# Patient Record
Sex: FEMALE | Race: BLACK OR AFRICAN AMERICAN | NOT HISPANIC OR LATINO | Employment: OTHER | URBAN - METROPOLITAN AREA
[De-identification: names, ages, dates, MRNs, and addresses within clinical notes are randomized per-mention and may not be internally consistent; named-entity substitution may affect disease eponyms.]

---

## 2017-01-17 ENCOUNTER — HOSPITAL ENCOUNTER (EMERGENCY)
Facility: OTHER | Age: 57
Discharge: HOME | End: 2017-01-17
Attending: EMERGENCY MEDICINE
Payer: MEDICAID

## 2017-01-17 VITALS
SYSTOLIC BLOOD PRESSURE: 134 MMHG | DIASTOLIC BLOOD PRESSURE: 68 MMHG | TEMPERATURE: 98 F | HEIGHT: 65 IN | WEIGHT: 213 LBS | OXYGEN SATURATION: 98 % | HEART RATE: 70 BPM | BODY MASS INDEX: 35.49 KG/M2 | RESPIRATION RATE: 16 BRPM

## 2017-01-17 DIAGNOSIS — R52 PAIN: ICD-10-CM

## 2017-01-17 DIAGNOSIS — M25.562 ACUTE PAIN OF LEFT KNEE: Primary | ICD-10-CM

## 2017-01-17 PROCEDURE — 96372 THER/PROPH/DIAG INJ SC/IM: CPT

## 2017-01-17 PROCEDURE — 63600175 PHARM REV CODE 636 W HCPCS: Performed by: EMERGENCY MEDICINE

## 2017-01-17 PROCEDURE — 99283 EMERGENCY DEPT VISIT LOW MDM: CPT | Mod: 25

## 2017-01-17 RX ORDER — KETOROLAC TROMETHAMINE 30 MG/ML
30 INJECTION, SOLUTION INTRAMUSCULAR; INTRAVENOUS
Status: COMPLETED | OUTPATIENT
Start: 2017-01-17 | End: 2017-01-17

## 2017-01-17 RX ORDER — IBUPROFEN 800 MG/1
800 TABLET ORAL 3 TIMES DAILY PRN
Qty: 20 TABLET | Refills: 0 | Status: SHIPPED | OUTPATIENT
Start: 2017-01-17 | End: 2017-02-02 | Stop reason: ALTCHOICE

## 2017-01-17 RX ADMIN — KETOROLAC TROMETHAMINE 30 MG: 30 INJECTION, SOLUTION INTRAMUSCULAR at 09:01

## 2017-01-17 NOTE — ED AVS SNAPSHOT
[facility]  [address] [address]               Susan Courtney   2017  8:37 PM   ED    Description:  Female : 1960   Department:  [facility]           Your Care was Coordinated By:     Provider Role From To    Yaw Veliz MD Attending Provider 17 --    Theresa Phillip PA-C Physician Assistant 17      Reason for Visit     Knee Pain           Diagnoses this Visit        Comments    Acute pain of left knee    -  Primary     Pain           ED Disposition     None           To Do List           Follow-up Information     Follow up with Archana Burgess MD. Schedule an appointment as soon as possible for a visit in 3 days.    Specialty:  General Practice    Contact information:    [address] [place] [id number] [phone]          Follow up with [facility].    Specialty:  Emergency Medicine    Why:  If symptoms worsen    Contact information:    [address] [address] [phone]       These Medications        Disp Refills Start End    ibuprofen (ADVIL,MOTRIN) 800 MG tablet 20 tablet 0 2017     Take 1 tablet (800 mg total) by mouth 3 (three) times daily as needed for Pain. - Oral    Pharmacy: [facility]/pharmacy #[id number] - [place], [place] - [address] Ph #: [phone]         [facility]     [facility] Care [facility] -  Assistance  Registered nurses in the [facility] provide clinical advisement, health education, appointment booking, and other advisory services.  Call for this free service at [phone].             Medications           Message regarding Medications     Verify the changes and/or additions to your medication regime listed below are the same as discussed with your clinician today.  If any of these changes or additions are incorrect, please notify your healthcare provider.    "     START taking these NEW medications        Refills    ibuprofen (ADVIL,MOTRIN) 800 MG tablet 0    Sig: Take 1 tablet (800 mg total) by mouth 3 (three) times daily as needed for Pain.    Class: Print    Route: Oral      These medications were administered today        Dose Freq    ketorolac injection 30 mg 30 mg ED 1 Time    Sig: Inject 30 mg into the muscle ED 1 Time.    Class: Normal    Route: Intramuscular           Verify that the below list of medications is an accurate representation of the medications you are currently taking.  If none reported, the list may be blank. If incorrect, please contact your healthcare provider. Carry this list with you in case of emergency.           Current Medications     ALBUTEROL INHL Inhale into the lungs as needed.    amitriptyline (ELAVIL) 150 MG Tab Take 150 mg by mouth every evening.    clotrimazole (LOTRIMIN) 1 % Soln Apply topically 2 (two) times daily.    fluticasone-salmeterol 100-50 mcg/dose (ADVAIR) 100-50 mcg/dose diskus inhaler Inhale 1 puff into the lungs 2 (two) times daily as needed.     hydrochlorothiazide (HYDRODIURIL) 25 MG tablet Take 25 mg by mouth once daily.    metformin (FORTAMET) 500 mg 24 hr tablet Take 500 mg by mouth 2 (two) times daily with meals.     omeprazole (PRILOSEC) 20 MG capsule Take 20 mg by mouth once daily.    ondansetron (ZOFRAN) 4 MG tablet Take 4 mg by mouth every 6 (six) hours as needed.    topiramate (TOPAMAX) 50 MG tablet Take 1 tablet (50 mg total) by mouth every evening.    ibuprofen (ADVIL,MOTRIN) 800 MG tablet Take 1 tablet (800 mg total) by mouth 3 (three) times daily as needed for Pain.    ketorolac injection 30 mg Inject 30 mg into the muscle ED 1 Time.           Clinical Reference Information           Your Vitals Were     BP Pulse Temp Resp Height Weight    137/62 (BP Location: Left arm, Patient Position: Sitting) 79 97.8 °F (36.6 °C) (Oral) 18 5' 5" (1.651 m) 96.6 kg (213 lb)    SpO2 BMI             98% 35.45 kg/m2    "      Allergies as of 1/17/2017        Reactions    Morphine       Immunizations Administered on Date of Encounter - 1/17/2017     None      ED Micro, Lab, POCT     None      ED Imaging Orders     Start Ordered       Status Ordering Provider    01/17/17 2041 01/17/17 2041  X-Ray Knee 3 View Left  1 time imaging      Final result       Discharge References/Attachments     ACE WRAP (ENGLISH)    ARTHRALGIA (ENGLISH)    KNEE PAIN AND SWELLING, REDUCING (ENGLISH)    IBUPROFEN ORAL TABLET (ENGLISH)      [facility] Sign-Up     Activating your [facility] account is as easy as 1-2-3!     1) Visit [email or web address], select Sign Up Now, enter this activation code and your date of birth, then select Next.  [email or web address]  Expires: 3/3/2017  9:22 PM      2) Create a username and password to use when you visit [facility] in the future and select a security question in case you lose your password and select Next.    3) Enter your e-mail address and click Sign Up!    Additional Information  If you have questions, please e-mail [email or web address] or call [phone] to talk to our [facility] staff. Remember, [facility] is NOT to be used for urgent needs. For medical emergencies, dial 911.          [facility] complies with applicable Federal civil rights laws and does not discriminate on the basis of race, color, national origin, age, disability, or sex.        Language Assistance Services     ATTENTION: Language assistance services are available, free of charge. Please call [phone].      ATENCIÓN: Si habla español, tiene a wilkerson disposición servicios gratuitos de asistencia lingüística. Llame al [phone].     CHÚ Ý: N?u b?n nói Ti?ng Vi?t, có các d?ch v? h? tr? ngôn ng? mi?n phí dành cho b?n. G?i s? [phone].

## 2017-01-18 NOTE — PROVIDER PROGRESS NOTES - EMERGENCY DEPT.
Encounter Date: 1/17/2017    ED Physician Progress Notes        Physician Note:   I evaluated the patient in triage and performed medical screening exam. Patient stable at this time and awaiting bed. Chief complaint and vital signs reviewed.

## 2017-01-18 NOTE — ED PROVIDER NOTES
Encounter Date: 1/17/2017    SCRIBE #1 NOTE: I, Roz Weller , am scribing for, and in the presence of, Dr. Veliz.       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 female who presents with complaint of knee pain. Symptoms began one week ago. Left knee pain is described as constant and moderate. Pt reports mild swelling to the knee, but denies fever, chills, nausea, vomiting, bruising, or back pain. She took Ibuprofen (800 mg) and applied heat and Icy-Hot with no relief. Pt reports an increased amount of standing, and has previously experienced symptoms.      The history is provided by the patient.     Past Medical History   Diagnosis Date    Abdominal hernia     Asthma     Diabetes mellitus     Hypertension     Migraine     Obesity     Sleep apnea      No past medical history pertinent negatives.  Past Surgical History   Procedure Laterality Date    Cholecystectomy      Hysterectomy       complete    Foot surgery       Family History   Problem Relation Age of Onset    Psoriasis Neg Hx     Melanoma Neg Hx     Lupus Neg Hx      Social History   Substance Use Topics    Smoking status: Never Smoker    Smokeless tobacco: Not on file    Alcohol use No     Review of Systems   Constitutional: Negative for chills and fever.   HENT: Negative for congestion and sore throat.    Eyes: Negative for redness and visual disturbance.   Respiratory: Negative for cough and shortness of breath.    Cardiovascular: Negative for chest pain and palpitations.   Gastrointestinal: Negative for abdominal pain, diarrhea, nausea and vomiting.   Genitourinary: Negative for dysuria.   Musculoskeletal: Positive for joint swelling. Negative for back pain.        Positive for left knee pain with mild  swelling.   Skin: Negative for rash.        Negative for bruising.   Neurological: Negative for weakness and headaches.   Psychiatric/Behavioral: Negative for confusion.       Physical Exam   Initial Vitals   BP Pulse Resp Temp SpO2   01/17/17 1951 01/17/17 1951 01/17/17 1951 01/17/17 1951 01/17/17 1951   137/62 79 18 97.8 °F (36.6 °C) 98 %     Physical Exam    Nursing note and vitals reviewed.  Constitutional: She appears well-developed and well-nourished. She is not diaphoretic. No distress.   HENT:   Head: Normocephalic and atraumatic.   Right Ear: External ear normal.   Left Ear: External ear normal.   Eyes: Conjunctivae and EOM are normal. Pupils are equal, round, and reactive to light. Right eye exhibits no discharge. Left eye exhibits no discharge. No scleral icterus.   Neck: Normal range of motion. Neck supple.   Cardiovascular: Normal rate, regular rhythm, normal heart sounds and intact distal pulses. Exam reveals no gallop and no friction rub.    No murmur heard.  Pulmonary/Chest: Breath sounds normal. No stridor. No respiratory distress. She has no wheezes. She has no rhonchi. She has no rales.   Abdominal: Soft. She exhibits no distension. There is no tenderness. There is no rebound and no guarding.   Musculoskeletal: Normal range of motion. She exhibits edema and tenderness.   Minimal swelling and tenderness to the left knee. Not ballotable, hot, or erythematous. Full ROM. Rest of the extremity is non-tender.   Neurological: She is alert and oriented to person, place, and time. She has normal strength. No cranial nerve deficit.   Skin: Skin is warm and dry. No rash noted. No erythema. No pallor.   Psychiatric: She has a normal mood and affect. Her behavior is normal. Judgment and thought content normal.         ED Course   Procedures  Labs Reviewed - No data to display      Imaging Results         X-Ray Knee 3 View Left (Final result) Result time:  01/17/17 21:02:33    Final result by Gonzalo MOSQUERA  MD Casper (01/17/17 21:02:33)    Impression:      No acute fracture. Mild osteoarthritis.      Electronically signed by: MARANDA CARMEN MD  Date:     01/17/17  Time:    21:02     Narrative:    AP, lateral, sunrise views of the left knee    Comparison: None available.    Findings:    There is no fracture or dislocation. There is no joint effusion. Mild tricompartment osteoarthritis.                X-Rays:   Independently Interpreted Readings:   Other Readings:  Left knee: No fracture or dislocation.    Medical Decision Making:   Clinical Tests:   Radiological Study: Ordered and Reviewed  ED Management:  Well-appearing patient presents with a week of knee pain.  Patient reports that she is standing on her feet all day and she thinks is leading to pain.  She has no warmth, no erythema, and minimal swelling on exam.  Forward motion of the knee, though she does report painful.  Diffusely tender including the popliteal fossa.  X-ray without acute findings.  X-ray does note arthritis.  I think this likely the cause of her pain. I have considered septic arthritis, but there are no signs of this, and she is afebrile. I recommend Ace wrap ice and ibuprofen 800.  I also recommend she follow-up with primary care to request sports medicine referral.      HERNANDEZ Veliz M.D.  01/18/2017  5:00 AM              Scribe Attestation:   Scribe #1: I performed the above scribed service and the documentation accurately describes the services I performed. I attest to the accuracy of the note.    Attending Attestation:           Physician Attestation for Scribe:  Physician Attestation Statement for Scribe #1: I, Dr. Veliz, reviewed documentation, as scribed by Roz Weller  in my presence, and it is both accurate and complete.                 ED Course     Clinical Impression:   Knee pain left          Yaw Veliz MD  01/18/17 0504

## 2017-01-18 NOTE — ED NOTES
Pt presents to the ED with c/o L knee pain 10/10 pain, limping gait, that has been going on for about a week. Pt reports she has been taking motrin without relief and using heat without relief. 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 Pt reports she has been trying multiple home therapies but nothing is working. Pt has minimal swelling around the L knee and tenderness to palpation.

## 2017-01-20 ENCOUNTER — HOSPITAL ENCOUNTER (EMERGENCY)
Facility: OTHER | Age: 57
Discharge: HOME | End: 2017-01-21
Attending: EMERGENCY MEDICINE
Payer: MEDICAID

## 2017-01-20 DIAGNOSIS — M25.562 ACUTE PAIN OF LEFT KNEE: ICD-10-CM

## 2017-01-20 DIAGNOSIS — R60.0 LEG EDEMA: Primary | ICD-10-CM

## 2017-01-20 DIAGNOSIS — M71.22 BAKER'S CYST OF KNEE, LEFT: ICD-10-CM

## 2017-01-20 PROCEDURE — 99284 EMERGENCY DEPT VISIT MOD MDM: CPT

## 2017-01-20 NOTE — ED AVS SNAPSHOT
[facility]  [address] [id number]               Susan Courtney   2017 10:26 PM   ED    Description:  Female : 1960   Department:  [facility]           Your Care was Coordinated By:     Provider Role From To    Isaiah Pedraza MD Attending Provider 17 --    Yanet Parker PA-C Physician Assistant 17 --      Reason for Visit     Knee Pain           Diagnoses this Visit        Comments    Leg edema    -  Primary     Acute pain of left knee         Baker's cyst of knee, left           ED Disposition     ED Disposition Condition Comment    Discharge             To Do List           Follow-up Information     Follow up with Ahmet Shannon MD In 2 days.    Specialty:  Orthopedic Surgery    Why:  As previously planned.     Contact information:    [address] [place] [id number]  [phone]         These Medications        Disp Refills Start End    meloxicam (MOBIC) 7.5 MG tablet 20 tablet 0 2017     Take 1 tablet (7.5 mg total) by mouth once daily. - Oral    Pharmacy: [facility]/pharmacy #[id number] - [place], [place] - [address] Ph #: [phone]         [facility]     [facility] -  Assistance  Registered nurses in the [facility] provide clinical advisement, health education, appointment booking, and other advisory services.  Call for this free service at [phone].             Medications           Message regarding Medications     Verify the changes and/or additions to your medication regime listed below are the same as discussed with your clinician today.  If any of these changes or additions are incorrect, please notify your healthcare provider.        START taking these NEW medications        Refills    meloxicam (MOBIC) 7.5 MG tablet 0    Sig: Take 1 tablet (7.5 mg total) by mouth once daily.    Class: Print    Route: Oral      STOP  "taking these medications     fluticasone-salmeterol 100-50 mcg/dose (ADVAIR) 100-50 mcg/dose diskus inhaler Inhale 1 puff into the lungs 2 (two) times daily as needed.            Verify that the below list of medications is an accurate representation of the medications you are currently taking.  If none reported, the list may be blank. If incorrect, please contact your healthcare provider. Carry this list with you in case of emergency.           Current Medications     ALBUTEROL INHL Inhale into the lungs as needed.    amitriptyline (ELAVIL) 150 MG Tab Take 150 mg by mouth every evening.    hydrochlorothiazide (HYDRODIURIL) 25 MG tablet Take 25 mg by mouth once daily.    ibuprofen (ADVIL,MOTRIN) 800 MG tablet Take 1 tablet (800 mg total) by mouth 3 (three) times daily as needed for Pain.    metformin (FORTAMET) 500 mg 24 hr tablet Take 500 mg by mouth 2 (two) times daily with meals.     omeprazole (PRILOSEC) 20 MG capsule Take 20 mg by mouth once daily.    topiramate (TOPAMAX) 50 MG tablet Take 1 tablet (50 mg total) by mouth every evening.    clotrimazole (LOTRIMIN) 1 % Soln Apply topically 2 (two) times daily.    meloxicam (MOBIC) 7.5 MG tablet Take 1 tablet (7.5 mg total) by mouth once daily.    ondansetron (ZOFRAN) 4 MG tablet Take 4 mg by mouth every 6 (six) hours as needed.           Clinical Reference Information           Your Vitals Were     BP Pulse Temp Resp Height Weight    128/73 (BP Location: Left arm, Patient Position: Sitting, BP Method: Automatic) 75 97.8 °F (36.6 °C) (Oral) 18 5' 5.5" (1.664 m) 99.8 kg (220 lb)    SpO2 BMI             98% 36.05 kg/m2         Allergies as of 1/21/2017        Reactions    Morphine       Immunizations Administered on Date of Encounter - 1/21/2017     None      ED Micro, Lab, POCT     None      ED Imaging Orders     Start Ordered       Status Ordering Provider    01/20/17 2249 01/20/17 2249   Lower Extremity Veins Left  1 time imaging      Final result     "     Discharge Instructions           Treatment for Bakers Cyst (Popliteal Cyst)  A Bakers cyst (popliteal cyst) is a fluid-filled sac that forms behind the knee.  Types of treatment  You likely wont need any treatment if you dont have any symptoms from your Bakers cyst. Some Bakers cysts go away without any treatment. If your cyst starts causing symptoms, you might need treatment at that time.  If you do have symptoms, you may be treated depending on the cause of your cyst. For example, you may need medicine for rheumatoid arthritis. Or you may need physical therapy for osteoarthritis.  Other treatments for a Bakers cyst can include:  · Over-the-counter pain medicines  · Arthrocentesis to remove extra fluid from the joint space  · Steroid injection into the joint to reduce cyst size  · Surgery to remove the cyst  Possible complications of a Bakers cyst  In rare cases, a Bakers cyst may cause complications. The cyst may get larger, which may cause redness and swelling. The cyst may also rupture, causing warmth, redness, and pain in your calf.  The symptoms may be the same as a blood clot in the veins of the legs. Your health care provider may need imaging tests of your leg to make sure you dont have a clot. Rupture can also lead to its own complications, such as:  · Trapping of a tibial nerve. This cases calf pain and numbness behind the leg. It can be treated with arthrocentesis and steroid injections.  · Blockage of the popliteal artery. This causes pain and lack of blood flow to the leg. It can also be treated with arthrocentesis and steroid injections.  · Compartment syndrome. This causes intense pain and problems moving the foot or toes. Compartment syndrome is a medical emergency. It needs immediate surgery. It can lead to permanent muscle damage if not treated right away.  When to call the health care provider  If your cyst starts causing mild symptoms, plan to see your health care provider soon. See  him or her right away if you have symptoms such as redness and swelling of your leg. These symptoms may mean your Bakers cyst has ruptured.      © 1086-5042 [employer]. [address] [place] [address], [place] [address]. All rights reserved. This information is not intended as a substitute for professional medical care. Always follow your healthcare professional's instructions.          Understanding Bakers Cyst (Popliteal Cyst)  A Bakers cyst (popliteal cyst) is a fluid-filled sac that forms behind the knee.  Parts of the knee  The knee is a complex joint that has many parts. The lower end of the thighbone (femur) rotates on the upper end of the shinbone (tibia). There are several small bursae around the knee joint. These are small sacs filled with a special fluid (synovial fluid) that cushions the rest of the joint. Between the bones is a space that also contains this fluid.  What causes a Bakers cyst?  A small bursa sits just below the crease at the back of your knee. When this sac fills with too much fluid, its called a Bakers cyst. This might happen when an injury or disease causes extra synovial fluid to leak into the bursa from the joint space.  In adults, other problems with the knee joint often cause the Bakers cyst. Injury or a knee disorder can change the normal structure of the knee joint. This can cause a cyst to form.  The synovial fluid inside the joint space may build up as a result of injury or disease. As the pressure builds up, the fluid may bulge into the back of the knee. This can cause the cyst.  Symptoms of a Bakers cyst  A Bakers cyst often doesnt cause symptoms. A cyst will more often be seen on an imaging test, like MRI, done for other reasons. If you do have symptoms, they may include:  · Pain in the back of the knee  · Knee stiffness  · Sense of swelling or fullness behind the knee, especially when you straighten your leg  · A swelling behind the knee that goes away when  you bend your knee  These symptoms tend to get worse when standing for a long time, or being active.  Diagnosing a Bakers cyst  Your health care provider will ask you about your medical history and your symptoms. He or she will give you a physical exam, which will include a careful exam of your knee. Its important to make sure your symptoms are caused by a Bakers cyst and not a tumor or a blood clot.  If the cause of your symptoms is not clear, you may have imaging tests, such as:  · Ultrasound, to look at the cyst in more detail  · X-ray, to get more information about the bones of the joint  · MRI, if the diagnosis is still unclear after ultrasound, or if your health care provider is considering surgery  © 5974-2768 [employer]. [address] [place] [address], [place] [address]. All rights reserved. This information is not intended as a substitute for professional medical care. Always follow your healthcare professional's instructions.        Knee Pain of Uncertain Cause    There are several common causes for knee pain. These can include:  · A sprain of the ligaments that support the joint  · An injury to the cartilage lining of the joint  · Arthritis from wear-and-tear or inflammation  There are other causes as well. There may also be swelling, reduced movement of the knee joint, and pain with walking. A definite diagnosis will still need to be made. If your symptoms do not improve, further follow-up and testing may be needed.  Home care  · Stay off the injured leg as much as possible until pain improves.  · Apply an ice pack over the injured area for 15 to 20 minutes every 3 to 6 hours. You should do this for the first 24 to 48 hours. You can make an ice pack by filling a plastic bag that seals at the top with ice cubes and then wrapping it with a thin towel. Continue to use ice packs for relief of pain and swelling as needed. As the ice melts, be careful to avoid getting your wrap, splint, or cast wet.  After 48 hours, apply heat (warm shower or warm bath) for 15 to 20 minutes several times a day, or alternate ice and heat. If you have to wear a hook-and-loop knee brace, you can open it to apply the ice pack, or heat, directly to the knee. Never put ice directly on the skin. Always wrap the ice in a towel or other type of cloth.  · You may use over-the-counter pain medicine to control pain, unless another pain medicine was prescribed. If you have chronic liver or kidney disease or ever had a stomach ulcer or GI bleeding, talk with your healthcare provider using these medicines.  · If crutches or a walker have been recommended, do not put weight on the injured leg until you can do so without pain. Check with your healthcare provider before returning to sports or full work duties.  · If you have a hook-and-loop knee brace, you can remove it to bathe and sleep, unless told otherwise.  Follow-up care  Follow up with your healthcare provider as advised. This is usually within 1-2 weeks.  If X-rays were taken, you will be told of any new findings that may affect your care.  Call 911  Call 911 if you have:  · Shortness of breath  · Chest pain  When to seek medical advice  Call your healthcare provider right away if any of these occur:  · Toes or foot becomes swollen, cold, blue, numb, or tingly  · Pain or swelling spreads over the knee or calf  · Warmth or redness appears over the knee or calf  · Other joints become painful  · Rash appears  · Fever of 100.4°F (38°C) or above lasting for 24 to 48 hours  © 0621-7102 [employer]. [address] [place] [address], [place] [address]. All rights reserved. This information is not intended as a substitute for professional medical care. Always follow your healthcare professional's instructions.          Reducing Knee Pain and Swelling    Many treatments can help reduce pain and swelling in your knee. Your healthcare provider or physical therapist may suggest one or more of the  following treatments:  · Icing your knee helps reduce swelling. You may be asked to ice your knee once a day or more. Apply ice for about 15 to 20 minutes at a time, with at least 40 minutes between sessions. Always keep a towel between the ice and your skin.   · Keeping your leg raised above your heart helps excess fluid flow out of your knee joint. This reduces swelling.  · Compression means wrapping an elastic bandage or neoprene sleeve snugly around your knees. This keeps fluid from collecting in your knee joint.  · Electrical stimulation, done by a physical therapist or , can help reduce excess fluid in your knee joint.  · Anti-inflammatory medicines may be prescribed by your healthcare provider. You may take pills or receive injections in your knee.  · Isometric (cynthia) exercises strengthen the muscles that support your knee joint. They also help reduce excess fluid in your knee.  · Massage helps fluid drain away from your knee.  © 9399-6007 [employer]. [address] [place] [address]. All rights reserved. This information is not intended as a substitute for professional medical care. Always follow your healthcare professional's instructions.          Leg Swelling in a Single Leg  Swelling of the arms, feet, ankles, and legs is called edema. It is caused by extra fluid collecting in the tissues. Because of gravity, extra fluid in the body settles to the lowest part. That is why the legs and feet are most affected. You have swelling in a single leg.  Some of the causes for swelling in only a single leg include:  · Infection in the foot or leg  · Long-term problem with a vein not working well (venous insufficiency)  · Swollen, twisted vein in the leg (varicose veins)  · Insect bite or sting on the foot or leg  · Injury or recent surgery on the foot or leg  · Blood clot in a deep vein of the leg (deep vein thrombosis or DVT)  · Inflammation of the joints of the lower  leg  Medical treatment will depend on what is causing your swelling.  Home care  Follow these guidelines when caring for yourself at home:  · Dont wear tight clothing.  · Keep your legs up while lying or sitting.  · Take any medicines as directed.  · If infection, injury, or recent surgery is the cause of your swelling, stay off your legs as much as possible until your symptoms get better.  · If you have venous insufficiency or varicose veins, dont sit or  one place for long periods of time. Take breaks and walk around every few hours. Talk with your healthcare provider about wearing support stockings to help lessen swelling during the day.  · Wear compression stockings with your doctor's approval  Follow-up care  Follow up with your healthcare provider as advised.  Call 911  Call 911 if any of these occur:  · Shortness of breath or trouble breathing  · Chest pain  · Coughing up blood  · Fainting or loss of consciousness   When to seek medical advice  Call your healthcare provider right away if any of these occur:  · Increased pain, swelling, warmth, or redness of the leg, ankle, or foot  · Fever of 100.4°F (38ºC) or higher, or as directed by your healthcare provider  · Weakness or dizziness  · Shaking chills  · Drenching sweats  © 8743-1307 [employer]. [address] [place] [address], [place] [address]. All rights reserved. This information is not intended as a substitute for professional medical care. Always follow your healthcare professional's instructions.          [facility] Sign-Up     Activating your [facility] account is as easy as 1-2-3!     1) Visit [email or web address], select Sign Up Now, enter this activation code and your date of birth, then select Next.  [email or web address]  Expires: 3/3/2017  9:22 PM      2) Create a username and password to use when you visit [facility] in the future and select a security question in case you lose your password and select Next.    3) Enter your e-mail address  and click Sign Up!    Additional Information  If you have questions, please e-mail [email or web address] or call [phone] to talk to our [facility] staff. Remember, [facility] is NOT to be used for urgent needs. For medical emergencies, dial 911.          [facility] complies with applicable Federal civil rights laws and does not discriminate on the basis of race, color, national origin, age, disability, or sex.        Language Assistance Services     ATTENTION: Language assistance services are available, free of charge. Please call [phone].      ATENCIÓN: Si habla español, tiene a wilkerson disposición servicios gratuitos de asistencia lingüística. Llame al [phone].     CHÚ Ý: N?u b?n nói Ti?ng Vi?t, có các d?ch v? h? tr? ngôn ng? mi?n phí dành cho b?n. G?i s? [phone].

## 2017-01-21 VITALS
SYSTOLIC BLOOD PRESSURE: 132 MMHG | OXYGEN SATURATION: 97 % | HEIGHT: 66 IN | WEIGHT: 220 LBS | DIASTOLIC BLOOD PRESSURE: 74 MMHG | BODY MASS INDEX: 35.36 KG/M2 | RESPIRATION RATE: 18 BRPM | TEMPERATURE: 98 F | HEART RATE: 79 BPM

## 2017-01-21 RX ORDER — MELOXICAM 7.5 MG/1
7.5 TABLET ORAL DAILY
Qty: 20 TABLET | Refills: 0 | Status: SHIPPED | OUTPATIENT
Start: 2017-01-21 | End: 2017-02-02 | Stop reason: SDDI

## 2017-01-21 NOTE — ED NOTES
Pain to left knee X 2 weeks, was using creams and goody's without relief. Seen in ER on Tuesday. Pain getting worse and now swelling. Pt stands to cook all day.

## 2017-01-21 NOTE — DISCHARGE INSTRUCTIONS
Treatment for Bakers Cyst (Popliteal Cyst)  A Bakers cyst (popliteal cyst) is a fluid-filled sac that forms behind the knee.  Types of treatment  You likely wont need any treatment if you dont have any symptoms from your Bakers cyst. Some Bakers cysts go away without any treatment. If your cyst starts causing symptoms, you might need treatment at that time.  If you do have symptoms, you may be treated depending on the cause of your cyst. For example, you may need medicine for rheumatoid arthritis. Or you may need physical therapy for osteoarthritis.  Other treatments for a Bakers cyst can include:  · Over-the-counter pain medicines  · Arthrocentesis to remove extra fluid from the joint space  · Steroid injection into the joint to reduce cyst size  · Surgery to remove the cyst  Possible complications of a Bakers cyst  In rare cases, a Bakers cyst may cause complications. The cyst may get larger, which may cause redness and swelling. The cyst may also rupture, causing warmth, redness, and pain in your calf.  The symptoms may be the same as a blood clot in the veins of the legs. Your health care provider may need imaging tests of your leg to make sure you dont have a clot. Rupture can also lead to its own complications, such as:  · Trapping of a tibial nerve. This cases calf pain and numbness behind the leg. It can be treated with arthrocentesis and steroid injections.  · Blockage of the popliteal artery. This causes pain and lack of blood flow to the leg. It can also be treated with arthrocentesis and steroid injections.  · Compartment syndrome. This causes intense pain and problems moving the foot or toes. Compartment syndrome is a medical emergency. It needs immediate surgery. It can lead to permanent muscle damage if not treated right away.  When to call the health care provider  If your cyst starts causing mild symptoms, plan to see your health care provider soon. See him or her right away if you  have symptoms such as redness and swelling of your leg. These symptoms may mean your Bakers cyst has ruptured.      © 6210-9430 [employer]. [address] [place] [address], [place] [address]. All rights reserved. This information is not intended as a substitute for professional medical care. Always follow your healthcare professional's instructions.          Understanding Bakers Cyst (Popliteal Cyst)  A Bakers cyst (popliteal cyst) is a fluid-filled sac that forms behind the knee.  Parts of the knee  The knee is a complex joint that has many parts. The lower end of the thighbone (femur) rotates on the upper end of the shinbone (tibia). There are several small bursae around the knee joint. These are small sacs filled with a special fluid (synovial fluid) that cushions the rest of the joint. Between the bones is a space that also contains this fluid.  What causes a Bakers cyst?  A small bursa sits just below the crease at the back of your knee. When this sac fills with too much fluid, its called a Bakers cyst. This might happen when an injury or disease causes extra synovial fluid to leak into the bursa from the joint space.  In adults, other problems with the knee joint often cause the Bakers cyst. Injury or a knee disorder can change the normal structure of the knee joint. This can cause a cyst to form.  The synovial fluid inside the joint space may build up as a result of injury or disease. As the pressure builds up, the fluid may bulge into the back of the knee. This can cause the cyst.  Symptoms of a Bakers cyst  A Bakers cyst often doesnt cause symptoms. A cyst will more often be seen on an imaging test, like MRI, done for other reasons. If you do have symptoms, they may include:  · Pain in the back of the knee  · Knee stiffness  · Sense of swelling or fullness behind the knee, especially when you straighten your leg  · A swelling behind the knee that goes away when you bend your knee  These  symptoms tend to get worse when standing for a long time, or being active.  Diagnosing a Bakers cyst  Your health care provider will ask you about your medical history and your symptoms. He or she will give you a physical exam, which will include a careful exam of your knee. Its important to make sure your symptoms are caused by a Bakers cyst and not a tumor or a blood clot.  If the cause of your symptoms is not clear, you may have imaging tests, such as:  · Ultrasound, to look at the cyst in more detail  · X-ray, to get more information about the bones of the joint  · MRI, if the diagnosis is still unclear after ultrasound, or if your health care provider is considering surgery  © 2879-6688 [employer]. [address] [place] [address], [place] [id number]. All rights reserved. This information is not intended as a substitute for professional medical care. Always follow your healthcare professional's instructions.        Knee Pain of Uncertain Cause    There are several common causes for knee pain. These can include:  · A sprain of the ligaments that support the joint  · An injury to the cartilage lining of the joint  · Arthritis from wear-and-tear or inflammation  There are other causes as well. There may also be swelling, reduced movement of the knee joint, and pain with walking. A definite diagnosis will still need to be made. If your symptoms do not improve, further follow-up and testing may be needed.  Home care  · Stay off the injured leg as much as possible until pain improves.  · Apply an ice pack over the injured area for 15 to 20 minutes every 3 to 6 hours. You should do this for the first 24 to 48 hours. You can make an ice pack by filling a plastic bag that seals at the top with ice cubes and then wrapping it with a thin towel. Continue to use ice packs for relief of pain and swelling as needed. As the ice melts, be careful to avoid getting your wrap, splint, or cast wet. After 48 hours, apply heat  (warm shower or warm bath) for 15 to 20 minutes several times a day, or alternate ice and heat. If you have to wear a hook-and-loop knee brace, you can open it to apply the ice pack, or heat, directly to the knee. Never put ice directly on the skin. Always wrap the ice in a towel or other type of cloth.  · You may use over-the-counter pain medicine to control pain, unless another pain medicine was prescribed. If you have chronic liver or kidney disease or ever had a stomach ulcer or GI bleeding, talk with your healthcare provider using these medicines.  · If crutches or a walker have been recommended, do not put weight on the injured leg until you can do so without pain. Check with your healthcare provider before returning to sports or full work duties.  · If you have a hook-and-loop knee brace, you can remove it to bathe and sleep, unless told otherwise.  Follow-up care  Follow up with your healthcare provider as advised. This is usually within 1-2 weeks.  If X-rays were taken, you will be told of any new findings that may affect your care.  Call 911  Call 911 if you have:  · Shortness of breath  · Chest pain  When to seek medical advice  Call your healthcare provider right away if any of these occur:  · Toes or foot becomes swollen, cold, blue, numb, or tingly  · Pain or swelling spreads over the knee or calf  · Warmth or redness appears over the knee or calf  · Other joints become painful  · Rash appears  · Fever of 100.4°F (38°C) or above lasting for 24 to 48 hours  © 7236-1761 [employer]. [address] [place] [address], [place] [address]. All rights reserved. This information is not intended as a substitute for professional medical care. Always follow your healthcare professional's instructions.          Reducing Knee Pain and Swelling    Many treatments can help reduce pain and swelling in your knee. Your healthcare provider or physical therapist may suggest one or more of the following  treatments:  · Icing your knee helps reduce swelling. You may be asked to ice your knee once a day or more. Apply ice for about 15 to 20 minutes at a time, with at least 40 minutes between sessions. Always keep a towel between the ice and your skin.   · Keeping your leg raised above your heart helps excess fluid flow out of your knee joint. This reduces swelling.  · Compression means wrapping an elastic bandage or neoprene sleeve snugly around your knees. This keeps fluid from collecting in your knee joint.  · Electrical stimulation, done by a physical therapist or , can help reduce excess fluid in your knee joint.  · Anti-inflammatory medicines may be prescribed by your healthcare provider. You may take pills or receive injections in your knee.  · Isometric (cynthia) exercises strengthen the muscles that support your knee joint. They also help reduce excess fluid in your knee.  · Massage helps fluid drain away from your knee.  © 3301-3722 [employer]. [address] [place] [address]. All rights reserved. This information is not intended as a substitute for professional medical care. Always follow your healthcare professional's instructions.          Leg Swelling in a Single Leg  Swelling of the arms, feet, ankles, and legs is called edema. It is caused by extra fluid collecting in the tissues. Because of gravity, extra fluid in the body settles to the lowest part. That is why the legs and feet are most affected. You have swelling in a single leg.  Some of the causes for swelling in only a single leg include:  · Infection in the foot or leg  · Long-term problem with a vein not working well (venous insufficiency)  · Swollen, twisted vein in the leg (varicose veins)  · Insect bite or sting on the foot or leg  · Injury or recent surgery on the foot or leg  · Blood clot in a deep vein of the leg (deep vein thrombosis or DVT)  · Inflammation of the joints of the lower  leg  Medical treatment will depend on what is causing your swelling.  Home care  Follow these guidelines when caring for yourself at home:  · Dont wear tight clothing.  · Keep your legs up while lying or sitting.  · Take any medicines as directed.  · If infection, injury, or recent surgery is the cause of your swelling, stay off your legs as much as possible until your symptoms get better.  · If you have venous insufficiency or varicose veins, dont sit or  one place for long periods of time. Take breaks and walk around every few hours. Talk with your healthcare provider about wearing support stockings to help lessen swelling during the day.  · Wear compression stockings with your doctor's approval  Follow-up care  Follow up with your healthcare provider as advised.  Call 911  Call 911 if any of these occur:  · Shortness of breath or trouble breathing  · Chest pain  · Coughing up blood  · Fainting or loss of consciousness   When to seek medical advice  Call your healthcare provider right away if any of these occur:  · Increased pain, swelling, warmth, or redness of the leg, ankle, or foot  · Fever of 100.4°F (38ºC) or higher, or as directed by your healthcare provider  · Weakness or dizziness  · Shaking chills  · Drenching sweats  © 7395-8039 [employer]. [address] [place] [address], [place] [address]. All rights reserved. This information is not intended as a substitute for professional medical care. Always follow your healthcare professional's instructions.

## 2017-01-21 NOTE — ED PROVIDER NOTES
Encounter Date: 1/20/2017       History     Chief Complaint   Patient presents with    Knee Pain     c/o atraumatic left knee pain unresolved from 1/17 visit. Reports taking Ibu without relief     Review of patient's allergies indicates:   Allergen Reactions    Morphine      HPI Comments: Patient is 56-year-old female who presents with complaints of left knee pain and lower extremity swelling that is been present for 5 days prior to arrival.  She reports being seen in this emergency department 3 days ago and was given anti-inflammatories and told to follow-up with  her primary care physician for consideration of orthopedics referral.  She reports having an appointment scheduled for Monday which is 2 days from now but reports that pain is worsening and now she has swelling all the way down to her foot.  She reports for the last 4 days she has not been working and she has been resting with her leg elevated.  She has been taking anti-inflammatories as well as her chronic pain medication (Percocet ) which she is reporting does not work for her.  She has no report of recurrent trauma or injury, denies fever, chills, nausea, vomiting, chest pain, shortness of breath, bleeding, dizziness, altered mental status.  She is currently unaccompanied in the exam room.  Of note, she is planning to drive herself home from the emergency department tonight.      The history is provided by the patient.     Past Medical History   Diagnosis Date    Abdominal hernia     Asthma     Diabetes mellitus     Hypertension     Migraine     Obesity     Sleep apnea      No past medical history pertinent negatives.  Past Surgical History   Procedure Laterality Date    Cholecystectomy      Hysterectomy       complete    Foot surgery       Family History   Problem Relation Age of Onset    Psoriasis Neg Hx     Melanoma Neg Hx     Lupus Neg Hx      Social History   Substance Use Topics    Smoking status: Never Smoker    Smokeless  tobacco: None    Alcohol use No     Review of Systems   Constitutional: Negative for chills and fever.   HENT: Negative for sore throat and trouble swallowing.    Eyes: Negative for visual disturbance.   Respiratory: Negative for cough and shortness of breath.    Cardiovascular: Negative for chest pain.   Gastrointestinal: Negative for abdominal pain, constipation, diarrhea, nausea and vomiting.   Genitourinary: Negative for dysuria and flank pain.   Musculoskeletal: Negative for back pain, neck pain and neck stiffness.        Left knee pain   Left leg swelling      Skin: Negative for rash.   Neurological: Negative for dizziness, syncope, weakness and headaches.   Psychiatric/Behavioral: Negative for confusion.       Physical Exam   Initial Vitals   BP Pulse Resp Temp SpO2   01/20/17 2203 01/20/17 2203 01/20/17 2203 01/20/17 2203 01/20/17 2203   138/74 87 18 98 °F (36.7 °C) 98 %     Physical Exam    Nursing note and vitals reviewed.  Constitutional: She appears well-developed and well-nourished. She is not diaphoretic. No distress.   Healthy appearing -American female who appears to be uncomfortable during interview and exam.  She is sitting in upright position guarding her left knee.  She makes good eye contact, speaks in clear full sentences and does not ambulate during my exam however she does ambulate into the exam room from the lobby with antalgic gait.   HENT:   Head: Normocephalic and atraumatic.   Eyes: Conjunctivae and EOM are normal. Pupils are equal, round, and reactive to light. Right eye exhibits no discharge. Left eye exhibits no discharge. No scleral icterus.   Neck: Normal range of motion.   Cardiovascular: Normal rate, regular rhythm and normal heart sounds. Exam reveals no gallop and no friction rub.    No murmur heard.  Pulmonary/Chest: Breath sounds normal. She has no wheezes. She has no rhonchi. She has no rales.   Abdominal: Soft. Bowel sounds are normal. There is no tenderness. There  is no rebound and no guarding.   Musculoskeletal: Normal range of motion. She exhibits no edema or tenderness.   Left knee is tender to palpation over medial and lateral joint lines, suprapatellar region, popliteal fossa with mild overlying edema.  No erythema or warmth to touch.  No overlying skin changes.  She does have positive Homans sign and 2+ pitting edema in at ankle and dorsum of foot.  There is 2+ DP and PT pulse.  Normal capillary refill and normal sensation to light touch.    Right lower extremity has normal exam.  There is very mild dorsal pedal edema noted to right lower extremity.   Lymphadenopathy:     She has no cervical adenopathy.   Neurological: She is alert and oriented to person, place, and time. She has normal strength.   Skin: Skin is warm and dry. No rash and no abscess noted. No erythema.   Psychiatric: She has a normal mood and affect. Her behavior is normal. Thought content normal.         ED Course   Procedures  Labs Reviewed - No data to display     Imaging Results         US Lower Extremity Veins Left (Final result) Result time:  01/21/17 00:11:07    Final result by Malik Dennis MD (01/21/17 00:11:07)    Impression:      No evidence of acute venous thrombosis of the left lower extremity.    Small amount of fluid in the prepatellar soft tissues.    3.1 x 2.3 x 1.3 cm hypoechoic collection in the posterior aspect of the left knee.  The findings may represent a Baker's cyst.  Outpatient MRI of the left knee may be obtained for further evaluation (preferably with IV contrast).      Electronically signed by: MALIK DENNIS MD  Date:     01/21/17  Time:    00:11     Narrative:    [id number]  01/20/17  23:04:02 [id number] ([facility]) : US LOWER EXTREMITY VEINS LEFT      ADDITIONAL CLINICAL HISTORY: N/A    TECHNIQUE: Duplex and color flow Doppler evaluation of the left lower extremity.    COMPARISON: X-rays of the left knee dated 01/17/17.    FINDINGS:  Left lower extremity has  no evidence of acute venous  thrombosis in the common femoral, superficial femoral, greater saphenous, popliteal, peroneal, anterior tibial, and posterior tibial veins.  There is no reflux to suggest valvular incompetence.    There is a 2.3 x 0.5 x 2.5 cm fluid in the prepatellar soft tissues.  In the region of the popliteal fossa, there is a 3.1 x 2.3 x 1.3 cm hypoechoic collection.                 Medical Decision Making:   ED Management:  Urgent evaluation of 56-year-old female who presents with complaints of atraumatic left knee pain now with lower extremity swelling concerning for DVT.  Patient is afebrile, nontoxic appearing, hemodynamically stable.  Physical exam reveals generalized knee tenderness to palpation with no range of motion deficits.  There is overlying edema with no clinical concerns for septic joint.  Distally there is tenderness to palpation of her calf with lower extremity edema.  Normal pulses, strength against resistance and sensation to light touch.  Venous ultrasound pending.  I did review x-ray from previous visit 4 days ago which reveals arthritic changes no concern for fracture.  Narcotic analgesia not felt appropriate at this time given patient is planning to drive herself home tonight.  Offered anti-inflammatories initially patient refuses.  We'll continue to monitor.    UPDATE: No evidence of DVT in LLE. There is evidence of Baker's cyst and a small amount of fluid in the pre-patellar soft tissue. Will place patient in ace from ankle to knee for compression and improved edema. She is encouraged to follow-up with ortho as previously planned. She is made aware of signs and symptoms of worsening and is told if these present she should return to the ER.  She is amenable.  Case is discussed with attending who agrees with plan.                   ED Course     Clinical Impression:   The primary encounter diagnosis was Leg edema. Diagnoses of Acute pain of left knee and Baker's cyst of knee, left were also pertinent to  this visit.          Yanet Parker PA-C  01/21/17 0025

## 2017-02-02 ENCOUNTER — HOSPITAL ENCOUNTER (EMERGENCY)
Facility: OTHER | Age: 57
Discharge: HOME | End: 2017-02-02
Attending: EMERGENCY MEDICINE
Payer: MEDICAID

## 2017-02-02 VITALS
OXYGEN SATURATION: 95 % | TEMPERATURE: 99 F | HEART RATE: 77 BPM | WEIGHT: 220 LBS | HEIGHT: 66 IN | BODY MASS INDEX: 35.36 KG/M2 | SYSTOLIC BLOOD PRESSURE: 127 MMHG | DIASTOLIC BLOOD PRESSURE: 76 MMHG | RESPIRATION RATE: 18 BRPM

## 2017-02-02 DIAGNOSIS — R06.02 SHORTNESS OF BREATH: Primary | ICD-10-CM

## 2017-02-02 LAB
ALBUMIN SERPL BCP-MCNC: 3.7 G/DL
ALP SERPL-CCNC: 111 U/L
ALT SERPL W/O P-5'-P-CCNC: 15 U/L
ANION GAP SERPL CALC-SCNC: 13 MMOL/L
AST SERPL-CCNC: 9 U/L
BASOPHILS # BLD AUTO: 0.01 K/UL
BASOPHILS NFR BLD: 0.1 %
BILIRUB SERPL-MCNC: 0.2 MG/DL
BNP SERPL-MCNC: 19 PG/ML
BUN SERPL-MCNC: 21 MG/DL
CALCIUM SERPL-MCNC: 10 MG/DL
CHLORIDE SERPL-SCNC: 97 MMOL/L
CO2 SERPL-SCNC: 30 MMOL/L
CREAT SERPL-MCNC: 0.9 MG/DL
D DIMER PPP IA.FEU-MCNC: 0.2 MG/L FEU
DIFFERENTIAL METHOD: ABNORMAL
EOSINOPHIL # BLD AUTO: 0 K/UL
EOSINOPHIL NFR BLD: 0 %
ERYTHROCYTE [DISTWIDTH] IN BLOOD BY AUTOMATED COUNT: 14.7 %
EST. GFR  (AFRICAN AMERICAN): >60 ML/MIN/1.73 M^2
EST. GFR  (NON AFRICAN AMERICAN): >60 ML/MIN/1.73 M^2
GLUCOSE SERPL-MCNC: 170 MG/DL
HCT VFR BLD AUTO: 40.4 %
HGB BLD-MCNC: 12.8 G/DL
LYMPHOCYTES # BLD AUTO: 1.4 K/UL
LYMPHOCYTES NFR BLD: 17.4 %
MCH RBC QN AUTO: 25.7 PG
MCHC RBC AUTO-ENTMCNC: 31.7 %
MCV RBC AUTO: 81 FL
MONOCYTES # BLD AUTO: 1.2 K/UL
MONOCYTES NFR BLD: 16 %
NEUTROPHILS # BLD AUTO: 5.1 K/UL
NEUTROPHILS NFR BLD: 66.2 %
PLATELET # BLD AUTO: 383 K/UL
PMV BLD AUTO: 9 FL
POTASSIUM SERPL-SCNC: 3.7 MMOL/L
PROT SERPL-MCNC: 9 G/DL
RBC # BLD AUTO: 4.98 M/UL
SODIUM SERPL-SCNC: 140 MMOL/L
TROPONIN I SERPL DL<=0.01 NG/ML-MCNC: 0.01 NG/ML
WBC # BLD AUTO: 7.76 K/UL

## 2017-02-02 PROCEDURE — 80053 COMPREHEN METABOLIC PANEL: CPT

## 2017-02-02 PROCEDURE — 93010 ELECTROCARDIOGRAM REPORT: CPT | Mod: ,,, | Performed by: INTERNAL MEDICINE

## 2017-02-02 PROCEDURE — 93005 ELECTROCARDIOGRAM TRACING: CPT

## 2017-02-02 PROCEDURE — 25000242 PHARM REV CODE 250 ALT 637 W/ HCPCS: Performed by: PHYSICIAN ASSISTANT

## 2017-02-02 PROCEDURE — 85025 COMPLETE CBC W/AUTO DIFF WBC: CPT

## 2017-02-02 PROCEDURE — 84484 ASSAY OF TROPONIN QUANT: CPT

## 2017-02-02 PROCEDURE — 83880 ASSAY OF NATRIURETIC PEPTIDE: CPT

## 2017-02-02 PROCEDURE — 85379 FIBRIN DEGRADATION QUANT: CPT

## 2017-02-02 PROCEDURE — 94640 AIRWAY INHALATION TREATMENT: CPT

## 2017-02-02 PROCEDURE — 99284 EMERGENCY DEPT VISIT MOD MDM: CPT | Mod: 25

## 2017-02-02 RX ORDER — CLONAZEPAM 2 MG/1
2 TABLET ORAL 2 TIMES DAILY PRN
COMMUNITY
End: 2023-01-18 | Stop reason: ALTCHOICE

## 2017-02-02 RX ORDER — FLUTICASONE PROPIONATE AND SALMETEROL 250; 50 UG/1; UG/1
1 POWDER RESPIRATORY (INHALATION) 2 TIMES DAILY
Qty: 60 EACH | Refills: 0 | Status: SHIPPED | OUTPATIENT
Start: 2017-02-02 | End: 2017-03-16

## 2017-02-02 RX ORDER — BENZONATATE 100 MG/1
100 CAPSULE ORAL 3 TIMES DAILY PRN
Qty: 20 CAPSULE | Refills: 0 | Status: SHIPPED | OUTPATIENT
Start: 2017-02-02 | End: 2017-02-12

## 2017-02-02 RX ORDER — IPRATROPIUM BROMIDE AND ALBUTEROL SULFATE 2.5; .5 MG/3ML; MG/3ML
3 SOLUTION RESPIRATORY (INHALATION)
Status: COMPLETED | OUTPATIENT
Start: 2017-02-02 | End: 2017-02-02

## 2017-02-02 RX ADMIN — IPRATROPIUM BROMIDE AND ALBUTEROL SULFATE 3 ML: .5; 3 SOLUTION RESPIRATORY (INHALATION) at 10:02

## 2017-02-02 NOTE — ED TRIAGE NOTES
Pt c/o asthma exacerbation X 2 days.  Pt has been using her albuterol inhaler & taking prednisone with no improvement.  Pt also c/o chest tightness.

## 2017-02-02 NOTE — ED PROVIDER NOTES
Encounter Date: 2/2/2017       History     Chief Complaint   Patient presents with    Shortness of Breath     x 2 days. Hx of asthma. No relief with home inhalers and prednisone.     Review of patient's allergies indicates:   Allergen Reactions    Morphine      HPI Comments: Patient is a 56-year-old female with history of diabetes, hypertension, and asthma who presents to the emergency department with shortness of breath.  Patient states over the last week she has been very short of breath.  Patient states this feels like her typical asthma exacerbation.  Patient states she has been using at home albuterol and prednisone.  Patient states the shortness of breath is not worsened on exertion.  Patient denies any associated chest pain.  Patient denies lower extremity edema.  Patient denies orthopnea.  Patient does report cough.  Pt denies fevers or chills.    The history is provided by the patient.     Past Medical History   Diagnosis Date    Abdominal hernia     Asthma     Diabetes mellitus     Hypertension     Migraine     Obesity     Sleep apnea      No past medical history pertinent negatives.  Past Surgical History   Procedure Laterality Date    Cholecystectomy      Hysterectomy       complete    Foot surgery       Family History   Problem Relation Age of Onset    Psoriasis Neg Hx     Melanoma Neg Hx     Lupus Neg Hx      Social History   Substance Use Topics    Smoking status: Never Smoker    Smokeless tobacco: None    Alcohol use No     Review of Systems   Constitutional: Negative for activity change, appetite change, chills, fatigue and fever.   HENT: Negative for congestion, ear discharge, ear pain, hearing loss, mouth sores, postnasal drip, rhinorrhea, sore throat and trouble swallowing.    Eyes: Negative for photophobia and visual disturbance.   Respiratory: Positive for cough and shortness of breath.    Cardiovascular: Negative for chest pain, palpitations and leg swelling.    Gastrointestinal: Negative for abdominal pain, blood in stool, constipation, diarrhea, nausea and vomiting.   Genitourinary: Negative for dysuria, flank pain and hematuria.   Musculoskeletal: Negative for back pain, neck pain and neck stiffness.   Skin: Negative for rash and wound.   Neurological: Negative for dizziness, syncope, speech difficulty, weakness, light-headedness, numbness and headaches.       Physical Exam   Initial Vitals   BP Pulse Resp Temp SpO2   02/02/17 0923 02/02/17 0923 02/02/17 0923 02/02/17 0923 02/02/17 0923   137/73 76 21 98.3 °F (36.8 °C) 99 %     Physical Exam    Nursing note and vitals reviewed.  Constitutional: She appears well-developed and well-nourished. She is not diaphoretic.  Non-toxic appearance. No distress.   HENT:   Head: Normocephalic.   Right Ear: Hearing and external ear normal.   Left Ear: Hearing and external ear normal.   Nose: Nose normal.   Mouth/Throat: Uvula is midline and oropharynx is clear and moist. No trismus in the jaw. No uvula swelling. No oropharyngeal exudate.   Eyes: Conjunctivae are normal. Pupils are equal, round, and reactive to light.   Neck: Normal range of motion.   Cardiovascular: Normal rate and regular rhythm.   No lower extremity edema.   Pulmonary/Chest: Breath sounds normal. No respiratory distress. She has no wheezes. She has no rhonchi. She has no rales. She exhibits no tenderness.   tachypneic    Abdominal: Soft. Bowel sounds are normal. She exhibits no distension and no mass. There is no tenderness. There is no rebound and no guarding.   Lymphadenopathy:     She has no cervical adenopathy.   Neurological: She is alert and oriented to person, place, and time.   Skin: Skin is warm and dry.   Psychiatric: She has a normal mood and affect.         ED Course   Procedures  Labs Reviewed   CBC W/ AUTO DIFFERENTIAL - Abnormal; Notable for the following:        Result Value    MCV 81 (*)     MCH 25.7 (*)     MCHC 31.7 (*)     RDW 14.7 (*)      Platelets 383 (*)     MPV 9.0 (*)     Mono # 1.2 (*)     Lymph% 17.4 (*)     Mono% 16.0 (*)     All other components within normal limits   COMPREHENSIVE METABOLIC PANEL - Abnormal; Notable for the following:     CO2 30 (*)     Glucose 170 (*)     BUN, Bld 21 (*)     Total Protein 9.0 (*)     AST 9 (*)     All other components within normal limits   B-TYPE NATRIURETIC PEPTIDE   TROPONIN I   D DIMER, QUANTITATIVE     EKG Readings: (Independently Interpreted)   Initial Reading: No STEMI. Rhythm: Normal Sinus Rhythm. Heart Rate: 73.       X-Rays:   Independently Interpreted Readings:   Other Readings:  No acute findings    Imaging Results         X-Ray Chest PA And Lateral (Final result) Result time:  02/02/17 10:10:07    Final result by Allie Rojas MD (02/02/17 10:10:07)    Impression:     Normal exam      Electronically signed by: ALLIE ROJAS MD  Date:     02/02/17  Time:    10:10     Narrative:    Procedure: PA and lateral chest.   This is compared to prior exam dated 1/26/2015.    Findings: PA and lateral radiographs of the chest demonstrate symmetrically inflated lungs with no mass, nodule, pneumothorax, infiltrate or effusion.  The cardiomediastinal silhouette, osseous and soft tissue structures are normal.              Medical Decision Making:   Initial Assessment:   Urgent evaluation of a 56-year-old female with history of asthma, ABDs, and hypertension who presents to the emergency department with shortness of breath.  Patient is afebrile, nontoxic appearing, and hemodynamically stable.  Patient is not hypoxic.  Patient is tachypneic when speaking in full sentences.  Lungs are clear to auscultation.  No lower extremity edema.  Patient states this feels occur typical asthma exacerbation.  With no wheezing and good breath sounds on lung auscultation, I'm not convinced that this is asthma exacerbation.  Patient insists that this is her asthma.  Patient recently visited the emergency department with  pain in lower extremity and swelling in lower extremity.  I do feel that I should obtain d-dimer.  Cardiac workup will be performed to ensure not cardiac etiology.  Symptoms have been present for several days, so if this is cardiac related, I would suspect troponin to be elevated.  I will also check BNP.  Chest x-ray will be obtained.  Patient be given breathing treatments.  Clinical Tests:   Lab Tests: Ordered and Reviewed  Radiological Study: Ordered and Reviewed  ED Management:  12:49 PM  EKG reveals no acute ischemia.  Chest x-ray shows no acute cardiopulmonary disease.  Labs reveal no significant anemia, kidney insufficiency, or electrolyte disturbance.  BNP is 19.  Troponin is negative.  D-dimer is negative.  Upon reevaluation, patient states her symptoms have significantly improved.  She is no longer tachypneic.  Patient is not hypoxic.  I do not feel that further workup is warranted at this time.  Patient has prednisone at home.  She also has albuterol.  She requests refill for Advair.  Patient is advised to follow-up with PCP in 24-48 hours for reevaluation or return to the emergency department with any worsening symptoms or concerns.  Other:   I have discussed this case with another health care provider.       <> Summary of the Discussion: Dr. Hernandez                   ED Course     Clinical Impression:   The encounter diagnosis was Shortness of breath.          Nicole Alford PA-C  02/02/17 [id number]

## 2017-02-02 NOTE — ED AVS SNAPSHOT
[facility]  [address]  [facility] [address]               Susan Courtney   2017  9:37 AM   ED    Description:  Female : 1960   Department:  [facility]           Your Care was Coordinated By:     Provider Role From To    Kayy Hernandez MD Attending Provider 17 0941 --    Nicole Alford PA-C Physician Assistant 1714 --      Reason for Visit     Shortness of Breath           Diagnoses this Visit        Comments    Shortness of breath    -  Primary       ED Disposition     None           To Do List           Follow-up Information     Follow up with Archana Burgess MD.    Specialty:  General Practice    Contact information:    [facility] [place] [id number] [phone]         These Medications        Disp Refills Start End    fluticasone-salmeterol 250-50 mcg/dose (ADVAIR) 250-50 mcg/dose diskus inhaler 60 each 0 2017 3/4/2017    Inhale 1 puff into the lungs 2 (two) times daily. Controller - Inhalation    Pharmacy: [facility]/pharmacy #[id number] - [place], [place] - [address] Ph #: [phone]         [facility]     [facility] Nurse Care [facility] -  Assistance  Registered nurses in the [facility] provide clinical advisement, health education, appointment booking, and other advisory services.  Call for this free service at [phone].             Medications           Message regarding Medications     Verify the changes and/or additions to your medication regime listed below are the same as discussed with your clinician today.  If any of these changes or additions are incorrect, please notify your healthcare provider.        START taking these NEW medications        Refills    fluticasone-salmeterol 250-50 mcg/dose (ADVAIR) 250-50 mcg/dose diskus inhaler 0    Sig: Inhale 1 puff into the lungs 2 (two) times daily. Controller    Class: Print    Route: Inhalation     "  These medications were administered today        Dose Freq    albuterol-ipratropium 2.5mg-0.5mg/3mL nebulizer solution 3 mL 3 mL Every 5 min    Sig: Take 3 mLs by nebulization every 5 (five) minutes.    Class: Normal    Route: Nebulization      STOP taking these medications     ibuprofen (ADVIL,MOTRIN) 800 MG tablet Take 1 tablet (800 mg total) by mouth 3 (three) times daily as needed for Pain.    meloxicam (MOBIC) 7.5 MG tablet Take 1 tablet (7.5 mg total) by mouth once daily.           Verify that the below list of medications is an accurate representation of the medications you are currently taking.  If none reported, the list may be blank. If incorrect, please contact your healthcare provider. Carry this list with you in case of emergency.           Current Medications     clonazePAM (KLONOPIN) 2 MG Tab Take 2 mg by mouth 2 (two) times daily.    PREDNISONE ORAL Take by mouth.    ALBUTEROL INHL Inhale into the lungs as needed.    amitriptyline (ELAVIL) 150 MG Tab Take 150 mg by mouth every evening.    clotrimazole (LOTRIMIN) 1 % Soln Apply topically 2 (two) times daily.    fluticasone-salmeterol 250-50 mcg/dose (ADVAIR) 250-50 mcg/dose diskus inhaler Inhale 1 puff into the lungs 2 (two) times daily. Controller    hydrochlorothiazide (HYDRODIURIL) 25 MG tablet Take 25 mg by mouth once daily.    metformin (FORTAMET) 500 mg 24 hr tablet Take 500 mg by mouth 2 (two) times daily with meals.     omeprazole (PRILOSEC) 20 MG capsule Take 20 mg by mouth once daily.    ondansetron (ZOFRAN) 4 MG tablet Take 4 mg by mouth every 6 (six) hours as needed.    topiramate (TOPAMAX) 50 MG tablet Take 1 tablet (50 mg total) by mouth every evening.           Clinical Reference Information           Your Vitals Were     BP Pulse Temp Resp Height Weight    127/76 (BP Location: Right arm, Patient Position: Sitting, BP Method: Automatic) 77 98.6 °F (37 °C) (Oral) 18 5' 5.5" (1.664 m) 99.8 kg (220 lb)    SpO2 BMI             95% 36.05 " kg/m2         Allergies as of 2/2/2017        Reactions    Morphine       Immunizations Administered on Date of Encounter - 2/2/2017     None      ED Micro, Lab, POCT     Start Ordered       Status Ordering Provider    02/02/17 0954 02/02/17 0954  CBC auto differential  STAT      Final result     02/02/17 0954 02/02/17 0954  Comprehensive metabolic panel  STAT      Final result     02/02/17 0954 02/02/17 0954  Brain natriuretic peptide  STAT      Final result     02/02/17 0954 02/02/17 0954  Troponin I  STAT      Final result     02/02/17 0954 02/02/17 0954  D dimer, quantitative  STAT      Final result       ED Imaging Orders     Start Ordered       Status Ordering Provider    02/02/17 0954 02/02/17 0954  X-Ray Chest PA And Lateral  1 time imaging      Final result       Discharge References/Attachments     ASTHMA, ACUTE (ADULT) (ENGLISH)      [facility] Sign-Up     Activating your [facility] account is as easy as 1-2-3!     1) Visit [email or web address], select Sign Up Now, enter this activation code and your date of birth, then select Next.  [email or web address]  Expires: 3/3/2017  9:22 PM      2) Create a username and password to use when you visit [facility] in the future and select a security question in case you lose your password and select Next.    3) Enter your e-mail address and click Sign Up!    Additional Information  If you have questions, please e-mail [email or web address] or call [phone] to talk to our [facility] staff. Remember, [facility] is NOT to be used for urgent needs. For medical emergencies, dial 911.          [facility] complies with applicable Federal civil rights laws and does not discriminate on the basis of race, color, national origin, age, disability, or sex.        Language Assistance Services     ATTENTION: Language assistance services are available, free of charge. Please call [phone].      ATENCIÓN: Si habla español, tiene a wilkerson disposición servicios gratuitos de  asistencia lingüística. [facility] [phone].     TAVO Ý: N?u b?n nói Ti?ng Vi?t, có các d?ch v? h? tr? ngôn ng? mi?n phí carltonh cho b?n. G?i s? [phone].

## 2017-02-02 NOTE — ED NOTES
Patient Identifiers for Susan Courtney checked and correct  LOC: The patient is awake, alert and aware of environment with an appropriate affect, the patient is oriented x 3 and speaking appropriate.  APPEARANCE: Patient resting comfortably and in no acute distress. The patient is clean and well groomed. The patient's clothing is properly fastened.  SKIN: The skin is warm and dry. The patient has normal skin turgor and moist mucus membranes.  Musculoskeletal :  Normal range of motion noted. Moves all extremities well, No swelling or tenderness noted  RESPIRATORY: Airway is open and patent, respirations are spontaneous, patient has a normal effort and rate. Breath sounds are decreased in the right base. Expiratory rales auscultated in the right upper field.  CARDIAC: Patient has a normal rate and rhythm, no peripheral edema noted, capillary refill < 3 seconds.   PULSES: 2+ radial pulses, symmetrical .  Will continue to monitor

## 2017-03-16 ENCOUNTER — OFFICE VISIT (OUTPATIENT)
Dept: NEUROLOGY | Facility: CLINIC | Age: 57
End: 2017-03-16
Payer: MEDICAID

## 2017-03-16 VITALS
HEART RATE: 84 BPM | SYSTOLIC BLOOD PRESSURE: 130 MMHG | WEIGHT: 215.63 LBS | BODY MASS INDEX: 34.65 KG/M2 | DIASTOLIC BLOOD PRESSURE: 80 MMHG | HEIGHT: 66 IN

## 2017-03-16 DIAGNOSIS — G44.86 CERVICOGENIC HEADACHE: ICD-10-CM

## 2017-03-16 DIAGNOSIS — M54.81 OCCIPITAL NEURALGIA OF LEFT SIDE: ICD-10-CM

## 2017-03-16 DIAGNOSIS — G43.119 INTRACTABLE MIGRAINE WITH AURA WITHOUT STATUS MIGRAINOSUS: Primary | ICD-10-CM

## 2017-03-16 PROCEDURE — 99213 OFFICE O/P EST LOW 20 MIN: CPT | Mod: PBBFAC | Performed by: PSYCHIATRY & NEUROLOGY

## 2017-03-16 PROCEDURE — 99214 OFFICE O/P EST MOD 30 MIN: CPT | Mod: S$PBB,,, | Performed by: PSYCHIATRY & NEUROLOGY

## 2017-03-16 PROCEDURE — 99999 PR PBB SHADOW E&M-EST. PATIENT-LVL III: CPT | Mod: PBBFAC,,, | Performed by: PSYCHIATRY & NEUROLOGY

## 2017-03-16 RX ORDER — METFORMIN HYDROCHLORIDE 500 MG/1
TABLET, EXTENDED RELEASE ORAL
Refills: 3 | COMMUNITY
Start: 2017-01-20 | End: 2017-08-27 | Stop reason: SDUPTHER

## 2017-03-16 RX ORDER — RIZATRIPTAN BENZOATE 10 MG/1
10 TABLET ORAL
Qty: 9 TABLET | Refills: 3 | Status: SHIPPED | OUTPATIENT
Start: 2017-03-16 | End: 2017-06-19 | Stop reason: SDUPTHER

## 2017-03-16 RX ORDER — ALBUTEROL SULFATE 90 UG/1
AEROSOL, METERED RESPIRATORY (INHALATION)
Refills: 2 | COMMUNITY
Start: 2017-02-06 | End: 2023-11-28 | Stop reason: SDUPTHER

## 2017-03-16 RX ORDER — MELOXICAM 7.5 MG/1
7.5 TABLET ORAL DAILY
Refills: 0 | COMMUNITY
Start: 2017-03-06 | End: 2021-09-08

## 2017-03-16 NOTE — PROGRESS NOTES
Subjective:       Patient ID: Susan Courtney is a 57 y.o. female.    Reason for Consult: Headache      Interval History:  Susan Courtney is here for follow up. Their condition is clinically stable however she notes that she is having her attacks of left-sided occipital neuralgia again.  She notes that in terms of her migraines she is only having one significant migraine months.  She has not been on a daily preventative migraine agent for over a year now and has been satisfied with the Maxalt as a rescue medication.  She is no longer working full-time and has applied for disability.  She has multiple medical conditions that were aggravated by the stress of her job.     Objective:     Vitals:    03/16/17 1416   BP: 130/80   Pulse: 84     Patient is awake alert oriented to person place and time.  Cranial nerves II through XII are without focal deficit.  Gait and station within normal limits.  Strength is 5 out of 5 in all extremities grossly.  Tinel sign strongly positive at the left lesser occipital nerve, not at the origin but somewhat more distally up on the scalp.  Focused examination was undertaken today. Over 50% of face to face time of 25 minute visit time was in giving guidance, counseling and discussing treatment options.    Assessment:     1. Intractable migraine with aura without status migrainosus  rizatriptan (MAXALT) 10 MG tablet   2. Occipital neuralgia of left side  Ambulatory Referral to Physical/Occupational Therapy   3. Cervicogenic headache  Ambulatory Referral to Physical/Occupational Therapy       Plan:   57-year-old female presents for evaluation and follow-up of multifactorial headaches including migraine and left-sided occipital neuralgia.  At this point in time we have had a discussion about the fact that physical therapy helped her last time she was suffering with her occipital neuralgia so I will refer her to physical therapy at this time to help address this issue.  If this does  not help that I would consider left-sided occipital nerve block.  I will also refill her Maxalt as she knows that works for her migraine headaches.  Consider imaging of the brain on follow-up if she is still having intractable headaches.  We have discussed risk benefits and alternatives to the treatment plan as outlined above.  I will follow up with them in 3 month(s).  The patient verbalizes understanding and agreement with the treatment plan. Questions were sought and answered to her stated verbal satisfaction.        Melanie Germain MD    This note is dictated on Dragon Natural Speaking word recognition program. There are word recognition mistakes that are occasionally missed on review.

## 2017-06-17 ENCOUNTER — HOSPITAL ENCOUNTER (EMERGENCY)
Facility: OTHER | Age: 57
Discharge: HOME | End: 2017-06-17
Attending: EMERGENCY MEDICINE
Payer: MEDICAID

## 2017-06-17 VITALS
WEIGHT: 207 LBS | HEIGHT: 65 IN | TEMPERATURE: 98 F | RESPIRATION RATE: 19 BRPM | HEART RATE: 68 BPM | OXYGEN SATURATION: 98 % | SYSTOLIC BLOOD PRESSURE: 130 MMHG | BODY MASS INDEX: 34.49 KG/M2 | DIASTOLIC BLOOD PRESSURE: 93 MMHG

## 2017-06-17 DIAGNOSIS — R10.10 UPPER ABDOMINAL PAIN: Primary | ICD-10-CM

## 2017-06-17 DIAGNOSIS — R10.9 ABDOMINAL PAIN: ICD-10-CM

## 2017-06-17 LAB
ALBUMIN SERPL BCP-MCNC: 3.1 G/DL
ALP SERPL-CCNC: 93 U/L
ALT SERPL W/O P-5'-P-CCNC: 12 U/L
ANION GAP SERPL CALC-SCNC: 10 MMOL/L
AST SERPL-CCNC: 14 U/L
BACTERIA #/AREA URNS HPF: ABNORMAL /HPF
BASOPHILS # BLD AUTO: 0.01 K/UL
BASOPHILS NFR BLD: 0.1 %
BILIRUB SERPL-MCNC: 0.2 MG/DL
BILIRUB UR QL STRIP: NEGATIVE
BUN SERPL-MCNC: 14 MG/DL
CALCIUM SERPL-MCNC: 9 MG/DL
CHLORIDE SERPL-SCNC: 103 MMOL/L
CLARITY UR: CLEAR
CO2 SERPL-SCNC: 29 MMOL/L
COLOR UR: YELLOW
CREAT SERPL-MCNC: 0.8 MG/DL
DIFFERENTIAL METHOD: ABNORMAL
EOSINOPHIL # BLD AUTO: 0.3 K/UL
EOSINOPHIL NFR BLD: 3 %
ERYTHROCYTE [DISTWIDTH] IN BLOOD BY AUTOMATED COUNT: 13.7 %
EST. GFR  (AFRICAN AMERICAN): >60 ML/MIN/1.73 M^2
EST. GFR  (NON AFRICAN AMERICAN): >60 ML/MIN/1.73 M^2
GLUCOSE SERPL-MCNC: 150 MG/DL
GLUCOSE UR QL STRIP: NEGATIVE
HCT VFR BLD AUTO: 35.3 %
HGB BLD-MCNC: 11.3 G/DL
HGB UR QL STRIP: ABNORMAL
KETONES UR QL STRIP: NEGATIVE
LEUKOCYTE ESTERASE UR QL STRIP: ABNORMAL
LIPASE SERPL-CCNC: 19 U/L
LYMPHOCYTES # BLD AUTO: 2.5 K/UL
LYMPHOCYTES NFR BLD: 26.3 %
MCH RBC QN AUTO: 26.3 PG
MCHC RBC AUTO-ENTMCNC: 32 %
MCV RBC AUTO: 82 FL
MICROSCOPIC COMMENT: ABNORMAL
MONOCYTES # BLD AUTO: 0.7 K/UL
MONOCYTES NFR BLD: 7.8 %
NEUTROPHILS # BLD AUTO: 5.9 K/UL
NEUTROPHILS NFR BLD: 62.5 %
NITRITE UR QL STRIP: NEGATIVE
PH UR STRIP: 6 [PH] (ref 5–8)
PLATELET # BLD AUTO: 295 K/UL
PMV BLD AUTO: 8.8 FL
POTASSIUM SERPL-SCNC: 3.8 MMOL/L
PROT SERPL-MCNC: 7.5 G/DL
PROT UR QL STRIP: NEGATIVE
RBC # BLD AUTO: 4.3 M/UL
RBC #/AREA URNS HPF: 5 /HPF (ref 0–4)
SODIUM SERPL-SCNC: 142 MMOL/L
SP GR UR STRIP: 1.01 (ref 1–1.03)
TROPONIN I SERPL DL<=0.01 NG/ML-MCNC: <0.006 NG/ML
URN SPEC COLLECT METH UR: ABNORMAL
UROBILINOGEN UR STRIP-ACNC: NEGATIVE EU/DL
WBC # BLD AUTO: 9.45 K/UL
WBC #/AREA URNS HPF: 20 /HPF (ref 0–5)

## 2017-06-17 PROCEDURE — 93010 ELECTROCARDIOGRAM REPORT: CPT | Mod: ,,, | Performed by: INTERNAL MEDICINE

## 2017-06-17 PROCEDURE — 63600175 PHARM REV CODE 636 W HCPCS: Performed by: EMERGENCY MEDICINE

## 2017-06-17 PROCEDURE — 81000 URINALYSIS NONAUTO W/SCOPE: CPT

## 2017-06-17 PROCEDURE — 25000003 PHARM REV CODE 250: Performed by: EMERGENCY MEDICINE

## 2017-06-17 PROCEDURE — 99284 EMERGENCY DEPT VISIT MOD MDM: CPT | Mod: 25

## 2017-06-17 PROCEDURE — 83690 ASSAY OF LIPASE: CPT

## 2017-06-17 PROCEDURE — 87086 URINE CULTURE/COLONY COUNT: CPT

## 2017-06-17 PROCEDURE — C9113 INJ PANTOPRAZOLE SODIUM, VIA: HCPCS | Performed by: EMERGENCY MEDICINE

## 2017-06-17 PROCEDURE — 93005 ELECTROCARDIOGRAM TRACING: CPT

## 2017-06-17 PROCEDURE — 85025 COMPLETE CBC W/AUTO DIFF WBC: CPT

## 2017-06-17 PROCEDURE — 84484 ASSAY OF TROPONIN QUANT: CPT

## 2017-06-17 PROCEDURE — 96361 HYDRATE IV INFUSION ADD-ON: CPT

## 2017-06-17 PROCEDURE — 96374 THER/PROPH/DIAG INJ IV PUSH: CPT

## 2017-06-17 PROCEDURE — 80053 COMPREHEN METABOLIC PANEL: CPT

## 2017-06-17 PROCEDURE — 96375 TX/PRO/DX INJ NEW DRUG ADDON: CPT

## 2017-06-17 RX ORDER — KETOROLAC TROMETHAMINE 30 MG/ML
30 INJECTION, SOLUTION INTRAMUSCULAR; INTRAVENOUS
Status: COMPLETED | OUTPATIENT
Start: 2017-06-17 | End: 2017-06-17

## 2017-06-17 RX ORDER — METOCLOPRAMIDE HYDROCHLORIDE 5 MG/ML
10 INJECTION INTRAMUSCULAR; INTRAVENOUS
Status: COMPLETED | OUTPATIENT
Start: 2017-06-17 | End: 2017-06-17

## 2017-06-17 RX ORDER — SODIUM CHLORIDE 9 MG/ML
500 INJECTION, SOLUTION INTRAVENOUS
Status: COMPLETED | OUTPATIENT
Start: 2017-06-17 | End: 2017-06-17

## 2017-06-17 RX ORDER — DIPHENHYDRAMINE HYDROCHLORIDE 50 MG/ML
25 INJECTION INTRAMUSCULAR; INTRAVENOUS
Status: COMPLETED | OUTPATIENT
Start: 2017-06-17 | End: 2017-06-17

## 2017-06-17 RX ORDER — PANTOPRAZOLE SODIUM 40 MG/10ML
40 INJECTION, POWDER, LYOPHILIZED, FOR SOLUTION INTRAVENOUS
Status: COMPLETED | OUTPATIENT
Start: 2017-06-17 | End: 2017-06-17

## 2017-06-17 RX ADMIN — METOCLOPRAMIDE 10 MG: 5 INJECTION, SOLUTION INTRAMUSCULAR; INTRAVENOUS at 08:06

## 2017-06-17 RX ADMIN — LIDOCAINE HYDROCHLORIDE: 20 SOLUTION ORAL; TOPICAL at 08:06

## 2017-06-17 RX ADMIN — KETOROLAC TROMETHAMINE 30 MG: 30 INJECTION, SOLUTION INTRAMUSCULAR at 08:06

## 2017-06-17 RX ADMIN — SODIUM CHLORIDE 500 ML: 0.9 INJECTION, SOLUTION INTRAVENOUS at 08:06

## 2017-06-17 RX ADMIN — PANTOPRAZOLE SODIUM 40 MG: 40 INJECTION, POWDER, FOR SOLUTION INTRAVENOUS at 08:06

## 2017-06-17 RX ADMIN — DIPHENHYDRAMINE HYDROCHLORIDE 25 MG: 50 INJECTION, SOLUTION INTRAMUSCULAR; INTRAVENOUS at 08:06

## 2017-06-17 NOTE — ED PROVIDER NOTES
"Encounter Date: 6/17/2017    SCRIBE #1 NOTE: I, Letty Conway, am scribing for, and in the presence of, Dr. Hernandez.       History     Chief Complaint   Patient presents with    Abdominal Pain     10/10 epigastric "throbbing" x 2 days, denies N/V/D, blood in stool; also c/o "migraine" somewhat relieved from goody's, neuro appt 6/16     Review of patient's allergies indicates:   Allergen Reactions    Morphine      Time seen by provider: 7:56 AM    This is a 57 y.o. female who presents with complaint of epigastric abdominal pain that has progressively worsened over the past 2 days.  The patient also endorses a few episodes of hot flashes yesterday as well as a HA with associated photophobia.  However, she notes that she frequently has HA and has an appointment with a neurologist next week.  She states that the HA is similar in quality and severity to prior HAs.  Furthermore, she claims that she has had "anxiety-like" left upper CP that has persisted for weeks; however, she notes that she ran out of her anxiety medication recently.  The patient reports no chills, SOB, N/V/D, dysuria, or changes in urinary frequency or urgency.  She admits that she has had similar episodes of abdominal pain in the past, which are usually alleviated with prilosec; however, she has found no relief with the medication this time.  She states that her symptoms are worse after eating.  The patient reports history of migraines, HTN, NIDDM, asthma, and an abdominal hernia.  She reports surgical history of cholecystectomy and hysterectomy.         The history is provided by the patient.     Past Medical History:   Diagnosis Date    Abdominal hernia     Asthma     Diabetes mellitus     Hypertension     Migraine     Obesity     Sleep apnea      Past Surgical History:   Procedure Laterality Date    CHOLECYSTECTOMY      FOOT SURGERY      HYSTERECTOMY      complete     Family History   Problem Relation Age of Onset    Psoriasis Neg Hx     " Melanoma Neg Hx     Lupus Neg Hx      Social History   Substance Use Topics    Smoking status: Never Smoker    Smokeless tobacco: Not on file    Alcohol use No     Review of Systems   Constitutional: Negative for chills and fever.        Positive for hot flashes.   HENT: Negative for facial swelling.    Eyes: Positive for photophobia.   Respiratory: Negative for shortness of breath.    Cardiovascular: Positive for chest pain.   Gastrointestinal: Positive for abdominal pain. Negative for diarrhea, nausea and vomiting.   Endocrine: Negative for polyuria.   Genitourinary: Negative for dysuria, frequency and urgency.   Musculoskeletal: Negative for myalgias.   Skin: Negative for rash.   Neurological: Positive for headaches.       Physical Exam     Initial Vitals [06/17/17 0738]   BP Pulse Resp Temp SpO2   (!) 159/74 79 16 98.4 °F (36.9 °C) 98 %     Physical Exam    Nursing note and vitals reviewed.  Constitutional: She appears well-developed and well-nourished. She is not diaphoretic. No distress.   HENT:   Head: Normocephalic and atraumatic.   Right Ear: External ear normal.   Left Ear: External ear normal.   Eyes: Conjunctivae and EOM are normal. Pupils are equal, round, and reactive to light. Right eye exhibits no discharge. Left eye exhibits no discharge.   Neck: Normal range of motion. Neck supple.   Cardiovascular: Normal rate, regular rhythm and normal heart sounds.   Pulmonary/Chest: Breath sounds normal. No respiratory distress. She has no wheezes. She has no rhonchi. She has no rales.   Abdominal: Soft. Bowel sounds are normal. She exhibits no distension. There is tenderness. There is no rebound and no guarding.   Mild upper abdominal tenderness.   Musculoskeletal: Normal range of motion. She exhibits no edema or tenderness.   Neurological: She is alert and oriented to person, place, and time.   Skin: Skin is warm and dry. No rash and no abscess noted. No erythema. No pallor.   Psychiatric: She has a  normal mood and affect. Her behavior is normal. Judgment and thought content normal.         ED Course   Procedures  Labs Reviewed   COMPREHENSIVE METABOLIC PANEL - Abnormal; Notable for the following:        Result Value    Glucose 150 (*)     Albumin 3.1 (*)     All other components within normal limits   CBC W/ AUTO DIFFERENTIAL - Abnormal; Notable for the following:     Hemoglobin 11.3 (*)     Hematocrit 35.3 (*)     MCH 26.3 (*)     MPV 8.8 (*)     All other components within normal limits   URINALYSIS - Abnormal; Notable for the following:     Occult Blood UA 1+ (*)     Leukocytes, UA Trace (*)     All other components within normal limits   URINALYSIS MICROSCOPIC - Abnormal; Notable for the following:     RBC, UA 5 (*)     WBC, UA 20 (*)     Bacteria, UA Many (*)     All other components within normal limits   CULTURE, URINE   CULTURE, URINE   LIPASE   TROPONIN I      Imaging Results    None         EKG Readings: (Independently Interpreted)   Initial Reading: No STEMI.   7:52. Rate of 67. Normal sinus rhythm. Normal axis. Normal intervals. No ST or ischemic changes.            Medical Decision Making:   History:   Old Medical Records: I decided to obtain old medical records.  Old Records Summarized: records from clinic visits and other records.  Initial Assessment:   7:56AM:  Pt is a 56 y/o F who presents to ED with upper abdominal pain along with a HA. She does have a hx of HAs that this feels similar to and has a neurologist that she follows with.  She does have some mild tenderness to palpation in the upper abdomen, worsens with eating, though her abdomen is very soft. Will plan for labs, analgesia, will continue to follow and reassess  Clinical Tests:   Lab Tests: Ordered and Reviewed  Medical Tests: Ordered and Reviewed    10:56 AM:  Pt doing well.  She is feeling much better.  Her HA has improved and her abdominal pain has resolved.  Her labs are stable.  Her UA does have trace leuks and many bacteria,  though asymptomatic so will plan just for culture at this time.  On re-examination, her abdomen is very soft, nontender.  I suspect that she likely has PUD vs. Gastritis.  Would recommend follow up with GI for further evaluation and management.  I updated pt regarding results.  I counseled pt regarding supportive care measures.   I have discussed with the pt ED return warnings and need for close PCP f/u.  Pt agreeable to plan and all questions answered.  I feel that pt is stable for discharge and management as an outpatient and no further intervention is needed at this time.  Pt is comfortable returning to the ED if needed.  Will DC home in stable condition.                Scribe Attestation:   Scribe #1: I performed the above scribed service and the documentation accurately describes the services I performed. I attest to the accuracy of the note.    Attending Attestation:           Physician Attestation for Scribe:  Physician Attestation Statement for Scribe #1: I, Dr. Hernandez, reviewed documentation, as scribed by Letty Conway in my presence, and it is both accurate and complete.                 ED Course     Clinical Impression:     1. Upper abdominal pain    2. Abdominal pain              Kayy Hernandez MD  06/17/17 7724

## 2017-06-17 NOTE — ED NOTES
Hourly rounding completed on pt, pt reporting 7/10 headache. Pt ambulated to restroom to provide urine sample. Bed in lowest, locked position, side rails up x 2. Call light within reach.

## 2017-06-17 NOTE — ED TRIAGE NOTES
"Pt reports to ED c/o 10/10 epigastric "throbbing" pain x 2 days unrelieved from Prilosec, denies N/V/D or blood in stool. Pt also reporting 10/10 occipital "migraine" headache unrelieved from goody's, pt has neuro appt 6/21. Denies lightheadedness/dizziness, fevers, chills, numbness/tinfgling to extremities. Also reporting left knee pain "from arthritis" with pain increasing with ambulation.   "

## 2017-06-19 ENCOUNTER — OFFICE VISIT (OUTPATIENT)
Dept: NEUROLOGY | Facility: CLINIC | Age: 57
End: 2017-06-19
Payer: MEDICAID

## 2017-06-19 ENCOUNTER — TELEPHONE (OUTPATIENT)
Dept: NEUROLOGY | Facility: CLINIC | Age: 57
End: 2017-06-19

## 2017-06-19 VITALS
SYSTOLIC BLOOD PRESSURE: 140 MMHG | HEART RATE: 76 BPM | HEIGHT: 65 IN | DIASTOLIC BLOOD PRESSURE: 90 MMHG | WEIGHT: 211 LBS | BODY MASS INDEX: 35.16 KG/M2

## 2017-06-19 DIAGNOSIS — M54.81 OCCIPITAL NEURALGIA OF LEFT SIDE: ICD-10-CM

## 2017-06-19 DIAGNOSIS — G43.709 CHRONIC MIGRAINE WITHOUT AURA WITHOUT STATUS MIGRAINOSUS, NOT INTRACTABLE: Primary | ICD-10-CM

## 2017-06-19 DIAGNOSIS — G44.86 CERVICOGENIC HEADACHE: ICD-10-CM

## 2017-06-19 DIAGNOSIS — G43.109 MIGRAINE WITH AURA AND WITHOUT STATUS MIGRAINOSUS, NOT INTRACTABLE: Primary | ICD-10-CM

## 2017-06-19 LAB — BACTERIA UR CULT: NO GROWTH

## 2017-06-19 PROCEDURE — 99214 OFFICE O/P EST MOD 30 MIN: CPT | Mod: S$PBB,,, | Performed by: PSYCHIATRY & NEUROLOGY

## 2017-06-19 PROCEDURE — 99213 OFFICE O/P EST LOW 20 MIN: CPT | Mod: PBBFAC | Performed by: PSYCHIATRY & NEUROLOGY

## 2017-06-19 PROCEDURE — 99999 PR PBB SHADOW E&M-EST. PATIENT-LVL III: CPT | Mod: PBBFAC,,, | Performed by: PSYCHIATRY & NEUROLOGY

## 2017-06-19 RX ORDER — SUMATRIPTAN 50 MG/1
50 TABLET, FILM COATED ORAL
Qty: 9 TABLET | Refills: 3 | Status: ON HOLD | OUTPATIENT
Start: 2017-06-19 | End: 2021-12-01

## 2017-06-19 RX ORDER — RIZATRIPTAN BENZOATE 10 MG/1
10 TABLET ORAL
Qty: 9 TABLET | Refills: 6 | Status: SHIPPED | OUTPATIENT
Start: 2017-06-19 | End: 2017-06-19 | Stop reason: CLARIF

## 2017-06-19 NOTE — TELEPHONE ENCOUNTER
----- Message from Elbert Scherer sent at 6/19/2017  2:11 PM CDT -----  Contact: Susan Glory  _X  1st Request  _  2nd Request  _  3rd Request        Who: Tatiana Courtney    Why: Patient states the rizatriptan (MAXALT) 10 MG tablet cost to too high. Please call back to follow up.    What Number to Call Back: [phone]    When to Expect a call back: (Before the end of the day)   -- if the call is after 12:00, the call back will be tomorrow.

## 2017-06-19 NOTE — ASSESSMENT & PLAN NOTE
Pt for cervicogenic component of headache. Consider TUAN and ABM block on the left is this doesn't respond to conservative treatment.

## 2017-06-19 NOTE — PROGRESS NOTES
Subjective:       Patient ID: Susan Courtney is a 57 y.o. female.    Reason for Consult: Headache      Interval History:  Susan Courtney is here for follow up. Their condition is clinically stable.  She has not been able to go to physical therapy due to a change in her insurer to medicate.  She notes that the occipital neuralgia headaches have become more frequent and more severe.  She notes that her migraines have been occurring less and less.    Objective:     Vitals:    06/19/17 1303   BP: (!) 140/90   Pulse: 76     Patient is awake alert oriented person place and time.  Moves all 4 tremors against gravity.  Gait and station within normal limits.  Cranial nerves II through XII without focal deficits.  Tender to palpation left cervical paraspinal musculature.  Tinel sign positive at left greater and lesser occipital nerve.  Focused examination was undertaken today. Over 50% of face to face time of 25 minute visit time was in giving guidance, counseling and discussing treatment options.    Assessment/Plan:     Problem List Items Addressed This Visit        Neuro    Classical migraine - Primary    Overview     Has tried and failed Elavil, Advil, Mobic, Zofran, Topamax, Maxalt, Zomig    Frequency of migraines low. MIDAS is 2.          Relevant Medications    rizatriptan (MAXALT) 10 MG tablet    Cervicogenic headache    Relevant Medications    rizatriptan (MAXALT) 10 MG tablet    Occipital neuralgia of left side    Overview     Has responded to PT in the past. She uses topical alcorub to help with the shooting pain.          Current Assessment & Plan     Pt for cervicogenic component of headache. Consider TUAN and BAM block on the left is this doesn't respond to conservative treatment.          Relevant Orders    Ambulatory Referral to Physical/Occupational Therapy      Other Visit Diagnoses    None.       57-year-old female presents for evaluation and follow-up of migraines with the contribution of occipital  neuralgia.  At this point believe that her occipital neuralgia is contributing to more headaches than her migraines.  I will refill her Maxalt for her migraine headaches.  For now she can continue using her over-the-counter topical medication to try and help alleviate her occipital neuralgia however we have discussed that she should have physical therapy first and then we will discuss the potential clinical utility for ultrasound-guided nerve block next visit.  I will follow up with them in 2 month(s).  The patient verbalizes understanding and agreement with the treatment plan. I have discussed risks, benefits and alternatives to the treatment plan. Questions were sought and answered to her stated verbal satisfaction.        Melanie Germain MD    This note is dictated on Dragon Natural Speaking word recognition program. There are word recognition mistakes that are occasionally missed on review.

## 2017-06-26 ENCOUNTER — TELEPHONE (OUTPATIENT)
Dept: NEUROLOGY | Facility: CLINIC | Age: 57
End: 2017-06-26

## 2017-06-26 NOTE — TELEPHONE ENCOUNTER
----- Message from Genie Solis sent at 6/26/2017  9:59 AM CDT -----  Contact: pt  _  1st Request  _  2nd Request  _  3rd Request        Who:     Why: pt requests to speak to nurse regarding her referral for PT. Pt would like you to send  referral to [phone] fax [phone]. Would like PT to be on Monday.    What Number to Call Back:[phone]    When to Expect a call back: (Before the end of the day)   -- if the call is after 12:00, the call back will be tomorrow.

## 2017-06-27 ENCOUNTER — TELEPHONE (OUTPATIENT)
Dept: SLEEP MEDICINE | Facility: CLINIC | Age: 57
End: 2017-06-27

## 2017-06-27 DIAGNOSIS — M54.81 OCCIPITAL NEURALGIA OF LEFT SIDE: ICD-10-CM

## 2017-06-27 DIAGNOSIS — G43.009 MIGRAINE WITHOUT AURA, WITHOUT MENTION OF INTRACTABLE MIGRAINE WITHOUT MENTION OF STATUS MIGRAINOSUS: Primary | ICD-10-CM

## 2017-06-27 DIAGNOSIS — G44.86 CERVICOGENIC HEADACHE: ICD-10-CM

## 2017-06-27 DIAGNOSIS — M54.2 NECK PAIN: ICD-10-CM

## 2017-06-27 NOTE — TELEPHONE ENCOUNTER
----- Message from Juan Jacobsen sent at 6/27/2017  3:37 PM CDT -----  Contact: pt  x_ 1st Request   _ 2nd Request   _ 3rd Request     Who: YUNG VILLEGAS [[id number]]    Why: pt called stated that PT told her they did not get a referral from Dr Gremain.  The patient is requesting that you call PT office at [phone] to coordinate and to call her when the referral has been sent.    What Number to Call Back: [phone]    When to Expect a call back: (Before the end of the day)   -- if call after 3:00 call back will be tomorrow.

## 2017-07-10 ENCOUNTER — CLINICAL SUPPORT (OUTPATIENT)
Dept: REHABILITATION | Facility: HOSPITAL | Age: 57
End: 2017-07-10
Attending: PSYCHIATRY & NEUROLOGY
Payer: MEDICAID

## 2017-07-10 DIAGNOSIS — M25.612 DECREASED RANGE OF MOTION OF LEFT SHOULDER: ICD-10-CM

## 2017-07-10 DIAGNOSIS — R29.898 DECREASED RANGE OF MOTION OF NECK: ICD-10-CM

## 2017-07-10 DIAGNOSIS — R26.89 DECREASED FUNCTIONAL MOBILITY: ICD-10-CM

## 2017-07-10 DIAGNOSIS — M25.611 DECREASED RANGE OF MOTION OF SHOULDER, RIGHT: Primary | ICD-10-CM

## 2017-07-10 DIAGNOSIS — M62.89 ABNORMAL INCREASED MUSCLE TIGHTNESS: ICD-10-CM

## 2017-07-10 DIAGNOSIS — R53.1 DECREASED STRENGTH: ICD-10-CM

## 2017-07-10 PROCEDURE — 97161 PT EVAL LOW COMPLEX 20 MIN: CPT | Mod: PO

## 2017-07-10 PROCEDURE — 97140 MANUAL THERAPY 1/> REGIONS: CPT | Mod: PO

## 2017-07-10 NOTE — PROGRESS NOTES
Physical Therapy Evaluation    Name: Susan Courtney  [facility] Number: [id number]    Diagnosis:   Encounter Diagnoses   Name Primary?    Decreased strength     Decreased range of motion of neck     Decreased range of motion of left shoulder     Decreased functional mobility     Decreased range of motion of shoulder, right Yes    Abnormal increased muscle tightness      Physician: Julio Germain III, MD  Treatment Orders: PT Eval and Treat    Past Medical History:   Diagnosis Date    Abdominal hernia     Asthma     Diabetes mellitus     Hypertension     Migraine     Obesity     Sleep apnea      Current Outpatient Prescriptions   Medication Sig    ALBUTEROL INHL Inhale into the lungs as needed.    amitriptyline (ELAVIL) 150 MG Tab Take 150 mg by mouth every evening.    clonazePAM (KLONOPIN) 2 MG Tab Take 2 mg by mouth 2 (two) times daily.    clotrimazole (LOTRIMIN) 1 % Soln Apply topically 2 (two) times daily.    hydrochlorothiazide (HYDRODIURIL) 25 MG tablet Take 25 mg by mouth once daily.    meloxicam (MOBIC) 7.5 MG tablet Take 7.5 mg by mouth once daily.    metformin (FORTAMET) 500 mg 24 hr tablet Take 500 mg by mouth 2 (two) times daily with meals.     metformin (GLUCOPHAGE-XR) 500 MG 24 hr tablet TAKE 2 TABLETS BY MOUTH TWICE A DAY WITH MEALS FOR DIABETES    omeprazole (PRILOSEC) 20 MG capsule Take 20 mg by mouth once daily.    ondansetron (ZOFRAN) 4 MG tablet Take 4 mg by mouth every 6 (six) hours as needed.    PREDNISONE ORAL Take by mouth.    sumatriptan (IMITREX) 50 MG tablet Take 1 tablet (50 mg total) by mouth every 2 (two) hours as needed.    VENTOLIN HFA 90 mcg/actuation inhaler INHALE 2 PUFFS BY MOUTH EVERY 4 TO 6 HOURS AS NEEDED FOR COUGHING AND WHEEZING     No current facility-administered medications for this visit.      Review of patient's allergies indicates:   Allergen Reactions    Morphine      Precautions: standard    Evaluation Date: 7/10/17  Visit # authorized:  "1/12  Authorization period: 7/3017  Plan of care Expiration: 9/4/17    Subjective     Onset Date: Pt's headaches have been a lifetime issue, but she reports the muscle tightness in her neck and shoulders and decreased strength have gotten worse recently  Prior Level of Function: Functional limitations due to migraines has been an ongoing issue, but she reports the muscle tightness in her neck and shoulders and decreased strength have gotten worse recently  Current level of Function: When pt has a headache, she "can't do anything." pt also reports that she can't reach overhead for very long or her shoulders "lock-up". Pt also states she can't hold pots/pans for too long- she needs to use both hands, and sometimes her hands/wrists lock-up. Pt reports tightness in her neck and B shoulders  Social History: lives alone in a house.  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 female that [facility] to [facility] secondary to occipital neuraligia. Susan states she gets L sided headaches/migraines along L occipital and temporal region. These headaches have been an issue for the pt's life. It is hereditary. Pt states that her doctor thought decreasing the tension in her shoulders and neck may alleviate her symptoms.     Imaging: X-ray or MRI taken and revealed No recent relevant imaging.    Pain: current 10/10, worst 10/10, best 0/10, tender; "I cant describe it at it's worst." I don't wish that on anyone, intermittent Pt denies auras with her headaches  Radicular symptoms: none- pt reports tightness in B shoulders "sometimes I can't reach overhead."  Aggravating factors: arms overhead, noise, bright light, carrying heavy items  Easing factors: Goodies, rest, being in the dark and quiet, rubbing alcohol    Pts goals: Relax the tension in my " "shoulders.        Objective     Observation: pt received ambulating s/ a device. Pt is pleasant, appropriate, A+O x 3. Pt with fair ashley, decreased B arm swing    Posture: pt presents with elevated shoulders, rounded shoulders, forward head, slouched posture. Pt is obese    Cervical Range of Motion:    Degrees Pain   Flexion WFL None     Extension WFL None   Right Rotation 28 tight     Left Rotation 55 tight     Right Side Bending 20 tight   Left Side Bending 24 tight      Shoulder Range of Motion:   Shoulder Left Right   Flexion 115 128   Abduction 115 124   ER Decreased and pain ful- TBA Decreased and painful- YBA   IR WFL WFL         Upper Extremity Strength  (R) UE  (L) UE    Shoulder flexion: 4-/5 Shoulder flexion: 4+/5   Shoulder Abduction: 3+/5 Shoulder abduction: 4+/5   Shoulder ER 5/5 Shoulder ER 5/5   Shoulder IR 5/5 Shoulder IR 5/5   Elbow flexion: 4+/5 Elbow flexion: 5/5   Elbow extension: 5/5 Elbow extension: 4/5       Palpation: increased muscle tightness and tenderness to palpation noted through B upper traps into occiput. Pt reports comfort with occipital release    Sensation: light touch intact bilaterally and symmetrically      Functional Limitations Reports - G Codes  Category: Mobility  Tool: FOTO- Neck  Score: 58% (42% limitation)  Current: CK at least 40% < 60% impaired, limited or restricted  Goal: CJ at least 20% < 40% impaired, limited or restricted      PT Evaluation Completed? Yes  Discussed Plan of Care with patient: Yes    TREATMENT:  Susan received therapeutic exercises to develop strength and endurance, flexibility for 5 minutes including: 2 x 30" B UT stretch, seated, 2 x 30", B levator stretch seated, 2 x 10 reps of chin tucks, cues for technique. This was given to pt as HEP. Pt provided with handout and demo's good understanding    Susan  received the following manual therapy techniques x 8 min. PT performs sub nuchal release and sub-occipital release x ~2-3 min each. PT " performs passive B upper trap stretch with pt in supine. Pt was educated on how to replicate suboccipital release at home with use of towel roll     Pt. Received moist hot pack x 10 min. To cervical region. Following treatment.    HEP provided: see above  Instructed pt. Regarding: Proper technique with all exercises. Pt demo good understanding of the education provided. Susan demonstrated good return demonstration of activities.      Assessment     History  Co-morbidities and personal factors that may impact the plan of care Examination  Body Structures and Functions, activity limitations and participation restrictions that may impact the plan of care. Medical necessity is demonstrated by the following impairments. Clinical Presentation Decision Making/ Complexity Score   1. Chronic migraines   1. Decreased strength  2. Decreased ROM  3. Pain  4. Impaired functional mobility  5. Decreased tolerance for overhead activity  6. Increased muscle tightness stable    moderate high low low         This is a 57 y.o. female referred to outpatient physical therapy and presents with a medical diagnosis of occipital neuralgia and demonstrates limitations as described in the problem list. Pt will benefit from physcial therapy services in order to maximize pain free functional independence. The following goals were discussed with the patient and patient is in agreement with them as to be addressed in the treatment plan. Pt is motivated to participate in PT and improve her functional mobility and reduce her pain and muscle tightness  Anticipated barriers to physical therapy: long-standing history of migraines    Medical necessity is demonstrated by the following IMPAIRMENTS/PROBLEM LIST:   1)Increase in pain level limiting function   2)decreased strength   3)decreased ROM in neck and B shoulders   4)impaired posture   5)Lack of HEP   6) impaired functional mobility   7) decreased tolerance for overhead activity   8)increased  muscle tightness    GOALS: Short Term Goals: 4 weeks  1.Report decreased head and neck pain  <   / =  7  /10 at worse  to increase tolerance for functional, work and overhead activities  2. Pt to demo a 10 degrees increase in B shoulder AROM for improved functional mobility and ability to perform overhead activities  3. Increased MMT by 1/2 grade (where applicable) to increase tolerance for ADL and work activities as well as for improved posture.  4. Pt to demo reduced muscle tightness t/o traps and cervical musculature for improved ROM and functional mobility  5. Pt to tolerate HEP to improve ROM, strength and independence with functional and work activities    Long Term Goals: 8 weeks  1.Report decreased head/neck and shoulder pain  <   / =  5  /10 at worst  to increase tolerance for work, functional and overhead activities.  2.pt to demo WNL and symmetrical cervical ROM for improved functional mobility (ie turning head while driving)  3.Increased MMT  By 1 grade (where applicable)  to increase tolerance for ADL, work activities, and for improved posture.  4.  Pt will report at CJ level (20-40% impaired) on FOTO neck survey score for neck pain disability to demonstrate decrease in disability and improvement in neck, head and shoulder pain.  5. Pt to be Independent with HEP to improve ROM and independence with ADL's, as well as management of symptoms  6. Pt to demo 20 degree increase in B shoulder ROM for improved functional mobility and ability to reach overhead        Plan     Pt will be treated by physical therapy 2 times a week for 8 weeks for Pt Education, HEP, therapeutic exercises, neuromuscular re-education, joint mobilizations, modalities prn to achieve established goals. Susan may at times be seen by a PTA as part of the Rehab Team.         I certify the need for these services furnished under this plan of treatment and while under my care.______________________________ Physician/Referring  Practitioner  Date of Signature

## 2017-07-11 PROBLEM — R53.1 DECREASED STRENGTH: Status: ACTIVE | Noted: 2017-07-11

## 2017-07-11 PROBLEM — R51.9 HEADACHE: Status: ACTIVE | Noted: 2017-07-11

## 2017-07-11 PROBLEM — R29.898 DECREASED RANGE OF MOTION OF NECK: Status: ACTIVE | Noted: 2017-07-11

## 2017-07-11 PROBLEM — R26.89 DECREASED FUNCTIONAL MOBILITY: Status: ACTIVE | Noted: 2017-07-11

## 2017-07-11 PROBLEM — M25.612 DECREASED RANGE OF MOTION OF LEFT SHOULDER: Status: ACTIVE | Noted: 2017-07-11

## 2017-07-11 PROBLEM — M25.611 DECREASED RANGE OF MOTION OF RIGHT SHOULDER: Status: ACTIVE | Noted: 2017-07-11

## 2017-07-11 NOTE — PLAN OF CARE
Physical Therapy Evaluation    Name: Susan Courtney  [facility] Number: [id number]    Diagnosis:   Encounter Diagnoses   Name Primary?    Decreased strength     Decreased range of motion of neck     Decreased range of motion of left shoulder     Decreased functional mobility     Decreased range of motion of shoulder, right Yes    Abnormal increased muscle tightness      Physician: Julio Germain III, MD  Treatment Orders: PT Eval and Treat    Past Medical History:   Diagnosis Date    Abdominal hernia     Asthma     Diabetes mellitus     Hypertension     Migraine     Obesity     Sleep apnea      Current Outpatient Prescriptions   Medication Sig    ALBUTEROL INHL Inhale into the lungs as needed.    amitriptyline (ELAVIL) 150 MG Tab Take 150 mg by mouth every evening.    clonazePAM (KLONOPIN) 2 MG Tab Take 2 mg by mouth 2 (two) times daily.    clotrimazole (LOTRIMIN) 1 % Soln Apply topically 2 (two) times daily.    hydrochlorothiazide (HYDRODIURIL) 25 MG tablet Take 25 mg by mouth once daily.    meloxicam (MOBIC) 7.5 MG tablet Take 7.5 mg by mouth once daily.    metformin (FORTAMET) 500 mg 24 hr tablet Take 500 mg by mouth 2 (two) times daily with meals.     metformin (GLUCOPHAGE-XR) 500 MG 24 hr tablet TAKE 2 TABLETS BY MOUTH TWICE A DAY WITH MEALS FOR DIABETES    omeprazole (PRILOSEC) 20 MG capsule Take 20 mg by mouth once daily.    ondansetron (ZOFRAN) 4 MG tablet Take 4 mg by mouth every 6 (six) hours as needed.    PREDNISONE ORAL Take by mouth.    sumatriptan (IMITREX) 50 MG tablet Take 1 tablet (50 mg total) by mouth every 2 (two) hours as needed.    VENTOLIN HFA 90 mcg/actuation inhaler INHALE 2 PUFFS BY MOUTH EVERY 4 TO 6 HOURS AS NEEDED FOR COUGHING AND WHEEZING     No current facility-administered medications for this visit.      Review of patient's allergies indicates:   Allergen Reactions    Morphine      Precautions: standard    Evaluation Date: 7/10/17  Visit # authorized:  "1/12  Authorization period: 7/3017  Plan of care Expiration: 9/4/17    Subjective     Onset Date: Pt's headaches have been a lifetime issue, but she reports the muscle tightness in her neck and shoulders and decreased strength have gotten worse recently  Prior Level of Function: Functional limitations due to migraines has been an ongoing issue, but she reports the muscle tightness in her neck and shoulders and decreased strength have gotten worse recently  Current level of Function: When pt has a headache, she "can't do anything." pt also reports that she can't reach overhead for very long or her shoulders "lock-up". Pt also states she can't hold pots/pans for too long- she needs to use both hands, and sometimes her hands/wrists lock-up. Pt reports tightness in her neck and B shoulders  Social History: lives alone in a house.  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 female that [facility] to [facility] secondary to occipital neuraligia. Susan states she gets L sided headaches/migraines along L occipital and temporal region. These headaches have been an issue for the pt's life. It is hereditary. Pt states that her doctor thought decreasing the tension in her shoulders and neck may alleviate her symptoms.     Imaging: X-ray or MRI taken and revealed No recent relevant imaging.    Pain: current 10/10, worst 10/10, best 0/10, tender; "I cant describe it at it's worst." I don't wish that on anyone, intermittent Pt denies auras with her headaches  Radicular symptoms: none- pt reports tightness in B shoulders "sometimes I can't reach overhead."  Aggravating factors: arms overhead, noise, bright light, carrying heavy items  Easing factors: Goodies, rest, being in the dark and quiet, rubbing alcohol    Pts goals: Relax the tension in my " "shoulders.        Objective     Observation: pt received ambulating s/ a device. Pt is pleasant, appropriate, A+O x 3. Pt with fair ashley, decreased B arm swing    Posture: pt presents with elevated shoulders, rounded shoulders, forward head, slouched posture. Pt is obese    Cervical Range of Motion:    Degrees Pain   Flexion WFL None     Extension WFL None   Right Rotation 28 tight     Left Rotation 55 tight     Right Side Bending 20 tight   Left Side Bending 24 tight      Shoulder Range of Motion:   Shoulder Left Right   Flexion 115 128   Abduction 115 124   ER Decreased and pain ful- TBA Decreased and painful- YBA   IR WFL WFL         Upper Extremity Strength  (R) UE  (L) UE    Shoulder flexion: 4-/5 Shoulder flexion: 4+/5   Shoulder Abduction: 3+/5 Shoulder abduction: 4+/5   Shoulder ER 5/5 Shoulder ER 5/5   Shoulder IR 5/5 Shoulder IR 5/5   Elbow flexion: 4+/5 Elbow flexion: 5/5   Elbow extension: 5/5 Elbow extension: 4/5       Palpation: increased muscle tightness and tenderness to palpation noted through B upper traps into occiput. Pt reports comfort with occipital release    Sensation: light touch intact bilaterally and symmetrically      Functional Limitations Reports - G Codes  Category: Mobility  Tool: FOTO- Neck  Score: 58% (42% limitation)  Current: CK at least 40% < 60% impaired, limited or restricted  Goal: CJ at least 20% < 40% impaired, limited or restricted      PT Evaluation Completed? Yes  Discussed Plan of Care with patient: Yes    TREATMENT:  Susan received therapeutic exercises to develop strength and endurance, flexibility for 5 minutes including: 2 x 30" B UT stretch, seated, 2 x 30", B levator stretch seated, 2 x 10 reps of chin tucks, cues for technique. This was given to pt as HEP. Pt provided with handout and demo's good understanding    Susan  received the following manual therapy techniques x 8 min. PT performs sub nuchal release and sub-occipital release x ~2-3 min each. PT " performs passive B upper trap stretch with pt in supine. Pt was educated on how to replicate suboccipital release at home with use of towel roll     Pt. Received moist hot pack x 10 min. To cervical region. Following treatment.    HEP provided: see above  Instructed pt. Regarding: Proper technique with all exercises. Pt demo good understanding of the education provided. Susan demonstrated good return demonstration of activities.      Assessment     History  Co-morbidities and personal factors that may impact the plan of care Examination  Body Structures and Functions, activity limitations and participation restrictions that may impact the plan of care. Medical necessity is demonstrated by the following impairments. Clinical Presentation Decision Making/ Complexity Score   1. Chronic migraines   1. Decreased strength  2. Decreased ROM  3. Pain  4. Impaired functional mobility  5. Decreased tolerance for overhead activity  6. Increased muscle tightness stable    moderate high low low         This is a 57 y.o. female referred to outpatient physical therapy and presents with a medical diagnosis of occipital neuralgia and demonstrates limitations as described in the problem list. Pt will benefit from physcial therapy services in order to maximize pain free functional independence. The following goals were discussed with the patient and patient is in agreement with them as to be addressed in the treatment plan. Pt is motivated to participate in PT and improve her functional mobility and reduce her pain and muscle tightness  Anticipated barriers to physical therapy: long-standing history of migraines    Medical necessity is demonstrated by the following IMPAIRMENTS/PROBLEM LIST:   1)Increase in pain level limiting function   2)decreased strength   3)decreased ROM in neck and B shoulders   4)impaired posture   5)Lack of HEP   6) impaired functional mobility   7) decreased tolerance for overhead activity   8)increased  muscle tightness    GOALS: Short Term Goals: 4 weeks  1.Report decreased head and neck pain  <   / =  7  /10 at worse  to increase tolerance for functional, work and overhead activities  2. Pt to demo a 10 degrees increase in B shoulder AROM for improved functional mobility and ability to perform overhead activities  3. Increased MMT by 1/2 grade (where applicable) to increase tolerance for ADL and work activities as well as for improved posture.  4. Pt to demo reduced muscle tightness t/o traps and cervical musculature for improved ROM and functional mobility  5. Pt to tolerate HEP to improve ROM, strength and independence with functional and work activities    Long Term Goals: 8 weeks  1.Report decreased head/neck and shoulder pain  <   / =  5  /10 at worst  to increase tolerance for work, functional and overhead activities.  2.pt to demo WNL and symmetrical cervical ROM for improved functional mobility (ie turning head while driving)  3.Increased MMT  By 1 grade (where applicable)  to increase tolerance for ADL, work activities, and for improved posture.  4.  Pt will report at CJ level (20-40% impaired) on FOTO neck survey score for neck pain disability to demonstrate decrease in disability and improvement in neck, head and shoulder pain.  5. Pt to be Independent with HEP to improve ROM and independence with ADL's, as well as management of symptoms  6. Pt to demo 20 degree increase in B shoulder ROM for improved functional mobility and ability to reach overhead        Plan     Pt will be treated by physical therapy 2 times a week for 8 weeks for Pt Education, HEP, therapeutic exercises, neuromuscular re-education, joint mobilizations, modalities prn to achieve established goals. Susan may at times be seen by a PTA as part of the Rehab Team.         I certify the need for these services furnished under this plan of treatment and while under my care.______________________________ Physician/Referring  Practitioner  Date of Signature

## 2017-07-21 ENCOUNTER — CLINICAL SUPPORT (OUTPATIENT)
Dept: REHABILITATION | Facility: HOSPITAL | Age: 57
End: 2017-07-21
Attending: PSYCHIATRY & NEUROLOGY
Payer: MEDICAID

## 2017-07-21 DIAGNOSIS — R53.1 DECREASED STRENGTH: ICD-10-CM

## 2017-07-21 DIAGNOSIS — R26.89 DECREASED FUNCTIONAL MOBILITY: ICD-10-CM

## 2017-07-21 DIAGNOSIS — M25.611 DECREASED RANGE OF MOTION OF RIGHT SHOULDER: ICD-10-CM

## 2017-07-21 DIAGNOSIS — R29.898 DECREASED RANGE OF MOTION OF NECK: ICD-10-CM

## 2017-07-21 PROCEDURE — 97140 MANUAL THERAPY 1/> REGIONS: CPT | Mod: PO

## 2017-07-21 PROCEDURE — 97110 THERAPEUTIC EXERCISES: CPT | Mod: PO

## 2017-07-21 NOTE — PROGRESS NOTES
Name: Susan Courtney  Clinic Number: [id number]    Diagnosis:   Encounter Diagnoses   Name Primary?    Decreased strength     Decreased range of motion of neck     Decreased range of motion of right shoulder     Decreased functional mobility      Physician: Julio Germain III, MD  Treatment Orders: PT Eval and Treat    Past Medical History:   Diagnosis Date    Abdominal hernia     Asthma     Diabetes mellitus     Hypertension     Migraine     Obesity     Sleep apnea          Precautions: standard    Evaluation Date: 7/10/17  Visit # authorized: 2/12  Authorization period: 7/3017  Plan of care Expiration: 9/4/17    Subjective     Patient reports she is having pain on both sides of the neck.  Patient denies having a current headache.  Patient reports she had a very bad miagraine on Tuesday and reports she almost had to go to the ER because of the pain.  Patient reports when the miagraine comes on she uses rubbing alcohol near the L temple and turns off the lights and stays really still.  Patient reports she always has trouble sleeping even if she doesn't have a miagraine.  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    Pain: 8/10, after treatment 0/10    Objective     Observation: pt received ambulating s/ a device. Pt is pleasant, appropriate, A+O x 3. Pt with fair ashley, decreased B arm swing    Posture: pt presents with elevated shoulders, rounded shoulders, forward head, slouched posture. Pt is obese    Cervical Range of Motion:    Degrees Pain   Flexion WFL None     Extension WFL None   Right Rotation 28 tight     Left Rotation 55 tight     Right Side Bending 20 tight   Left Side Bending 24 tight      Shoulder Range of Motion:   Shoulder Left Right   Flexion 115 128   Abduction 115 124   ER Decreased and pain ful- TBA Decreased and painful- YBA   IR WFL WFL       TREATMENT:    Pt. Received moist hot pack x 10 min. To  "cervical region. At the beginning of treatment.    Susan received therapeutic exercises to develop strength and endurance, flexibility for 38 minutes including:    B UT stretch  2 x 30"   B levator stretch 2 x 30"  Supine chin tucks 2 x 10    Shoulder rows with orange theraband x 20  Shoulder extension with orange theraband x 20   Supine cervical rotation x 10 5"  Unweighted chin tucks x 20  Unweighted cervical rotation x 20    Patient received 1 on 1 therapeutic exercise for 17 minutes      Susan  received the following manual therapy techniques x 12 minutes including:  Sub-occipital release  Soft tissue mobilization to L upper trap, sub occipital, SCM  Massage to B temporal area  Gentle cervical distraction       HEP provided: supine cervical rotation  Instructed pt. Regarding: Proper technique with all exercises. Pt demo good understanding of the education provided. Susan demonstrated good return demonstration of activities.      Functional Limitations Reports - G Codes  Category: Mobility  Tool: FOTO- Neck  Score: 58% (42% limitation)  Current: CK at least 40% < 60% impaired, limited or restricted  Goal: CJ at least 20% < 40% impaired, limited or restricted      Assessment     Patient tolerated treatment well with a decrease in pain.  Patient reported relief with soft tissue to upper traps.  Will try to perform manual at the beginning of treatment to help loosens muscle before stretching.  Pt was advised to perform stretches multiple times a day.  Patient verbalized understanding. Pt will benefit from physcial therapy services in order to maximize pain free functional independence.   Anticipated barriers to physical therapy: long-standing history of migraines        History  Co-morbidities and personal factors that may impact the plan of care Examination  Body Structures and Functions, activity limitations and participation restrictions that may impact the plan of care. Medical necessity is demonstrated by " the following impairments. Clinical Presentation Decision Making/ Complexity Score   1. Chronic migraines   1. Decreased strength  2. Decreased ROM  3. Pain  4. Impaired functional mobility  5. Decreased tolerance for overhead activity  6. Increased muscle tightness stable    moderate high low low           Medical necessity is demonstrated by the following IMPAIRMENTS/PROBLEM LIST:   1)Increase in pain level limiting function   2)decreased strength   3)decreased ROM in neck and B shoulders   4)impaired posture   5)Lack of HEP   6) impaired functional mobility   7) decreased tolerance for overhead activity   8)increased muscle tightness    GOALS: Short Term Goals: 4 weeks  1.Report decreased head and neck pain  <   / =  7  /10 at worse  to increase tolerance for functional, work and overhead activities  2. Pt to demo a 10 degrees increase in B shoulder AROM for improved functional mobility and ability to perform overhead activities  3. Increased MMT by 1/2 grade (where applicable) to increase tolerance for ADL and work activities as well as for improved posture.  4. Pt to demo reduced muscle tightness t/o traps and cervical musculature for improved ROM and functional mobility  5. Pt to tolerate HEP to improve ROM, strength and independence with functional and work activities    Long Term Goals: 8 weeks  1.Report decreased head/neck and shoulder pain  <   / =  5  /10 at worst  to increase tolerance for work, functional and overhead activities.  2.pt to demo WNL and symmetrical cervical ROM for improved functional mobility (ie turning head while driving)  3.Increased MMT  By 1 grade (where applicable)  to increase tolerance for ADL, work activities, and for improved posture.  4.  Pt will report at CJ level (20-40% impaired) on FOTO neck survey score for neck pain disability to demonstrate decrease in disability and improvement in neck, head and shoulder pain.  5. Pt to be Independent with HEP to improve ROM and  independence with ADL's, as well as management of symptoms  6. Pt to demo 20 degree increase in B shoulder ROM for improved functional mobility and ability to reach overhead        Plan     Pt will be treated by physical therapy 2 times a week for 8 weeks for Pt Education, HEP, therapeutic exercises, neuromuscular re-education, joint mobilizations, modalities prn to achieve established goals. Susan may at times be seen by a PTA as part of the Rehab Team.

## 2017-07-25 ENCOUNTER — CLINICAL SUPPORT (OUTPATIENT)
Dept: REHABILITATION | Facility: HOSPITAL | Age: 57
End: 2017-07-25
Attending: PSYCHIATRY & NEUROLOGY
Payer: MEDICAID

## 2017-07-25 DIAGNOSIS — R29.898 DECREASED RANGE OF MOTION OF NECK: ICD-10-CM

## 2017-07-25 DIAGNOSIS — R26.89 DECREASED FUNCTIONAL MOBILITY: ICD-10-CM

## 2017-07-25 DIAGNOSIS — M25.611 DECREASED RANGE OF MOTION OF RIGHT SHOULDER: ICD-10-CM

## 2017-07-25 DIAGNOSIS — R53.1 DECREASED STRENGTH: ICD-10-CM

## 2017-07-25 PROCEDURE — 97140 MANUAL THERAPY 1/> REGIONS: CPT | Mod: PO

## 2017-07-25 PROCEDURE — 97110 THERAPEUTIC EXERCISES: CPT | Mod: PO

## 2017-07-25 NOTE — PROGRESS NOTES
"Name: Susan Courtney  Clinic Number: [id number]    Diagnosis:   Encounter Diagnoses   Name Primary?    Decreased strength     Decreased range of motion of neck     Decreased range of motion of right shoulder     Decreased functional mobility      Physician: Julio Germain III, MD  Treatment Orders: PT Eval and Treat    Past Medical History:   Diagnosis Date    Abdominal hernia     Asthma     Diabetes mellitus     Hypertension     Migraine     Obesity     Sleep apnea          Precautions: standard    Evaluation Date: 7/10/17  Visit # authorized: 3/12  Authorization period: 7/3017  Plan of care Expiration: 9/4/17    Subjective     Patient reports she has been doing the stretches consistently.  Patient reports she has not had a migraine since last treatment.    Pain: 4/10, after treatment 0/10    Objective     Observation: pt received ambulating s/ a device. Pt is pleasant, appropriate, A+O x 3. Pt with fair ashley, decreased B arm swing    Posture: pt presents with elevated shoulders, rounded shoulders, forward head, slouched posture. Pt is obese    Cervical Range of Motion:    Degrees Pain   Flexion WFL None     Extension WFL None   Right Rotation 28 tight     Left Rotation 55 tight     Right Side Bending 20 tight   Left Side Bending 24 tight      Shoulder Range of Motion:   Shoulder Left Right   Flexion 115 128   Abduction 115 124   ER Decreased and pain ful- TBA Decreased and painful- YBA   IR WFL WFL       TREATMENT:    Pt. Received moist hot pack x 10 min. To cervical region. At the beginning of treatment.    Susan received therapeutic exercises to develop strength and endurance, flexibility for 30 minutes including:    B UT stretch  2 x 30"   B levator stretch 2 x 30"  Supine chin tucks 3 x 10    Shoulder rows with orange theraband x 20  Shoulder extension with orange theraband x 20  Supine cervical rotation x 10 5"  Unweighted chin tucks x 20- NP  Unweighted cervical rotation x 20- NP    Patient " received 1 on 1 therapeutic exercise for 11 minutes      Susan  received the following manual therapy techniques x 12 minutes including:  Sub-occipital release  Soft tissue mobilization to L upper trap, sub occipital, SCM  Massage to B temporal area  Gentle cervical distraction       HEP provided: none added  Instructed pt. Regarding: Proper technique with all exercises. Pt demo good understanding of the education provided. Susan demonstrated good return demonstration of activities.      Functional Limitations Reports - G Codes  Category: Mobility  Tool: FOTO- Neck  Score: 58% (42% limitation)  Current: CK at least 40% < 60% impaired, limited or restricted  Goal: CJ at least 20% < 40% impaired, limited or restricted      Assessment     Patient tolerated treatment well with a decrease in pain.  Will progress as tolerated. Pt will benefit from physcial therapy services in order to maximize pain free functional independence.   Anticipated barriers to physical therapy: long-standing history of migraines        History  Co-morbidities and personal factors that may impact the plan of care Examination  Body Structures and Functions, activity limitations and participation restrictions that may impact the plan of care. Medical necessity is demonstrated by the following impairments. Clinical Presentation Decision Making/ Complexity Score   1. Chronic migraines   1. Decreased strength  2. Decreased ROM  3. Pain  4. Impaired functional mobility  5. Decreased tolerance for overhead activity  6. Increased muscle tightness stable    moderate high low low           Medical necessity is demonstrated by the following IMPAIRMENTS/PROBLEM LIST:   1)Increase in pain level limiting function   2)decreased strength   3)decreased ROM in neck and B shoulders   4)impaired posture   5)Lack of HEP   6) impaired functional mobility   7) decreased tolerance for overhead activity   8)increased muscle tightness    GOALS: Short Term Goals: 4  weeks  1.Report decreased head and neck pain  <   / =  7  /10 at worse  to increase tolerance for functional, work and overhead activities  2. Pt to demo a 10 degrees increase in B shoulder AROM for improved functional mobility and ability to perform overhead activities  3. Increased MMT by 1/2 grade (where applicable) to increase tolerance for ADL and work activities as well as for improved posture.  4. Pt to demo reduced muscle tightness t/o traps and cervical musculature for improved ROM and functional mobility  5. Pt to tolerate HEP to improve ROM, strength and independence with functional and work activities    Long Term Goals: 8 weeks  1.Report decreased head/neck and shoulder pain  <   / =  5  /10 at worst  to increase tolerance for work, functional and overhead activities.  2.pt to demo WNL and symmetrical cervical ROM for improved functional mobility (ie turning head while driving)  3.Increased MMT  By 1 grade (where applicable)  to increase tolerance for ADL, work activities, and for improved posture.  4.  Pt will report at CJ level (20-40% impaired) on FOTO neck survey score for neck pain disability to demonstrate decrease in disability and improvement in neck, head and shoulder pain.  5. Pt to be Independent with HEP to improve ROM and independence with ADL's, as well as management of symptoms  6. Pt to demo 20 degree increase in B shoulder ROM for improved functional mobility and ability to reach overhead        Plan     Pt will be treated by physical therapy 2 times a week for 8 weeks for Pt Education, HEP, therapeutic exercises, neuromuscular re-education, joint mobilizations, modalities prn to achieve established goals. Susan may at times be seen by a PTA as part of the Rehab Team.

## 2017-07-28 ENCOUNTER — CLINICAL SUPPORT (OUTPATIENT)
Dept: REHABILITATION | Facility: HOSPITAL | Age: 57
End: 2017-07-28
Attending: PSYCHIATRY & NEUROLOGY
Payer: MEDICAID

## 2017-07-28 DIAGNOSIS — R26.89 DECREASED FUNCTIONAL MOBILITY: ICD-10-CM

## 2017-07-28 DIAGNOSIS — R29.898 DECREASED RANGE OF MOTION OF NECK: ICD-10-CM

## 2017-07-28 DIAGNOSIS — R53.1 DECREASED STRENGTH: ICD-10-CM

## 2017-07-28 DIAGNOSIS — M25.611 DECREASED RANGE OF MOTION OF RIGHT SHOULDER: ICD-10-CM

## 2017-07-28 PROCEDURE — 97110 THERAPEUTIC EXERCISES: CPT | Mod: PO

## 2017-07-28 PROCEDURE — 97140 MANUAL THERAPY 1/> REGIONS: CPT | Mod: PO

## 2017-07-28 NOTE — PROGRESS NOTES
"Name: Susan Courtney  Clinic Number: [id number]    Diagnosis:   No diagnosis found.  Physician: Julio Germain III, MD  Treatment Orders: PT Eval and Treat    Past Medical History:   Diagnosis Date    Abdominal hernia     Asthma     Diabetes mellitus     Hypertension     Migraine     Obesity     Sleep apnea          Precautions: standard    Evaluation Date: 7/10/17  Visit # authorized: 4/12  Authorization period: 7/3017  Plan of care Expiration: 9/4/17    Subjective     Patient reports no current pain and no headaches since last treatment.    Pain: 0/10, after treatment 0/10    Objective     Observation: pt received ambulating s/ a device. Pt is pleasant, appropriate, A+O x 3. Pt with fair ashley, decreased B arm swing    Posture: pt presents with elevated shoulders, rounded shoulders, forward head, slouched posture. Pt is obese    Cervical Range of Motion:    Degrees Pain   Flexion WFL None     Extension WFL None   Right Rotation 28 tight     Left Rotation 55 tight     Right Side Bending 20 tight   Left Side Bending 24 tight      Shoulder Range of Motion:   Shoulder Left Right   Flexion 115 128   Abduction 115 124   ER Decreased and pain ful- TBA Decreased and painful- YBA   IR WFL WFL       TREATMENT:    Pt. Received moist hot pack x 9 min. To cervical region. At the beginning of treatment.    Susan received therapeutic exercises to develop strength and endurance, flexibility for 40 minutes including:    B UT stretch  3 x 30"   B levator stretch 3 x 30"  Supine chin tucks 3 x 10    Shoulder rows with orange theraband x 30  Shoulder extension with orange theraband x 30  Supine cervical rotation x 10 5"  Corner stretch 3 x 30"  Brugger's postural exercise with yellow theraband x 15  Unweighted chin tucks x 20- NP  Unweighted cervical rotation x 20        Susan  received the following manual therapy techniques x 13 minutes including:  Sub-occipital release  Soft tissue mobilization to L upper trap, sub " occipital, SCM  Gentle cervical distraction       HEP provided: corner stretch, brugger's postural exercise  Instructed pt. Regarding: Proper technique with all exercises. Pt demo good understanding of the education provided. Susan demonstrated good return demonstration of activities.      Functional Limitations Reports - G Codes  Category: Mobility  Tool: FOTO- Neck  Score: 58% (42% limitation)  Current: CK at least 40% < 60% impaired, limited or restricted  Goal: CJ at least 20% < 40% impaired, limited or restricted      Assessment     Patient tolerated treatment well with a decrease in pain. Corner stretch was added to help with pec tightness and brugger's was added for postural reeducation.  Will progress as tolerated. Pt will benefit from physcial therapy services in order to maximize pain free functional independence.   Anticipated barriers to physical therapy: long-standing history of migraines        History  Co-morbidities and personal factors that may impact the plan of care Examination  Body Structures and Functions, activity limitations and participation restrictions that may impact the plan of care. Medical necessity is demonstrated by the following impairments. Clinical Presentation Decision Making/ Complexity Score   1. Chronic migraines   1. Decreased strength  2. Decreased ROM  3. Pain  4. Impaired functional mobility  5. Decreased tolerance for overhead activity  6. Increased muscle tightness stable    moderate high low low           Medical necessity is demonstrated by the following IMPAIRMENTS/PROBLEM LIST:   1)Increase in pain level limiting function   2)decreased strength   3)decreased ROM in neck and B shoulders   4)impaired posture   5)Lack of HEP   6) impaired functional mobility   7) decreased tolerance for overhead activity   8)increased muscle tightness    GOALS: Short Term Goals: 4 weeks  1.Report decreased head and neck pain  <   / =  7  /10 at worse  to increase tolerance for  functional, work and overhead activities  2. Pt to demo a 10 degrees increase in B shoulder AROM for improved functional mobility and ability to perform overhead activities  3. Increased MMT by 1/2 grade (where applicable) to increase tolerance for ADL and work activities as well as for improved posture.  4. Pt to demo reduced muscle tightness t/o traps and cervical musculature for improved ROM and functional mobility  5. Pt to tolerate HEP to improve ROM, strength and independence with functional and work activities    Long Term Goals: 8 weeks  1.Report decreased head/neck and shoulder pain  <   / =  5  /10 at worst  to increase tolerance for work, functional and overhead activities.  2.pt to demo WNL and symmetrical cervical ROM for improved functional mobility (ie turning head while driving)  3.Increased MMT  By 1 grade (where applicable)  to increase tolerance for ADL, work activities, and for improved posture.  4.  Pt will report at CJ level (20-40% impaired) on FOTO neck survey score for neck pain disability to demonstrate decrease in disability and improvement in neck, head and shoulder pain.  5. Pt to be Independent with HEP to improve ROM and independence with ADL's, as well as management of symptoms  6. Pt to demo 20 degree increase in B shoulder ROM for improved functional mobility and ability to reach overhead        Plan     Pt will be treated by physical therapy 2 times a week for 8 weeks for Pt Education, HEP, therapeutic exercises, neuromuscular re-education, joint mobilizations, modalities prn to achieve established goals. Susan may at times be seen by a PTA as part of the Rehab Team.

## 2017-07-31 ENCOUNTER — CLINICAL SUPPORT (OUTPATIENT)
Dept: REHABILITATION | Facility: HOSPITAL | Age: 57
End: 2017-07-31
Attending: PSYCHIATRY & NEUROLOGY
Payer: MEDICAID

## 2017-07-31 DIAGNOSIS — R26.89 DECREASED FUNCTIONAL MOBILITY: ICD-10-CM

## 2017-07-31 DIAGNOSIS — R53.1 DECREASED STRENGTH: ICD-10-CM

## 2017-07-31 DIAGNOSIS — R29.898 DECREASED RANGE OF MOTION OF NECK: ICD-10-CM

## 2017-07-31 DIAGNOSIS — R51.9 HEADACHE, UNSPECIFIED HEADACHE TYPE: ICD-10-CM

## 2017-07-31 DIAGNOSIS — M25.611 DECREASED RANGE OF MOTION OF RIGHT SHOULDER: ICD-10-CM

## 2017-07-31 PROCEDURE — 97110 THERAPEUTIC EXERCISES: CPT | Mod: PO

## 2017-07-31 PROCEDURE — 97140 MANUAL THERAPY 1/> REGIONS: CPT | Mod: PO

## 2017-07-31 NOTE — PROGRESS NOTES
Name: Susan Courtney  Clinic Number: [id number]    Diagnosis:   Encounter Diagnoses   Name Primary?    Headache, unspecified headache type     Decreased strength     Decreased range of motion of neck     Decreased range of motion of right shoulder     Decreased functional mobility      Physician: Julio Germain III, MD  Treatment Orders: PT Eval and Treat    Past Medical History:   Diagnosis Date    Abdominal hernia     Asthma     Diabetes mellitus     Hypertension     Migraine     Obesity     Sleep apnea          Precautions: standard    Evaluation Date: 7/10/17  Visit # authorized: 5/12  Authorization period: 7/3017  Plan of care Expiration: 9/4/17    Subjective     Patient reports no current pain and no headaches since last treatment.    Pain: pt states she gets a spot on the back of her head (L side just above occipital region) where pain is 3/10    Objective     Observation: pt received ambulating s/ a device. Pt is pleasant, appropriate, A+O x 3. Pt with fair ashley, decreased B arm swing.     Posture: pt presents with elevated shoulders, rounded shoulders, forward head, slouched posture. Pt noted to have increased muscle tightness and knots throughout upper trap and into cervical region.    Cervical Range of Motion:    Degrees Pain   Flexion WFL None     Extension WFL None   Right Rotation 28 tight     Left Rotation 55 tight     Right Side Bending 20 tight   Left Side Bending 24 tight      Shoulder Range of Motion:   Shoulder Left Right   Flexion 115 128   Abduction 115 124   ER Decreased and painful- TBA Decreased and painful- TBA   IR WFL WFL       TREAT    Pt. Received moist hot pack x 10 min. To cervical region after first 4 exercises.    Susan received therapeutic exercises to develop strength and endurance, flexibility for 22 minutes including:  2 x 10 reps of forward and backward shoulder rolls, seated  10 reps of B cervical side-bending, seated  10 reps of B cervical rotation in  "pain-free range( pt noted to have some dizziness with this)  B levator stretch 2 x 30"  Supine chin tucks 15 reps, 3" holds    Brugger's postural exercise with yellow theraband 2 x10  Unweighted chin tucks x 10 reps, 3" hold  Unweighted cervical rotation x 10 reps  Scaption, 2 x 10 reps, shoulder height  Shoulder rows with orange theraband x 30    B UT stretch  3 x 30" -NP  Shoulder extension with orange theraband x 30-NP  Supine cervical rotation x 10 5"- NP  Corner stretch 3 x 30"-NP      Susan  received the following manual therapy techniques x 30 minutes including:  Sub-occipital release  Sub-nuchal release  Soft tissue mobilization to B upper trap and sub-occipital  Gentle cervical distraction  To decrease occipital and scalp discomfort, pt places towel overhead, while therapist runs fingers over the towel. Pt reports comfort with intervention       HEP /education provided: how to replicate scalp intervention at home with a towel and hair brush. Pt also educated on use of tennis balls in a sock for suboccipital release at home. Potential benefit of dry needling.  Instructed pt. Regarding: Proper technique with all exercises. Pt demo good understanding of the education provided. Susan demonstrated good return demonstration of activities.      Functional Limitations Reports - G Codes  Category: Mobility  Tool: FOTO- Neck  Score: 58% (42% limitation)  Current: CK at least 40% < 60% impaired, limited or restricted  Goal: CJ at least 20% < 40% impaired, limited or restricted      Assessment     Patient tolerated treatment well with a decrease in pain. Pt states headaches have been decreased overall. Pt needs intermittent cues for posture and technique with exercises t/o.  Will progress as tolerated. Pt may benefit from dry needling to address symptoms of muscle tightness.  Will follow up with a certifeid therapist for next session if available.    Pt will benefit from physcial therapy services in order to maximize " pain free functional independence.   Anticipated barriers to physical therapy: long-standing history of migraines        Medical necessity is demonstrated by the following IMPAIRMENTS/PROBLEM LIST:   1)Increase in pain level limiting function   2)decreased strength   3)decreased ROM in neck and B shoulders   4)impaired posture   5)Lack of HEP   6) impaired functional mobility   7) decreased tolerance for overhead activity   8)increased muscle tightness    GOALS: Short Term Goals: 4 weeks  1.Report decreased head and neck pain  <   / =  7  /10 at worse  to increase tolerance for functional, work and overhead activities- Goal met  2. Pt to demo a 10 degrees increase in B shoulder AROM for improved functional mobility and ability to perform overhead activities  3. Increased MMT by 1/2 grade (where applicable) to increase tolerance for ADL and work activities as well as for improved posture.  4. Pt to demo reduced muscle tightness t/o traps and cervical musculature for improved ROM and functional mobility  5. Pt to tolerate HEP to improve ROM, strength and independence with functional and work activities    Long Term Goals: 8 weeks  1.Report decreased head/neck and shoulder pain  <   / =  5  /10 at worst  to increase tolerance for work, functional and overhead activities.  2.pt to demo WNL and symmetrical cervical ROM for improved functional mobility (ie turning head while driving)  3.Increased MMT  By 1 grade (where applicable)  to increase tolerance for ADL, work activities, and for improved posture.  4.  Pt will report at CJ level (20-40% impaired) on FOTO neck survey score for neck pain disability to demonstrate decrease in disability and improvement in neck, head and shoulder pain.  5. Pt to be Independent with HEP to improve ROM and independence with ADL's, as well as management of symptoms  6. Pt to demo 20 degree increase in B shoulder ROM for improved functional mobility and ability to reach  overhead        Plan     Pt will be treated by physical therapy 2 times a week for 8 weeks for Pt Education, HEP, therapeutic exercises, neuromuscular re-education, joint mobilizations, modalities prn to achieve established goals. Susan may at times be seen by a PTA as part of the Rehab Team.

## 2017-08-04 ENCOUNTER — CLINICAL SUPPORT (OUTPATIENT)
Dept: REHABILITATION | Facility: HOSPITAL | Age: 57
End: 2017-08-04
Attending: PSYCHIATRY & NEUROLOGY
Payer: MEDICAID

## 2017-08-04 DIAGNOSIS — R29.898 DECREASED RANGE OF MOTION OF NECK: ICD-10-CM

## 2017-08-04 DIAGNOSIS — R26.89 DECREASED FUNCTIONAL MOBILITY: ICD-10-CM

## 2017-08-04 DIAGNOSIS — M25.611 DECREASED RANGE OF MOTION OF RIGHT SHOULDER: ICD-10-CM

## 2017-08-04 DIAGNOSIS — R53.1 DECREASED STRENGTH: ICD-10-CM

## 2017-08-04 DIAGNOSIS — R51.9 HEADACHE, UNSPECIFIED HEADACHE TYPE: ICD-10-CM

## 2017-08-04 PROCEDURE — 97110 THERAPEUTIC EXERCISES: CPT | Mod: PO

## 2017-08-04 PROCEDURE — 97140 MANUAL THERAPY 1/> REGIONS: CPT | Mod: PO

## 2017-08-04 NOTE — PROGRESS NOTES
"Name: Susan Courtney  Clinic Number: [id number]    Diagnosis:   Encounter Diagnoses   Name Primary?    Headache, unspecified headache type     Decreased strength     Decreased range of motion of neck     Decreased range of motion of right shoulder     Decreased functional mobility      Physician: Julio Germain III, MD  Treatment Orders: PT Eval and Treat    Past Medical History:   Diagnosis Date    Abdominal hernia     Asthma     Diabetes mellitus     Hypertension     Migraine     Obesity     Sleep apnea          Precautions: standard    Evaluation Date: 7/10/17  Visit # authorized: 5/12  Authorization period: 7/3017  Plan of care Expiration: 9/4/17    Subjective     Patient states she feels good since she's been coming here.    Pain: No pain at this time.    Objective     Observation: pt received ambulating s/ a device. Pt is pleasant, appropriate, A+O x 3. Pt with fair ashley, decreased B arm swing.     Posture: pt presents with elevated shoulders, rounded shoulders, forward head, slouched posture. Pt noted to have increased muscle tightness  throughout upper trap and into upper cervical region (L worse than R).        Treatment    Pt. Received moist hot pack x 10 min. To cervical region at start of session to promote muscle relaxation and facilitate blood flow to reagion    Susan received therapeutic exercises to develop strength and endurance, flexibility for 20 minutes including:  3 x 10 reps of backward shoulder rolls, seated  2 x 10 reps of B cervical side-bending, standing, arms un-weighted   2 x 10 reps of seated chin tucks, 5" holds  3 x 10reps of B cervical rotation in pain-free range, un-weighted; pt denies dizziness today  B levator stretch 2 x 30"- NP  Brugger's postural exercise with yellow theraband 2 x10- NP  Scaption, 3 x 10 reps, overhead (1 set with no weight, 2 sets with 1# dumbbells)  Shoulder rows with GTB 3 x 10  Shoulder extension c/ OTB, 3 x 10 reps    B UT stretch  3 x 30" " "-NP  Shoulder extension with orange theraband x 30-NP  Supine cervical rotation x 10 5"- NP  Corner stretch 3 x 30"-NP      Susan  received the following manual therapy techniques x 23 minutes including:  Sub-occipital release  Sub-nuchal release  Soft tissue mobilization to B upper trap and sub-occipital region  B upper trap stretch from therapist    After being cleared of contraindications and given explanation of potential side effects and benefits, pt received dry needling to by certified dry needling therapist, Flores Cee, PT.       HEP /education provided: No change to HEP  Instructed pt. Regarding: Proper technique with all exercises. Pt demo good understanding of the education provided. Susan demonstrated good return demonstration of activities.          Assessment     Patient tolerated treatment well with a decrease in pain. Pt states headaches have been decreased overall. Pt needs intermittent cues for posture and technique with exercises t/o.  Will progress as tolerated. Pt may benefit from dry needling to address symptoms of muscle tightness.  Will follow up with a certifeid therapist for next session if available.    Pt will benefit from physcial therapy services in order to maximize pain free functional independence.   Anticipated barriers to physical therapy: long-standing history of migraines        Medical necessity is demonstrated by the following IMPAIRMENTS/PROBLEM LIST:   1)Increase in pain level limiting function   2)decreased strength   3)decreased ROM in neck and B shoulders   4)impaired posture   5)Lack of HEP   6) impaired functional mobility   7) decreased tolerance for overhead activity   8)increased muscle tightness    GOALS: Short Term Goals: 4 weeks  1.Report decreased head and neck pain  <   / =  7  /10 at worse  to increase tolerance for functional, work and overhead activities- Goal met  2. Pt to demo a 10 degrees increase in B shoulder AROM for improved functional mobility " and ability to perform overhead activities  3. Increased MMT by 1/2 grade (where applicable) to increase tolerance for ADL and work activities as well as for improved posture.  4. Pt to demo reduced muscle tightness t/o traps and cervical musculature for improved ROM and functional mobility  5. Pt to tolerate HEP to improve ROM, strength and independence with functional and work activities    Long Term Goals: 8 weeks  1.Report decreased head/neck and shoulder pain  <   / =  5  /10 at worst  to increase tolerance for work, functional and overhead activities.  2.pt to demo WNL and symmetrical cervical ROM for improved functional mobility (ie turning head while driving)  3.Increased MMT  By 1 grade (where applicable)  to increase tolerance for ADL, work activities, and for improved posture.  4.  Pt will report at CJ level (20-40% impaired) on FOTO neck survey score for neck pain disability to demonstrate decrease in disability and improvement in neck, head and shoulder pain.  5. Pt to be Independent with HEP to improve ROM and independence with ADL's, as well as management of symptoms  6. Pt to demo 20 degree increase in B shoulder ROM for improved functional mobility and ability to reach overhead        Plan     Pt will be treated by physical therapy 2 times a week for 8 weeks for Pt Education, HEP, therapeutic exercises, neuromuscular re-education, joint mobilizations, modalities prn to achieve established goals. Susan may at times be seen by a PTA as part of the Rehab Team.

## 2017-08-14 ENCOUNTER — OFFICE VISIT (OUTPATIENT)
Dept: NEUROLOGY | Facility: CLINIC | Age: 57
End: 2017-08-14
Payer: MEDICAID

## 2017-08-14 VITALS
BODY MASS INDEX: 34.93 KG/M2 | SYSTOLIC BLOOD PRESSURE: 136 MMHG | HEIGHT: 65 IN | WEIGHT: 209.69 LBS | HEART RATE: 92 BPM | DIASTOLIC BLOOD PRESSURE: 70 MMHG

## 2017-08-14 DIAGNOSIS — G43.119 INTRACTABLE MIGRAINE WITH AURA WITHOUT STATUS MIGRAINOSUS: ICD-10-CM

## 2017-08-14 DIAGNOSIS — M54.81 OCCIPITAL NEURALGIA OF LEFT SIDE: Primary | ICD-10-CM

## 2017-08-14 PROCEDURE — 99999 PR PBB SHADOW E&M-EST. PATIENT-LVL II: CPT | Mod: PBBFAC,,, | Performed by: PSYCHIATRY & NEUROLOGY

## 2017-08-14 PROCEDURE — 3008F BODY MASS INDEX DOCD: CPT | Mod: ,,, | Performed by: PSYCHIATRY & NEUROLOGY

## 2017-08-14 PROCEDURE — 99214 OFFICE O/P EST MOD 30 MIN: CPT | Mod: S$PBB,,, | Performed by: PSYCHIATRY & NEUROLOGY

## 2017-08-14 PROCEDURE — 99212 OFFICE O/P EST SF 10 MIN: CPT | Mod: PBBFAC | Performed by: PSYCHIATRY & NEUROLOGY

## 2017-08-14 NOTE — PROGRESS NOTES
Subjective:       Patient ID: Susan Courtney is a 57 y.o. female.    Reason for Consult: Headache      Interval History:  Susan Courtney is here for follow up. Their condition has improved with conservative physical therapy.  She notes that she does have an area of tenderness in the left suboccipital musculature area however the physical therapy was quite helpful in taking care of this.  She notes that her triptan rescue medicine is helpful for her although she doesn't remember if she is on Imitrex or Maxalt.     Objective:     Vitals:    08/14/17 1425   BP: 136/70   Pulse: 92     Patient is awake alert oriented to person place and time.  Moves all 4 extremities against gravity.  Gait and station within normal limits area and cervical range of motion has improved since last visit.  Focused examination was undertaken today. Over 50% of face to face time of 25 minute visit time was in giving guidance, counseling and discussing treatment options.    Results for orders placed or performed during the hospital encounter of 04/25/14   MRI Brain Without Contrast    Narrative    Procedure: MRI the brain without contrast.    Technique: Sagittal and axial T1, axial/coronal T2, axial FLAIR, axial gradient, and axial diffusion imaging of the whole brain.    Comparison: None    Findings: 1-2 questionable punctate Foci of flair hyperintensity left frontal subcortical white matter versus volume averaging artifact without corresponding signal abnormality on additional sequences in this region.  Overall of doubtful clinical   significance.  Brain parenchyma is normal in contour.  The ventricles are normal in size and configuration without evidence for hydrocephalus.  There is no midline shift or mass-effect.  There is no diffusion signal abnormality to suggest acute   infarction.  There is no abnormal parenchymal gradient susceptibility.  The major intracranial T2 flow voids are present.  No abnormal intra-or extra-axial  fluid collection.  Study is slightly limited by lack of contrast.    Impression     Unremarkable noncontrast MRI of the brain as detailed above specifically without evidence of a acute infarction.      Electronically signed by: JAYJAY MARY MERA  Date:     04/25/14  Time:    10:13        Assessment/Plan:     Problem List Items Addressed This Visit        Neuro    Classical migraine    Overview     Has tried and failed Elavil, Advil, Mobic, Zofran, Topamax, Maxalt, Zomig    Frequency of migraines low. MIDAS is 2.             Orthopedic    Occipital neuralgia of left side - Primary    Overview     Has responded to PT in the past. She uses topical alcorub to help with the shooting pain.   Has responded to PT again.             Other Visit Diagnoses    None.       57 showed female presents for evaluation and follow-up of headaches and occipital neuralgia.  At this time her occipital neuralgia has completely resolved.  In terms of her headaches she notes that they have significantly improved as well in her treatable with her rescue medication for migraine.  She does have decent control of her headaches now I will see her back in 6 months.  If her headaches worsen I would consider obtaining an MRI of her cervical spine.  I will follow up with them in 6 month(s).  The patient verbalizes understanding and agreement with the treatment plan. I have discussed risks, benefits and alternatives to the treatment plan. Questions were sought and answered to her stated verbal satisfaction.        Melanie Germain MD    This note is dictated on Dragon Natural Speaking word recognition program. There are word recognition mistakes that are occasionally missed on review.

## 2017-08-17 ENCOUNTER — HOSPITAL ENCOUNTER (EMERGENCY)
Facility: OTHER | Age: 57
Discharge: HOME | End: 2017-08-17
Attending: EMERGENCY MEDICINE
Payer: MEDICAID

## 2017-08-17 VITALS
BODY MASS INDEX: 34.82 KG/M2 | SYSTOLIC BLOOD PRESSURE: 127 MMHG | WEIGHT: 209 LBS | DIASTOLIC BLOOD PRESSURE: 61 MMHG | TEMPERATURE: 98 F | HEART RATE: 73 BPM | HEIGHT: 65 IN | RESPIRATION RATE: 14 BRPM | OXYGEN SATURATION: 99 %

## 2017-08-17 DIAGNOSIS — K21.9 ACID REFLUX: ICD-10-CM

## 2017-08-17 DIAGNOSIS — G43.909 MIGRAINE WITHOUT STATUS MIGRAINOSUS, NOT INTRACTABLE, UNSPECIFIED MIGRAINE TYPE: Primary | ICD-10-CM

## 2017-08-17 PROCEDURE — 99284 EMERGENCY DEPT VISIT MOD MDM: CPT | Mod: 25

## 2017-08-17 PROCEDURE — 93010 ELECTROCARDIOGRAM REPORT: CPT | Mod: ,,, | Performed by: INTERNAL MEDICINE

## 2017-08-17 PROCEDURE — 25000003 PHARM REV CODE 250: Performed by: PHYSICIAN ASSISTANT

## 2017-08-17 PROCEDURE — 93005 ELECTROCARDIOGRAM TRACING: CPT

## 2017-08-17 PROCEDURE — 96374 THER/PROPH/DIAG INJ IV PUSH: CPT

## 2017-08-17 PROCEDURE — 96375 TX/PRO/DX INJ NEW DRUG ADDON: CPT

## 2017-08-17 PROCEDURE — 63600175 PHARM REV CODE 636 W HCPCS: Performed by: PHYSICIAN ASSISTANT

## 2017-08-17 RX ORDER — DIPHENHYDRAMINE HYDROCHLORIDE 50 MG/ML
25 INJECTION INTRAMUSCULAR; INTRAVENOUS
Status: COMPLETED | OUTPATIENT
Start: 2017-08-17 | End: 2017-08-17

## 2017-08-17 RX ORDER — PROCHLORPERAZINE EDISYLATE 5 MG/ML
10 INJECTION INTRAMUSCULAR; INTRAVENOUS
Status: COMPLETED | OUTPATIENT
Start: 2017-08-17 | End: 2017-08-17

## 2017-08-17 RX ORDER — BUTALBITAL, ACETAMINOPHEN AND CAFFEINE 50; 325; 40 MG/1; MG/1; MG/1
2 TABLET ORAL
Status: COMPLETED | OUTPATIENT
Start: 2017-08-17 | End: 2017-08-17

## 2017-08-17 RX ADMIN — LIDOCAINE HYDROCHLORIDE: 20 SOLUTION ORAL; TOPICAL at 09:08

## 2017-08-17 RX ADMIN — PROCHLORPERAZINE EDISYLATE 10 MG: 5 INJECTION INTRAMUSCULAR; INTRAVENOUS at 09:08

## 2017-08-17 RX ADMIN — BUTALBITAL, ACETAMINOPHEN AND CAFFEINE 2 TABLET: 50; 325; 40 TABLET ORAL at 09:08

## 2017-08-17 RX ADMIN — DIPHENHYDRAMINE HYDROCHLORIDE 25 MG: 50 INJECTION, SOLUTION INTRAMUSCULAR; INTRAVENOUS at 09:08

## 2017-08-18 NOTE — ED NOTES
Pt rounding complete.  Pain 1/10, states she is ready to go home, will notify PA.  Restroom and comfort needs addressed.  Pt updated on plan of care.  Will continue to monitor.

## 2017-08-18 NOTE — ED PROVIDER NOTES
Encounter Date: 8/17/2017       History     Chief Complaint   Patient presents with    Migraine     x1 day pta, reports lightheaded, hx of such, unrelieved via OTC meds      Patient is a 57-year-old female with history of asthma, diabetes, migraines, and hypertension who presents to the emergency department with headache.patient states her symptoms began at 5:00 PM.  Patient states this feels like her typical migraine.  She rates the pain 9 out of 10.  She denies visual disturbance.  She denies facial asymmetry, speech slur, or extremity weakness.  She states she took her prescribed migraine medication, but has not helped her pain.  She states she is unsure the name of the medication.  She denies any fevers or chills.  She denies neck stiffness.  She also reports acid reflux.  She states she has a burning sensation after eating dinner tonight.  She states she usually gets GI cocktail which helps her acid reflux.      The history is provided by the patient.     Review of patient's allergies indicates:   Allergen Reactions    Morphine      Past Medical History:   Diagnosis Date    Abdominal hernia     Asthma     Diabetes mellitus     Hypertension     Migraine     Migraine headache     Obesity     Sleep apnea      Past Surgical History:   Procedure Laterality Date    CHOLECYSTECTOMY      FOOT SURGERY      HYSTERECTOMY      complete     Family History   Problem Relation Age of Onset    Psoriasis Neg Hx     Melanoma Neg Hx     Lupus Neg Hx      Social History   Substance Use Topics    Smoking status: Never Smoker    Smokeless tobacco: Never Used    Alcohol use No     Review of Systems   Constitutional: Negative for activity change, appetite change, chills, fatigue and fever.   HENT: Negative for congestion, ear discharge, ear pain, mouth sores, nosebleeds, postnasal drip, rhinorrhea, sore throat and trouble swallowing.    Eyes: Negative for photophobia and visual disturbance.   Respiratory: Negative  for cough and shortness of breath.    Cardiovascular: Negative for chest pain.   Gastrointestinal: Negative for abdominal pain, blood in stool, constipation, diarrhea, nausea and vomiting.   Genitourinary: Negative for dysuria, flank pain and hematuria.   Musculoskeletal: Negative for back pain, neck pain and neck stiffness.   Skin: Negative for rash and wound.   Neurological: Positive for headaches. Negative for dizziness, syncope, speech difficulty, weakness and light-headedness.       Physical Exam     Initial Vitals [08/17/17 2053]   BP Pulse Resp Temp SpO2   134/62 81 14 98.7 °F (37.1 °C) 97 %      MAP       86         Physical Exam    Nursing note and vitals reviewed.  Constitutional: She appears well-developed and well-nourished. She is not diaphoretic. She is Obese .  Non-toxic appearance. No distress.   HENT:   Head: Normocephalic.   Right Ear: Hearing and external ear normal.   Left Ear: Hearing and external ear normal.   Nose: Nose normal.   Mouth/Throat: Uvula is midline, oropharynx is clear and moist and mucous membranes are normal. No trismus in the jaw. No uvula swelling. No oropharyngeal exudate.   nontender in temporal region bilaterally    Eyes: Conjunctivae are normal. Pupils are equal, round, and reactive to light.   Neck: Normal range of motion.   Cardiovascular: Normal rate and regular rhythm.   Pulmonary/Chest: Breath sounds normal. No respiratory distress. She has no wheezes. She has no rhonchi. She has no rales. She exhibits no tenderness.   Abdominal: Soft. Bowel sounds are normal. She exhibits no distension and no mass. There is no tenderness. There is no rebound and no guarding.   Lymphadenopathy:     She has no cervical adenopathy.   Neurological: She is alert and oriented to person, place, and time. She has normal strength. No cranial nerve deficit or sensory deficit. She displays a negative Romberg sign. Coordination and gait normal.   Skin: Skin is warm and dry. Capillary refill takes  less than 2 seconds.   Psychiatric: She has a normal mood and affect.         ED Course   Procedures  Labs Reviewed - No data to display  EKG Readings: (Independently Interpreted)   Initial Reading: No STEMI. Rhythm: Normal Sinus Rhythm.          Medical Decision Making:   Initial Assessment:   Urgent evaluation of a 57-year-old female with history of asthma, diabetes, migraines, and hypertension who presents to the emergency department with a headache.  Patient is afebrile, nontoxic appearing, and hemodynamically stable.  Normal neuro exam.  No nuchal rigidity.  Patient's pain feels like her typical migraine.  I do not feel that head CT is warranted.  I do not suspect intracranial abnormality or meningitis.  Patient be given IV migraine concoction.  Patient is also requesting acid reflux medication.  She will be given GI cocktail.  ED Management:  11:11 PM  With patient's complaint of acid reflux, I did obtain EKG to rule out acute ischemia.  EKG reveals no acute ischemia.  After IV migraine concoction, patient states she is feeling much better.  She requests to be discharged.  She is advised to follow-up with PCP or return to the emergency department with any worsening symptoms or concerns.  Case is discussed in depth with supervising physician who agrees with plan.  Other:   I have discussed this case with another health care provider.       <> Summary of the Discussion: Dr. Montenegro                   ED Course     Clinical Impression:   The primary encounter diagnosis was Migraine without status migrainosus, not intractable, unspecified migraine type. A diagnosis of Acid reflux was also pertinent to this visit.                           Nicole Alford PA-C  08/17/17 9542

## 2017-08-18 NOTE — ED NOTES
Pt states she started with a headache yesterday and today, diarrhea started today, she took her home medications without relief.   LOC: Pt is awake alert and aware of environment, oriented X3 and speaking appropriately  Appearance: Pt is in no acute distress, Pt is well groomed and clean  Skin: skin is warm and dry with normal turgor, mucus membranes are moist and pink, skin is intact with no bruising or breakdown  Muskuloskeletal: Pt moves all extremities well, there is no obvious swelling or deformities noted, pulses are intact.  Respiratory: Airway is open and patent, respirations are spontaneous and even.  Cardiac: no edema and cap refill is <3sec  Abdomen: soft, non-tender and non-distended  Neuro: Pt follows commands easily and has no obvious deficits

## 2017-08-27 ENCOUNTER — HOSPITAL ENCOUNTER (EMERGENCY)
Facility: OTHER | Age: 57
Discharge: HOME | End: 2017-08-27
Attending: EMERGENCY MEDICINE
Payer: MEDICAID

## 2017-08-27 VITALS
DIASTOLIC BLOOD PRESSURE: 71 MMHG | WEIGHT: 209 LBS | SYSTOLIC BLOOD PRESSURE: 142 MMHG | HEIGHT: 66 IN | HEART RATE: 90 BPM | BODY MASS INDEX: 33.59 KG/M2 | OXYGEN SATURATION: 97 % | TEMPERATURE: 98 F | RESPIRATION RATE: 16 BRPM

## 2017-08-27 DIAGNOSIS — S46.912A LEFT SHOULDER STRAIN, INITIAL ENCOUNTER: Primary | ICD-10-CM

## 2017-08-27 DIAGNOSIS — M25.512 LEFT SHOULDER PAIN: ICD-10-CM

## 2017-08-27 PROCEDURE — 93010 ELECTROCARDIOGRAM REPORT: CPT | Mod: ,,, | Performed by: INTERNAL MEDICINE

## 2017-08-27 PROCEDURE — 99284 EMERGENCY DEPT VISIT MOD MDM: CPT | Mod: 25

## 2017-08-27 PROCEDURE — 96372 THER/PROPH/DIAG INJ SC/IM: CPT

## 2017-08-27 PROCEDURE — 63600175 PHARM REV CODE 636 W HCPCS: Performed by: EMERGENCY MEDICINE

## 2017-08-27 PROCEDURE — 93005 ELECTROCARDIOGRAM TRACING: CPT

## 2017-08-27 RX ORDER — CYCLOBENZAPRINE HCL 10 MG
10 TABLET ORAL 3 TIMES DAILY PRN
Qty: 15 TABLET | Refills: 0 | Status: SHIPPED | OUTPATIENT
Start: 2017-08-27 | End: 2017-09-01

## 2017-08-27 RX ORDER — KETOROLAC TROMETHAMINE 30 MG/ML
30 INJECTION, SOLUTION INTRAMUSCULAR; INTRAVENOUS
Status: COMPLETED | OUTPATIENT
Start: 2017-08-27 | End: 2017-08-27

## 2017-08-27 RX ADMIN — KETOROLAC TROMETHAMINE 30 MG: 30 INJECTION, SOLUTION INTRAMUSCULAR at 10:08

## 2017-08-28 NOTE — ED PROVIDER NOTES
Encounter Date: 8/27/2017    SCRIBE #1 NOTE: I, Salome Lomeli, am scribing for, and in the presence of,  Dr. Veliz. I have scribed the entire note.       History     Chief Complaint   Patient presents with    Shoulder Pain     pain to left shoulder since yesterday, denies trauma     Time seen by provider: 10:20 PM    This is a 57 y.o. female who presents with complaint of left shoulder pain. She reports onset of symptoms was 1 day ago. The patient states the pain began suddenly yesterday. She describes the pain as sharp.The patient denies any associated numbness, tingling or weakness in the left arm. She states the pain worsens with movement. The patient notes she has been using Aleve and Icy Hot with no improvement of pain. She denies any recent heavy lifting or trauma. The patient does note she has had pain in the left shoulder previously. She states with the previous pain she was not evaluated.       The history is provided by the patient.     Review of patient's allergies indicates:   Allergen Reactions    Morphine      Past Medical History:   Diagnosis Date    Abdominal hernia     Asthma     Diabetes mellitus     Hypertension     Migraine     Migraine headache     Obesity     Sleep apnea      Past Surgical History:   Procedure Laterality Date    CHOLECYSTECTOMY      FOOT SURGERY      HYSTERECTOMY      complete     Family History   Problem Relation Age of Onset    Psoriasis Neg Hx     Melanoma Neg Hx     Lupus Neg Hx      Social History   Substance Use Topics    Smoking status: Never Smoker    Smokeless tobacco: Never Used    Alcohol use No     Review of Systems   Constitutional: Negative for chills and fever.   HENT: Negative for congestion and sore throat.    Eyes: Negative for redness and visual disturbance.   Respiratory: Negative for cough and shortness of breath.    Cardiovascular: Negative for chest pain and palpitations.   Gastrointestinal: Negative for abdominal pain, diarrhea,  nausea and vomiting.   Genitourinary: Negative for dysuria.   Musculoskeletal: Negative for back pain.        Left shoulder pain   Skin: Negative for rash.   Neurological: Negative for weakness and headaches.   Psychiatric/Behavioral: Negative for confusion.       Physical Exam     Initial Vitals [08/27/17 2209]   BP Pulse Resp Temp SpO2   (!) 142/71 90 16 98.3 °F (36.8 °C) 97 %      MAP       94.67         Physical Exam    Nursing note and vitals reviewed.  Constitutional: She appears well-developed and well-nourished. She is not diaphoretic. No distress.   HENT:   Head: Normocephalic and atraumatic.   Right Ear: External ear normal.   Left Ear: External ear normal.   Eyes: Conjunctivae and EOM are normal.   Neck: Normal range of motion. Neck supple.   Cardiovascular: Normal rate, regular rhythm, normal heart sounds and intact distal pulses. Exam reveals no gallop and no friction rub.    No murmur heard.  Pulmonary/Chest: Breath sounds normal. No respiratory distress. She has no wheezes. She has no rhonchi. She has no rales.   Abdominal: Soft. Bowel sounds are normal. There is no tenderness. There is no rebound and no guarding.   Musculoskeletal: Normal range of motion. She exhibits tenderness. She exhibits no edema.   LUE: Reduced ROM secondary to pain. No bony tenderness. Diffuse lateral muscular tenderness. No deformities. Normal  strength. No rashes or lesions. No other extremity pain.    Lymphadenopathy:     She has no cervical adenopathy.   Neurological: She is alert and oriented to person, place, and time. She has normal strength.   Skin: Skin is warm and dry. No rash noted.         ED Course   Procedures  Labs Reviewed - No data to display  EKG Readings: (Independently Interpreted)   EKG Reading (10:24 PM): Normal sinus rhythm at 91 bpm. No STEMI     Imaging Results          X-Ray Shoulder 2 or More Views Left (Final result)  Result time 08/27/17 22:55:07    Final result by Isaiah Duarte MD  (08/27/17 22:55:07)                 Impression:      No evidence of acute fracture or dislocation. Degenerative change as above.      Electronically signed by: CAMDEN HUBBARD  Date:     08/27/17  Time:    22:55              Narrative:    Comparison: 10/2/2014    Technique: AP internally and externally rotated and scapular Y radiographs of the left shoulder.    Findings: There is no acute fracture or dislocation. The humeral head is well seated within the glenoid. There is mild glenohumeral degenerative arthrosis.There is moderate acromioclavicular degenerative arthrosis, noting prominent inferior projecting osteophyte arising from the distal acromion.                              X-Rays:   Independently Interpreted Readings:   Other Readings:  Left Shoulder X-ray Reading (11:00 PM): No fracture or dislocation.     Medical Decision Making:   Independently Interpreted Test(s):   I have ordered and independently interpreted X-rays - see prior notes.  Clinical Tests:   Radiological Study: Ordered and Reviewed  Medical Tests: Ordered and Reviewed  ED Management:  Well-appearing patient complains of nontraumatic left shoulder pain.  No obvious inciting events.  Is very reproducible, in fact that limits her range of motion hurts so much.  Very unlikely ACS, EKG is nonischemic, no further workup indicated.  X-ray negative for acute findings.  Could relate to strain versus tendon ligamentous injury.  Counseled for gentle exercises to prevent frozen shoulder.  Prescribed Flexeril.  Encouraged follow-up with primary care.    I did have an extensive talk regarding signs to return for and need for follow up. Patient expressed understanding and will monitor symptoms closely and follow-up as needed.    HERNANDEZ Veliz M.D.  08/28/2017  4:02 AM      Additional MDM:   EKG: I have independently interpreted EKG(s) - see notes.   X-Rays: I have independently interpreted X-Ray(s) - see notes.          Scribe Attestation:   Scribtiffany  #1: I performed the above scribed service and the documentation accurately describes the services I performed. I attest to the accuracy of the note.    Attending Attestation:           Physician Attestation for Scribe:  Physician Attestation Statement for Scribe #1: I, Dr. Veliz, reviewed documentation, as scribed by Salome Lomeli in my presence, and it is both accurate and complete.                 ED Course     Clinical Impression:     1. Left shoulder strain, initial encounter    2. Left shoulder pain                                 Yaw Veliz MD  08/28/17 7972

## 2017-08-28 NOTE — ED TRIAGE NOTES
Pt c/o left shoulder pain, onset yesterday. Pain 8/10, Described as constant and throbbing. pain worse with movements, reports unable to lift arm past shoulder height. Denies any injury. Denies NV. Respirations unlabored. Skin warm and dry. Pt in NAD.

## 2018-01-18 ENCOUNTER — DOCUMENTATION ONLY (OUTPATIENT)
Dept: REHABILITATION | Facility: HOSPITAL | Age: 58
End: 2018-01-18

## 2018-01-18 NOTE — PROGRESS NOTES
PHYSICAL THERAPY  Discharge  Date of Discharge 1/18/2018    Name: Susan Barrios [facility] #: [id number]    Pt was seen in Physical Therapy for initial evaluation on: 7/11/17  Pt's last date of treatment in Physical Therapy was: 8/4/17  Number of treatment sessions completed: 6  Reason for discharge:    self discharge/reason unknown  Status at Discharge:  Goals not met     Discharge update in functional G code not available due to unplanned discharge.

## 2018-02-25 ENCOUNTER — HOSPITAL ENCOUNTER (EMERGENCY)
Facility: OTHER | Age: 58
Discharge: HOME | End: 2018-02-25
Attending: EMERGENCY MEDICINE
Payer: MEDICAID

## 2018-02-25 VITALS
BODY MASS INDEX: 34.99 KG/M2 | HEART RATE: 84 BPM | TEMPERATURE: 98 F | DIASTOLIC BLOOD PRESSURE: 61 MMHG | HEIGHT: 65 IN | WEIGHT: 210 LBS | OXYGEN SATURATION: 98 % | SYSTOLIC BLOOD PRESSURE: 133 MMHG | RESPIRATION RATE: 16 BRPM

## 2018-02-25 DIAGNOSIS — R05.9 COUGH: ICD-10-CM

## 2018-02-25 DIAGNOSIS — J06.9 VIRAL URI WITH COUGH: Primary | ICD-10-CM

## 2018-02-25 PROCEDURE — 99284 EMERGENCY DEPT VISIT MOD MDM: CPT

## 2018-02-25 PROCEDURE — 25000003 PHARM REV CODE 250: Performed by: PHYSICIAN ASSISTANT

## 2018-02-25 RX ORDER — IBUPROFEN 400 MG/1
800 TABLET ORAL
Status: COMPLETED | OUTPATIENT
Start: 2018-02-25 | End: 2018-02-25

## 2018-02-25 RX ORDER — IBUPROFEN 800 MG/1
800 TABLET ORAL EVERY 6 HOURS PRN
Qty: 20 TABLET | Refills: 0 | OUTPATIENT
Start: 2018-02-25 | End: 2018-10-22

## 2018-02-25 RX ORDER — FLUTICASONE PROPIONATE 50 MCG
1 SPRAY, SUSPENSION (ML) NASAL 2 TIMES DAILY PRN
Qty: 15 G | Refills: 0 | Status: SHIPPED | OUTPATIENT
Start: 2018-02-25 | End: 2023-04-20

## 2018-02-25 RX ORDER — BENZONATATE 100 MG/1
100 CAPSULE ORAL 3 TIMES DAILY PRN
Qty: 20 CAPSULE | Refills: 0 | Status: SHIPPED | OUTPATIENT
Start: 2018-02-25 | End: 2018-03-07

## 2018-02-25 RX ADMIN — IBUPROFEN 800 MG: 400 TABLET, FILM COATED ORAL at 08:02

## 2018-02-26 NOTE — ED PROVIDER NOTES
Encounter Date: 2/25/2018       History     Chief Complaint   Patient presents with    Cough     and sore throat x 4 days.      Patient is a  58 y.o. female with a past medical history of asthma, diabetes, hypertension, presenting with complaints of cough and sore throat.  The patient reports her symptoms have been persistent for the past 5 days.  She states that the cough is occasionally productive of mucus.  She denies shortness of breath or chest pain.  She denies fever, chills, nausea, vomiting.  She states her cough is exacerbating her sore throat.  She denies difficulty swallowing or handling oral secretions.  She reports taking ibuprofen 800 mg with some relief, states that when it wears off her symptoms return.        The history is provided by the patient.     Review of patient's allergies indicates:   Allergen Reactions    Morphine Itching     Past Medical History:   Diagnosis Date    Abdominal hernia     Asthma     Diabetes mellitus     Hypertension     Migraine     Migraine headache     Obesity     Sleep apnea      Past Surgical History:   Procedure Laterality Date    CHOLECYSTECTOMY      FOOT SURGERY      HYSTERECTOMY      complete     Family History   Problem Relation Age of Onset    Psoriasis Neg Hx     Melanoma Neg Hx     Lupus Neg Hx      Social History   Substance Use Topics    Smoking status: Never Smoker    Smokeless tobacco: Never Used    Alcohol use No     Review of Systems   Constitutional: Negative for activity change, appetite change, chills, fatigue and fever.   HENT: Positive for sore throat. Negative for congestion and rhinorrhea.    Eyes: Negative for photophobia and visual disturbance.   Respiratory: Positive for cough. Negative for shortness of breath and wheezing.    Cardiovascular: Negative for chest pain.   Gastrointestinal: Negative for abdominal pain, diarrhea, nausea and vomiting.   Genitourinary: Negative for dysuria, hematuria and urgency.   Musculoskeletal:  Negative for back pain, myalgias and neck pain.   Skin: Negative for color change and wound.   Neurological: Negative for weakness and headaches.   Psychiatric/Behavioral: Negative for agitation and confusion.       Physical Exam     Initial Vitals [02/25/18 1919]   BP Pulse Resp Temp SpO2   (!) 173/76 94 16 98.4 °F (36.9 °C) 98 %      MAP       108.33         Physical Exam    Nursing note and vitals reviewed.  Constitutional: She appears well-developed and well-nourished. She is not diaphoretic. She is cooperative.  Non-toxic appearance. She does not have a sickly appearance. She does not appear ill. No distress.   Well appearing, -American female accompanied by her son in the emergency department.  Speaking clear and full sentences.  No acute distress.   HENT:   Head: Normocephalic and atraumatic.   Right Ear: Hearing, tympanic membrane, external ear and ear canal normal.   Left Ear: Hearing, tympanic membrane, external ear and ear canal normal.   Nose: Nose normal.   Mouth/Throat: No trismus in the jaw. No uvula swelling. Posterior oropharyngeal erythema present. No oropharyngeal exudate, posterior oropharyngeal edema or tonsillar abscesses.   Swallowing and handling oral secretions without difficulty   Eyes: Conjunctivae and EOM are normal.   Neck: Normal range of motion. Neck supple.   Cardiovascular: Normal rate, regular rhythm and normal heart sounds.   Pulmonary/Chest: Breath sounds normal. No respiratory distress. She has no wheezes.   Musculoskeletal: Normal range of motion.   Neurological: She is alert and oriented to person, place, and time. GCS eye subscore is 4. GCS verbal subscore is 5. GCS motor subscore is 6.   Skin: Skin is warm.   Psychiatric: She has a normal mood and affect. Her behavior is normal. Judgment and thought content normal.         ED Course   Procedures  Labs Reviewed - No data to display     Imaging Results          X-Ray Chest PA And Lateral (Final result)  Result time  02/25/18 20:16:07    Final result by Gigi Chew MD (02/25/18 20:16:07)                 Impression:     No acute cardiopulmonary process..          Electronically signed by: GIGI CHWE MD  Date:     02/25/18  Time:    20:16              Narrative:    Chest PA and lateral    Indication:Cough    Comparison:2/2/2017    Findings:  The cardiomediastinal silhouette is not enlarged.  There is no pleural effusion.  The trachea is midline.  The lungs are symmetrically expanded bilaterally without evidence of acute parenchymal process. No large focal consolidation seen.  There is no pneumothorax.  There is stable subtle vascular crowding projected over the right lower lung zone.  The osseous structures are remarkable for degenerative changes.                             X-Rays:   Independently Interpreted Readings:   Chest X-Ray: Normal heart size.  No acute abnormalities.  No infiltrates.     Medical Decision Making:   Initial Assessment:   Urgent evaluation of a 58-year-old female with a past medical history of asthma, diabetes, hypertension, presenting with complaints of cough and sore throat.  Patient is afebrile, nontoxic appearing, hemodynamically stable.  Physical exam reveals regular rate and rhythm, lungs are clear to auscultation bilaterally.  Posterior oral pharyngeal erythema, no tonsillar swelling or exudate. There is no uvula deviation to suggest peritonsillar abscess. The patient has normal phonation with no trismus to suggest retropharyngeal abscess. There is no hoarseness, difficulty handling secretions, or facial swelling to suggest peritonsillar abscess, retropharyngeal abscess, epiglottitis, or airway compromise.  Will obtain chest x-ray, administer ibuprofen and reassess.  Independently Interpreted Test(s):   I have ordered and independently interpreted X-rays - see prior notes.  Clinical Tests:   Radiological Study: Ordered and Reviewed  ED Management:  Chest x-ray shows no acute findings.   Sine symptoms likely secondary to a viral etiology.  Patient be counseled on symptomatic care and treatment, discharged home with a prescription for ibuprofen, Tessalon, Flonase.  Stable for discharge. The patient was instructed to follow up with a primary care provider in 2 days or to return to the emergency department for worsening symptoms. The treatment plan was discussed with the patient who demonstrated understanding and comfort with plan. The patient's history, physical exam, and plan of care was discussed with and agreed upon with my supervising physician.    Other:   I have discussed this case with another health care provider.                      Clinical Impression:     1. Viral URI with cough    2. Cough       Disposition:   Disposition: Discharged  Condition: Stable                        Theresa Phillip PA-C  02/25/18 2028

## 2018-03-31 ENCOUNTER — HOSPITAL ENCOUNTER (EMERGENCY)
Facility: OTHER | Age: 58
Discharge: HOME | End: 2018-04-01
Attending: EMERGENCY MEDICINE
Payer: MEDICAID

## 2018-03-31 DIAGNOSIS — K29.00 ACUTE GASTRITIS WITHOUT HEMORRHAGE, UNSPECIFIED GASTRITIS TYPE: ICD-10-CM

## 2018-03-31 DIAGNOSIS — R10.9 ABDOMINAL PAIN, UNSPECIFIED ABDOMINAL LOCATION: Primary | ICD-10-CM

## 2018-03-31 PROCEDURE — 99284 EMERGENCY DEPT VISIT MOD MDM: CPT | Mod: 25

## 2018-03-31 PROCEDURE — 96375 TX/PRO/DX INJ NEW DRUG ADDON: CPT

## 2018-03-31 PROCEDURE — 96361 HYDRATE IV INFUSION ADD-ON: CPT

## 2018-03-31 PROCEDURE — 96374 THER/PROPH/DIAG INJ IV PUSH: CPT

## 2018-04-01 VITALS
WEIGHT: 205.69 LBS | TEMPERATURE: 98 F | HEIGHT: 66 IN | SYSTOLIC BLOOD PRESSURE: 146 MMHG | HEART RATE: 100 BPM | OXYGEN SATURATION: 98 % | DIASTOLIC BLOOD PRESSURE: 84 MMHG | RESPIRATION RATE: 20 BRPM | BODY MASS INDEX: 33.06 KG/M2

## 2018-04-01 LAB
ALBUMIN SERPL BCP-MCNC: 3.5 G/DL
ALP SERPL-CCNC: 99 U/L
ALT SERPL W/O P-5'-P-CCNC: 16 U/L
ANION GAP SERPL CALC-SCNC: 11 MMOL/L
AST SERPL-CCNC: 13 U/L
BACTERIA #/AREA URNS HPF: NORMAL /HPF
BASOPHILS # BLD AUTO: 0.01 K/UL
BASOPHILS NFR BLD: 0.1 %
BILIRUB SERPL-MCNC: 0.8 MG/DL
BILIRUB UR QL STRIP: NEGATIVE
BUN SERPL-MCNC: 14 MG/DL
CALCIUM SERPL-MCNC: 9.3 MG/DL
CHLORIDE SERPL-SCNC: 96 MMOL/L
CLARITY UR: CLEAR
CO2 SERPL-SCNC: 32 MMOL/L
COLOR UR: YELLOW
CREAT SERPL-MCNC: 0.8 MG/DL
DIFFERENTIAL METHOD: ABNORMAL
EOSINOPHIL # BLD AUTO: 0.2 K/UL
EOSINOPHIL NFR BLD: 2 %
ERYTHROCYTE [DISTWIDTH] IN BLOOD BY AUTOMATED COUNT: 14.5 %
EST. GFR  (AFRICAN AMERICAN): >60 ML/MIN/1.73 M^2
EST. GFR  (NON AFRICAN AMERICAN): >60 ML/MIN/1.73 M^2
GLUCOSE SERPL-MCNC: 150 MG/DL
GLUCOSE UR QL STRIP: NEGATIVE
HCT VFR BLD AUTO: 37.8 %
HGB BLD-MCNC: 12.2 G/DL
HGB UR QL STRIP: ABNORMAL
KETONES UR QL STRIP: NEGATIVE
LEUKOCYTE ESTERASE UR QL STRIP: NEGATIVE
LIPASE SERPL-CCNC: 9 U/L
LYMPHOCYTES # BLD AUTO: 2.9 K/UL
LYMPHOCYTES NFR BLD: 25.7 %
MAGNESIUM SERPL-MCNC: 1.5 MG/DL
MCH RBC QN AUTO: 26.4 PG
MCHC RBC AUTO-ENTMCNC: 32.3 G/DL
MCV RBC AUTO: 82 FL
MICROSCOPIC COMMENT: NORMAL
MONOCYTES # BLD AUTO: 0.6 K/UL
MONOCYTES NFR BLD: 5.5 %
NEUTROPHILS # BLD AUTO: 7.6 K/UL
NEUTROPHILS NFR BLD: 66.5 %
NITRITE UR QL STRIP: NEGATIVE
PH UR STRIP: 6 [PH] (ref 5–8)
PLATELET # BLD AUTO: 342 K/UL
PMV BLD AUTO: 8.6 FL
POTASSIUM SERPL-SCNC: 3.5 MMOL/L
PROT SERPL-MCNC: 8.6 G/DL
PROT UR QL STRIP: NEGATIVE
RBC # BLD AUTO: 4.62 M/UL
RBC #/AREA URNS HPF: 2 /HPF (ref 0–4)
SODIUM SERPL-SCNC: 139 MMOL/L
SP GR UR STRIP: 1.02 (ref 1–1.03)
SQUAMOUS #/AREA URNS HPF: 20 /HPF
URN SPEC COLLECT METH UR: ABNORMAL
UROBILINOGEN UR STRIP-ACNC: NEGATIVE EU/DL
WBC # BLD AUTO: 11.37 K/UL
WBC #/AREA URNS HPF: 2 /HPF (ref 0–5)

## 2018-04-01 PROCEDURE — 80053 COMPREHEN METABOLIC PANEL: CPT

## 2018-04-01 PROCEDURE — S0028 INJECTION, FAMOTIDINE, 20 MG: HCPCS | Performed by: EMERGENCY MEDICINE

## 2018-04-01 PROCEDURE — 85025 COMPLETE CBC W/AUTO DIFF WBC: CPT

## 2018-04-01 PROCEDURE — 25000003 PHARM REV CODE 250: Performed by: EMERGENCY MEDICINE

## 2018-04-01 PROCEDURE — 81000 URINALYSIS NONAUTO W/SCOPE: CPT

## 2018-04-01 PROCEDURE — 63600175 PHARM REV CODE 636 W HCPCS: Performed by: EMERGENCY MEDICINE

## 2018-04-01 PROCEDURE — 25500020 PHARM REV CODE 255: Performed by: EMERGENCY MEDICINE

## 2018-04-01 PROCEDURE — 83735 ASSAY OF MAGNESIUM: CPT

## 2018-04-01 PROCEDURE — 83690 ASSAY OF LIPASE: CPT

## 2018-04-01 RX ORDER — FAMOTIDINE 20 MG/1
20 TABLET, FILM COATED ORAL 2 TIMES DAILY
Qty: 20 TABLET | Refills: 0 | Status: SHIPPED | OUTPATIENT
Start: 2018-04-01 | End: 2019-12-10

## 2018-04-01 RX ORDER — FAMOTIDINE 10 MG/ML
20 INJECTION INTRAVENOUS
Status: COMPLETED | OUTPATIENT
Start: 2018-04-01 | End: 2018-04-01

## 2018-04-01 RX ORDER — ONDANSETRON 4 MG/1
4 TABLET, ORALLY DISINTEGRATING ORAL EVERY 8 HOURS PRN
Qty: 15 TABLET | Refills: 0 | Status: SHIPPED | OUTPATIENT
Start: 2018-04-01 | End: 2021-05-20 | Stop reason: SDUPTHER

## 2018-04-01 RX ORDER — ONDANSETRON 2 MG/ML
4 INJECTION INTRAMUSCULAR; INTRAVENOUS
Status: COMPLETED | OUTPATIENT
Start: 2018-04-01 | End: 2018-04-01

## 2018-04-01 RX ORDER — DICYCLOMINE HYDROCHLORIDE 20 MG/1
20 TABLET ORAL 2 TIMES DAILY PRN
Qty: 15 TABLET | Refills: 0 | Status: SHIPPED | OUTPATIENT
Start: 2018-04-01 | End: 2018-05-01

## 2018-04-01 RX ORDER — DICYCLOMINE HYDROCHLORIDE 10 MG/1
20 CAPSULE ORAL
Status: COMPLETED | OUTPATIENT
Start: 2018-04-01 | End: 2018-04-01

## 2018-04-01 RX ORDER — FENTANYL CITRATE 50 UG/ML
50 INJECTION, SOLUTION INTRAMUSCULAR; INTRAVENOUS
Status: COMPLETED | OUTPATIENT
Start: 2018-04-01 | End: 2018-04-01

## 2018-04-01 RX ADMIN — SODIUM CHLORIDE 1000 ML: 0.9 INJECTION, SOLUTION INTRAVENOUS at 01:04

## 2018-04-01 RX ADMIN — ONDANSETRON HYDROCHLORIDE 4 MG: 2 INJECTION INTRAMUSCULAR; INTRAVENOUS at 01:04

## 2018-04-01 RX ADMIN — IOHEXOL 100 ML: 350 INJECTION, SOLUTION INTRAVENOUS at 03:04

## 2018-04-01 RX ADMIN — FAMOTIDINE 20 MG: 10 INJECTION INTRAVENOUS at 01:04

## 2018-04-01 RX ADMIN — LIDOCAINE HYDROCHLORIDE: 20 SOLUTION ORAL; TOPICAL at 01:04

## 2018-04-01 RX ADMIN — FENTANYL CITRATE 50 MCG: 50 INJECTION INTRAMUSCULAR; INTRAVENOUS at 03:04

## 2018-04-01 RX ADMIN — DICYCLOMINE HYDROCHLORIDE 20 MG: 10 CAPSULE ORAL at 01:04

## 2018-04-01 NOTE — ED PROVIDER NOTES
Encounter Date: 3/31/2018    SCRIBE #1 NOTE: ITanesha, am scribing for, and in the presence of, Dr. Sevilla.       History     Chief Complaint   Patient presents with    Abdominal Pain     at salmon at 2000 hrs last night and woke up at 0600 this AM w/ abd pain, vomiting and diarrhea     Time seen by provider: 12:41 AM    This is a 58 y.o. Female, with a history of hiatal hernia, DM, and HTN, who presents with complaint of abdominal pain that began this morning. Patient reports eating salmon last night, went to bed ok, but then developed diaphoresis, abdominal pain, nausea, vomiting, and diarrhea 6am this morning. She reports one episode of vomiting, and two episodes of diarrhea. She reports abdominal pain is constant and she describes pain as throbbing. She denies fever, chills, constipation, blood in stool, dysuria, urinary urgency, or urinary frequency. She denies any recent issues with hiatal hernia. She denies any previous episodes of similar pain. She denies tobacco or alcohol use.       The history is provided by the patient.     Review of patient's allergies indicates:   Allergen Reactions    Morphine Itching     Past Medical History:   Diagnosis Date    Abdominal hernia     Asthma     Diabetes mellitus     Hypertension     Migraine     Migraine headache     Obesity     Sleep apnea      Past Surgical History:   Procedure Laterality Date    CHOLECYSTECTOMY      FOOT SURGERY      HYSTERECTOMY      complete     Family History   Problem Relation Age of Onset    Psoriasis Neg Hx     Melanoma Neg Hx     Lupus Neg Hx      Social History   Substance Use Topics    Smoking status: Never Smoker    Smokeless tobacco: Never Used    Alcohol use No     Review of Systems   Constitutional: Positive for diaphoresis. Negative for chills and fever.   HENT: Negative for congestion and sore throat.    Eyes: Negative for photophobia.   Respiratory: Negative for cough and shortness of breath.     Cardiovascular: Negative for chest pain.   Gastrointestinal: Positive for abdominal pain, diarrhea, nausea and vomiting. Negative for blood in stool and constipation.   Genitourinary: Negative for dysuria.   Musculoskeletal: Negative for back pain.   Skin: Negative for rash.   Neurological: Negative for weakness.   Hematological: Does not bruise/bleed easily.   Psychiatric/Behavioral: Negative for confusion.       Physical Exam     Initial Vitals [03/31/18 2223]   BP Pulse Resp Temp SpO2   (!) 146/84 100 20 98.1 °F (36.7 °C) 98 %      MAP       104.67         Physical Exam    Nursing note and vitals reviewed.  Constitutional: She appears well-developed and well-nourished. She is not diaphoretic. No distress.   HENT:   Head: Normocephalic and atraumatic.   Right Ear: External ear normal.   Left Ear: External ear normal.   Eyes: EOM are normal. Right eye exhibits no discharge. Left eye exhibits no discharge.   Neck: Normal range of motion.   Cardiovascular: Normal rate, regular rhythm and normal heart sounds. Exam reveals no gallop and no friction rub.    No murmur heard.  Pulmonary/Chest: Breath sounds normal. No respiratory distress. She has no wheezes. She has no rhonchi. She has no rales.   Abdominal: Soft. There is tenderness in the periumbilical area. There is no rebound and no guarding.   Musculoskeletal: Normal range of motion. She exhibits no edema or tenderness.   Neurological: She is alert and oriented to person, place, and time.   Skin: Skin is warm and dry. No rash and no abscess noted. No erythema. No pallor.   Psychiatric: She has a normal mood and affect. Her behavior is normal. Judgment and thought content normal.         ED Course   Procedures  Labs Reviewed   URINALYSIS - Abnormal; Notable for the following:        Result Value    Occult Blood UA 1+ (*)     All other components within normal limits   COMPREHENSIVE METABOLIC PANEL - Abnormal; Notable for the following:     CO2 32 (*)     Glucose  150 (*)     Total Protein 8.6 (*)     All other components within normal limits   CBC W/ AUTO DIFFERENTIAL - Abnormal; Notable for the following:     MCH 26.4 (*)     MPV 8.6 (*)     All other components within normal limits   MAGNESIUM - Abnormal; Notable for the following:     Magnesium 1.5 (*)     All other components within normal limits   URINALYSIS MICROSCOPIC   LIPASE     Imaging Results          CT Abdomen Pelvis With Contrast (Final result)  Result time 04/01/18 03:44:42    Final result by Elver Osuna MD (04/01/18 03:44:42)                 Impression:      1. No acute intra abdominal/pelvic CT abnormalities to account for the reported history of abdominal pain  2. Findings include:  *Hepatic steatosis.  Recommend correlation with liver function tests.  *Postsurgical changes of hysterectomy cholecystectomy.      Electronically signed by: Elver Osuna MD  Date:    04/01/2018  Time:    03:44             Narrative:    EXAMINATION:  CT ABDOMEN PELVIS WITH CONTRAST    CLINICAL HISTORY:  Abd pain, gastroenteritis or colitis suspected    TECHNIQUE:  Low dose axial images, sagittal and coronal reformations were obtained from the lung bases to the pubic symphysis following the IV administration of 100 mL of Omnipaque 350.    COMPARISON:  CT 01/10/2013.    FINDINGS:  Visualized heart is normal.  No pericardial effusion.    Visualized lungs are clear.  No pleural effusions.  Subsegmental atelectasis noted within the right middle lobe.    The liver is normal in size.  Hepatic parenchyma demonstrates diffuse hypoattenuation in keeping with sequela of fatty infiltration.  No hyperenhancing lesions.  No intrahepatic bile duct dilatation.  Portal vein, splenic vein and SMV are patent.    Gallbladder is surgically absent.  The common bile duct is dilated measuring 0.9 cm in maximum dimension with tapering at the level of the duodenal ampulla.    The stomach, duodenum, spleen, pancreas and adrenal glands are within  normal limits.    Kidneys are normal in size and location.  No enhancing cortical lesions.  No nephrolithiasis.  No hydronephrosis or hydroureter.  Bladder is fluid filled and unremarkable.    The uterus is surgically absent.  Ovaries are not definitely seen.  No pelvic free fluid    The small bowel is normal in caliber.  The colon is unremarkable.  The appendix is normal.  No obstruction or inflammatory changes.    The abdominal aorta tapers normally with mild atherosclerotic calcification.  No dissection.  Celiac artery, SMA and bilateral renal arteries are patent.  IVC and common iliac veins are patent.    No ascites or intra-abdominal free air.  No abdominal or pelvic lymph node enlargement.  No focal mesenteric    Subcutaneous soft tissues are within normal limits.    No acute fractures or osseous destruction.  Degenerative changes of the spine noted.                                        Medical Decision Making:   Initial Assessment:       58F with h/o hiatal hernia, DM/HTN presents with abdominal pain, emesis/diarrhea this AM on waking up. She suspects food poisoning from salmon she ate the night before. She does not have regular sx from hiatal hernia, and otherwise at baseline recently. Periumbilical tenderness on exam, afebrile with normal vitals.     Labs with WBC 11.9, no other acute process. She still had significant pain after Pepcid/GI cocktail/Bentyl/Zofran, so CT abdomen done to r/o colitis or intra-abdominal infection, with no acute process. Likely gastroenteritis, on re-assess she is now tolerating PO and feels better. Stable for discharge with continued Pepcid/Bentyl/Zofran PRN, PO hydration, will return to ED for any worsening      Clinical Tests:   Lab Tests: Ordered and Reviewed  Radiological Study: Ordered and Reviewed              Attending Attestation:           Physician Attestation for Scribe:  Physician Attestation Statement for Scribe #1: I, Dr. Sevilla, reviewed documentation, as  scribed by Tanesha Parker in my presence, and it is both accurate and complete.                    Clinical Impression:     1. Abdominal pain, unspecified abdominal location    2. Acute gastritis without hemorrhage, unspecified gastritis type                               Quentin Sevilla MD  04/02/18 0849       Quentin Sevilla MD  04/02/18 0849

## 2018-04-01 NOTE — ED TRIAGE NOTES
Patient presents to the Ed with a complaint of epigastric pain, nausea, vomiting and diarrhea. Patient states that symptoms started this morning, she believes that the food she ate the night before are responsible for causing her symptoms. Patient states that she is not able to eat much because of the pain. Patient states that she has not been able to take any of her medication because of the pain. Paint states that pain is 10/10, quality is sharp and cramping. Patient denies anything that worsens the pain, states that placing pressure to the area and vomiting help to relieve the pain. Patient currently awake alert and oriented. In Nad.

## 2018-10-22 ENCOUNTER — HOSPITAL ENCOUNTER (EMERGENCY)
Facility: OTHER | Age: 58
Discharge: HOME | End: 2018-10-22
Attending: EMERGENCY MEDICINE
Payer: MEDICAID

## 2018-10-22 VITALS
TEMPERATURE: 98 F | DIASTOLIC BLOOD PRESSURE: 74 MMHG | RESPIRATION RATE: 18 BRPM | WEIGHT: 203 LBS | OXYGEN SATURATION: 98 % | SYSTOLIC BLOOD PRESSURE: 139 MMHG | HEIGHT: 66 IN | HEART RATE: 82 BPM | BODY MASS INDEX: 32.62 KG/M2

## 2018-10-22 DIAGNOSIS — M19.012 LOCALIZED OSTEOARTHRITIS OF SHOULDER, LEFT: ICD-10-CM

## 2018-10-22 DIAGNOSIS — R52 PAIN: ICD-10-CM

## 2018-10-22 DIAGNOSIS — M77.8 TENDONITIS OF SHOULDER, LEFT: Primary | ICD-10-CM

## 2018-10-22 PROCEDURE — 63600175 PHARM REV CODE 636 W HCPCS: Performed by: EMERGENCY MEDICINE

## 2018-10-22 PROCEDURE — 96372 THER/PROPH/DIAG INJ SC/IM: CPT

## 2018-10-22 PROCEDURE — 25000003 PHARM REV CODE 250: Performed by: EMERGENCY MEDICINE

## 2018-10-22 PROCEDURE — 99284 EMERGENCY DEPT VISIT MOD MDM: CPT | Mod: 25

## 2018-10-22 RX ORDER — IBUPROFEN 600 MG/1
600 TABLET ORAL EVERY 6 HOURS PRN
Qty: 9 TABLET | Refills: 0 | Status: SHIPPED | OUTPATIENT
Start: 2018-10-22 | End: 2018-10-25

## 2018-10-22 RX ORDER — DEXAMETHASONE SODIUM PHOSPHATE 4 MG/ML
8 INJECTION, SOLUTION INTRA-ARTICULAR; INTRALESIONAL; INTRAMUSCULAR; INTRAVENOUS; SOFT TISSUE
Status: COMPLETED | OUTPATIENT
Start: 2018-10-22 | End: 2018-10-22

## 2018-10-22 RX ORDER — OXYCODONE AND ACETAMINOPHEN 5; 325 MG/1; MG/1
1 TABLET ORAL
Status: COMPLETED | OUTPATIENT
Start: 2018-10-22 | End: 2018-10-22

## 2018-10-22 RX ORDER — KETOROLAC TROMETHAMINE 30 MG/ML
30 INJECTION, SOLUTION INTRAMUSCULAR; INTRAVENOUS
Status: COMPLETED | OUTPATIENT
Start: 2018-10-22 | End: 2018-10-22

## 2018-10-22 RX ORDER — OXYCODONE AND ACETAMINOPHEN 5; 325 MG/1; MG/1
1 TABLET ORAL EVERY 6 HOURS PRN
Qty: 8 TABLET | Refills: 0 | Status: SHIPPED | OUTPATIENT
Start: 2018-10-22 | End: 2019-12-10

## 2018-10-22 RX ADMIN — KETOROLAC TROMETHAMINE 30 MG: 30 INJECTION, SOLUTION INTRAMUSCULAR at 12:10

## 2018-10-22 RX ADMIN — OXYCODONE HYDROCHLORIDE AND ACETAMINOPHEN 1 TABLET: 5; 325 TABLET ORAL at 12:10

## 2018-10-22 RX ADMIN — DEXAMETHASONE SODIUM PHOSPHATE 8 MG: 4 INJECTION, SOLUTION INTRAMUSCULAR; INTRAVENOUS at 12:10

## 2018-10-22 NOTE — ED PROVIDER NOTES
Encounter Date: 10/22/2018    SCRIBE #1 NOTE: IDamari, cresencio scribing for, and in the presence of, Dr. Zuleta.       History     Chief Complaint   Patient presents with    Shoulder Pain     Pt c/o left shoulder pain since yesterday. She was diagnosed with arthritis in march and given steroid shots by [facility]     Time seen by provider: 12:01 PM    This is a 58 y.o. R hand dominant female with hx of HLD, DM, HTN, and migraine HA who presents with complaint of L shoulder pain since yesterday evening. It is exacerbated with movement. Pt has been using Bengay, IcyHot, Goody's, and Naproxen without relief. She denies any trauma, fall, and injury. Pt has had hx of similar pain to the shoulder due to chronic tendonitis (diagnosed via MRI) and arthritis. She has received a steroid shot into the joint with PCP which resolved pain for some time. She denies any fever, chills, numbness, weakness, tingling, rash, and wound. She denies smoking, drinking, and illicit drug use. Pt is allergic to morphine.      The history is provided by the patient.     Review of patient's allergies indicates:   Allergen Reactions    Morphine Itching     Past Medical History:   Diagnosis Date    Abdominal hernia     Asthma     Diabetes mellitus     Hypertension     Migraine     Migraine headache     Obesity     Sleep apnea      Past Surgical History:   Procedure Laterality Date    BUNIONECTOMY Right 4/16/2014    Performed by Shiva Olmstead [facility] at [facility]    CHOLECYSTECTOMY      FOOT SURGERY      HYSTERECTOMY      complete     Family History   Problem Relation Age of Onset    Psoriasis Neg Hx     Melanoma Neg Hx     Lupus Neg Hx      Social History     Tobacco Use    Smoking status: Never Smoker    Smokeless tobacco: Never Used   Substance Use Topics    Alcohol use: No    Drug use: No     Review of Systems   Constitutional: Negative for chills and fever.   HENT: Negative for congestion and sore throat.     Eyes: Negative for photophobia and redness.   Respiratory: Negative for cough and shortness of breath.    Cardiovascular: Negative for chest pain.   Gastrointestinal: Negative for abdominal pain, nausea and vomiting.   Genitourinary: Negative for dysuria.   Musculoskeletal: Negative for back pain.        Positive for L shoulder pain.   Skin: Negative for rash and wound.   Neurological: Negative for weakness, light-headedness, numbness and headaches.        Negative for tingling.   Psychiatric/Behavioral: Negative for confusion.       Physical Exam     Initial Vitals [10/22/18 1100]   BP Pulse Resp Temp SpO2   (!) 145/51 90 18 98.3 °F (36.8 °C) --      MAP       --         Physical Exam    Nursing note and vitals reviewed.  Constitutional: She appears well-developed and well-nourished. She is not diaphoretic. No distress.   HENT:   Head: Normocephalic and atraumatic.   Right Ear: External ear normal.   Left Ear: External ear normal.   Eyes: Conjunctivae and EOM are normal. Pupils are equal, round, and reactive to light. Right eye exhibits no discharge. Left eye exhibits no discharge.   Neck: Normal range of motion. Neck supple.   Cardiovascular: Normal rate, regular rhythm and normal heart sounds.   Pulses:       Radial pulses are 2+ on the left side.   Normal S1, S2. No murmurs, no rubs, no gallops.    Pulmonary/Chest: Breath sounds normal. No respiratory distress. She has no wheezes. She has no rhonchi. She has no rales.   Abdominal: Soft. Bowel sounds are normal. She exhibits no distension. There is no tenderness. There is no rebound and no guarding.   Musculoskeletal: Normal range of motion. She exhibits no edema.   Tenderness over the L AC joint and biceps tendon. No deformity, crepitus, or step-offs.   Lymphadenopathy:     She has no cervical adenopathy.   Neurological: She is alert and oriented to person, place, and time. She has normal strength. No sensory deficit.   LUE sensation intact to light touch.   strength 5/5.    Skin: Skin is warm and dry. No rash noted. No erythema.   Psychiatric: She has a normal mood and affect. Her behavior is normal.         ED Course   Procedures  Labs Reviewed - No data to display       Imaging Results          X-Ray Shoulder Trauma Left (Final result)  Result time 10/22/18 11:37:03    Final result by Augusto Hassan MD (10/22/18 11:37:03)                 Impression:      No acute abnormality.      Electronically signed by: Augusto Hassan MD  Date:    10/22/2018  Time:    11:37             Narrative:    EXAMINATION:  XR SHOULDER TRAUMA 3 VIEW LEFT    CLINICAL HISTORY:  Pain, unspecified    TECHNIQUE:  Three views of the left shoulder were performed.    COMPARISON  Left shoulder radiographs 08/27/2017.    FINDINGS:  Humeral head is well aligned within the glenoid.  No fracture or dislocation.  Moderate acromioclavicular degeneration is present.                              X-Rays:   Independently Interpreted Readings:   Other Readings:  X-Ray Shoulder Trauma Left: Degenerative changes.     Medical Decision Making:   Independently Interpreted Test(s):   I have ordered and independently interpreted X-rays - see prior notes.  Clinical Tests:   Radiological Study: Ordered and Reviewed            Scribe Attestation:   Scribe #1: I performed the above scribed service and the documentation accurately describes the services I performed. I attest to the accuracy of the note.    Attending Attestation:           Physician Attestation for Scribe:  Physician Attestation Statement for Scribe #1: I, Dr. Zuleta , reviewed documentation, as scribed by Damari Mcgarry in my presence, and it is both accurate and complete.         Attending ED Notes:   Emergent evaluation of a 58-year-old female with nontraumatic left shoulder pain. Patient is afebrile, nontoxic-appearing stable vital signs except for mild elevation of blood pressure.  Patient is neurovascularly intact without focal neurologic deficits.   I did review patient's medical record in epic including but not limited to patient's MRI of her left shoulder back in 2014.  X-rays today reveal no acute abnormalities.  Patient does have a moderate acromioclavicular degeneration.  The patient is extensively counseled her diagnosis and treatment including all diagnostic and physical exam findings.  The patient is discharged in good condition and directed to follow up with Orthopedics in the next 24-48 hours.             Clinical Impression:     1. Tendonitis of shoulder, left    2. Pain    3. Localized osteoarthritis of shoulder, left                                 Isaiah Pedraza MD  10/22/18 1104

## 2018-10-22 NOTE — ED TRIAGE NOTES
Pt reports L shoulder pain since yesterday. Reports tenderness to palpation to top of shoulder. Denies any injury. Reports pain gradually worsened over the course of the day. Reports steroid shot one month ago to L shoulder.

## 2018-12-11 ENCOUNTER — OFFICE VISIT (OUTPATIENT)
Dept: NEUROLOGY | Facility: CLINIC | Age: 58
End: 2018-12-11
Payer: MEDICAID

## 2018-12-11 VITALS
BODY MASS INDEX: 31.64 KG/M2 | HEART RATE: 99 BPM | SYSTOLIC BLOOD PRESSURE: 124 MMHG | WEIGHT: 196.88 LBS | HEIGHT: 66 IN | DIASTOLIC BLOOD PRESSURE: 66 MMHG

## 2018-12-11 DIAGNOSIS — G44.86 CERVICOGENIC HEADACHE: ICD-10-CM

## 2018-12-11 DIAGNOSIS — M54.81 OCCIPITAL NEURALGIA OF LEFT SIDE: Primary | ICD-10-CM

## 2018-12-11 DIAGNOSIS — G43.119 INTRACTABLE MIGRAINE WITH AURA WITHOUT STATUS MIGRAINOSUS: ICD-10-CM

## 2018-12-11 PROCEDURE — 99214 OFFICE O/P EST MOD 30 MIN: CPT | Mod: S$PBB,,, | Performed by: PSYCHIATRY & NEUROLOGY

## 2018-12-11 PROCEDURE — 99213 OFFICE O/P EST LOW 20 MIN: CPT | Mod: PBBFAC | Performed by: PSYCHIATRY & NEUROLOGY

## 2018-12-11 PROCEDURE — 99999 PR PBB SHADOW E&M-EST. PATIENT-LVL III: CPT | Mod: PBBFAC,,, | Performed by: PSYCHIATRY & NEUROLOGY

## 2018-12-11 NOTE — PROGRESS NOTES
Subjective:       Patient ID: Susan Courtney is a 58 y.o. female.    Reason for Consult: Headache      Interval History:  Susan Courtney is here for follow up. Their condition has changed.  She has been working on her exercise and losing weight and fortunately has had a recurrence of her left-sided occipital neuralgia type headaches.  She notes last time we are able to stop her headaches with conservative physical therapy and acupuncture.  We have discussed the clinical utility of this moving forward.      Objective:     Vitals:    12/11/18 1104   BP: 124/66   Pulse: 99     Patient is awake alert oriented to person place and time.  Moves all 4 extremities against gravity.  Gait and station within normal limits.  Cranial nerves 2-12 were without focal deficits.  Tinel sign positive at left lesser occipital nerve, negative at left greater occipital nerve, right greater and lesser occipital.  Tenderness to palpation of bilateral cervical paraspinal musculature with positive muscle twitch response.  Cervical range of motion limited especially towards the left.  Focused examination was undertaken today. Over 50% of face to face time of 25 minute visit time was in giving guidance, counseling and discussing treatment options.    Results for orders placed or performed during the hospital encounter of 04/25/14   MRI Brain Without Contrast    Narrative    Procedure: MRI the brain without contrast.    Technique: Sagittal and axial T1, axial/coronal T2, axial FLAIR, axial gradient, and axial diffusion imaging of the whole brain.    Comparison: None    Findings: 1-2 questionable punctate Foci of flair hyperintensity left frontal subcortical white matter versus volume averaging artifact without corresponding signal abnormality on additional sequences in this region.  Overall of doubtful clinical   significance.  Brain parenchyma is normal in contour.  The ventricles are normal in size and configuration without evidence  for hydrocephalus.  There is no midline shift or mass-effect.  There is no diffusion signal abnormality to suggest acute   infarction.  There is no abnormal parenchymal gradient susceptibility.  The major intracranial T2 flow voids are present.  No abnormal intra-or extra-axial fluid collection.  Study is slightly limited by lack of contrast.    Impression     Unremarkable noncontrast MRI of the brain as detailed above specifically without evidence of a acute infarction.      Electronically signed by: JAYJAY HERNANDEZ DO  Date:     04/25/14  Time:    10:13        Assessment/Plan:     Problem List Items Addressed This Visit        Neuro    Classical migraine    Overview     Has tried and failed Elavil, Advil, Mobic, Zofran, Topamax, Maxalt, Zomig    Frequency of migraines low. MIDAS is 2.          Relevant Orders    Ambulatory Referral to Physical/Occupational Therapy    Acupuncture    Cervicogenic headache    Relevant Orders    Ambulatory Referral to Physical/Occupational Therapy       Orthopedic    Occipital neuralgia of left side - Primary    Overview     Has responded to PT and acupuncture.          Relevant Orders    Ambulatory Referral to Physical/Occupational Therapy    Acupuncture        58-year-old right-handed female presents for evaluation follow-up of cervicogenic headache with left-sided occipital neuralgia.  At this time we have agreed on a conservative course of treatment including physical therapy and acupuncture.  I believe both of these treatments are medically necessary to avoid placing her on medication which may cause complications in her weight loss and attempt to reduce her HbA1c.  I have explained to her that the amitriptyline may increase fingerstick glucose and she may want have a discussion with the doctor who prescribed for that medication as well moving forward.  I will follow up with them in 2 month(s).  The patient verbalizes understanding and agreement with the treatment plan. I have  discussed risks, benefits and alternatives to the treatment plan. Questions were sought and answered to her stated verbal satisfaction.        Melanie Germain MD    This note is dictated on M*Modal Fluency Direct word recognition program. There are word recognition mistakes that are occasionally missed on review.

## 2018-12-15 ENCOUNTER — HOSPITAL ENCOUNTER (EMERGENCY)
Facility: OTHER | Age: 58
Discharge: HOME | End: 2018-12-15
Attending: EMERGENCY MEDICINE
Payer: MEDICAID

## 2018-12-15 VITALS
BODY MASS INDEX: 31.5 KG/M2 | WEIGHT: 196 LBS | HEIGHT: 66 IN | DIASTOLIC BLOOD PRESSURE: 67 MMHG | OXYGEN SATURATION: 97 % | TEMPERATURE: 98 F | SYSTOLIC BLOOD PRESSURE: 141 MMHG | HEART RATE: 82 BPM | RESPIRATION RATE: 20 BRPM

## 2018-12-15 DIAGNOSIS — J45.901 EXACERBATION OF ASTHMA, UNSPECIFIED ASTHMA SEVERITY, UNSPECIFIED WHETHER PERSISTENT: Primary | ICD-10-CM

## 2018-12-15 DIAGNOSIS — R05.9 COUGH: ICD-10-CM

## 2018-12-15 LAB
FLUAV AG SPEC QL IA: NEGATIVE
FLUBV AG SPEC QL IA: NEGATIVE
SPECIMEN SOURCE: NORMAL

## 2018-12-15 PROCEDURE — 96372 THER/PROPH/DIAG INJ SC/IM: CPT

## 2018-12-15 PROCEDURE — 87400 INFLUENZA A/B EACH AG IA: CPT | Mod: 59

## 2018-12-15 PROCEDURE — 99284 EMERGENCY DEPT VISIT MOD MDM: CPT | Mod: 25

## 2018-12-15 PROCEDURE — 63600175 PHARM REV CODE 636 W HCPCS: Performed by: EMERGENCY MEDICINE

## 2018-12-15 RX ORDER — PREDNISONE 20 MG/1
40 TABLET ORAL
Status: COMPLETED | OUTPATIENT
Start: 2018-12-15 | End: 2018-12-15

## 2018-12-15 RX ORDER — KETOROLAC TROMETHAMINE 30 MG/ML
15 INJECTION, SOLUTION INTRAMUSCULAR; INTRAVENOUS
Status: COMPLETED | OUTPATIENT
Start: 2018-12-15 | End: 2018-12-15

## 2018-12-15 RX ORDER — AZITHROMYCIN 250 MG/1
250 TABLET, FILM COATED ORAL DAILY
Qty: 6 TABLET | Refills: 0 | Status: SHIPPED | OUTPATIENT
Start: 2018-12-15 | End: 2019-12-10

## 2018-12-15 RX ORDER — PREDNISONE 20 MG/1
40 TABLET ORAL DAILY
Qty: 8 TABLET | Refills: 0 | Status: SHIPPED | OUTPATIENT
Start: 2018-12-15 | End: 2018-12-19

## 2018-12-15 RX ADMIN — PREDNISONE 40 MG: 20 TABLET ORAL at 09:12

## 2018-12-15 RX ADMIN — KETOROLAC TROMETHAMINE 15 MG: 30 INJECTION, SOLUTION INTRAMUSCULAR at 09:12

## 2018-12-15 NOTE — ED TRIAGE NOTES
Pt reports cough/congestion x 1 wk. Reports taking 1 prednisone last night. Denies any n/v/d. Reports trying to sweat it out and have sweats and body aches. Denies taking any medication today

## 2018-12-15 NOTE — ED PROVIDER NOTES
Encounter Date: 12/15/2018    SCRIBE #1 NOTE: IXiomara, am scribing for, and in the presence of, Dr. Sevilla.       History     Chief Complaint   Patient presents with    URI     cough/congestion, sweats, and body aches x 1 wk.      Time seen by provider: 8:31 AM    This is a 58 y.o. female with h/o HTN, DM, and asthma who presents with complaint of intermittent wheezing for one week.  Symptoms initially began associated with sore throat that is now improved, congestion, and productive cough with green sputum, chest tightness, and myalgias. She reports wheezing worsens at night. She reports subjective fever and diaphoresis. She took 10 mg prednisone five days ago and again last night with no improvement. She has been using albuterol inhaler as well as two breathing treatments last night and two more this morning. She has taken Nyquil and goodys. Positive sick contacts, her children have had sore throat and similar symptoms.      The history is provided by the patient.     Review of patient's allergies indicates:   Allergen Reactions    Morphine Itching     Past Medical History:   Diagnosis Date    Abdominal hernia     Asthma     Diabetes mellitus     Hypertension     Migraine     Migraine headache     Obesity     Sleep apnea      Past Surgical History:   Procedure Laterality Date    BUNIONECTOMY Right 4/16/2014    Performed by Shiva Olmstead D[facility] at [facility]    CHOLECYSTECTOMY      FOOT SURGERY      HYSTERECTOMY      complete     Family History   Problem Relation Age of Onset    Psoriasis Neg Hx     Melanoma Neg Hx     Lupus Neg Hx      Social History     Tobacco Use    Smoking status: Never Smoker    Smokeless tobacco: Never Used   Substance Use Topics    Alcohol use: No    Drug use: No     Review of Systems   Constitutional: Positive for diaphoresis and fever.   HENT: Positive for congestion and sore throat. Negative for rhinorrhea and trouble swallowing.    Respiratory: Positive for  cough, chest tightness and wheezing. Negative for shortness of breath.    Cardiovascular: Negative for chest pain.   Gastrointestinal: Negative for abdominal pain, diarrhea, nausea and vomiting.   Genitourinary: Negative for dysuria.   Musculoskeletal: Positive for myalgias. Negative for back pain.   Skin: Negative for rash.   Neurological: Negative for weakness and headaches.   Hematological: Does not bruise/bleed easily.       Physical Exam     Initial Vitals [12/15/18 0757]   BP Pulse Resp Temp SpO2   (!) 143/64 97 18 97.5 °F (36.4 °C) 97 %      MAP       --         Physical Exam    Nursing note and vitals reviewed.  Constitutional: She appears well-developed and well-nourished. She is not diaphoretic. No distress.   HENT:   Head: Normocephalic and atraumatic.   Mouth/Throat: Oropharynx is clear and moist.   Nasal congestion.   Eyes: Conjunctivae and EOM are normal. No scleral icterus.   Neck: Normal range of motion. Neck supple.   Cardiovascular: Normal rate, regular rhythm and normal heart sounds. Exam reveals no gallop and no friction rub.    No murmur heard.  Pulmonary/Chest: Breath sounds normal. No respiratory distress. She has no wheezes. She has no rhonchi. She has no rales.   Musculoskeletal: Normal range of motion. She exhibits no edema or tenderness.   Lymphadenopathy:     She has no cervical adenopathy.   Neurological: She is alert and oriented to person, place, and time.   Skin: Skin is warm and dry.         ED Course   Procedures  Labs Reviewed   INFLUENZA A AND B ANTIGEN          Imaging Results          X-Ray Chest PA And Lateral (Final result)  Result time 12/15/18 09:13:03    Final result by Alicia Pedraza MD (12/15/18 09:13:03)                 Impression:      As above.      Electronically signed by: Alicia Pedraza MD  Date:    12/15/2018  Time:    09:13             Narrative:    EXAMINATION:  XR CHEST PA AND LATERAL    CLINICAL HISTORY:  Cough    TECHNIQUE:  PA and lateral views of the  chest were performed.    COMPARISON:  02/02/2017, 02/25/2018    FINDINGS:  There is mild central peribronchial cuffing which could suggest a viral bronchitis versus reactive airways disease.  No consolidation to suggest a pneumonia.  The heart is normal in size.  The skeletal structures are intact.                              X-Rays:   Independently Interpreted Readings:   Chest X-Ray: Normal heart size.  No infiltrates.  No acute abnormalities.     Medical Decision Making:   Initial Assessment:       58-year-old female with history of HTN/DM/asthma presents with persistent cough and nasal congestion along with malaise and chills for 1 week.  She has had intermittent wheezing associated, transiently improved with albuterol but persistent and worse at night.  She took a 10 mg prednisone pill last night with minimal improvement.  On arrival she is afebrile and well-appearing with no active wheezing or SOB.  Chest x-ray done with no sign of pneumonia, more consistent with viral URI, and influenza swab negative. She was given 40 mg prednisone in the ED with improvement of air entry and cough.  Patient will continue prednisone for 4 additional days for asthma exacerbation, and continue supportive care for viral URI.  Will Rx Z-Gonzalez for patient only to take if she has no improvement with asthma treatment alone in the next 2-3 days, as this may represent less likely bacterial bronchitis.  The patient is comfortable with treatment plan and will follow up with PCP and return to ED for any worsening SOB or wheezing or other concerns.       Clinical Tests:   Lab Tests: Ordered and Reviewed  Radiological Study: Ordered and Reviewed            Scribe Attestation:   Scribe #1: I performed the above scribed service and the documentation accurately describes the services I performed. I attest to the accuracy of the note.    Attending Attestation:           Physician Attestation for Scribe:  Physician Attestation Statement for  Scribe #1: I, Dr. Sevilla, reviewed documentation, as scribed by Xiomara Rocha in my presence, and it is both accurate and complete.                    Clinical Impression:     1. Exacerbation of asthma, unspecified asthma severity, unspecified whether persistent    2. Cough                                 Quentin Sevilla MD  12/15/18 [id number]

## 2019-01-10 ENCOUNTER — CLINICAL SUPPORT (OUTPATIENT)
Dept: REHABILITATION | Facility: OTHER | Age: 59
End: 2019-01-10
Payer: MEDICAID

## 2019-01-10 DIAGNOSIS — M25.612 DECREASED ROM OF LEFT SHOULDER: ICD-10-CM

## 2019-01-10 DIAGNOSIS — G89.29 CHRONIC PAIN OF RIGHT KNEE: ICD-10-CM

## 2019-01-10 DIAGNOSIS — Z74.09 DECREASED FUNCTIONAL MOBILITY AND ENDURANCE: ICD-10-CM

## 2019-01-10 DIAGNOSIS — M54.2 NECK PAIN: ICD-10-CM

## 2019-01-10 DIAGNOSIS — M25.561 CHRONIC PAIN OF RIGHT KNEE: ICD-10-CM

## 2019-01-10 PROBLEM — R26.89 DECREASED FUNCTIONAL MOBILITY: Status: RESOLVED | Noted: 2017-07-11 | Resolved: 2019-01-10

## 2019-01-10 PROBLEM — R53.1 DECREASED STRENGTH: Status: RESOLVED | Noted: 2017-07-11 | Resolved: 2019-01-10

## 2019-01-10 PROBLEM — R29.898 DECREASED RANGE OF MOTION OF NECK: Status: RESOLVED | Noted: 2017-07-11 | Resolved: 2019-01-10

## 2019-01-10 PROBLEM — M25.611 DECREASED RANGE OF MOTION OF RIGHT SHOULDER: Status: RESOLVED | Noted: 2017-07-11 | Resolved: 2019-01-10

## 2019-01-10 PROCEDURE — 97162 PT EVAL MOD COMPLEX 30 MIN: CPT | Mod: PN | Performed by: PHYSICAL THERAPIST

## 2019-01-10 PROCEDURE — 97110 THERAPEUTIC EXERCISES: CPT | Mod: PN | Performed by: PHYSICAL THERAPIST

## 2019-01-10 NOTE — PLAN OF CARE
[facility]  Physical Therapy Initial Evaluation    Name: Susan Courtney  Clinic Number: [id number]    Therapy Diagnosis:   Encounter Diagnoses   Name Primary?    Neck pain     Decreased ROM of left shoulder     Chronic pain of right knee     Decreased functional mobility and endurance      Physician: Julio Germain III, MD    Physician Orders: PT Eval and Treat cervical traction and dry needling  Medical Diagnosis from Referral:   M54.81 (ICD-10-CM) - Occipital neuralgia of left side G43.119 (ICD-10-CM) - Intractable migraine with aura without status migrainosus R51 (ICD-10-CM) - Cervicogenic headache     Evaluation Date: 1/10/2019  Authorization Period Expiration: 12/11/2019  Plan of Care Expiration: 4/5/2019  Visit # / Visits authorized: 1/ 1    Time In: 0750  Time Out: 0900   Total Billable Time: 50 minutes    Precautions: Standard    Subjective   Date of onset: chronic  History of current condition - Susan reports: having history of migraine headaches on L side. She had PT a few years ago and it completely went away. She requested judy come back for the exercise, massage (manual therapy), acupuncture (dry needling), and heat treatments. She is also having R knee pain and L shoulder pain which she see's an [facility]oped[facility]. She would like a few exercises to do at home for her knees. Cheif complaint L shoulder pain and headaches.      Medical History:   Past Medical History:   Diagnosis Date    Abdominal hernia     Asthma     Diabetes mellitus     Hypertension     Migraine     Migraine headache     Obesity     Sleep apnea        Surgical History:   Susan Courtney  has a past surgical history that includes Cholecystectomy; Hysterectomy; and Foot surgery.    Medications:   Susan has a current medication list which includes the following prescription(s): amitriptyline, azithromycin, clonazepam, clotrimazole, famotidine, fluticasone, hydrochlorothiazide,  meloxicam, metformin, omeprazole, ondansetron, ondansetron, oxycodone-acetaminophen, sumatriptan, and ventolin hfa.    Allergies:   Review of patient's allergies indicates:   Allergen Reactions    Morphine Itching        Imaging, bone scan films: Humeral head is well aligned within the glenoid.  No fracture or dislocation.  Moderate acromioclavicular degeneration is present    Prior Therapy: yes  Social History: helps care for her 5 mo old grandson; lives alone with her cat   Occupation: cook for [employer]  Prior Level of Function: independent, working full time without headaches or difficulty prolonged standing or lifting  Current Level of Function: tired and painful after work and will go home to get in the bed, difficulty concentrating, lifting or carrying items, and recreational activities     Pain:  Current 0/10, worst 10/10, best 0/10   Location: left head temporal region and B occipital region  Description: bad  Aggravating Factors: light, certain foods  Easing Factors: massage, ice, heating pad and dry needling, rest, goody power, OTC pain medication     Pts goals: To not have to take any pain medication, lose weight, and stay fit    Objective     Observation: ranjan and pleasant mood    Posture: rounded shoulders and forward head    Cervical Range of Motion:    Degrees Pain   Flexion 30 Painful posterior neck     Extension 20 Painful suboccipital region     Right Rotation 50 painful     Left Rotation 60 neg     Right Side Bending 20 painful   Left Side Bending 30 neg      Shoulder Range of Motion: Limited in L overhead flexion and abduction AROM, PROM WFL    Knee  Right   Left  Pain/Dysfunction with Movement    AROM PROM MMT AROM PROM MMT    Flexion 110 120 3+ 120 130 4 Painful R   Extension 0 0 3+ 0 0 4 Painful R     Upper Extremity Strength  (R) UE  (L) UE    Shoulder flexion: 4/5 Shoulder flexion: 3+/5   Shoulder Abduction: 4/5 Shoulder abduction: 3-/5   Shoulder ER 4+/5 Shoulder ER 4/5   Shoulder  "IR 5/5 Shoulder IR 4+/5   Elbow flexion: 4+/5 Elbow flexion: 4/5   Elbow extension: 5/5 Elbow extension: 4+/5   Lower Trap 3+/5 Lower Trap 2/5   Middle Trap 3+/5 Middle Trap 3/5   Rhomboids 4-/5 Rhomboids 3+/5       Special Tests:  Distraction positive   Compression negative   Spurlings negative   Sharp-Malu negative   VA test negative     Joint Mobility: hypomobility,  central  PA's C7-T5,    Transverse glides limited R mid cervical spine,      Palpation: TTP suboccipitals, upper traps, L rhomboids       Sensation: BUE light touch grossly intact all dermatomes    Flexibility: tightness in pectorals and cervical paraspinals        CMS Impairment/Limitation/Restriction for FOTO craniofascial Survey    Therapist reviewed FOTO scores for Susan Courtney on 1/10/2019.   FOTO documents entered into EPIC - see Media section.    Limitation Score: 57%  Goal: 41%         TREATMENT   Treatment Time In: 0825am  Treatment Time Out: 0900am  Total Treatment time separate from Evaluation: 25 minutes    Susan received therapeutic exercises to develop strength, endurance, ROM, flexibility, posture and core stabilization for 15minutes including:  Chin tucks x 10  Upper trap stretch 15" x 3  Levator scapulae stretch 15" x 3  Scapula retractions x 10  B shoulder ER with YTB x 10    Susan received the following manual therapy techniques: Manual traction, Myofacial release and Soft tissue Mobilization were applied to the: neck for 10 minutes, including:  Cervical distraction  STM L rhomboids  MFR suboccipitals    Application of FDN: Pt educated on benefits and potential side effects of dry needling. Educated pt on benefits, precautions, side effects followign IDN. Educated pt to use heat following treatment sessions if pt is experiencing pain or soreness. Pt verbalized good understanding of education.  Pt signed written consent to dry needling Rx. Pt gave verbal consent for DN    Pt received dry needling to the below listed " muscles using 15mm-30mm needles. X 5 min No charge  Upper traps B  Suboccipitals B    Susan received hot pack for 8 minutes to neck.    Home Exercises and Patient Education Provided    Education provided:   - HEP and dry needling    Written Home Exercises Provided: yes.  Exercises were reviewed and Susan was able to demonstrate them prior to the end of the session.  Susan demonstrated good  understanding of the education provided.     See EMR under Media for exercises provided 1/10/2019.    Assessment   Susan is a 58 y.o. female referred to outpatient Physical Therapy with a medical diagnosis of cervicogenic headaches and migraines L sideded. Pt requesting PT for neck, low back, L shoulder, and R knee pain. Chief complaint is headaches and L shoulder pain. Pt demonstrating decreased cervical spine AROM, R knee AROM, L shoulder AROM, BUE/BLE weakness, poor posture, and antalgic gait R    Pt prognosis is Good.   Pt will benefit from skilled outpatient Physical Therapy to address the deficits stated above and in the chart below, provide pt/family education, and to maximize pt's level of independence.     Plan of care discussed with patient: Yes  Pt's spiritual, cultural and educational needs considered and patient is agreeable to the plan of care and goals as stated below:     Anticipated Barriers for therapy: pt watches her 5 mo old grandson and works full time. May not be able to attend PT frequently     Medical Necessity is demonstrated by the following  History  Co-morbidities and personal factors that may impact the plan of care Co-morbidities:   high BMI    Personal Factors:   no deficits     moderate   Examination  Body Structures and Functions, activity limitations and participation restrictions that may impact the plan of care Body Regions:   head  neck  back  lower extremities  upper extremities    Body Systems:    ROM  strength  gait  motor control    Participation Restrictions:   Lifting, carrying,  walking    Activity limitations:   Learning and applying knowledge  no deficits    General Tasks and Commands  no deficits    Communication  no deficits    Mobility  lifting and carrying objects  walking    Self care  no deficits    Domestic Life  cooking    Interactions/Relationships  no deficits    Life Areas  employment    Community and Social Life  recreation and leisure         high   Clinical Presentation evolving clinical presentation with changing clinical characteristics moderate   Decision Making/ Complexity Score: moderate     Goals:  Short Term Goals: 6 weeks   1. Patient to demonstrate independence with postural awareness for improved management of condition  2. Patient to report decreased frequency and intensity of headaches by 30% or greater for improved concentration and performance of ADL's  3. Patient to increase AROM R knee by 10 degrees or greater for improved functional performance of squatting  4. Patient to increased AROM in cervical spine flexion and right side bending by 10 degrees or greater for improved performance of ADL's    Long Term Goals: 12 weeks   1. Patient to be independent with home exercise program for improved self management of condition  2. Patient to have decreased subjective report of disability as noted by a score of 41% or less on the FOTO craniofascial questionnaire   3. Patient to increased strength in BUE's to 4/5 or greater for improved performance of ADL's such as lifting  4. Patient to increased strength in B knee's to 5/5 for improved performance of ADL's such as prolonged standing      Plan   Plan of care Certification: 1/10/2019 to 4/5/2019.    Outpatient Physical Therapy 1 times weekly for 12 weeks to include the following interventions: Cervical/Lumbar Traction, Gait Training, Manual Therapy, Moist Heat/ Ice, Neuromuscular Re-ed, Patient Education, Therapeutic Activites, Therapeutic Exercise and dry needling and modalities PRN.     Becki Moran, PT

## 2019-03-26 ENCOUNTER — TELEPHONE (OUTPATIENT)
Dept: NEUROLOGY | Facility: CLINIC | Age: 59
End: 2019-03-26

## 2019-03-29 ENCOUNTER — OFFICE VISIT (OUTPATIENT)
Dept: NEUROLOGY | Facility: CLINIC | Age: 59
End: 2019-03-29
Payer: MEDICAID

## 2019-03-29 VITALS
DIASTOLIC BLOOD PRESSURE: 68 MMHG | BODY MASS INDEX: 31.85 KG/M2 | SYSTOLIC BLOOD PRESSURE: 141 MMHG | WEIGHT: 198.19 LBS | HEIGHT: 66 IN | HEART RATE: 84 BPM

## 2019-03-29 DIAGNOSIS — G43.119 INTRACTABLE MIGRAINE WITH AURA WITHOUT STATUS MIGRAINOSUS: Primary | ICD-10-CM

## 2019-03-29 PROCEDURE — 99214 OFFICE O/P EST MOD 30 MIN: CPT | Mod: S$PBB,,, | Performed by: PSYCHIATRY & NEUROLOGY

## 2019-03-29 PROCEDURE — 99999 PR PBB SHADOW E&M-EST. PATIENT-LVL III: CPT | Mod: PBBFAC,,, | Performed by: PSYCHIATRY & NEUROLOGY

## 2019-03-29 PROCEDURE — 99214 PR OFFICE/OUTPT VISIT, EST, LEVL IV, 30-39 MIN: ICD-10-PCS | Mod: S$PBB,,, | Performed by: PSYCHIATRY & NEUROLOGY

## 2019-03-29 PROCEDURE — 99213 OFFICE O/P EST LOW 20 MIN: CPT | Mod: PBBFAC | Performed by: PSYCHIATRY & NEUROLOGY

## 2019-03-29 PROCEDURE — 99999 PR PBB SHADOW E&M-EST. PATIENT-LVL III: ICD-10-PCS | Mod: PBBFAC,,, | Performed by: PSYCHIATRY & NEUROLOGY

## 2019-03-29 NOTE — PROGRESS NOTES
Subjective:       Patient ID: Susan Courtney is a 59 y.o. female.    Reason for Consult: Headache      Interval History:  Susan Courtney is here for follow up. Their condition has improved significantly.  She notes that now that she is exercising and continue to work on her weight loss she has noted that she has only had 1 day of headache since our last visit over 3 months ago.  She notes that she was able to take a BC Powder and stop her headaches.  We have discussed about the reduction of headaches with her weight loss and taking care of her diet and exercise.    Objective:     Vitals:    03/29/19 1127   BP: (!) 141/68   Pulse: 84     Patient is awake alert oriented to person place and time.  Moves all 4 extremities against gravity.  Gait and station within normal limits.  Cranial nerves 2-12 were without focal deficits.  Focused examination was undertaken today. Over 50% of face to face time of 25 minute visit time was in giving guidance, counseling and discussing treatment options.    Results for orders placed or performed during the hospital encounter of 04/25/14   MRI Brain Without Contrast    Narrative    Procedure: MRI the brain without contrast.    Technique: Sagittal and axial T1, axial/coronal T2, axial FLAIR, axial gradient, and axial diffusion imaging of the whole brain.    Comparison: None    Findings: 1-2 questionable punctate Foci of flair hyperintensity left frontal subcortical white matter versus volume averaging artifact without corresponding signal abnormality on additional sequences in this region.  Overall of doubtful clinical   significance.  Brain parenchyma is normal in contour.  The ventricles are normal in size and configuration without evidence for hydrocephalus.  There is no midline shift or mass-effect.  There is no diffusion signal abnormality to suggest acute   infarction.  There is no abnormal parenchymal gradient susceptibility.  The major intracranial T2 flow voids are  present.  No abnormal intra-or extra-axial fluid collection.  Study is slightly limited by lack of contrast.    Impression     Unremarkable noncontrast MRI of the brain as detailed above specifically without evidence of a acute infarction.      Electronically signed by: JAYJAY MARY MERA  Date:     04/25/14  Time:    10:13        Assessment/Plan:     Problem List Items Addressed This Visit        Neuro    Classical migraine - Primary    Overview     Has tried and failed Elavil, Advil, Mobic, Zofran, Topamax, Maxalt, Zomig    Frequency of migraines low, one in 3 months    Improved diet and exercise has been very helpful              51-year-old female presents for evaluation follow-up migraine headaches.  At this time she is only having 1 migraine headache for 3 months.  I will see her back in 1 year or sooner for headache condition worsens.  She notes that since working on her diet and exercise her headaches have been doing much better control.  I congratulated her and encouraged her on this current path to wellness.     I will follow up with them in 12 month(s).  The patient verbalizes understanding and agreement with the treatment plan. I have discussed risks, benefits and alternatives to the treatment plan. Questions were sought and answered to her stated verbal satisfaction.        Melanie Germain MD    This note is dictated on M*Modal Fluency Direct word recognition program. There are word recognition mistakes that are occasionally missed on review.

## 2019-06-10 ENCOUNTER — HOSPITAL ENCOUNTER (EMERGENCY)
Facility: OTHER | Age: 59
Discharge: HOME | End: 2019-06-10
Attending: EMERGENCY MEDICINE
Payer: MEDICARE

## 2019-06-10 VITALS
BODY MASS INDEX: 33.13 KG/M2 | RESPIRATION RATE: 17 BRPM | WEIGHT: 198.88 LBS | HEART RATE: 67 BPM | DIASTOLIC BLOOD PRESSURE: 70 MMHG | TEMPERATURE: 98 F | SYSTOLIC BLOOD PRESSURE: 126 MMHG | OXYGEN SATURATION: 100 % | HEIGHT: 65 IN

## 2019-06-10 DIAGNOSIS — S40.022A CONTUSION OF LEFT UPPER EXTREMITY, INITIAL ENCOUNTER: Primary | ICD-10-CM

## 2019-06-10 DIAGNOSIS — M25.551 PAIN OF RIGHT HIP JOINT: ICD-10-CM
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
## 2019-06-10 LAB
BACTERIA #/AREA URNS HPF: ABNORMAL /HPF
BILIRUB UR QL STRIP: NEGATIVE
CLARITY UR: CLEAR
COLOR UR: YELLOW
GLUCOSE UR QL STRIP: NEGATIVE
HGB UR QL STRIP: NEGATIVE
KETONES UR QL STRIP: NEGATIVE
LEUKOCYTE ESTERASE UR QL STRIP: ABNORMAL
MICROSCOPIC COMMENT: ABNORMAL
NITRITE UR QL STRIP: NEGATIVE
PH UR STRIP: 6 [PH] (ref 5–8)
PROT UR QL STRIP: NEGATIVE
RBC #/AREA URNS HPF: 0 /HPF (ref 0–4)
SP GR UR STRIP: 1.02 (ref 1–1.03)
SQUAMOUS #/AREA URNS HPF: 60 /HPF
URN SPEC COLLECT METH UR: ABNORMAL
UROBILINOGEN UR STRIP-ACNC: NEGATIVE EU/DL
WBC #/AREA URNS HPF: 4 /HPF (ref 0–5)

## 2019-06-10 PROCEDURE — 99284 EMERGENCY DEPT VISIT MOD MDM: CPT | Mod: 25

## 2019-06-10 PROCEDURE — 81000 URINALYSIS NONAUTO W/SCOPE: CPT

## 2019-06-10 PROCEDURE — 96372 THER/PROPH/DIAG INJ SC/IM: CPT

## 2019-06-10 PROCEDURE — 63600175 PHARM REV CODE 636 W HCPCS: Performed by: PHYSICIAN ASSISTANT

## 2019-06-10 RX ORDER — ORPHENADRINE CITRATE 30 MG/ML
30 INJECTION INTRAMUSCULAR; INTRAVENOUS
Status: COMPLETED | OUTPATIENT
Start: 2019-06-10 | End: 2019-06-10

## 2019-06-10 RX ORDER — NAPROXEN 375 MG/1
375 TABLET ORAL 2 TIMES DAILY WITH MEALS
Qty: 20 TABLET | Refills: 0 | Status: ON HOLD | OUTPATIENT
Start: 2019-06-10 | End: 2019-12-17 | Stop reason: HOSPADM

## 2019-06-10 RX ORDER — KETOROLAC TROMETHAMINE 30 MG/ML
30 INJECTION, SOLUTION INTRAMUSCULAR; INTRAVENOUS
Status: COMPLETED | OUTPATIENT
Start: 2019-06-10 | End: 2019-06-10

## 2019-06-10 RX ORDER — METHOCARBAMOL 500 MG/1
1000 TABLET, FILM COATED ORAL 3 TIMES DAILY
Qty: 30 TABLET | Refills: 0 | Status: SHIPPED | OUTPATIENT
Start: 2019-06-10 | End: 2019-06-15

## 2019-06-10 RX ADMIN — KETOROLAC TROMETHAMINE 30 MG: 30 INJECTION, SOLUTION INTRAMUSCULAR; INTRAVENOUS at 09:06

## 2019-06-10 RX ADMIN — ORPHENADRINE CITRATE 30 MG: 30 INJECTION INTRAMUSCULAR; INTRAVENOUS at 09:06

## 2019-06-11 NOTE — ED PROVIDER NOTES
Encounter Date: 6/10/2019       History     Chief Complaint   Patient presents with    Fall     tripped over a kid, c/o L arm pain after hitting a baker's rack     Patient is a 59-year-old female with a past medical history of hypertension, diabetes, presenting to the emergency room for evaluation status post fall.  Patient states that earlier this afternoon, she was baby-sitting her grandchild when she accidentally tripped and fell.  She states that she hit her left arm on a Baker's rack.  She states that she landed on her right side.  She states that she went to lay down, but when she got up, she has had worsening pain. She denies any numbness, tingling, weakness of her upper lower extremities bilaterally.  No loss of consciousness.  She states she has not yet taken any medication for her symptoms.This is the extent of the patient's complaints at this time.       The history is provided by the patient.     Review of patient's allergies indicates:   Allergen Reactions    Morphine Itching     Past Medical History:   Diagnosis Date    Abdominal hernia     Asthma     Diabetes mellitus     Hypertension     Migraine     Migraine headache     Obesity     Sleep apnea      Past Surgical History:   Procedure Laterality Date    BUNIONECTOMY Right 4/16/2014    Performed by Shiva Olmstead DPM at [facility]    CHOLECYSTECTOMY      FOOT SURGERY      HYSTERECTOMY      complete     Family History   Problem Relation Age of Onset    Psoriasis Neg Hx     Melanoma Neg Hx     Lupus Neg Hx      Social History     Tobacco Use    Smoking status: Never Smoker    Smokeless tobacco: Never Used   Substance Use Topics    Alcohol use: No    Drug use: No     Review of Systems   Constitutional: Negative for activity change, appetite change, chills, fatigue and fever.   HENT: Negative for congestion, rhinorrhea and sore throat.    Eyes: Negative for photophobia and visual disturbance.   Respiratory: Negative for cough, shortness  of breath and wheezing.    Cardiovascular: Negative for chest pain.   Gastrointestinal: Positive for abdominal pain. Negative for diarrhea, nausea and vomiting.   Genitourinary: Negative for dysuria, hematuria and urgency.   Musculoskeletal: Positive for arthralgias, joint swelling and myalgias. Negative for back pain and neck pain.        Arm pain   Skin: Negative for color change and wound.   Neurological: Negative for weakness and headaches.   Psychiatric/Behavioral: Negative for agitation and confusion.       Physical Exam     Initial Vitals [06/10/19 2009]   BP Pulse Resp Temp SpO2   132/61 76 18 98.5 °F (36.9 °C) 100 %      MAP       --         Physical Exam    Nursing note and vitals reviewed.  Constitutional: Vital signs are normal. She appears well-developed and well-nourished. She is not diaphoretic. She is cooperative.  Non-toxic appearance. She does not have a sickly appearance. She does not appear ill. No distress.    female accompanied by her daughter in the emergency room.  Speaking clearly in full sentences.  No acute distress.   HENT:   Head: Normocephalic and atraumatic.   Right Ear: External ear normal.   Left Ear: External ear normal.   Nose: Nose normal.   Mouth/Throat: Oropharynx is clear and moist.   Eyes: Conjunctivae and EOM are normal.   Neck: Normal range of motion. Neck supple.   Cardiovascular: Normal rate, regular rhythm and normal heart sounds.   Pulmonary/Chest: Breath sounds normal. No respiratory distress. She has no wheezes.   Abdominal: Soft. Bowel sounds are normal. She exhibits no distension. There is tenderness. There is no rebound and no guarding.   Mild tenderness to palpation of the abdomen in the right upper and right lower quadrant with no palpable deformity, mass.  No ecchymosis. Normal bowel sounds.   Musculoskeletal: Normal range of motion.        Arms:  Tenderness to palpation of the left upper extremity along the medial aspect of the humerus with no  palpable deformity, crepitus, step-off.  Ecchymosis noted. Normal range of motion, strength, sensation.  No palpable deformity or tenderness proximally or distally.  Tenderness to palpation of the right hip with increased pain with lower extremity flexion.  Ambulatory and weight-bearing.   Neurological: She is alert and oriented to person, place, and time. She has normal strength. No sensory deficit. GCS eye subscore is 4. GCS verbal subscore is 5. GCS motor subscore is 6.   Skin: Skin is warm.   Psychiatric: She has a normal mood and affect. Her behavior is normal. Judgment and thought content normal.         ED Course   Procedures  Labs Reviewed   URINALYSIS, REFLEX TO URINE CULTURE - Abnormal; Notable for the following components:       Result Value    Leukocytes, UA Trace (*)     All other components within normal limits    Narrative:     Preferred Collection Type->Urine, Clean Catch   URINALYSIS MICROSCOPIC - Abnormal; Notable for the following components:    Bacteria Moderate (*)     All other components within normal limits    Narrative:     Preferred Collection Type->Urine, Clean Catch          Imaging Results          X-Ray Humerus 2 View Left (Final result)  Result time 06/10/19 21:48:42    Final result by Sary Stringer MD (06/10/19 21:48:42)                 Impression:      No acute osseous abnormality identified.      Electronically signed by: Sary Stringer MD  Date:    06/10/2019  Time:    21:48             Narrative:    EXAMINATION:  XR HUMERUS 2 VIEW LEFT    CLINICAL HISTORY:  Unspecified fall, initial encounter    COMPARISON:  None    FINDINGS:  No evidence of acute displaced fracture, dislocation, or osseous destructive process.  Posterior left olecranon spur is seen.                               X-Ray Hip 2 View Right (Final result)  Result time 06/10/19 21:50:58    Final result by Sarwat Hartmann MD (06/10/19 21:50:58)                 Impression:      No acute displaced fracture-dislocation  identified.      Electronically signed by: Sarwat Hartmann MD  Date:    06/10/2019  Time:    21:50             Narrative:    EXAMINATION:  XR HIP 2 VIEW RIGHT    CLINICAL HISTORY:  Unspecified fall, initial encounter    TECHNIQUE:  AP view of the pelvis and frog leg lateral view of the right hip were performed.    COMPARISON:  CT abdomen and pelvis 04/01/2018    FINDINGS:  Bones are well mineralized.  Overall alignment is within normal limits.  No displaced fracture, dislocation or destructive osseous process.  Age-related mild degenerative change.  No subcutaneous emphysema or radiodense retained foreign body.                              X-Rays:   Independently Interpreted Readings:   Other Readings:  X-ray left humerus - no acute fracture or dislocation  X-ray right hip - no acute fracture or dislocation    Medical Decision Making:   Initial Assessment:     Urgent evaluation of a 59 y.o. female presenting to the emergency department for evaluation status post fall. Patient is afebrile, nontoxic appearing and hemodynamically stable. Physical exam reveals tenderness to palpation of the left upper extremity in the right hip patient also has tenderness to palpation abdomen.  Will plan for imaging, analgesics, UA and reassess.      Independently Interpreted Test(s):   I have ordered and independently interpreted X-rays - see prior notes.  Clinical Tests:   Radiological Study: Ordered and Reviewed  ED Management:    UA does not show any evidence of hematuria, trace leukocytes however copious squamous cells noted. No signs of UTI.  Patient's x-rays do not show any signs or symptoms concerning for acute process.  On reassessment, patient states that she is already feeling much better.  At this point, do not feel any further testing imaging is warranted.  Her signs symptoms are likely secondary to musculoskeletal etiology.  She is counseled extensively on symptomatic care and treatment.  She is given a prescription for  naproxen and Robaxin to treat her symptoms.  She is discharged home in stable condition.The patient was instructed to follow up with a primary care provider in 2 days or to return to the emergency department for worsening symptoms. The treatment plan was discussed with the patient who demonstrated understanding and comfort with plan.    This note was created using [employer]. There may be typographical errors secondary to dictation.                         Clinical Impression:     1. Contusion of left upper extremity, initial encounter    2. Fall    3. Pain of right hip joint           Disposition:   Disposition: Discharged  Condition: Stable                        Theresa Phillip PA-C  06/10/19 2208       Theresa Phillip PA-C  06/10/19 2209

## 2019-06-11 NOTE — ED NOTES
Pt to Room 13 with c/o fall. Pt states she was taking care of a few children, when she slipped and fell. Pt states she hit her left arm on a children's rack. Bruising noted to the posterior left upper arm. Pt also states she landed on her right side and has been having right lower abdominal pain. Pt denies any other symptoms. Pt is AAO x 3. Respirations even and unlabored, lung sounds clear and equal bilaterally. Abdomen tender in RLQ. All quadrants soft. BS present x 4. Skin warm and dry to touch, no swelling noted. Pt has full ROM. No acute distress noted. Fall precautions noted. Family at the bedside. Will continue to monitor closely.

## 2019-11-26 NOTE — H&P (VIEW-ONLY)
History & Physical    SUBJECTIVE:     History of Present Illness:  Patient is a 59 y.o. female nwith symptomatic ventral hernai.     Review of patient's allergies indicates:   Allergen Reactions    Morphine Itching       Current Outpatient Medications   Medication Sig Dispense Refill    amitriptyline (ELAVIL) 150 MG Tab Take 150 mg by mouth every evening.      azithromycin (Z-SHAWNEE) 250 MG tablet Take 1 tablet (250 mg total) by mouth once daily. Take first 2 tablets together, then 1 every day until finished. 6 tablet 0    clonazePAM (KLONOPIN) 2 MG Tab Take 2 mg by mouth 2 (two) times daily.      clotrimazole (LOTRIMIN) 1 % Soln Apply topically 2 (two) times daily. 30 mL 0    famotidine (PEPCID) 20 MG tablet Take 1 tablet (20 mg total) by mouth 2 (two) times daily. 20 tablet 0    fluticasone (FLONASE) 50 mcg/actuation nasal spray 1 spray (50 mcg total) by Each Nare route 2 (two) times daily as needed. 15 g 0    hydrochlorothiazide (HYDRODIURIL) 25 MG tablet Take 25 mg by mouth once daily.      meloxicam (MOBIC) 7.5 MG tablet Take 7.5 mg by mouth once daily.  0    metformin (FORTAMET) 500 mg 24 hr tablet Take 500 mg by mouth 2 (two) times daily with meals.       naproxen (NAPROSYN) 375 MG tablet Take 1 tablet (375 mg total) by mouth 2 (two) times daily with meals. 20 tablet 0    omeprazole (PRILOSEC) 20 MG capsule Take 20 mg by mouth once daily.      ondansetron (ZOFRAN) 4 MG tablet Take 4 mg by mouth every 6 (six) hours as needed.      ondansetron (ZOFRAN-ODT) 4 MG TbDL Take 1 tablet (4 mg total) by mouth every 8 (eight) hours as needed. 15 tablet 0    oxyCODONE-acetaminophen (PERCOCET) 5-325 mg per tablet Take 1 tablet by mouth every 6 (six) hours as needed for Pain. 8 tablet 0    sumatriptan (IMITREX) 50 MG tablet Take 1 tablet (50 mg total) by mouth every 2 (two) hours as needed. 9 tablet 3    VENTOLIN HFA 90 mcg/actuation inhaler INHALE 2 PUFFS BY MOUTH EVERY 4 TO 6 HOURS AS NEEDED FOR COUGHING  "AND WHEEZING  2     No current facility-administered medications for this visit.        Past Medical History:   Diagnosis Date    Abdominal hernia     Asthma     Diabetes mellitus     Hypertension     Migraine     Migraine headache     Obesity     Sleep apnea      Past Surgical History:   Procedure Laterality Date    CHOLECYSTECTOMY      FOOT SURGERY      HYSTERECTOMY      complete     Family History   Problem Relation Age of Onset    Psoriasis Neg Hx     Melanoma Neg Hx     Lupus Neg Hx      Social History     Tobacco Use    Smoking status: Never Smoker    Smokeless tobacco: Never Used   Substance Use Topics    Alcohol use: No    Drug use: No        Review of Systems:  Review of Systems   HENT: Negative.    Respiratory: Negative.    Cardiovascular: Negative.    Gastrointestinal: Negative.    Genitourinary: Negative.    Musculoskeletal: Negative.    Skin: Negative.        OBJECTIVE:     Vital Signs (Most Recent)  Pulse: 94 (11/26/19 1020)  BP: 130/66 (11/26/19 1020)  5' 5" (1.651 m)  89.4 kg (197 lb)     Physical Exam:  Physical Exam   Constitutional: She is oriented to person, place, and time. She appears well-developed and well-nourished.   HENT:   Head: Normocephalic and atraumatic.   Eyes: EOM are normal.   Neck: Normal range of motion. Neck supple.   Cardiovascular: Normal rate, regular rhythm and normal heart sounds.   Pulmonary/Chest: Effort normal and breath sounds normal.   Abdominal: Soft. Normal appearance and bowel sounds are normal. A hernia is present. Hernia confirmed positive in the ventral area.   Upper midline reducible hernia   Musculoskeletal: Normal range of motion.   Neurological: She is alert and oriented to person, place, and time.   Skin: Skin is warm and dry.   Psychiatric: She has a normal mood and affect. Her behavior is normal.       Laboratory      Diagnostic Results:      ASSESSMENT/PLAN:       Ventral Hernia     PLAN:Plan     Repair        "

## 2019-12-10 ENCOUNTER — ANESTHESIA EVENT (OUTPATIENT)
Dept: SURGERY | Facility: OTHER | Age: 59
End: 2019-12-10
Payer: MEDICARE

## 2019-12-10 ENCOUNTER — HOSPITAL ENCOUNTER (OUTPATIENT)
Dept: PREADMISSION TESTING | Facility: OTHER | Age: 59
Discharge: HOME | End: 2019-12-10
Attending: SPECIALIST
Payer: MEDICARE

## 2019-12-10 VITALS
SYSTOLIC BLOOD PRESSURE: 139 MMHG | DIASTOLIC BLOOD PRESSURE: 71 MMHG | OXYGEN SATURATION: 99 % | HEIGHT: 66 IN | HEART RATE: 77 BPM | TEMPERATURE: 99 F | BODY MASS INDEX: 31.8 KG/M2

## 2019-12-10 DIAGNOSIS — Z01.818 PRE-OP TESTING: Primary | ICD-10-CM

## 2019-12-10 LAB
ANION GAP SERPL CALC-SCNC: 10 MMOL/L (ref 8–16)
BASOPHILS # BLD AUTO: 0.03 K/UL (ref 0–0.2)
BASOPHILS NFR BLD: 0.4 % (ref 0–1.9)
BUN SERPL-MCNC: 14 MG/DL (ref 6–20)
CALCIUM SERPL-MCNC: 10.1 MG/DL (ref 8.7–10.5)
CHLORIDE SERPL-SCNC: 98 MMOL/L (ref 95–110)
CO2 SERPL-SCNC: 32 MMOL/L (ref 23–29)
CREAT SERPL-MCNC: 0.9 MG/DL (ref 0.5–1.4)
DIFFERENTIAL METHOD: ABNORMAL
EOSINOPHIL # BLD AUTO: 0.2 K/UL (ref 0–0.5)
EOSINOPHIL NFR BLD: 2.4 % (ref 0–8)
ERYTHROCYTE [DISTWIDTH] IN BLOOD BY AUTOMATED COUNT: 13.6 % (ref 11.5–14.5)
EST. GFR  (AFRICAN AMERICAN): >60 ML/MIN/1.73 M^2
EST. GFR  (NON AFRICAN AMERICAN): >60 ML/MIN/1.73 M^2
GLUCOSE SERPL-MCNC: 207 MG/DL (ref 70–110)
HCT VFR BLD AUTO: 40.8 % (ref 37–48.5)
HGB BLD-MCNC: 12.6 G/DL (ref 12–16)
IMM GRANULOCYTES # BLD AUTO: 0.02 K/UL (ref 0–0.04)
IMM GRANULOCYTES NFR BLD AUTO: 0.2 % (ref 0–0.5)
LYMPHOCYTES # BLD AUTO: 3 K/UL (ref 1–4.8)
LYMPHOCYTES NFR BLD: 36.2 % (ref 18–48)
MCH RBC QN AUTO: 25.3 PG (ref 27–31)
MCHC RBC AUTO-ENTMCNC: 30.9 G/DL (ref 32–36)
MCV RBC AUTO: 82 FL (ref 82–98)
MONOCYTES # BLD AUTO: 0.6 K/UL (ref 0.3–1)
MONOCYTES NFR BLD: 6.9 % (ref 4–15)
NEUTROPHILS # BLD AUTO: 4.4 K/UL (ref 1.8–7.7)
NEUTROPHILS NFR BLD: 53.9 % (ref 38–73)
NRBC BLD-RTO: 0 /100 WBC
PLATELET # BLD AUTO: 340 K/UL (ref 150–350)
PMV BLD AUTO: 8.8 FL (ref 9.2–12.9)
POTASSIUM SERPL-SCNC: 3.7 MMOL/L (ref 3.5–5.1)
RBC # BLD AUTO: 4.98 M/UL (ref 4–5.4)
SODIUM SERPL-SCNC: 140 MMOL/L (ref 136–145)
WBC # BLD AUTO: 8.21 K/UL (ref 3.9–12.7)

## 2019-12-10 PROCEDURE — 36415 COLL VENOUS BLD VENIPUNCTURE: CPT

## 2019-12-10 PROCEDURE — 85025 COMPLETE CBC W/AUTO DIFF WBC: CPT

## 2019-12-10 PROCEDURE — 80048 BASIC METABOLIC PNL TOTAL CA: CPT

## 2019-12-10 RX ORDER — TRAZODONE HYDROCHLORIDE 50 MG/1
50 TABLET ORAL NIGHTLY
COMMUNITY
End: 2022-03-30

## 2019-12-10 RX ORDER — CELECOXIB 200 MG/1
400 CAPSULE ORAL
Status: CANCELLED | OUTPATIENT
Start: 2019-12-10 | End: 2019-12-10

## 2019-12-10 RX ORDER — FAMOTIDINE 20 MG/1
20 TABLET, FILM COATED ORAL
Status: CANCELLED | OUTPATIENT
Start: 2019-12-10 | End: 2019-12-10

## 2019-12-10 RX ORDER — SODIUM CHLORIDE, SODIUM LACTATE, POTASSIUM CHLORIDE, CALCIUM CHLORIDE 600; 310; 30; 20 MG/100ML; MG/100ML; MG/100ML; MG/100ML
INJECTION, SOLUTION INTRAVENOUS CONTINUOUS
Status: CANCELLED | OUTPATIENT
Start: 2019-12-10

## 2019-12-10 RX ORDER — ATORVASTATIN CALCIUM 40 MG/1
40 TABLET, FILM COATED ORAL DAILY
COMMUNITY
End: 2022-03-22 | Stop reason: SDUPTHER

## 2019-12-10 RX ORDER — ALBUTEROL SULFATE 0.83 MG/ML
2.5 SOLUTION RESPIRATORY (INHALATION)
Status: CANCELLED | OUTPATIENT
Start: 2019-12-10 | End: 2019-12-10

## 2019-12-10 RX ORDER — CITALOPRAM 40 MG/1
40 TABLET, FILM COATED ORAL DAILY
COMMUNITY
End: 2022-03-30

## 2019-12-10 RX ORDER — ACETAMINOPHEN 500 MG
1000 TABLET ORAL
Status: CANCELLED | OUTPATIENT
Start: 2019-12-10 | End: 2019-12-10

## 2019-12-10 RX ORDER — BENZONATATE 100 MG/1
100 CAPSULE ORAL DAILY PRN
COMMUNITY
End: 2020-03-03 | Stop reason: CLARIF

## 2019-12-10 RX ORDER — LIDOCAINE HYDROCHLORIDE 10 MG/ML
0.5 INJECTION, SOLUTION EPIDURAL; INFILTRATION; INTRACAUDAL; PERINEURAL ONCE
Status: CANCELLED | OUTPATIENT
Start: 2019-12-10 | End: 2019-12-10

## 2019-12-10 RX ORDER — TOPIRAMATE 100 MG/1
100 TABLET, FILM COATED ORAL DAILY
COMMUNITY
End: 2022-03-30

## 2019-12-10 RX ORDER — TRIAZOLAM 0.25 MG/1
0.12 TABLET ORAL NIGHTLY PRN
COMMUNITY
End: 2022-06-07

## 2019-12-10 RX ORDER — ZOLPIDEM TARTRATE 10 MG/1
5 TABLET ORAL NIGHTLY PRN
COMMUNITY
End: 2019-12-26

## 2019-12-10 RX ORDER — BUDESONIDE AND FORMOTEROL FUMARATE DIHYDRATE 160; 4.5 UG/1; UG/1
2 AEROSOL RESPIRATORY (INHALATION) EVERY 12 HOURS
COMMUNITY
End: 2023-01-18 | Stop reason: SDUPTHER

## 2019-12-10 RX ORDER — PREGABALIN 75 MG/1
75 CAPSULE ORAL
Status: CANCELLED | OUTPATIENT
Start: 2019-12-10 | End: 2019-12-10

## 2019-12-10 NOTE — DISCHARGE INSTRUCTIONS
PRE-ADMIT TESTING -  [phone]    [address]  [place] [facility]          Your surgery has been scheduled at [facility]. We are pleased to have the opportunity to serve you. For Further Information please call [phone].    On the day of surgery please report to the Information Desk on the 1st floor.    · CONTACT YOUR PHYSICIAN'S OFFICE THE DAY PRIOR TO YOUR SURGERY TO OBTAIN YOUR ARRIVAL TIME.     · The evening before surgery do not eat anything after 9 p.m. ( this includes hard candy, chewing gum and mints).  You may only have GATORADE, POWERADE AND WATER  from 9 p.m. until you leave your home.   DO NOT DRINK ANY LIQUIDS ON THE WAY TO THE HOSPITAL.      SPECIAL MEDICATION INSTRUCTIONS: TAKE medications checked off by the Anesthesiologist on your Medication List.    Angiogram Patients: Take medications as instructed by your physician, including aspirin.     Surgery Patients:    If you take ASPIRIN - Your PHYSICIAN/SURGEON will need to inform you IF/OR when you need to stop taking aspirin prior to your surgery.     Do Not take any medications containing IBUPROFEN.  Do Not Wear any make-up or dark nail polish   (especially eye make-up) to surgery. If you come to surgery with makeup on you will be required to remove the makeup or nail polish.    Do not shave your surgical area at least 5 days prior to your surgery. The surgical prep will be performed at the hospital according to Infection Control regulations.    Leave all valuables at home.   Do Not wear any jewelry or watches, including any metal in body piercings. Jewelry must be removed prior to coming to the hospital.  There is a possibility that rings that are unable to be removed may be cut off if they are on the surgical extremity.    Contact Lens must be removed before surgery. Either do not wear the contact lens or bring a case and solution for storage.  Please bring a container for eyeglasses or dentures as required.  Bring  any paperwork your physician has provided, such as consent forms,  history and physicals, doctor's orders, etc.   Bring comfortable clothes that are loose fitting to wear upon discharge. Take into consideration the type of surgery being performed.  Maintain your diet as advised per your physician the day prior to surgery.      Adequate rest the night before surgery is advised.   Park in the Parking lot behind the hospital or in the [facility] across the street from the parking lot. Parking is complimentary.  If you will be discharged the same day as your procedure, please arrange for a responsible adult to drive you home or to accompany you if traveling by taxi.   YOU WILL NOT BE PERMITTED TO DRIVE OR TO LEAVE THE HOSPITAL ALONE AFTER SURGERY.   It is strongly recommended that you arrange for someone to remain with you for the first 24 hrs following your surgery.    The Surgeon will speak to your family/visitor after your surgery regarding the outcome of your surgery and post op care.  The Surgeon may speak to you after your surgery, but there is a possibility you may not remember the details.  Please check with your family members regarding the conversation with the Surgeon.    We strongly recommend whoever is bringing you home be present for discharge instructions.  This will ensure a thorough understanding for your post op home care.    EACH PATIENT IS ALLOWED TWO FAMILY MEMBERS OR VISITORS IN THE ROOM AND IN THE WAITING ROOMS WHILE YOU ARE IN SURGERY. ALL CHILDREN MUST ALWAYS BE ACCOMPANIED BY AN ADULT.    Thank you for your cooperation.  The Staff of [facility].                Bathing Instructions with Hibiclens     Shower the evening before and morning of your procedure with Hibiclens:   Wash your face with water and your regular face wash/soap   Apply Hibiclens directly on your skin or on a wet washcloth and wash gently. When showering: Move away from the shower stream when  applying Hibiclens to avoid rinsing off too soon.   Rinse thoroughly with warm water   Do not dilute Hibiclens         Dry off as usual, do not use any deodorant, powder, body lotions, perfume, after shave or cologne.

## 2019-12-10 NOTE — ANESTHESIA PREPROCEDURE EVALUATION
12/10/2019  Susan Courtney is a 59 y.o., female.    Anesthesia Evaluation    I have reviewed the Patient Summary Reports.    I have reviewed the Nursing Notes.   I have reviewed the Medications.     Review of Systems  Anesthesia Hx:  No problems with previous Anesthesia    Social:  Non-Smoker, No Alcohol Use    Hematology/Oncology:  Hematology Normal   Oncology Normal     EENT/Dental:EENT/Dental Normal   Cardiovascular:   Exercise tolerance: good Hypertension, well controlled    Pulmonary:   Asthma asymptomatic Shortness of breath Sleep Apnea, CPAP    Renal/:  Renal/ Normal     Hepatic/GI:  Hepatic/GI Normal    Musculoskeletal:  Musculoskeletal Normal    Neurological:   Headaches    Endocrine:   Diabetes, well controlled, type 2    Dermatological:  Skin Normal    Psych:  Psychiatric Normal           Physical Exam  General:  Obesity    Airway/Jaw/Neck:  Airway Findings: Mouth Opening: Normal Tongue: Normal  General Airway Assessment: Adult, Good  Mallampati: I  TM Distance: Normal, at least 6 cm  Jaw/Neck Findings:  Neck ROM: Normal ROM      Dental:  Dental Findings: Upper partial dentures        Mental Status:  Mental Status Findings:  Cooperative, Alert and Oriented         Anesthesia Plan  Type of Anesthesia, risks & benefits discussed:  Anesthesia Type:  general  Patient's Preference:   Intra-op Monitoring Plan: standard ASA monitors  Intra-op Monitoring Plan Comments:   Post Op Pain Control Plan: per primary service following discharge from PACU and multimodal analgesia  Post Op Pain Control Plan Comments:   Induction:    Beta Blocker:         Informed Consent: Patient understands risks and agrees with Anesthesia plan.  Questions answered. Anesthesia consent signed with patient.  ASA Score: 2     Day of Surgery Review of History & Physical:    H&P update referred to the surgeon.     Anesthesia  Plan Notes: CBC and BMP.        Ready For Surgery From Anesthesia Perspective.

## 2019-12-13 ENCOUNTER — ANESTHESIA (OUTPATIENT)
Dept: SURGERY | Facility: OTHER | Age: 59
End: 2019-12-13
Payer: MEDICARE

## 2019-12-13 ENCOUNTER — HOSPITAL ENCOUNTER (OUTPATIENT)
Facility: OTHER | Age: 59
Discharge: HOME | End: 2019-12-17
Attending: SPECIALIST | Admitting: SPECIALIST
Payer: MEDICARE

## 2019-12-13 DIAGNOSIS — K43.9 VENTRAL HERNIA: ICD-10-CM

## 2019-12-13 LAB — POCT GLUCOSE: 145 MG/DL (ref 70–110)

## 2019-12-13 PROCEDURE — 25000003 PHARM REV CODE 250: Performed by: SPECIALIST

## 2019-12-13 PROCEDURE — 25000003 PHARM REV CODE 250: Performed by: ANESTHESIOLOGY

## 2019-12-13 PROCEDURE — 94640 AIRWAY INHALATION TREATMENT: CPT

## 2019-12-13 PROCEDURE — 63600175 PHARM REV CODE 636 W HCPCS: Performed by: SPECIALIST

## 2019-12-13 PROCEDURE — 36000706: Performed by: SPECIALIST

## 2019-12-13 PROCEDURE — 25000242 PHARM REV CODE 250 ALT 637 W/ HCPCS: Performed by: SPECIALIST

## 2019-12-13 PROCEDURE — 37000008 HC ANESTHESIA 1ST 15 MINUTES: Performed by: SPECIALIST

## 2019-12-13 PROCEDURE — 63600175 PHARM REV CODE 636 W HCPCS: Performed by: ANESTHESIOLOGY

## 2019-12-13 PROCEDURE — 63600175 PHARM REV CODE 636 W HCPCS: Performed by: NURSE ANESTHETIST, CERTIFIED REGISTERED

## 2019-12-13 PROCEDURE — 37000009 HC ANESTHESIA EA ADD 15 MINS: Performed by: SPECIALIST

## 2019-12-13 PROCEDURE — 71000033 HC RECOVERY, INTIAL HOUR: Performed by: SPECIALIST

## 2019-12-13 PROCEDURE — 36000707: Performed by: SPECIALIST

## 2019-12-13 PROCEDURE — C1729 CATH, DRAINAGE: HCPCS | Performed by: SPECIALIST

## 2019-12-13 PROCEDURE — 25000003 PHARM REV CODE 250: Performed by: NURSE ANESTHETIST, CERTIFIED REGISTERED

## 2019-12-13 PROCEDURE — 71000039 HC RECOVERY, EACH ADD'L HOUR: Performed by: SPECIALIST

## 2019-12-13 PROCEDURE — 82962 GLUCOSE BLOOD TEST: CPT | Performed by: SPECIALIST

## 2019-12-13 RX ORDER — FAMOTIDINE 20 MG/1
20 TABLET, FILM COATED ORAL
Status: COMPLETED | OUTPATIENT
Start: 2019-12-13 | End: 2019-12-13

## 2019-12-13 RX ORDER — OXYCODONE HYDROCHLORIDE 5 MG/1
5 TABLET ORAL
Status: DISCONTINUED | OUTPATIENT
Start: 2019-12-13 | End: 2019-12-13 | Stop reason: HOSPADM

## 2019-12-13 RX ORDER — SODIUM CHLORIDE 9 MG/ML
INJECTION, SOLUTION INTRAVENOUS CONTINUOUS
Status: DISCONTINUED | OUTPATIENT
Start: 2019-12-13 | End: 2019-12-13

## 2019-12-13 RX ORDER — OXYCODONE HYDROCHLORIDE 5 MG/1
5 TABLET ORAL EVERY 4 HOURS PRN
Status: DISCONTINUED | OUTPATIENT
Start: 2019-12-13 | End: 2019-12-17 | Stop reason: HOSPADM

## 2019-12-13 RX ORDER — DIPHENHYDRAMINE HCL 25 MG
25 CAPSULE ORAL EVERY 6 HOURS PRN
Status: DISCONTINUED | OUTPATIENT
Start: 2019-12-13 | End: 2019-12-17 | Stop reason: HOSPADM

## 2019-12-13 RX ORDER — FLUTICASONE PROPIONATE 50 MCG
1 SPRAY, SUSPENSION (ML) NASAL DAILY
Status: DISCONTINUED | OUTPATIENT
Start: 2019-12-14 | End: 2019-12-17 | Stop reason: HOSPADM

## 2019-12-13 RX ORDER — LIDOCAINE HYDROCHLORIDE 10 MG/ML
1 INJECTION, SOLUTION EPIDURAL; INFILTRATION; INTRACAUDAL; PERINEURAL ONCE
Status: DISCONTINUED | OUTPATIENT
Start: 2019-12-13 | End: 2019-12-13

## 2019-12-13 RX ORDER — GLYCOPYRROLATE 0.2 MG/ML
INJECTION INTRAMUSCULAR; INTRAVENOUS
Status: DISCONTINUED | OUTPATIENT
Start: 2019-12-13 | End: 2019-12-13

## 2019-12-13 RX ORDER — LIDOCAINE HYDROCHLORIDE 10 MG/ML
0.5 INJECTION, SOLUTION EPIDURAL; INFILTRATION; INTRACAUDAL; PERINEURAL ONCE
Status: DISCONTINUED | OUTPATIENT
Start: 2019-12-13 | End: 2019-12-13 | Stop reason: HOSPADM

## 2019-12-13 RX ORDER — ZOLPIDEM TARTRATE 5 MG/1
5 TABLET ORAL NIGHTLY PRN
Status: DISCONTINUED | OUTPATIENT
Start: 2019-12-13 | End: 2019-12-17 | Stop reason: HOSPADM

## 2019-12-13 RX ORDER — ROCURONIUM BROMIDE 10 MG/ML
INJECTION, SOLUTION INTRAVENOUS
Status: DISCONTINUED | OUTPATIENT
Start: 2019-12-13 | End: 2019-12-13

## 2019-12-13 RX ORDER — SODIUM CHLORIDE 9 MG/ML
INJECTION, SOLUTION INTRAVENOUS CONTINUOUS
Status: DISCONTINUED | OUTPATIENT
Start: 2019-12-13 | End: 2019-12-14

## 2019-12-13 RX ORDER — HYDROCHLOROTHIAZIDE 25 MG/1
25 TABLET ORAL DAILY
Status: DISCONTINUED | OUTPATIENT
Start: 2019-12-14 | End: 2019-12-17 | Stop reason: HOSPADM

## 2019-12-13 RX ORDER — CELECOXIB 200 MG/1
400 CAPSULE ORAL
Status: COMPLETED | OUTPATIENT
Start: 2019-12-13 | End: 2019-12-13

## 2019-12-13 RX ORDER — ACETAMINOPHEN 500 MG
1000 TABLET ORAL
Status: COMPLETED | OUTPATIENT
Start: 2019-12-13 | End: 2019-12-13

## 2019-12-13 RX ORDER — CITALOPRAM 20 MG/1
40 TABLET, FILM COATED ORAL DAILY
Status: DISCONTINUED | OUTPATIENT
Start: 2019-12-14 | End: 2019-12-17 | Stop reason: HOSPADM

## 2019-12-13 RX ORDER — PREGABALIN 75 MG/1
75 CAPSULE ORAL
Status: COMPLETED | OUTPATIENT
Start: 2019-12-13 | End: 2019-12-13

## 2019-12-13 RX ORDER — FENTANYL CITRATE 50 UG/ML
INJECTION, SOLUTION INTRAMUSCULAR; INTRAVENOUS
Status: DISCONTINUED | OUTPATIENT
Start: 2019-12-13 | End: 2019-12-13

## 2019-12-13 RX ORDER — SODIUM CHLORIDE, SODIUM LACTATE, POTASSIUM CHLORIDE, CALCIUM CHLORIDE 600; 310; 30; 20 MG/100ML; MG/100ML; MG/100ML; MG/100ML
INJECTION, SOLUTION INTRAVENOUS CONTINUOUS
Status: DISCONTINUED | OUTPATIENT
Start: 2019-12-13 | End: 2019-12-13

## 2019-12-13 RX ORDER — ONDANSETRON 2 MG/ML
INJECTION INTRAMUSCULAR; INTRAVENOUS
Status: DISCONTINUED | OUTPATIENT
Start: 2019-12-13 | End: 2019-12-13

## 2019-12-13 RX ORDER — CEFAZOLIN SODIUM 1 G/3ML
2 INJECTION, POWDER, FOR SOLUTION INTRAMUSCULAR; INTRAVENOUS
Status: COMPLETED | OUTPATIENT
Start: 2019-12-13 | End: 2019-12-13

## 2019-12-13 RX ORDER — LIDOCAINE HCL/PF 100 MG/5ML
SYRINGE (ML) INTRAVENOUS
Status: DISCONTINUED | OUTPATIENT
Start: 2019-12-13 | End: 2019-12-13

## 2019-12-13 RX ORDER — ONDANSETRON 2 MG/ML
4 INJECTION INTRAMUSCULAR; INTRAVENOUS DAILY PRN
Status: DISCONTINUED | OUTPATIENT
Start: 2019-12-13 | End: 2019-12-13 | Stop reason: HOSPADM

## 2019-12-13 RX ORDER — MEPERIDINE HYDROCHLORIDE 25 MG/ML
12.5 INJECTION INTRAMUSCULAR; INTRAVENOUS; SUBCUTANEOUS ONCE AS NEEDED
Status: DISCONTINUED | OUTPATIENT
Start: 2019-12-13 | End: 2019-12-13 | Stop reason: HOSPADM

## 2019-12-13 RX ORDER — AMITRIPTYLINE HYDROCHLORIDE 25 MG/1
150 TABLET, FILM COATED ORAL NIGHTLY
Status: DISCONTINUED | OUTPATIENT
Start: 2019-12-13 | End: 2019-12-17 | Stop reason: HOSPADM

## 2019-12-13 RX ORDER — OXYCODONE HYDROCHLORIDE 5 MG/1
10 TABLET ORAL EVERY 4 HOURS PRN
Status: DISCONTINUED | OUTPATIENT
Start: 2019-12-13 | End: 2019-12-17 | Stop reason: HOSPADM

## 2019-12-13 RX ORDER — HYDROMORPHONE HYDROCHLORIDE 2 MG/ML
0.4 INJECTION, SOLUTION INTRAMUSCULAR; INTRAVENOUS; SUBCUTANEOUS EVERY 5 MIN PRN
Status: DISCONTINUED | OUTPATIENT
Start: 2019-12-13 | End: 2019-12-13 | Stop reason: HOSPADM

## 2019-12-13 RX ORDER — DIPHENHYDRAMINE HYDROCHLORIDE 50 MG/ML
25 INJECTION INTRAMUSCULAR; INTRAVENOUS EVERY 6 HOURS PRN
Status: DISCONTINUED | OUTPATIENT
Start: 2019-12-13 | End: 2019-12-13 | Stop reason: HOSPADM

## 2019-12-13 RX ORDER — SODIUM CHLORIDE 0.9 % (FLUSH) 0.9 %
3 SYRINGE (ML) INJECTION
Status: DISCONTINUED | OUTPATIENT
Start: 2019-12-13 | End: 2019-12-17 | Stop reason: HOSPADM

## 2019-12-13 RX ORDER — CLONAZEPAM 0.5 MG/1
2 TABLET ORAL 2 TIMES DAILY
Status: DISCONTINUED | OUTPATIENT
Start: 2019-12-13 | End: 2019-12-17 | Stop reason: HOSPADM

## 2019-12-13 RX ORDER — ACETAMINOPHEN 325 MG/1
650 TABLET ORAL EVERY 4 HOURS PRN
Status: DISCONTINUED | OUTPATIENT
Start: 2019-12-13 | End: 2019-12-17 | Stop reason: HOSPADM

## 2019-12-13 RX ORDER — BUPIVACAINE HYDROCHLORIDE AND EPINEPHRINE 5; 5 MG/ML; UG/ML
INJECTION, SOLUTION EPIDURAL; INTRACAUDAL; PERINEURAL
Status: DISCONTINUED | OUTPATIENT
Start: 2019-12-13 | End: 2019-12-13 | Stop reason: HOSPADM

## 2019-12-13 RX ORDER — PROPOFOL 10 MG/ML
VIAL (ML) INTRAVENOUS
Status: DISCONTINUED | OUTPATIENT
Start: 2019-12-13 | End: 2019-12-13

## 2019-12-13 RX ORDER — ONDANSETRON 8 MG/1
8 TABLET, ORALLY DISINTEGRATING ORAL EVERY 8 HOURS PRN
Status: DISCONTINUED | OUTPATIENT
Start: 2019-12-13 | End: 2019-12-17 | Stop reason: HOSPADM

## 2019-12-13 RX ORDER — NEOSTIGMINE METHYLSULFATE 1 MG/ML
INJECTION, SOLUTION INTRAVENOUS
Status: DISCONTINUED | OUTPATIENT
Start: 2019-12-13 | End: 2019-12-13

## 2019-12-13 RX ORDER — TOPIRAMATE 100 MG/1
100 TABLET, FILM COATED ORAL DAILY
Status: DISCONTINUED | OUTPATIENT
Start: 2019-12-14 | End: 2019-12-17 | Stop reason: HOSPADM

## 2019-12-13 RX ORDER — ALBUTEROL SULFATE 0.83 MG/ML
2.5 SOLUTION RESPIRATORY (INHALATION)
Status: COMPLETED | OUTPATIENT
Start: 2019-12-13 | End: 2019-12-13

## 2019-12-13 RX ORDER — TRAZODONE HYDROCHLORIDE 50 MG/1
50 TABLET ORAL NIGHTLY
Status: DISCONTINUED | OUTPATIENT
Start: 2019-12-13 | End: 2019-12-17 | Stop reason: HOSPADM

## 2019-12-13 RX ADMIN — SODIUM CHLORIDE, SODIUM LACTATE, POTASSIUM CHLORIDE, AND CALCIUM CHLORIDE: 600; 310; 30; 20 INJECTION, SOLUTION INTRAVENOUS at 01:12

## 2019-12-13 RX ADMIN — OXYCODONE HYDROCHLORIDE 5 MG: 5 TABLET ORAL at 04:12

## 2019-12-13 RX ADMIN — SODIUM CHLORIDE, SODIUM LACTATE, POTASSIUM CHLORIDE, AND CALCIUM CHLORIDE: 600; 310; 30; 20 INJECTION, SOLUTION INTRAVENOUS at 03:12

## 2019-12-13 RX ADMIN — AMITRIPTYLINE HYDROCHLORIDE 150 MG: 25 TABLET, FILM COATED ORAL at 08:12

## 2019-12-13 RX ADMIN — FAMOTIDINE 20 MG: 20 TABLET, FILM COATED ORAL at 09:12

## 2019-12-13 RX ADMIN — ONDANSETRON 4 MG: 2 INJECTION INTRAMUSCULAR; INTRAVENOUS at 03:12

## 2019-12-13 RX ADMIN — DIPHENHYDRAMINE HYDROCHLORIDE 25 MG: 25 CAPSULE ORAL at 06:12

## 2019-12-13 RX ADMIN — HYDROMORPHONE HYDROCHLORIDE 0.4 MG: 2 INJECTION, SOLUTION INTRAMUSCULAR; INTRAVENOUS; SUBCUTANEOUS at 04:12

## 2019-12-13 RX ADMIN — NEOSTIGMINE METHYLSULFATE 5 MG: 1 INJECTION INTRAVENOUS at 03:12

## 2019-12-13 RX ADMIN — OXYCODONE HYDROCHLORIDE 10 MG: 5 TABLET ORAL at 06:12

## 2019-12-13 RX ADMIN — ROCURONIUM BROMIDE 40 MG: 10 INJECTION, SOLUTION INTRAVENOUS at 02:12

## 2019-12-13 RX ADMIN — LIDOCAINE HYDROCHLORIDE 75 MG: 20 INJECTION, SOLUTION INTRAVENOUS at 02:12

## 2019-12-13 RX ADMIN — CEFAZOLIN 2 G: 330 INJECTION, POWDER, FOR SOLUTION INTRAMUSCULAR; INTRAVENOUS at 02:12

## 2019-12-13 RX ADMIN — ALBUTEROL SULFATE 2.5 MG: 2.5 SOLUTION RESPIRATORY (INHALATION) at 09:12

## 2019-12-13 RX ADMIN — SODIUM CHLORIDE: 0.9 INJECTION, SOLUTION INTRAVENOUS at 05:12

## 2019-12-13 RX ADMIN — PREGABALIN 75 MG: 75 CAPSULE ORAL at 09:12

## 2019-12-13 RX ADMIN — CLONAZEPAM 2 MG: 0.5 TABLET ORAL at 08:12

## 2019-12-13 RX ADMIN — TRAZODONE HYDROCHLORIDE 50 MG: 50 TABLET ORAL at 08:12

## 2019-12-13 RX ADMIN — FENTANYL CITRATE 100 MCG: 50 INJECTION, SOLUTION INTRAMUSCULAR; INTRAVENOUS at 02:12

## 2019-12-13 RX ADMIN — GLYCOPYRROLATE 0.8 MG: 0.2 INJECTION, SOLUTION INTRAMUSCULAR; INTRAVENOUS at 03:12

## 2019-12-13 RX ADMIN — ACETAMINOPHEN 1000 MG: 500 TABLET, FILM COATED ORAL at 09:12

## 2019-12-13 RX ADMIN — PROPOFOL 200 MG: 10 INJECTION, EMULSION INTRAVENOUS at 02:12

## 2019-12-13 RX ADMIN — CELECOXIB 400 MG: 200 CAPSULE ORAL at 09:12

## 2019-12-13 RX ADMIN — CARBOXYMETHYLCELLULOSE SODIUM 2 DROP: 2.5 SOLUTION/ DROPS OPHTHALMIC at 02:12

## 2019-12-13 NOTE — DISCHARGE INSTRUCTIONS

## 2019-12-13 NOTE — TRANSFER OF CARE
"Anesthesia Transfer of Care Note    Patient: Susan Courtney    Procedure(s) Performed: Procedure(s) (LRB):  REPAIR, HERNIA, INCISIONAL OR VENTRAL, WITHOUT HISTORY OF PRIOR REPAIR (N/A)    Patient location: PACU    Anesthesia Type: general    Transport from OR: Transported from OR on 6-10 L/min O2 by face mask with adequate spontaneous ventilation. Continuous SpO2 monitoring in transport    Post pain: adequate analgesia    Post assessment: no apparent anesthetic complications    Post vital signs: stable    Level of consciousness: awake and alert    Nausea/Vomiting: no nausea/vomiting    Complications: none    Transfer of care protocol was followed      Last vitals:   Visit Vitals  /73 (BP Location: Right arm, Patient Position: Lying)   Pulse 74   Temp 36.4 °C (97.5 °F) (Oral)   Resp 16   Ht 5' 6" (1.676 m)   Wt 89.4 kg (197 lb)   SpO2 97%   Breastfeeding? No   BMI 31.80 kg/m²     "

## 2019-12-13 NOTE — ANESTHESIA POSTPROCEDURE EVALUATION
Anesthesia Post Evaluation    Patient: Susan Courtney    Procedure(s) Performed: Procedure(s) (LRB):  REPAIR, HERNIA, INCISIONAL OR VENTRAL, WITHOUT HISTORY OF PRIOR REPAIR (N/A)    Final Anesthesia Type: general    Patient location during evaluation: PACU  Patient participation: Yes- Able to Participate  Level of consciousness: awake and alert  Post-procedure vital signs: reviewed and stable  Pain management: adequate  Airway patency: patent    PONV status at discharge: No PONV  Anesthetic complications: no      Cardiovascular status: blood pressure returned to baseline  Respiratory status: unassisted, spontaneous ventilation and room air  Hydration status: euvolemic  Follow-up not needed.          Vitals Value Taken Time   /61 12/13/2019  4:30 PM   Temp 36.9 °C (98.4 °F) 12/13/2019  3:39 PM   Pulse 67 12/13/2019  4:39 PM   Resp 16 12/13/2019  3:39 PM   SpO2 95 % 12/13/2019  4:39 PM   Vitals shown include unvalidated device data.      No case tracking events are documented in the log.      Pain/Lj Score: Pain Rating Prior to Med Admin: 7 (12/13/2019  4:23 PM)  Lj Score: 9 (12/13/2019  4:10 PM)

## 2019-12-13 NOTE — OP NOTE
[facility]  Surgery Department  Operative Note    SUMMARY     Patient: Susan Courtney    Medical Record: [id number]    Date of Procedure: 12/13/2019     Surgeon: Surgeon(s) and Role:     * Reyes Fu Jr., MD - Primary    Assisting Surgeon: None    Pre-Operative Diagnosis: Ventral hernia without obstruction or gangrene [K43.9]    Post-Operative Diagnosis: Post-Op Diagnosis Codes:     * Ventral hernia without obstruction or gangrene [K43.9]    Procedure: Procedure(s) (LRB):  REPAIR, HERNIA, INCISIONAL OR VENTRAL, WITHOUT HISTORY OF PRIOR REPAIR (N/A)    Procedure in Detail:  The patient brought the operating room placed supine position, the abdomen prepped and draped in a sterile fashion. Prior to surgery the area of tenderness the site of the ventral hernia was marked with cooperation the patient. A longitudinal incision made over this area which was in the upper midline. Using sharp dissection was carried down through the subcutaneous tissues and small ventral hernia encountered.  The fatty contents were reduced into the peritoneal cavity and this hernia as well as a small umbilical hernia within close through this incision. 1.  Ethibond sutures placed in interrupted fashion were used to close the fascia. Subcutaneous tissue was closed with interrupted 3 0 Vicryl and the skin with running 4 Monocryl. The patient tolerated the procedure well left the operating room in good condition. At the end the procedure all sponge lap and instrument counts were correct. Estimated blood loss minimal

## 2019-12-13 NOTE — NURSING
Rec'd to room 346 via stretcher, assisted to bed. NADN. Resp even and unlabored on o2@2l per NC. Drowsy but easily arouses to verbal stimuli. Dressing noted to abd, CDI with abdominal binder intact. JPx1 noted to abd also. DTV no white present. Patient understands to call for assistance. Oriented to room and call system. Family at bedside. Bed in lowest locked position, side rails elevated x2, cb in reach.

## 2019-12-13 NOTE — INTERVAL H&P NOTE
The patient has been examined and the H&P has been reviewed:    I concur with the findings and no changes have occurred since H&P was written.        Active Hospital Problems    Diagnosis  POA    Ventral hernia [K43.9]  Yes      Resolved Hospital Problems   No resolved problems to display.

## 2019-12-14 PROCEDURE — 25000242 PHARM REV CODE 250 ALT 637 W/ HCPCS: Performed by: SPECIALIST

## 2019-12-14 PROCEDURE — 94761 N-INVAS EAR/PLS OXIMETRY MLT: CPT

## 2019-12-14 PROCEDURE — 25000003 PHARM REV CODE 250: Performed by: SPECIALIST

## 2019-12-14 RX ADMIN — OXYCODONE HYDROCHLORIDE 5 MG: 5 TABLET ORAL at 02:12

## 2019-12-14 RX ADMIN — CITALOPRAM HYDROBROMIDE 40 MG: 20 TABLET ORAL at 09:12

## 2019-12-14 RX ADMIN — CLONAZEPAM 2 MG: 0.5 TABLET ORAL at 08:12

## 2019-12-14 RX ADMIN — HYDROCHLOROTHIAZIDE 25 MG: 25 TABLET ORAL at 09:12

## 2019-12-14 RX ADMIN — TOPIRAMATE 100 MG: 100 TABLET, FILM COATED ORAL at 09:12

## 2019-12-14 RX ADMIN — CLONAZEPAM 2 MG: 0.5 TABLET ORAL at 09:12

## 2019-12-14 RX ADMIN — TRAZODONE HYDROCHLORIDE 50 MG: 50 TABLET ORAL at 08:12

## 2019-12-14 RX ADMIN — DIPHENHYDRAMINE HYDROCHLORIDE 25 MG: 25 CAPSULE ORAL at 02:12

## 2019-12-14 RX ADMIN — OXYCODONE HYDROCHLORIDE 10 MG: 5 TABLET ORAL at 09:12

## 2019-12-14 RX ADMIN — FLUTICASONE PROPIONATE 50 MCG: 50 SPRAY, METERED NASAL at 09:12

## 2019-12-14 RX ADMIN — AMITRIPTYLINE HYDROCHLORIDE 150 MG: 25 TABLET, FILM COATED ORAL at 08:12

## 2019-12-14 RX ADMIN — OXYCODONE HYDROCHLORIDE 10 MG: 5 TABLET ORAL at 08:12

## 2019-12-14 NOTE — PLAN OF CARE
Pt AAOx4, VSS on RA and afebrile, resp even and unlabored. Midline dressing CDI - ALMA drain x 1 putting out minimal sanguineous drainage. Drain care performed.  Pain controlled via PO medication. Tolerating ordered diet without nausea. Voiding spontaneously without difficulty. Ambulated with assist in hallway during shift, tolerated well. Pt OOB to chair for meals. Linens changed during shift and pt provided with new gown. POC reviewed with patient. Pt has call light in reach, bed brakes on, side rails up x2, bed in low position, SCDs on, and nonskid socks on. Purposeful hourly rounding and safety maintained. Continue to monitor.

## 2019-12-14 NOTE — PROGRESS NOTES
Postop day 1.  Status post ventral hernia repair  Afebrile  Vital signs stable    PE  Chest-clear  Heart-regular rate and rhythm  Abdomen-soft, nondistended, incisional tenderness, dressing clean and dry  Extremities-no tenderness, edema    Impression/plan  Status post ventral hernia repair  Increase activity

## 2019-12-15 PROCEDURE — 94799 UNLISTED PULMONARY SVC/PX: CPT

## 2019-12-15 PROCEDURE — 25000003 PHARM REV CODE 250: Performed by: SPECIALIST

## 2019-12-15 PROCEDURE — 94761 N-INVAS EAR/PLS OXIMETRY MLT: CPT

## 2019-12-15 RX ADMIN — TOPIRAMATE 100 MG: 100 TABLET, FILM COATED ORAL at 09:12

## 2019-12-15 RX ADMIN — ACETAMINOPHEN 650 MG: 325 TABLET, FILM COATED ORAL at 05:12

## 2019-12-15 RX ADMIN — OXYCODONE HYDROCHLORIDE 10 MG: 5 TABLET ORAL at 09:12

## 2019-12-15 RX ADMIN — CITALOPRAM HYDROBROMIDE 40 MG: 20 TABLET ORAL at 09:12

## 2019-12-15 RX ADMIN — CLONAZEPAM 2 MG: 0.5 TABLET ORAL at 09:12

## 2019-12-15 RX ADMIN — HYDROCHLOROTHIAZIDE 25 MG: 25 TABLET ORAL at 09:12

## 2019-12-15 RX ADMIN — OXYCODONE HYDROCHLORIDE 5 MG: 5 TABLET ORAL at 05:12

## 2019-12-15 NOTE — PROGRESS NOTES
Postop day 2.  Status post ventral hernia repair  Afebrile  Vital signs stable  Abdomen-soft, nondistended, dressing clean and dry

## 2019-12-15 NOTE — PLAN OF CARE
Pt AAOx4. Pt afebrile and VSS throughout shift. POC reviewed with pt and questions answered. Appetite is good and tolerating diet well. Pt is voiding accurately up to toilet. Drainage is clear and yellow. Dressing to Abd, CDI. Pain is managed with prn p.o meds. Pt remains free from falls, injury, and skin breakdown. All needs and concerns met. Purposeful rounding is done. Bed is locked and in lowest position, side rails up x2, call light in reach. Will continue to monitor.

## 2019-12-16 PROCEDURE — 94761 N-INVAS EAR/PLS OXIMETRY MLT: CPT

## 2019-12-16 PROCEDURE — 25000003 PHARM REV CODE 250: Performed by: SPECIALIST

## 2019-12-16 PROCEDURE — 99900035 HC TECH TIME PER 15 MIN (STAT)

## 2019-12-16 RX ADMIN — AMITRIPTYLINE HYDROCHLORIDE 150 MG: 25 TABLET, FILM COATED ORAL at 08:12

## 2019-12-16 RX ADMIN — CITALOPRAM HYDROBROMIDE 40 MG: 20 TABLET ORAL at 08:12

## 2019-12-16 RX ADMIN — TRAZODONE HYDROCHLORIDE 50 MG: 50 TABLET ORAL at 08:12

## 2019-12-16 RX ADMIN — FLUTICASONE PROPIONATE 50 MCG: 50 SPRAY, METERED NASAL at 08:12

## 2019-12-16 RX ADMIN — TOPIRAMATE 100 MG: 100 TABLET, FILM COATED ORAL at 08:12

## 2019-12-16 RX ADMIN — OXYCODONE HYDROCHLORIDE 5 MG: 5 TABLET ORAL at 08:12

## 2019-12-16 RX ADMIN — HYDROCHLOROTHIAZIDE 25 MG: 25 TABLET ORAL at 08:12

## 2019-12-16 NOTE — PLAN OF CARE
Pt AAOx4. VSS. Pt responds to tactile and verbal stimulation but was extremely drowsy and slept entire shift. POC reviewed with pt and daughter. Repositions independently. Voiding up to toilet. Drainage clear and yellow. Drain care maintained. Pt denies pain this shift. Pt remains free from falls, injury, and skin breakdown. SCDs in place. All needs and concerns met. Purposeful rounding done. Bed is locked and in lowest position, side rails up x2, call light in reach. Will continue to monitor.

## 2019-12-16 NOTE — PLAN OF CARE
Patient on 2L NC with Sats 95%. IS done with fair effort. No distress noted. Will continue to monitor.

## 2019-12-17 VITALS
TEMPERATURE: 98 F | HEART RATE: 97 BPM | WEIGHT: 196.88 LBS | SYSTOLIC BLOOD PRESSURE: 144 MMHG | BODY MASS INDEX: 31.64 KG/M2 | OXYGEN SATURATION: 90 % | DIASTOLIC BLOOD PRESSURE: 70 MMHG | HEIGHT: 66 IN | RESPIRATION RATE: 16 BRPM

## 2019-12-17 PROCEDURE — 27000221 HC OXYGEN, UP TO 24 HOURS

## 2019-12-17 PROCEDURE — 25000003 PHARM REV CODE 250: Performed by: SPECIALIST

## 2019-12-17 PROCEDURE — 94799 UNLISTED PULMONARY SVC/PX: CPT

## 2019-12-17 PROCEDURE — 94761 N-INVAS EAR/PLS OXIMETRY MLT: CPT

## 2019-12-17 RX ORDER — OXYCODONE AND ACETAMINOPHEN 7.5; 325 MG/1; MG/1
1 TABLET ORAL EVERY 6 HOURS PRN
Qty: 40 TABLET | Refills: 0 | Status: SHIPPED | OUTPATIENT
Start: 2019-12-17 | End: 2019-12-26

## 2019-12-17 RX ADMIN — CITALOPRAM HYDROBROMIDE 40 MG: 20 TABLET ORAL at 10:12

## 2019-12-17 RX ADMIN — TOPIRAMATE 100 MG: 100 TABLET, FILM COATED ORAL at 10:12

## 2019-12-17 RX ADMIN — HYDROCHLOROTHIAZIDE 25 MG: 25 TABLET ORAL at 10:12

## 2019-12-17 NOTE — PLAN OF CARE
VSS and Afebrile. AAOx4. Dressing clean dry and intact with very very scant sanguinous drainage. Pain controlled well with po medications. Held night time clonazepam due to previous drowsiness, and the current prescribed medications. Patient slept through the entire night. Throughout night patient was drowsy but would awaken to verbal stimuli, and followed commands.Pt is tolerating  Diet. Appetite good. Pt is voiding adequately. Plan of care reviewed with patient. Purposeful Hourly rounding done. Call light at bedside. Bed at lowest position. Brakes on. Nonskid socks on. Will continue to monitor

## 2019-12-17 NOTE — PLAN OF CARE
Patient up in chair on RA sat's 90% with shallow breathing incentive spirometry done poorly.Placed back on 2L NC sat's 95% will monitor.

## 2019-12-17 NOTE — DISCHARGE SUMMARY
The patient is a 59-year-old female who was admitted for repair of a ventral hernia. Patient had a large ventral hernia and underwent primary repair.  She had a somewhat slow but benign postoperative course and is being discharged to be followed by me in my office.  She has been instructed as the appropriate limitations of her activity and diet as well as wound care.  New medication at time of discharge include Percocet [id number] p.o. q.4 hours p.r.n. pain

## 2019-12-17 NOTE — PLAN OF CARE
Free from falls, injury, or skin breakdown this hospital admission.  Pt eager & in agreement w/ DC. VU of DC instructions and the need to attend follow-up appointment--paperwork passed & explained. Prescription filled and delivered to bedside. IV removed w/ cath tip intact, WNL. Voiding, ambulating, & tolerating PO well. Dressing to abdomen CDI. ALMA X1 removed by Dr Fu. To be DCd home w/ family--will be escorted downstairs via  transport team once dressed, ready & ride arrives. Pt discharged in no distress.

## 2019-12-19 NOTE — PHYSICIAN QUERY
PT Name: Susan Courtney  MR #: [id number]     Physician Query Form - Documentation Clarification      CDS/: Karlee Mullins               Contact information: [email or web address]    This form is a permanent document in the medical record.     Query Date: December 19, 2019    By submitting this query, we are merely seeking further clarification of documentation. Please utilize your independent clinical judgment when addressing the question(s) below.    The Medical record reflects the following:    Supporting Clinical Findings Location in Medical Record     Ventral hernia repair       Op note                                                                                      Doctor, Please specify laterality associated with above clinical findings.    Provider Use Only     ____ right     ____ left      ____ bilateral   x                                                                                                                    [  ] Clinically xUndetermined

## 2019-12-26 ENCOUNTER — HOSPITAL ENCOUNTER (EMERGENCY)
Facility: OTHER | Age: 59
Discharge: HOME | End: 2019-12-27
Attending: EMERGENCY MEDICINE
Payer: MEDICARE

## 2019-12-26 DIAGNOSIS — R42 DIZZINESS: ICD-10-CM

## 2019-12-26 DIAGNOSIS — Z98.890 POST-OPERATIVE STATE: ICD-10-CM

## 2019-12-26 DIAGNOSIS — H81.10 BENIGN PAROXYSMAL POSITIONAL VERTIGO, UNSPECIFIED LATERALITY: Primary | ICD-10-CM

## 2019-12-26 LAB — POCT GLUCOSE: 141 MG/DL (ref 70–110)

## 2019-12-26 PROCEDURE — 82962 GLUCOSE BLOOD TEST: CPT

## 2019-12-26 PROCEDURE — 93010 ELECTROCARDIOGRAM REPORT: CPT | Mod: ,,, | Performed by: INTERNAL MEDICINE

## 2019-12-26 PROCEDURE — 96372 THER/PROPH/DIAG INJ SC/IM: CPT

## 2019-12-26 PROCEDURE — 93005 ELECTROCARDIOGRAM TRACING: CPT

## 2019-12-26 PROCEDURE — 99284 EMERGENCY DEPT VISIT MOD MDM: CPT | Mod: 25

## 2019-12-26 PROCEDURE — 93010 EKG 12-LEAD: ICD-10-PCS | Mod: ,,, | Performed by: INTERNAL MEDICINE

## 2019-12-26 RX ORDER — HYDROCODONE BITARTRATE AND ACETAMINOPHEN 5; 325 MG/1; MG/1
1 TABLET ORAL EVERY 6 HOURS PRN
COMMUNITY
End: 2021-05-20

## 2019-12-26 RX ORDER — KETOROLAC TROMETHAMINE 30 MG/ML
15 INJECTION, SOLUTION INTRAMUSCULAR; INTRAVENOUS
Status: COMPLETED | OUTPATIENT
Start: 2019-12-27 | End: 2019-12-27

## 2019-12-26 RX ORDER — MECLIZINE HYDROCHLORIDE 25 MG/1
50 TABLET ORAL
Status: COMPLETED | OUTPATIENT
Start: 2019-12-27 | End: 2019-12-27

## 2019-12-27 VITALS
RESPIRATION RATE: 16 BRPM | DIASTOLIC BLOOD PRESSURE: 72 MMHG | HEIGHT: 66 IN | HEART RATE: 82 BPM | WEIGHT: 220 LBS | SYSTOLIC BLOOD PRESSURE: 140 MMHG | BODY MASS INDEX: 35.36 KG/M2 | OXYGEN SATURATION: 97 % | TEMPERATURE: 98 F

## 2019-12-27 PROCEDURE — 25000003 PHARM REV CODE 250: Performed by: EMERGENCY MEDICINE

## 2019-12-27 PROCEDURE — 63600175 PHARM REV CODE 636 W HCPCS: Performed by: EMERGENCY MEDICINE

## 2019-12-27 RX ORDER — MECLIZINE HYDROCHLORIDE 25 MG/1
25 TABLET ORAL 3 TIMES DAILY PRN
Qty: 20 TABLET | Refills: 0 | Status: SHIPPED | OUTPATIENT
Start: 2019-12-27 | End: 2023-01-18 | Stop reason: ALTCHOICE

## 2019-12-27 RX ADMIN — MECLIZINE HYDROCHLORIDE 50 MG: 25 TABLET ORAL at 12:12

## 2019-12-27 RX ADMIN — KETOROLAC TROMETHAMINE 15 MG: 30 INJECTION, SOLUTION INTRAMUSCULAR; INTRAVENOUS at 12:12

## 2019-12-27 NOTE — ED TRIAGE NOTES
"Pt presents to ER via personal transportation from home with c/o "lightheaded" and dizziness since yesterday. Pt is unsure of alleviating or precipitating factors. Pt had hernia repair on 12/13. Denies redness/drainage from incision site. Denies fever, but states she has been "sweating a lot at night".   "

## 2019-12-27 NOTE — ED PROVIDER NOTES
"Encounter Date: 12/26/2019    SCRIBE #1 NOTE: I, Xiomarajose Rocha, am scribing for, and in the presence of, Dr. Shannon.       History     Chief Complaint   Patient presents with    Dizziness     "I been feeling lightheaded, and woozy" symptoms began Wednesday, and have worsened.     Time seen by provider: 11:42 PM    This is a 59 y.o. female with hx of HTN and DM who presents with complaint of light headedness and dizziness that began yesterday. She describes room spinning sensation upon standing. She reports fall last night and does not recall how she landed, but denies LOC. She also reports abdominal distention s/p hernia repair about two weeks ago. Last bowel movement was this morning. She denies dysuria. This is the extent of the patient's complaints at this time.    The history is provided by the patient.     Review of patient's allergies indicates:   Allergen Reactions    Morphine Itching     Past Medical History:   Diagnosis Date    Abdominal hernia     Asthma     Diabetes mellitus     Hypertension     Migraine     Migraine headache     Obesity     Sleep apnea      Past Surgical History:   Procedure Laterality Date    CHOLECYSTECTOMY      FOOT SURGERY      HERNIA REPAIR      HYSTERECTOMY      complete     Family History   Problem Relation Age of Onset    Psoriasis Neg Hx     Melanoma Neg Hx     Lupus Neg Hx      Social History     Tobacco Use    Smoking status: Never Smoker    Smokeless tobacco: Never Used   Substance Use Topics    Alcohol use: No    Drug use: No     Review of Systems   Constitutional: Negative for fever.   HENT: Negative for sore throat.    Respiratory: Negative for shortness of breath.    Cardiovascular: Negative for chest pain.   Gastrointestinal: Positive for abdominal distention. Negative for abdominal pain and nausea.   Genitourinary: Negative for dysuria.   Musculoskeletal: Negative for back pain.   Skin: Negative for rash.   Neurological: Positive for dizziness " and light-headedness. Negative for syncope and weakness.   Hematological: Does not bruise/bleed easily.       Physical Exam     Initial Vitals [12/26/19 2221]   BP Pulse Resp Temp SpO2   126/77 85 18 97.8 °F (36.6 °C) 98 %      MAP       --         Physical Exam    Nursing note and vitals reviewed.  Constitutional: She appears well-developed. She is not diaphoretic. She is Obese . No distress.   HENT:   Head: Normocephalic and atraumatic.   Eyes: EOM are normal. Pupils are equal, round, and reactive to light.   No nystagmus.   Neck: Normal range of motion. Neck supple.   Cardiovascular: Normal rate, regular rhythm and normal heart sounds.   Pulmonary/Chest: Breath sounds normal. No respiratory distress.   Abdominal: Soft. There is no tenderness.   Well healing ventral surgical incision.   Musculoskeletal: Normal range of motion. She exhibits no edema or tenderness.   Neurological: She is alert and oriented to person, place, and time. No cranial nerve deficit.   No facial droop, nml speech, symmetric strength ue/le   Skin: Skin is warm and dry.   Psychiatric: She has a normal mood and affect. Her behavior is normal. Judgment and thought content normal.         ED Course   Procedures  Labs Reviewed   POCT GLUCOSE - Abnormal; Notable for the following components:       Result Value    POCT Glucose 141 (*)     All other components within normal limits     EKG Readings: (Independently Interpreted)   Normal sinus rhythm, heart rate 87, low voltage, narrow QRS, no STEMI, unchanged from 2017.            Medical Decision Making:   History:   Old Medical Records: I decided to obtain old medical records.  Initial Assessment:      Urgent evaluation of 59-year-old female with hypertension diabetes, with recent abdominal surgery here with complaints of generalized dizziness.  Patient does feel that she has been over exerting herself, denies difficulty ambulating, though did have mechanical fall yesterday.  On examination patient  is well appearing, no facial droop, negative [place], no nystagmus on lateral gaze, abdomen obese with well-healing surgical incision, bandage removed and clean dressing applied, non peritoneal on deep palpation.  Do not suspect acute anemia or intra-abdominal injury nor central cause of dizziness.  Suspect peripheral vertigo versus dehydration, encouraged fluids, strict return precautions discussed.  Independently Interpreted Test(s):   I have ordered and independently interpreted EKG Reading(s) - see prior notes  Clinical Tests:   Lab Tests: Ordered and Reviewed  Medical Tests: Ordered and Reviewed            Scribe Attestation:   Scribe #1: I performed the above scribed service and the documentation accurately describes the services I performed. I attest to the accuracy of the note.    Attending Attestation:           Physician Attestation for Scribe:  Physician Attestation Statement for Scribe #1: I, Dr. Shannon, reviewed documentation, as scribed by Xiomara Rocha in my presence, and it is both accurate and complete.                               Clinical Impression:     1. Benign paroxysmal positional vertigo, unspecified laterality    2. Dizziness    3. Post-operative state          Disposition:   Disposition: Discharged  Condition: Stable                     Crys Shannon MD  12/27/19 3514

## 2019-12-31 ENCOUNTER — TELEPHONE (OUTPATIENT)
Dept: NEUROLOGY | Facility: CLINIC | Age: 59
End: 2019-12-31

## 2019-12-31 NOTE — TELEPHONE ENCOUNTER
----- Message from Luh Zabala sent at 12/31/2019 11:02 AM CST -----  Contact: Pt   Name of Who is Calling: YUNG VILLEGAS [[id number]]    What is the request in detail: YUNG VILLEGAS [[id number]] is requesting a call back regards to light headed and headaches the patient was seen by the doctor in the hospital ...... Please contact to further discuss and advise      Can the clinic reply by [facility]: No     [facility] [phone] (home)

## 2020-01-12 ENCOUNTER — HOSPITAL ENCOUNTER (EMERGENCY)
Facility: OTHER | Age: 60
Discharge: HOME | End: 2020-01-12
Attending: EMERGENCY MEDICINE
Payer: MEDICARE

## 2020-01-12 VITALS
DIASTOLIC BLOOD PRESSURE: 73 MMHG | HEART RATE: 98 BPM | TEMPERATURE: 98 F | SYSTOLIC BLOOD PRESSURE: 138 MMHG | OXYGEN SATURATION: 97 % | WEIGHT: 210 LBS | BODY MASS INDEX: 33.75 KG/M2 | RESPIRATION RATE: 18 BRPM | HEIGHT: 66 IN

## 2020-01-12 DIAGNOSIS — Z51.89 VISIT FOR WOUND CHECK: Primary | ICD-10-CM

## 2020-01-12 DIAGNOSIS — S30.1XXA ABDOMINAL WALL SEROMA, INITIAL ENCOUNTER: ICD-10-CM

## 2020-01-12 PROCEDURE — 99282 EMERGENCY DEPT VISIT SF MDM: CPT

## 2020-01-12 RX ORDER — BACITRACIN ZINC 500 UNIT/G
OINTMENT (GRAM) TOPICAL 2 TIMES DAILY
Qty: 30 G | Refills: 0 | Status: ON HOLD | OUTPATIENT
Start: 2020-01-12 | End: 2020-03-06 | Stop reason: CLARIF

## 2020-01-13 PROBLEM — K43.9 VENTRAL HERNIA: Status: RESOLVED | Noted: 2019-12-13 | Resolved: 2020-01-13

## 2020-01-13 NOTE — ED NOTES
Pt had hernia sx in December 2019; reporting wound was healing reporting increasing in redness x several days. Denies fevers, chill. Hx of diabetes. Hasn't been checking her CBG as of lately. Pt AAOx4 and appropriate at this time. Respirations even and unlabored. No acute distress noted.

## 2020-01-13 NOTE — ED PROVIDER NOTES
Encounter Date: 1/12/2020    SCRIBE #1 NOTE: I, Willy Veras, am scribing for, and in the presence of, Dr. Shannon.       History     Chief Complaint   Patient presents with    Wound Check     Dr. Fu performed hernia sx on 12/13. pt reports wound opened up sometime last week     Time seen by provider: 6:55 PM    This is a 59 y.o. female who presents with concern for abnormal leakage from an abdominal wound. The patient reports ventral hernia repair performed by Dr. Fu on 12/13/2019. The patient reports within the last week she noted mild discomfort along the surgical incision with leakage of fluid. She denies associated fever or chills. She denies using topical agents. The patient denies trauma to the abdomen.      The history is provided by the patient.     Review of patient's allergies indicates:   Allergen Reactions    Morphine Itching     Past Medical History:   Diagnosis Date    Abdominal hernia     Asthma     Diabetes mellitus     Hypertension     Migraine     Migraine headache     Obesity     Sleep apnea      Past Surgical History:   Procedure Laterality Date    CHOLECYSTECTOMY      FOOT SURGERY      HERNIA REPAIR      HYSTERECTOMY      complete     Family History   Problem Relation Age of Onset    Psoriasis Neg Hx     Melanoma Neg Hx     Lupus Neg Hx      Social History     Tobacco Use    Smoking status: Never Smoker    Smokeless tobacco: Never Used   Substance Use Topics    Alcohol use: No    Drug use: No     Review of Systems   Constitutional: Negative for chills and fever.   HENT: Negative for sore throat.    Respiratory: Negative for shortness of breath.    Cardiovascular: Negative for chest pain.   Gastrointestinal: Negative for nausea.   Genitourinary: Negative for dysuria.   Musculoskeletal: Negative for back pain.   Skin: Positive for wound (Drainage.). Negative for rash.   Neurological: Negative for weakness.   Hematological: Does not bruise/bleed easily.   All other  "systems reviewed and are negative.      Physical Exam     Initial Vitals [01/12/20 1818]   BP Pulse Resp Temp SpO2   138/73 98 18 98.1 °F (36.7 °C) 97 %      MAP       --         Physical Exam    Nursing note and vitals reviewed.  Constitutional: She appears well-developed. She is cooperative.   Morbidly obese.   HENT:   Head: Atraumatic.   Eyes: Conjunctivae and EOM are normal.   Neck: Normal range of motion and phonation normal. Neck supple.   Cardiovascular: Normal rate and normal pulses.   Abdominal:   Well-healing linear ventral abdominal surgical scar with 1 cm wound dehiscence. No underlying fluctuance.    Musculoskeletal: Normal range of motion.   Neurological: She is alert.   Skin: No rash noted.   Psychiatric: She has a normal mood and affect. Her speech is normal and behavior is normal.         ED Course   Procedures  Labs Reviewed - No data to display       Imaging Results    None          Medical Decision Making:   History:   Old Medical Records: I decided to obtain old medical records.  Initial Assessment:     Urgent evaluation of 59-year-old female with diabetes status post ventral hernia repair December 13th here with concern for abnormal wound with drainage. Patient denies abdominal trauma, no fevers or chills, just noted that it on "busted "within the last week.  On examination patient has minimal 1 cm region of dehiscence with serous fluid present, no underlying fluctuance, no surrounding erythema.  Discuss likely seroma, sterile dressing placed, with plan to DC home with wound care education given, follow-up general surgery, and topical antibiotic ordered.            Scribe Attestation:   Scribe #1: I performed the above scribed service and the documentation accurately describes the services I performed. I attest to the accuracy of the note.    Attending Attestation:           Physician Attestation for Scribe:  Physician Attestation Statement for Scribe #1: I, Dr. Shannon, reviewed " documentation, as scribed by Willy Veras in my presence, and it is both accurate and complete.                               Clinical Impression:     1. Visit for wound check    2. Abdominal wall seroma, initial encounter            Disposition:   Disposition: Discharged  Condition: Fair                     Crys Shannon MD  01/12/20 2122

## 2020-03-03 ENCOUNTER — HOSPITAL ENCOUNTER (OUTPATIENT)
Dept: PREADMISSION TESTING | Facility: OTHER | Age: 60
Discharge: HOME | End: 2020-03-03
Attending: OTOLARYNGOLOGY
Payer: MEDICARE

## 2020-03-03 ENCOUNTER — ANESTHESIA EVENT (OUTPATIENT)
Dept: SURGERY | Facility: OTHER | Age: 60
End: 2020-03-03
Payer: MEDICARE

## 2020-03-03 VITALS
HEART RATE: 92 BPM | OXYGEN SATURATION: 96 % | TEMPERATURE: 98 F | WEIGHT: 220 LBS | SYSTOLIC BLOOD PRESSURE: 141 MMHG | HEIGHT: 66 IN | BODY MASS INDEX: 35.36 KG/M2 | DIASTOLIC BLOOD PRESSURE: 67 MMHG

## 2020-03-03 RX ORDER — PREGABALIN 50 MG/1
50 CAPSULE ORAL
Status: CANCELLED | OUTPATIENT
Start: 2020-03-03 | End: 2020-03-03

## 2020-03-03 RX ORDER — ALBUTEROL SULFATE 0.83 MG/ML
2.5 SOLUTION RESPIRATORY (INHALATION)
Status: CANCELLED | OUTPATIENT
Start: 2020-03-03 | End: 2020-03-03

## 2020-03-03 RX ORDER — ACETAMINOPHEN 500 MG
1000 TABLET ORAL
Status: CANCELLED | OUTPATIENT
Start: 2020-03-03 | End: 2020-03-03

## 2020-03-03 RX ORDER — LIDOCAINE HYDROCHLORIDE 10 MG/ML
0.5 INJECTION, SOLUTION EPIDURAL; INFILTRATION; INTRACAUDAL; PERINEURAL ONCE
Status: CANCELLED | OUTPATIENT
Start: 2020-03-03 | End: 2020-03-03

## 2020-03-03 RX ORDER — SODIUM CHLORIDE, SODIUM LACTATE, POTASSIUM CHLORIDE, CALCIUM CHLORIDE 600; 310; 30; 20 MG/100ML; MG/100ML; MG/100ML; MG/100ML
INJECTION, SOLUTION INTRAVENOUS CONTINUOUS
Status: CANCELLED | OUTPATIENT
Start: 2020-03-03

## 2020-03-03 NOTE — DISCHARGE INSTRUCTIONS
Information to Prepare you for your Surgery    PRE-ADMIT TESTING -  [phone]    [address]  [place] [facility]          Your surgery has been scheduled at [facility]. We are pleased to have the opportunity to serve you. For Further Information please call [phone].    On the day of surgery please report to the Information Desk on the 1st floor.    · CONTACT YOUR PHYSICIAN'S OFFICE THE DAY PRIOR TO YOUR SURGERY TO OBTAIN YOUR ARRIVAL TIME.     · The evening before surgery do not eat anything after 9 p.m. ( this includes hard candy, chewing gum and mints).  You may only have GATORADE, POWERADE AND WATER  from 9 p.m. until you leave your home.   DO NOT DRINK ANY LIQUIDS ON THE WAY TO THE HOSPITAL.      SPECIAL MEDICATION INSTRUCTIONS: TAKE medications checked off by the Anesthesiologist on your Medication List.    Angiogram Patients: Take medications as instructed by your physician, including aspirin.     Surgery Patients:    If you take ASPIRIN - Your PHYSICIAN/SURGEON will need to inform you IF/OR when you need to stop taking aspirin prior to your surgery.     Do Not take any medications containing IBUPROFEN.  Do Not Wear any make-up or dark nail polish   (especially eye make-up) to surgery. If you come to surgery with makeup on you will be required to remove the makeup or nail polish.    Do not shave your surgical area at least 5 days prior to your surgery. The surgical prep will be performed at the hospital according to Infection Control regulations.    Leave all valuables at home.   Do Not wear any jewelry or watches, including any metal in body piercings. Jewelry must be removed prior to coming to the hospital.  There is a possibility that rings that are unable to be removed may be cut off if they are on the surgical extremity.    Contact Lens must be removed before surgery. Either do not wear the contact lens or bring a case and solution for  storage.  Please bring a container for eyeglasses or dentures as required.  Bring any paperwork your physician has provided, such as consent forms,  history and physicals, doctor's orders, etc.   Bring comfortable clothes that are loose fitting to wear upon discharge. Take into consideration the type of surgery being performed.  Maintain your diet as advised per your physician the day prior to surgery.      Adequate rest the night before surgery is advised.   Park in the Parking lot behind the hospital or in the [facility] across the street from the parking lot. Parking is complimentary.  If you will be discharged the same day as your procedure, please arrange for a responsible adult to drive you home or to accompany you if traveling by taxi.   YOU WILL NOT BE PERMITTED TO DRIVE OR TO LEAVE THE HOSPITAL ALONE AFTER SURGERY.   It is strongly recommended that you arrange for someone to remain with you for the first 24 hrs following your surgery.    The Surgeon will speak to your family/visitor after your surgery regarding the outcome of your surgery and post op care.  The Surgeon may speak to you after your surgery, but there is a possibility you may not remember the details.  Please check with your family members regarding the conversation with the Surgeon.    We strongly recommend whoever is bringing you home be present for discharge instructions.  This will ensure a thorough understanding for your post op home care.    EACH PATIENT IS ALLOWED TWO FAMILY MEMBERS OR VISITORS IN THE ROOM AND IN THE WAITING ROOMS WHILE YOU ARE IN SURGERY. ALL CHILDREN MUST ALWAYS BE ACCOMPANIED BY AN ADULT.    Thank you for your cooperation.  The Staff of [facility].                Bathing Instructions with Hibiclens     Shower the evening before and morning of your procedure with Hibiclens:   Wash your face with water and your regular face wash/soap   Apply Hibiclens directly on your skin or on a  wet washcloth and wash gently. When showering: Move away from the shower stream when applying Hibiclens to avoid rinsing off too soon.   Rinse thoroughly with warm water   Do not dilute Hibiclens         Dry off as usual, do not use any deodorant, powder, body lotions, perfume, after shave or cologne.

## 2020-03-03 NOTE — ANESTHESIA PREPROCEDURE EVALUATION
03/03/2020  Susan Courtney is a 60 y.o., female.    Anesthesia Evaluation    I have reviewed the Patient Summary Reports.    I have reviewed the Nursing Notes.   I have reviewed the Medications.     Review of Systems  Anesthesia Hx:  No problems with previous Anesthesia  History of prior surgery of interest to airway management or planning: Previous anesthesia: General Ventral hernis DR Fu Dec 2019 [facility] with general anesthesia.  Procedure performed at an [facility] Facility. Denies Family Hx of Anesthesia complications.   Denies Personal Hx of Anesthesia complications.   Social:  Non-Smoker, No Alcohol Use    Hematology/Oncology:  Hematology Normal   Oncology Normal     EENT/Dental:EENT/Dental Normal   Cardiovascular:   Exercise tolerance: good Hypertension, well controlled    Pulmonary:   Asthma asymptomatic Shortness of breath Sleep Apnea, CPAP    Renal/:  Renal/ Normal     Hepatic/GI:  Hepatic/GI Normal    Musculoskeletal:  Musculoskeletal Normal    Neurological:   Headaches    Endocrine:   Diabetes, well controlled, type 2    Dermatological:  Skin Normal    Psych:  Psychiatric Normal           Physical Exam  General:  Obesity    Airway/Jaw/Neck:  Airway Findings: Mouth Opening: Small, but > 3cm Tongue: Normal  General Airway Assessment: Adult  Mallampati: II      Dental:  Dental Findings: Upper partial dentures, Periodontal disease, Mild        Mental Status:  Mental Status Findings:  Cooperative, Alert and Oriented         Anesthesia Plan  Type of Anesthesia, risks & benefits discussed:  Anesthesia Type:  general  Patient's Preference:   Intra-op Monitoring Plan: standard ASA monitors  Intra-op Monitoring Plan Comments:   Post Op Pain Control Plan: per primary service following discharge from PACU and multimodal analgesia  Post Op Pain Control Plan Comments:   Induction:   IV  Beta Blocker:          Informed Consent: Patient understands risks and agrees with Anesthesia plan.  Questions answered. Anesthesia consent signed with patient.  ASA Score: 2     Day of Surgery Review of History & Physical:    H&P update referred to the surgeon.     Anesthesia Plan Notes: Dec labs in epic ok        Ready For Surgery From Anesthesia Perspective.

## 2020-03-06 ENCOUNTER — HOSPITAL ENCOUNTER (OUTPATIENT)
Facility: OTHER | Age: 60
Discharge: HOME | End: 2020-03-07
Attending: OTOLARYNGOLOGY | Admitting: OTOLARYNGOLOGY
Payer: MEDICARE

## 2020-03-06 ENCOUNTER — ANESTHESIA (OUTPATIENT)
Dept: SURGERY | Facility: OTHER | Age: 60
End: 2020-03-06
Payer: MEDICARE

## 2020-03-06 DIAGNOSIS — J35.01 CHRONIC TONSILLITIS: Primary | ICD-10-CM

## 2020-03-06 PROBLEM — K21.9 GASTROESOPHAGEAL REFLUX DISEASE: Status: ACTIVE | Noted: 2017-07-03

## 2020-03-06 PROBLEM — K21.9 GASTROESOPHAGEAL REFLUX DISEASE: Chronic | Status: ACTIVE | Noted: 2017-07-03

## 2020-03-06 PROBLEM — R09.02 HYPOXIA: Status: ACTIVE | Noted: 2020-03-06

## 2020-03-06 PROBLEM — J45.909 ASTHMA: Chronic | Status: ACTIVE | Noted: 2020-03-06

## 2020-03-06 PROBLEM — M54.2 NECK PAIN: Status: RESOLVED | Noted: 2019-01-10 | Resolved: 2020-03-06

## 2020-03-06 PROBLEM — E11.9 TYPE 2 DIABETES MELLITUS, WITHOUT LONG-TERM CURRENT USE OF INSULIN: Chronic | Status: ACTIVE | Noted: 2020-03-06

## 2020-03-06 PROBLEM — F41.9 ANXIETY: Chronic | Status: ACTIVE | Noted: 2020-03-06

## 2020-03-06 PROBLEM — M54.81 OCCIPITAL NEURALGIA OF LEFT SIDE: Status: RESOLVED | Noted: 2017-06-19 | Resolved: 2020-03-06

## 2020-03-06 PROBLEM — I10 ESSENTIAL HYPERTENSION: Chronic | Status: ACTIVE | Noted: 2020-03-06

## 2020-03-06 LAB
ANION GAP SERPL CALC-SCNC: 9 MMOL/L (ref 8–16)
BUN SERPL-MCNC: 13 MG/DL (ref 6–20)
CALCIUM SERPL-MCNC: 8.8 MG/DL (ref 8.7–10.5)
CHLORIDE SERPL-SCNC: 101 MMOL/L (ref 95–110)
CO2 SERPL-SCNC: 27 MMOL/L (ref 23–29)
CREAT SERPL-MCNC: 0.9 MG/DL (ref 0.5–1.4)
EST. GFR  (AFRICAN AMERICAN): >60 ML/MIN/1.73 M^2
EST. GFR  (NON AFRICAN AMERICAN): >60 ML/MIN/1.73 M^2
GLUCOSE SERPL-MCNC: 237 MG/DL (ref 70–110)
POCT GLUCOSE: 124 MG/DL (ref 70–110)
POCT GLUCOSE: 259 MG/DL (ref 70–110)
POCT GLUCOSE: 286 MG/DL (ref 70–110)
POTASSIUM SERPL-SCNC: 4.3 MMOL/L (ref 3.5–5.1)
SODIUM SERPL-SCNC: 137 MMOL/L (ref 136–145)

## 2020-03-06 PROCEDURE — 63600175 PHARM REV CODE 636 W HCPCS: Performed by: NURSE ANESTHETIST, CERTIFIED REGISTERED

## 2020-03-06 PROCEDURE — 63600175 PHARM REV CODE 636 W HCPCS: Performed by: OTOLARYNGOLOGY

## 2020-03-06 PROCEDURE — 63600175 PHARM REV CODE 636 W HCPCS: Performed by: ANESTHESIOLOGY

## 2020-03-06 PROCEDURE — 80048 BASIC METABOLIC PNL TOTAL CA: CPT

## 2020-03-06 PROCEDURE — 83036 HEMOGLOBIN GLYCOSYLATED A1C: CPT

## 2020-03-06 PROCEDURE — 94761 N-INVAS EAR/PLS OXIMETRY MLT: CPT

## 2020-03-06 PROCEDURE — 25000242 PHARM REV CODE 250 ALT 637 W/ HCPCS: Performed by: ANESTHESIOLOGY

## 2020-03-06 PROCEDURE — 71000039 HC RECOVERY, EACH ADD'L HOUR: Performed by: OTOLARYNGOLOGY

## 2020-03-06 PROCEDURE — 88304 TISSUE EXAM BY PATHOLOGIST: CPT | Mod: 26,,, | Performed by: PATHOLOGY

## 2020-03-06 PROCEDURE — 82962 GLUCOSE BLOOD TEST: CPT | Performed by: OTOLARYNGOLOGY

## 2020-03-06 PROCEDURE — 36000707: Performed by: OTOLARYNGOLOGY

## 2020-03-06 PROCEDURE — 94660 CPAP INITIATION&MGMT: CPT

## 2020-03-06 PROCEDURE — 37000008 HC ANESTHESIA 1ST 15 MINUTES: Performed by: OTOLARYNGOLOGY

## 2020-03-06 PROCEDURE — 36000706: Performed by: OTOLARYNGOLOGY

## 2020-03-06 PROCEDURE — 71000033 HC RECOVERY, INTIAL HOUR: Performed by: OTOLARYNGOLOGY

## 2020-03-06 PROCEDURE — 99900035 HC TECH TIME PER 15 MIN (STAT)

## 2020-03-06 PROCEDURE — 25000003 PHARM REV CODE 250: Performed by: OTOLARYNGOLOGY

## 2020-03-06 PROCEDURE — 36415 COLL VENOUS BLD VENIPUNCTURE: CPT

## 2020-03-06 PROCEDURE — 88304 PR  SURG PATH,LEVEL III: ICD-10-PCS | Mod: 26,,, | Performed by: PATHOLOGY

## 2020-03-06 PROCEDURE — 25000003 PHARM REV CODE 250: Performed by: ANESTHESIOLOGY

## 2020-03-06 PROCEDURE — S0020 INJECTION, BUPIVICAINE HYDRO: HCPCS | Performed by: OTOLARYNGOLOGY

## 2020-03-06 PROCEDURE — 25000003 PHARM REV CODE 250: Performed by: NURSE ANESTHETIST, CERTIFIED REGISTERED

## 2020-03-06 PROCEDURE — 27000190 HC CPAP FULL FACE MASK W/VALVE

## 2020-03-06 PROCEDURE — 94640 AIRWAY INHALATION TREATMENT: CPT

## 2020-03-06 PROCEDURE — 27201423 OPTIME MED/SURG SUP & DEVICES STERILE SUPPLY: Performed by: OTOLARYNGOLOGY

## 2020-03-06 PROCEDURE — 37000009 HC ANESTHESIA EA ADD 15 MINS: Performed by: OTOLARYNGOLOGY

## 2020-03-06 PROCEDURE — 88304 TISSUE EXAM BY PATHOLOGIST: CPT | Mod: 59 | Performed by: PATHOLOGY

## 2020-03-06 PROCEDURE — 94799 UNLISTED PULMONARY SVC/PX: CPT

## 2020-03-06 PROCEDURE — 27000221 HC OXYGEN, UP TO 24 HOURS

## 2020-03-06 RX ORDER — IBUPROFEN 200 MG
16 TABLET ORAL
Status: DISCONTINUED | OUTPATIENT
Start: 2020-03-06 | End: 2020-03-07 | Stop reason: HOSPADM

## 2020-03-06 RX ORDER — PROPOFOL 10 MG/ML
VIAL (ML) INTRAVENOUS
Status: DISCONTINUED | OUTPATIENT
Start: 2020-03-06 | End: 2020-03-06

## 2020-03-06 RX ORDER — ONDANSETRON 8 MG/1
8 TABLET, ORALLY DISINTEGRATING ORAL EVERY 8 HOURS PRN
Status: DISCONTINUED | OUTPATIENT
Start: 2020-03-06 | End: 2020-03-07 | Stop reason: HOSPADM

## 2020-03-06 RX ORDER — FLUTICASONE FUROATE AND VILANTEROL 100; 25 UG/1; UG/1
1 POWDER RESPIRATORY (INHALATION) DAILY
Status: DISCONTINUED | OUTPATIENT
Start: 2020-03-07 | End: 2020-03-07 | Stop reason: HOSPADM

## 2020-03-06 RX ORDER — NEOSTIGMINE METHYLSULFATE 1 MG/ML
INJECTION, SOLUTION INTRAVENOUS
Status: DISCONTINUED | OUTPATIENT
Start: 2020-03-06 | End: 2020-03-06

## 2020-03-06 RX ORDER — GLUCAGON 1 MG
1 KIT INJECTION
Status: DISCONTINUED | OUTPATIENT
Start: 2020-03-06 | End: 2020-03-07 | Stop reason: HOSPADM

## 2020-03-06 RX ORDER — EPINEPHRINE 0.1 MG/ML
INJECTION INTRAVENOUS
Status: DISCONTINUED | OUTPATIENT
Start: 2020-03-06 | End: 2020-03-06 | Stop reason: HOSPADM

## 2020-03-06 RX ORDER — ACETAMINOPHEN 500 MG
1000 TABLET ORAL
Status: COMPLETED | OUTPATIENT
Start: 2020-03-06 | End: 2020-03-06

## 2020-03-06 RX ORDER — IBUPROFEN 200 MG
24 TABLET ORAL
Status: DISCONTINUED | OUTPATIENT
Start: 2020-03-06 | End: 2020-03-07 | Stop reason: HOSPADM

## 2020-03-06 RX ORDER — AMOXICILLIN 400 MG/5ML
5 POWDER, FOR SUSPENSION ORAL 2 TIMES DAILY
Qty: 100 ML | Refills: 1 | Status: SHIPPED | OUTPATIENT
Start: 2020-03-06 | End: 2020-03-16

## 2020-03-06 RX ORDER — SODIUM CHLORIDE 9 MG/ML
INJECTION, SOLUTION INTRAVENOUS CONTINUOUS
Status: DISCONTINUED | OUTPATIENT
Start: 2020-03-06 | End: 2020-03-06

## 2020-03-06 RX ORDER — GLYCOPYRROLATE 0.2 MG/ML
INJECTION INTRAMUSCULAR; INTRAVENOUS
Status: DISCONTINUED | OUTPATIENT
Start: 2020-03-06 | End: 2020-03-06

## 2020-03-06 RX ORDER — IPRATROPIUM BROMIDE AND ALBUTEROL SULFATE 2.5; .5 MG/3ML; MG/3ML
3 SOLUTION RESPIRATORY (INHALATION) EVERY 4 HOURS PRN
Status: DISCONTINUED | OUTPATIENT
Start: 2020-03-06 | End: 2020-03-07 | Stop reason: HOSPADM

## 2020-03-06 RX ORDER — HYDROMORPHONE HYDROCHLORIDE 2 MG/ML
0.4 INJECTION, SOLUTION INTRAMUSCULAR; INTRAVENOUS; SUBCUTANEOUS EVERY 5 MIN PRN
Status: DISCONTINUED | OUTPATIENT
Start: 2020-03-06 | End: 2020-03-06 | Stop reason: HOSPADM

## 2020-03-06 RX ORDER — ONDANSETRON 2 MG/ML
4 INJECTION INTRAMUSCULAR; INTRAVENOUS DAILY PRN
Status: DISCONTINUED | OUTPATIENT
Start: 2020-03-06 | End: 2020-03-06 | Stop reason: HOSPADM

## 2020-03-06 RX ORDER — FENTANYL CITRATE 50 UG/ML
INJECTION, SOLUTION INTRAMUSCULAR; INTRAVENOUS
Status: DISCONTINUED | OUTPATIENT
Start: 2020-03-06 | End: 2020-03-06

## 2020-03-06 RX ORDER — BUPIVACAINE HYDROCHLORIDE 5 MG/ML
INJECTION, SOLUTION EPIDURAL; INTRACAUDAL
Status: DISCONTINUED | OUTPATIENT
Start: 2020-03-06 | End: 2020-03-06 | Stop reason: HOSPADM

## 2020-03-06 RX ORDER — PREGABALIN 50 MG/1
50 CAPSULE ORAL
Status: COMPLETED | OUTPATIENT
Start: 2020-03-06 | End: 2020-03-06

## 2020-03-06 RX ORDER — ALBUTEROL SULFATE 0.83 MG/ML
2.5 SOLUTION RESPIRATORY (INHALATION)
Status: COMPLETED | OUTPATIENT
Start: 2020-03-06 | End: 2020-03-06

## 2020-03-06 RX ORDER — HYDROCODONE BITARTRATE AND ACETAMINOPHEN 7.5; 325 MG/15ML; MG/15ML
15 SOLUTION ORAL EVERY 4 HOURS PRN
Qty: 480 ML | Refills: 0 | OUTPATIENT
Start: 2020-03-06 | End: 2021-05-20

## 2020-03-06 RX ORDER — PANTOPRAZOLE SODIUM 40 MG/1
40 TABLET, DELAYED RELEASE ORAL DAILY
Status: DISCONTINUED | OUTPATIENT
Start: 2020-03-07 | End: 2020-03-07 | Stop reason: HOSPADM

## 2020-03-06 RX ORDER — SODIUM CHLORIDE 0.9 % (FLUSH) 0.9 %
3 SYRINGE (ML) INJECTION
Status: DISCONTINUED | OUTPATIENT
Start: 2020-03-06 | End: 2020-03-07 | Stop reason: HOSPADM

## 2020-03-06 RX ORDER — DIPHENHYDRAMINE HYDROCHLORIDE 50 MG/ML
25 INJECTION INTRAMUSCULAR; INTRAVENOUS EVERY 6 HOURS PRN
Status: DISCONTINUED | OUTPATIENT
Start: 2020-03-06 | End: 2020-03-06 | Stop reason: HOSPADM

## 2020-03-06 RX ORDER — CITALOPRAM 20 MG/1
40 TABLET, FILM COATED ORAL DAILY
Status: DISCONTINUED | OUTPATIENT
Start: 2020-03-07 | End: 2020-03-07 | Stop reason: HOSPADM

## 2020-03-06 RX ORDER — LIDOCAINE HYDROCHLORIDE 10 MG/ML
0.5 INJECTION, SOLUTION EPIDURAL; INFILTRATION; INTRACAUDAL; PERINEURAL ONCE
Status: DISCONTINUED | OUTPATIENT
Start: 2020-03-06 | End: 2020-03-06 | Stop reason: HOSPADM

## 2020-03-06 RX ORDER — ATORVASTATIN CALCIUM 20 MG/1
40 TABLET, FILM COATED ORAL DAILY
Status: DISCONTINUED | OUTPATIENT
Start: 2020-03-07 | End: 2020-03-07 | Stop reason: HOSPADM

## 2020-03-06 RX ORDER — INSULIN ASPART 100 [IU]/ML
0-5 INJECTION, SOLUTION INTRAVENOUS; SUBCUTANEOUS
Status: DISCONTINUED | OUTPATIENT
Start: 2020-03-06 | End: 2020-03-07 | Stop reason: HOSPADM

## 2020-03-06 RX ORDER — HYDROCHLOROTHIAZIDE 25 MG/1
25 TABLET ORAL DAILY
Status: DISCONTINUED | OUTPATIENT
Start: 2020-03-07 | End: 2020-03-07 | Stop reason: HOSPADM

## 2020-03-06 RX ORDER — ONDANSETRON 8 MG/1
8 TABLET, ORALLY DISINTEGRATING ORAL EVERY 8 HOURS PRN
Qty: 6 TABLET | Refills: 1 | OUTPATIENT
Start: 2020-03-06 | End: 2021-05-20

## 2020-03-06 RX ORDER — MEPERIDINE HYDROCHLORIDE 25 MG/ML
12.5 INJECTION INTRAMUSCULAR; INTRAVENOUS; SUBCUTANEOUS ONCE AS NEEDED
Status: DISCONTINUED | OUTPATIENT
Start: 2020-03-06 | End: 2020-03-06 | Stop reason: HOSPADM

## 2020-03-06 RX ORDER — OXYCODONE HYDROCHLORIDE 5 MG/1
5 TABLET ORAL
Status: DISCONTINUED | OUTPATIENT
Start: 2020-03-06 | End: 2020-03-06 | Stop reason: HOSPADM

## 2020-03-06 RX ORDER — DEXAMETHASONE SODIUM PHOSPHATE 4 MG/ML
4 INJECTION, SOLUTION INTRA-ARTICULAR; INTRALESIONAL; INTRAMUSCULAR; INTRAVENOUS; SOFT TISSUE
Status: COMPLETED | OUTPATIENT
Start: 2020-03-06 | End: 2020-03-06

## 2020-03-06 RX ORDER — SODIUM CHLORIDE, SODIUM LACTATE, POTASSIUM CHLORIDE, CALCIUM CHLORIDE 600; 310; 30; 20 MG/100ML; MG/100ML; MG/100ML; MG/100ML
INJECTION, SOLUTION INTRAVENOUS CONTINUOUS
Status: DISCONTINUED | OUTPATIENT
Start: 2020-03-06 | End: 2020-03-06

## 2020-03-06 RX ORDER — HYDROCODONE BITARTRATE AND ACETAMINOPHEN 7.5; 325 MG/15ML; MG/15ML
15 SOLUTION ORAL EVERY 4 HOURS PRN
Status: DISCONTINUED | OUTPATIENT
Start: 2020-03-06 | End: 2020-03-07 | Stop reason: HOSPADM

## 2020-03-06 RX ADMIN — FENTANYL CITRATE 100 MCG: 50 INJECTION, SOLUTION INTRAMUSCULAR; INTRAVENOUS at 12:03

## 2020-03-06 RX ADMIN — GLYCOPYRROLATE 0.8 MG: 0.2 INJECTION, SOLUTION INTRAMUSCULAR; INTRAVENOUS at 01:03

## 2020-03-06 RX ADMIN — DEXAMETHASONE SODIUM PHOSPHATE 8 MG: 4 INJECTION, SOLUTION INTRAMUSCULAR; INTRAVENOUS at 12:03

## 2020-03-06 RX ADMIN — PROPOFOL 190 MG: 10 INJECTION, EMULSION INTRAVENOUS at 12:03

## 2020-03-06 RX ADMIN — ALBUTEROL SULFATE 2.5 MG: 2.5 SOLUTION RESPIRATORY (INHALATION) at 09:03

## 2020-03-06 RX ADMIN — HYDROCODONE BITARTRATE AND ACETAMINOPHEN 15 ML: 7.5; 325 SOLUTION ORAL at 10:03

## 2020-03-06 RX ADMIN — HYDROMORPHONE HYDROCHLORIDE 0.4 MG: 2 INJECTION, SOLUTION INTRAMUSCULAR; INTRAVENOUS; SUBCUTANEOUS at 01:03

## 2020-03-06 RX ADMIN — NEOSTIGMINE METHYLSULFATE 5 MG: 1 INJECTION INTRAVENOUS at 01:03

## 2020-03-06 RX ADMIN — HYDROCODONE BITARTRATE AND ACETAMINOPHEN 15 ML: 7.5; 325 SOLUTION ORAL at 01:03

## 2020-03-06 RX ADMIN — Medication 20 MG: at 01:03

## 2020-03-06 RX ADMIN — PREGABALIN 50 MG: 50 CAPSULE ORAL at 10:03

## 2020-03-06 RX ADMIN — SODIUM CHLORIDE, SODIUM LACTATE, POTASSIUM CHLORIDE, AND CALCIUM CHLORIDE: 600; 310; 30; 20 INJECTION, SOLUTION INTRAVENOUS at 12:03

## 2020-03-06 RX ADMIN — CARBOXYMETHYLCELLULOSE SODIUM 2 DROP: 2.5 SOLUTION/ DROPS OPHTHALMIC at 12:03

## 2020-03-06 RX ADMIN — DEXTROSE 1 G: 50 INJECTION, SOLUTION INTRAVENOUS at 12:03

## 2020-03-06 RX ADMIN — HYDROMORPHONE HYDROCHLORIDE 0.4 MG: 2 INJECTION, SOLUTION INTRAMUSCULAR; INTRAVENOUS; SUBCUTANEOUS at 02:03

## 2020-03-06 RX ADMIN — SODIUM CHLORIDE: 0.9 INJECTION, SOLUTION INTRAVENOUS at 03:03

## 2020-03-06 RX ADMIN — HYDROCODONE BITARTRATE AND ACETAMINOPHEN 15 ML: 7.5; 325 SOLUTION ORAL at 05:03

## 2020-03-06 RX ADMIN — ACETAMINOPHEN 1000 MG: 500 TABLET, FILM COATED ORAL at 10:03

## 2020-03-06 RX ADMIN — IBUPROFEN 800 MG: 800 INJECTION INTRAVENOUS at 03:03

## 2020-03-06 RX ADMIN — ONDANSETRON 4 MG: 2 INJECTION INTRAMUSCULAR; INTRAVENOUS at 01:03

## 2020-03-06 NOTE — ASSESSMENT & PLAN NOTE
- Hypoxia post-operatively in the setting of anesthetic use, pain medications.  - Continue oxygen protocol and wean as tolerated.  - Start albuterol-ipratropium 2.5-0.5mg inhaled q4hr PRN.

## 2020-03-06 NOTE — ASSESSMENT & PLAN NOTE
- On metformin 1000mg PO BID at home.  - Start low-dose sliding scale insulin aspart 0-5U subQ TIDWM PRN while inpatient.

## 2020-03-06 NOTE — INTERVAL H&P NOTE
The patient has been examined and the H&P has been reviewed:    I concur with the findings and no changes have occurred since H&P was written.    Anesthesia/Surgery risks, benefits and alternative options discussed and understood by patient/family.          Active Hospital Problems    Diagnosis  POA    Chronic tonsillitis [J35.01]  Yes      Resolved Hospital Problems   No resolved problems to display.

## 2020-03-06 NOTE — OR NURSING
"Pt asleep snoring, periods of sleep apnea when sleeping, pt uses a CPAP at home. Woken for pain assessment and asked if pain is getting better, pt shook head "no", very sleepy and not opening eyes, no additional pain medication given. Explained to pt that I could no longer safely give her IV pain medication, nodded head in understanding  "

## 2020-03-06 NOTE — HPI
Ms. Courtney is a 60/F with PMH DMII, HTN, JOSE, asthma, anxiety who presented to [facility] 03/06 for tonsillectomy with Dr. Pelaez. She reported she was in her usual state of health prior to presenting for procedure. Post-operatively she was found to be hypoxic and minimally interactive; pain control was titrated and she became more responsive but required supplemental oxygen for adequate saturations. She reported significant pain despite interventions. Hospital medicine was consulted for hypoxia / medical co-management. She reported that she has had similar hypoxia post-procedurally in the past. She notes some abdominal tenderness after having had a recent ventral hernia repair.

## 2020-03-06 NOTE — OP NOTE
[facility]  Surgery Department  Operative Note    SUMMARY     Patient: Susan Courtney    Medical Record: [id number]    Date of Procedure: 3/6/2020     Surgeon: Surgeon(s) and Role:     * Brant Pelaez MD - Primary    Assisting Surgeon: None    Pre-Operative Diagnosis: Chronic tonsillitis [J35.01]    Post-Operative Diagnosis: Post-Op Diagnosis Codes:     * Chronic tonsillitis [J35.01]    Procedure: Procedure(s) (LRB):  TONSILLECTOMY (Bilateral)      Operative  Findings:  Chronic tonsillitis    Anesthesia:  General endotracheal    EBL:  200 cc    Specimens:  Left and right tonsils    Implants:  None    Complications:  None    Indications:  Patient is 60-year-old with chronic tonsillitis.  She has been in the hospital for tonsillectomy.  Four consent was obtained    Procedure in Detail: The patient was taken operating room placed under general endotracheal anesthesia.  A time-out was then performed according the [facility] standards.  The palate was palpated there is no evidence of a submucous cleft.  The palate was retracted with a catheter.  The adenoids were normal on visual inspection.     The tonsils were then grasped with an Allis clamp removed with sharp surgical dissection.  A 12 blade was used to make a mucosal incision along the palatal glossal and palatal pharyngeal folds.  Dissection between the tonsil capsule and the constrictor muscles was started and developed with Metzenbaum scissors and completed with a Lan knife.  The inferior tonsil was removed with the tonsil snare.  Packing was placed for hemostasis on both sides.  It was then changed once.  The packing was then withdrawn removed a selective cauterization was done of this.  Inferior pole in all bleeding points were controlled.  Local anesthetic was infiltrated into the tonsillar fossa.  The stomach contents were evacuated with an orogastric tube.  The patient was then brought out of the anesthetic  extubated and taken to the recovery in stable condition.        Discharge Note    SUMMARY     Admit Date: 3/6/2020    Discharge Date and Time:  03/06/2020 1:22 PM    Hospital Course (synopsis of major diagnoses, care, treatment, and services provided during the course of the hospital stay): Surgery today as described,  with expected outcome, no complications,  will discharge home after recovery        Final Diagnosis: Post-Op Diagnosis Codes:     * Chronic tonsillitis [J35.01]    Disposition: Home or Self Care    Follow Up/Patient Instructions:     Medications:  Reconciled Home Medications:      Medication List      START taking these medications    amoxicillin 400 mg/5 mL suspension  Commonly known as:  AMOXIL  Take 5 mLs (400 mg total) by mouth 2 (two) times daily. for 10 days        CHANGE how you take these medications    * HYDROcodone-acetaminophen 5-325 mg per tablet  Commonly known as:  NORCO  Take 1 tablet by mouth every 6 (six) hours as needed for Pain.  What changed:  Another medication with the same name was added. Make sure you understand how and when to take each.     * hydrocodone-apap 7.5-325 MG/15 ML oral solution  Commonly known as:  HYCET  Take 15 mLs by mouth every 4 (four) hours as needed for Pain.  What changed:  You were already taking a medication with the same name, and this prescription was added. Make sure you understand how and when to take each.     * ondansetron 4 MG Tbdl  Commonly known as:  ZOFRAN-ODT  Take 1 tablet (4 mg total) by mouth every 8 (eight) hours as needed.  What changed:  Another medication with the same name was added. Make sure you understand how and when to take each.     * ondansetron 8 MG Tbdl  Commonly known as:  ZOFRAN-ODT  Take 1 tablet (8 mg total) by mouth every 8 (eight) hours as needed.  What changed:  You were already taking a medication with the same name, and this prescription was added. Make sure you understand how and when to take each.         * This  list has 4 medication(s) that are the same as other medications prescribed for you. Read the directions carefully, and ask your doctor or other care provider to review them with you.            CONTINUE taking these medications    amitriptyline 150 MG Tab  Commonly known as:  ELAVIL  Take 150 mg by mouth every evening.     atorvastatin 40 MG tablet  Commonly known as:  LIPITOR  Take 40 mg by mouth once daily.     bacitracin 500 unit/gram Oint  Apply topically 2 (two) times daily.     budesonide-formoterol 160-4.5 mcg 160-4.5 mcg/actuation Hfaa  Commonly known as:  SYMBICORT  Inhale 2 puffs into the lungs every 12 (twelve) hours. Controller     citalopram 40 MG tablet  Commonly known as:  CELEXA  Take 40 mg by mouth once daily.     clonazePAM 2 MG Tab  Commonly known as:  KLONOPIN  Take 2 mg by mouth 2 (two) times daily as needed.     clotrimazole 1 % Soln  Commonly known as:  LOTRIMIN  Apply topically 2 (two) times daily.     fluticasone propionate 50 mcg/actuation nasal spray  Commonly known as:  FLONASE  1 spray (50 mcg total) by Each Nare route 2 (two) times daily as needed.     hydroCHLOROthiazide 25 MG tablet  Commonly known as:  HYDRODIURIL  Take 25 mg by mouth once daily.     meclizine 25 mg tablet  Commonly known as:  ANTIVERT  Take 1 tablet (25 mg total) by mouth 3 (three) times daily as needed.     meloxicam 7.5 MG tablet  Commonly known as:  MOBIC  Take 7.5 mg by mouth once daily.     metFORMIN 500 mg 24hr tablet  Commonly known as:  FORTAMET  Take 500 mg by mouth 2 (two) times daily with meals.     naproxen 500 MG tablet  Commonly known as:  NAPROSYN  Take 1 tablet (500 mg total) by mouth 2 (two) times daily.     omeprazole 20 MG capsule  Commonly known as:  PRILOSEC  Take 20 mg by mouth once daily.     sumatriptan 50 MG tablet  Commonly known as:  IMITREX  Take 1 tablet (50 mg total) by mouth every 2 (two) hours as needed.     topiramate 100 MG tablet  Commonly known as:  TOPAMAX  Take 100 mg by mouth  once daily.     traZODone 50 MG tablet  Commonly known as:  DESYREL  Take 50 mg by mouth every evening.     triazolam 0.25 MG Tab  Commonly known as:  HALCION  Take 0.125 mg by mouth nightly as needed.     Ventolin HFA 90 mcg/actuation inhaler  Generic drug:  albuterol  INHALE 2 PUFFS BY MOUTH EVERY 4 TO 6 HOURS AS NEEDED FOR COUGHING AND WHEEZING          Discharge Procedure Orders   Diet general   Order Comments: Tonsillectomy Diet     Lifting restrictions     Call MD for:  persistent nausea and vomiting     Change dressing (specify)   Order Comments: Patient has the following activity restrictions: limited activity for one week.  No heavy lifting or strenuous activity, no driving for one week.     Activity as tolerated

## 2020-03-06 NOTE — ANESTHESIA PROCEDURE NOTES
Intubation  Performed by: Gonzalo Laguna CRNA  Authorized by: Gonzalo Laguna CRNA     Intubation:     Induction:  Intravenous    Intubated:  Postinduction    Mask Ventilation:  Easy mask    Attempts:  1    Attempted By:  CRNA    Method of Intubation:  Direct    Blade:  Tran 3    Laryngeal View Grade: Grade I - full view of chords      Difficult Airway Encountered?: No      Complications:  None    Airway Device:  Oral endotracheal tube    Airway Device Size:  7.5    Style/Cuff Inflation:  Cuffed    Inflation Amount (mL):  7    Tube secured:  21    Secured at:  The lips    Placement Verified By:  Capnometry    Complicating Factors:  None

## 2020-03-06 NOTE — CONSULTS
[facility]  Hospital Medicine  Consult Note    Patient Name: Susan Courtney  MRN: [id number]  Admission Date: 3/6/2020  Hospital Length of Stay: 0 days  Attending Physician: Brant Pelaez, *   Primary Care Provider: Archana Burgess MD     Patient information was obtained from patient, past medical records and ER records.     Consults  Subjective:     Principal Problem: Chronic tonsillitis    Chief Complaint: No chief complaint on file.       HPI: Ms. Courtney is a 60/F with PMH DMII, HTN, JOSE, asthma, anxiety who presented to [facility] 03/06 for tonsillectomy with Dr. Pelaez. She reported she was in her usual state of health prior to presenting for procedure. Post-operatively she was found to be hypoxic and minimally interactive; pain control was titrated and she became more responsive but required supplemental oxygen for adequate saturations. She reported significant pain despite interventions. Hospital medicine was consulted for hypoxia / medical co-management. She reported that she has had similar hypoxia post-procedurally in the past. She notes some abdominal tenderness after having had a recent ventral hernia repair.    Past Medical History:   Diagnosis Date    Abdominal hernia     Anxiety     Asthma     Diabetes mellitus     Hypertension     Migraine     Migraine headache     Obesity     Sleep apnea        Past Surgical History:   Procedure Laterality Date    CHOLECYSTECTOMY      FOOT SURGERY      HERNIA REPAIR      HYSTERECTOMY      complete       Review of patient's allergies indicates:   Allergen Reactions    Morphine Itching     Not sure what she can take for pain.       No current facility-administered medications on file prior to encounter.      Current Outpatient Medications on File Prior to Encounter   Medication Sig    amitriptyline (ELAVIL) 150 MG Tab Take 150 mg by mouth every evening.    atorvastatin (LIPITOR) 40 MG tablet Take 40 mg by mouth once daily.     budesonide-formoterol 160-4.5 mcg (SYMBICORT) 160-4.5 mcg/actuation HFAA Inhale 2 puffs into the lungs every 12 (twelve) hours. Controller    citalopram (CELEXA) 40 MG tablet Take 40 mg by mouth once daily.    clonazePAM (KLONOPIN) 2 MG Tab Take 2 mg by mouth 2 (two) times daily as needed.     hydrochlorothiazide (HYDRODIURIL) 25 MG tablet Take 25 mg by mouth once daily.    metformin (FORTAMET) 500 mg 24 hr tablet Take 500 mg by mouth 2 (two) times daily with meals.     omeprazole (PRILOSEC) 20 MG capsule Take 20 mg by mouth once daily.    topiramate (TOPAMAX) 100 MG tablet Take 100 mg by mouth once daily.    traZODone (DESYREL) 50 MG tablet Take 50 mg by mouth every evening.    triazolam (HALCION) 0.25 MG Tab Take 0.125 mg by mouth nightly as needed.    fluticasone (FLONASE) 50 mcg/actuation nasal spray 1 spray (50 mcg total) by Each Nare route 2 (two) times daily as needed.    HYDROcodone-acetaminophen (NORCO) 5-325 mg per tablet Take 1 tablet by mouth every 6 (six) hours as needed for Pain.    meclizine (ANTIVERT) 25 mg tablet Take 1 tablet (25 mg total) by mouth 3 (three) times daily as needed.    meloxicam (MOBIC) 7.5 MG tablet Take 7.5 mg by mouth once daily.    naproxen (NAPROSYN) 500 MG tablet Take 1 tablet (500 mg total) by mouth 2 (two) times daily.    ondansetron (ZOFRAN-ODT) 4 MG TbDL Take 1 tablet (4 mg total) by mouth every 8 (eight) hours as needed.    sumatriptan (IMITREX) 50 MG tablet Take 1 tablet (50 mg total) by mouth every 2 (two) hours as needed.    VENTOLIN HFA 90 mcg/actuation inhaler INHALE 2 PUFFS BY MOUTH EVERY 4 TO 6 HOURS AS NEEDED FOR COUGHING AND WHEEZING    [DISCONTINUED] bacitracin 500 unit/gram Oint Apply topically 2 (two) times daily.    [DISCONTINUED] clotrimazole (LOTRIMIN) 1 % Soln Apply topically 2 (two) times daily.     Family History     None        Tobacco Use    Smoking status: Never Smoker    Smokeless tobacco: Never Used   Substance and Sexual  Activity    Alcohol use: No    Drug use: No    Sexual activity: Not on file     Review of Systems   Constitutional: Positive for fatigue. Negative for chills and fever.   HENT: Negative for sore throat and trouble swallowing.    Eyes: Negative for pain and visual disturbance.   Respiratory: Positive for cough and shortness of breath.    Cardiovascular: Negative for chest pain and palpitations.   Gastrointestinal: Positive for abdominal pain. Negative for nausea and vomiting.   Genitourinary: Negative for difficulty urinating and dysuria.   Musculoskeletal: Negative for arthralgias and myalgias.   Skin: Negative for rash and wound.   Neurological: Negative for weakness and numbness.     Objective:     Vital Signs (Most Recent):  Temp: 98.2 °F (36.8 °C) (03/06/20 1507)  Pulse: 77 (03/06/20 1550)  Resp: 16 (03/06/20 1550)  BP: (!) 167/77 (03/06/20 1550)  SpO2: 100 % (03/06/20 1550) Vital Signs (24h Range):  Temp:  [97.6 °F (36.4 °C)-98.2 °F (36.8 °C)] 98.2 °F (36.8 °C)  Pulse:  [73-99] 77  Resp:  [16-20] 16  SpO2:  [91 %-100 %] 100 %  BP: (141-172)/(70-91) 167/77     Weight: 99.8 kg (220 lb)  Body mass index is 35.51 kg/m².    Physical Exam   Constitutional: She is oriented to person, place, and time. She appears well-developed. No distress.   HENT:   Head: Normocephalic and atraumatic.   Eyes: Conjunctivae are normal. Right eye exhibits no discharge. Left eye exhibits no discharge.   Cardiovascular: Normal rate and intact distal pulses.   Pulmonary/Chest: Effort normal. No respiratory distress.   Abdominal: Soft. There is tenderness (mild, epigastric).   Musculoskeletal: Normal range of motion. She exhibits edema (trace BLE).   Neurological: She is oriented to person, place, and time.   Drowsy but arouses to voice.   Skin: Skin is warm and dry.   Nursing note and vitals reviewed.    Significant Labs:   BMP ordered and pending.    Significant Imaging:   No new imaging at time of consult.    Assessment/Plan:     *  Chronic tonsillitis  - Management, pain control as per primary.    Gastroesophageal reflux disease  - On omeprazole at home; continue PPI with pantoprazole 40mg PO daily while inpatient.    Asthma  - Home medication not available on formulary; start fluticasone-vilanterol 100-25mcg inhaled daily.    Anxiety  - Hold medications with sedative effect given hypoxia.    Hypoxia  - Hypoxia post-operatively in the setting of anesthetic use, pain medications.  - Continue oxygen protocol and wean as tolerated.  - Start albuterol-ipratropium 2.5-0.5mg inhaled q4hr PRN.    Type 2 diabetes mellitus, without long-term current use of insulin  - On metformin 1000mg PO BID at home.  - Start low-dose sliding scale insulin aspart 0-5U subQ TIDWM PRN while inpatient.    Essential hypertension  - Continue HCTZ 25mg PO daily.    Obstructive sleep apnea  - Continue CPAP qHS.    VTE Risk Mitigation (From admission, onward)    None        Thank you for your consult. I will continue to follow with you. Please do not hesitate to contact me with any questions or concerns.    BERNARDO Russ MD  Department of Hospital Medicine   [facility]  [phone]

## 2020-03-06 NOTE — TRANSFER OF CARE
"Anesthesia Transfer of Care Note    Patient: Susan Courtney    Procedure(s) Performed: Procedure(s) (LRB):  TONSILLECTOMY (Bilateral)    Patient location: PACU    Anesthesia Type: general    Transport from OR: Transported from OR on 2-3 L/min O2 by NC with adequate spontaneous ventilation    Post pain: adequate analgesia    Post assessment: no apparent anesthetic complications    Post vital signs: stable    Level of consciousness: awake, alert and oriented    Nausea/Vomiting: no nausea/vomiting    Complications: none    Transfer of care protocol was followed      Last vitals:   Visit Vitals  BP (!) 167/91 (BP Location: Left arm, Patient Position: Lying)   Pulse 99   Temp 36.4 °C (97.6 °F) (Oral)   Resp 20   Ht 5' 6" (1.676 m)   Wt 99.8 kg (220 lb)   SpO2 97%   Breastfeeding? No   BMI 35.51 kg/m²     "

## 2020-03-06 NOTE — OR NURSING
Pt still rating pain 10/10 and unable to wean pt to room air, states she want to spend the night and cant go home in pain. Dr. Pelaez contacted and admit orders noted.

## 2020-03-06 NOTE — OR NURSING
Dr. Peguero @ bedside to assess pt for post op medical management consult. Sister updated and sent to room 370    Pt resting with eyes closed and VSS, ready for transfer to inpatient room.

## 2020-03-06 NOTE — ANESTHESIA POSTPROCEDURE EVALUATION
Anesthesia Post Evaluation    Patient: Susan Courtney    Procedure(s) Performed: Procedure(s) (LRB):  TONSILLECTOMY (Bilateral)    Final Anesthesia Type: general    Patient location during evaluation: PACU  Patient participation: Yes- Able to Participate  Level of consciousness: awake and alert  Post-procedure vital signs: reviewed and stable  Pain management: adequate  Airway patency: patent  JOSE mitigation strategies: Extubation while patient is awake  PONV status at discharge: No PONV  Anesthetic complications: no      Cardiovascular status: hemodynamically stable  Respiratory status: unassisted  Hydration status: euvolemic  Follow-up not needed.          Vitals Value Taken Time   /75 3/6/2020  3:07 PM   Temp 36.8 °C (98.2 °F) 3/6/2020  3:07 PM   Pulse 82 3/6/2020  3:15 PM   Resp 16 3/6/2020  3:07 PM   SpO2 98 % 3/6/2020  3:15 PM   Vitals shown include unvalidated device data.      No case tracking events are documented in the log.      Pain/Lj Score: Pain Rating Prior to Med Admin: 10 (3/6/2020  2:38 PM)  Pain Rating Post Med Admin: 10 (3/6/2020  3:09 PM)  Lj Score: 8 (3/6/2020  3:09 PM)

## 2020-03-06 NOTE — SUBJECTIVE & OBJECTIVE
Past Medical History:   Diagnosis Date    Abdominal hernia     Anxiety     Asthma     Diabetes mellitus     Hypertension     Migraine     Migraine headache     Obesity     Sleep apnea        Past Surgical History:   Procedure Laterality Date    CHOLECYSTECTOMY      FOOT SURGERY      HERNIA REPAIR      HYSTERECTOMY      complete       Review of patient's allergies indicates:   Allergen Reactions    Morphine Itching     Not sure what she can take for pain.       No current facility-administered medications on file prior to encounter.      Current Outpatient Medications on File Prior to Encounter   Medication Sig    amitriptyline (ELAVIL) 150 MG Tab Take 150 mg by mouth every evening.    atorvastatin (LIPITOR) 40 MG tablet Take 40 mg by mouth once daily.    budesonide-formoterol 160-4.5 mcg (SYMBICORT) 160-4.5 mcg/actuation HFAA Inhale 2 puffs into the lungs every 12 (twelve) hours. Controller    citalopram (CELEXA) 40 MG tablet Take 40 mg by mouth once daily.    clonazePAM (KLONOPIN) 2 MG Tab Take 2 mg by mouth 2 (two) times daily as needed.     hydrochlorothiazide (HYDRODIURIL) 25 MG tablet Take 25 mg by mouth once daily.    metformin (FORTAMET) 500 mg 24 hr tablet Take 500 mg by mouth 2 (two) times daily with meals.     omeprazole (PRILOSEC) 20 MG capsule Take 20 mg by mouth once daily.    topiramate (TOPAMAX) 100 MG tablet Take 100 mg by mouth once daily.    traZODone (DESYREL) 50 MG tablet Take 50 mg by mouth every evening.    triazolam (HALCION) 0.25 MG Tab Take 0.125 mg by mouth nightly as needed.    fluticasone (FLONASE) 50 mcg/actuation nasal spray 1 spray (50 mcg total) by Each Nare route 2 (two) times daily as needed.    HYDROcodone-acetaminophen (NORCO) 5-325 mg per tablet Take 1 tablet by mouth every 6 (six) hours as needed for Pain.    meclizine (ANTIVERT) 25 mg tablet Take 1 tablet (25 mg total) by mouth 3 (three) times daily as needed.    meloxicam (MOBIC) 7.5 MG tablet Take  7.5 mg by mouth once daily.    naproxen (NAPROSYN) 500 MG tablet Take 1 tablet (500 mg total) by mouth 2 (two) times daily.    ondansetron (ZOFRAN-ODT) 4 MG TbDL Take 1 tablet (4 mg total) by mouth every 8 (eight) hours as needed.    sumatriptan (IMITREX) 50 MG tablet Take 1 tablet (50 mg total) by mouth every 2 (two) hours as needed.    VENTOLIN HFA 90 mcg/actuation inhaler INHALE 2 PUFFS BY MOUTH EVERY 4 TO 6 HOURS AS NEEDED FOR COUGHING AND WHEEZING    [DISCONTINUED] bacitracin 500 unit/gram Oint Apply topically 2 (two) times daily.    [DISCONTINUED] clotrimazole (LOTRIMIN) 1 % Soln Apply topically 2 (two) times daily.     Family History     None        Tobacco Use    Smoking status: Never Smoker    Smokeless tobacco: Never Used   Substance and Sexual Activity    Alcohol use: No    Drug use: No    Sexual activity: Not on file     Review of Systems   Constitutional: Positive for fatigue. Negative for chills and fever.   HENT: Negative for sore throat and trouble swallowing.    Eyes: Negative for pain and visual disturbance.   Respiratory: Positive for cough and shortness of breath.    Cardiovascular: Negative for chest pain and palpitations.   Gastrointestinal: Positive for abdominal pain. Negative for nausea and vomiting.   Genitourinary: Negative for difficulty urinating and dysuria.   Musculoskeletal: Negative for arthralgias and myalgias.   Skin: Negative for rash and wound.   Neurological: Negative for weakness and numbness.     Objective:     Vital Signs (Most Recent):  Temp: 98.2 °F (36.8 °C) (03/06/20 1507)  Pulse: 77 (03/06/20 1550)  Resp: 16 (03/06/20 1550)  BP: (!) 167/77 (03/06/20 1550)  SpO2: 100 % (03/06/20 1550) Vital Signs (24h Range):  Temp:  [97.6 °F (36.4 °C)-98.2 °F (36.8 °C)] 98.2 °F (36.8 °C)  Pulse:  [73-99] 77  Resp:  [16-20] 16  SpO2:  [91 %-100 %] 100 %  BP: (141-172)/(70-91) 167/77     Weight: 99.8 kg (220 lb)  Body mass index is 35.51 kg/m².    Physical Exam   Constitutional:  She is oriented to person, place, and time. She appears well-developed. No distress.   HENT:   Head: Normocephalic and atraumatic.   Eyes: Conjunctivae are normal. Right eye exhibits no discharge. Left eye exhibits no discharge.   Cardiovascular: Normal rate and intact distal pulses.   Pulmonary/Chest: Effort normal. No respiratory distress.   Abdominal: Soft. There is tenderness (mild, epigastric).   Musculoskeletal: Normal range of motion. She exhibits edema (trace BLE).   Neurological: She is oriented to person, place, and time.   Drowsy but arouses to voice.   Skin: Skin is warm and dry.   Nursing note and vitals reviewed.    Significant Labs:   BMP ordered and pending.    Significant Imaging:   No new imaging at time of consult.

## 2020-03-06 NOTE — ASSESSMENT & PLAN NOTE
- Home medication not available on formulary; start fluticasone-vilanterol 100-25mcg inhaled daily.

## 2020-03-07 VITALS
BODY MASS INDEX: 35.32 KG/M2 | DIASTOLIC BLOOD PRESSURE: 71 MMHG | RESPIRATION RATE: 20 BRPM | OXYGEN SATURATION: 97 % | WEIGHT: 219.81 LBS | SYSTOLIC BLOOD PRESSURE: 142 MMHG | HEART RATE: 82 BPM | HEIGHT: 66 IN | TEMPERATURE: 98 F

## 2020-03-07 LAB
ESTIMATED AVG GLUCOSE: 197 MG/DL (ref 68–131)
HBA1C MFR BLD HPLC: 8.5 % (ref 4–5.6)
POCT GLUCOSE: 180 MG/DL (ref 70–110)
POCT GLUCOSE: 262 MG/DL (ref 70–110)

## 2020-03-07 PROCEDURE — 25000003 PHARM REV CODE 250: Performed by: OTOLARYNGOLOGY

## 2020-03-07 PROCEDURE — 25000242 PHARM REV CODE 250 ALT 637 W/ HCPCS: Performed by: INTERNAL MEDICINE

## 2020-03-07 PROCEDURE — 94761 N-INVAS EAR/PLS OXIMETRY MLT: CPT

## 2020-03-07 PROCEDURE — 25000003 PHARM REV CODE 250: Performed by: INTERNAL MEDICINE

## 2020-03-07 PROCEDURE — 99900035 HC TECH TIME PER 15 MIN (STAT)

## 2020-03-07 PROCEDURE — 63600175 PHARM REV CODE 636 W HCPCS: Performed by: OTOLARYNGOLOGY

## 2020-03-07 PROCEDURE — 94640 AIRWAY INHALATION TREATMENT: CPT

## 2020-03-07 RX ADMIN — HYDROCHLOROTHIAZIDE 25 MG: 25 TABLET ORAL at 09:03

## 2020-03-07 RX ADMIN — CITALOPRAM HYDROBROMIDE 40 MG: 20 TABLET ORAL at 09:03

## 2020-03-07 RX ADMIN — INSULIN ASPART 1 UNITS: 100 INJECTION, SOLUTION INTRAVENOUS; SUBCUTANEOUS at 12:03

## 2020-03-07 RX ADMIN — ATORVASTATIN CALCIUM 40 MG: 20 TABLET, FILM COATED ORAL at 09:03

## 2020-03-07 RX ADMIN — HYDROCODONE BITARTRATE AND ACETAMINOPHEN 15 ML: 7.5; 325 SOLUTION ORAL at 02:03

## 2020-03-07 RX ADMIN — HYDROCODONE BITARTRATE AND ACETAMINOPHEN 15 ML: 7.5; 325 SOLUTION ORAL at 06:03

## 2020-03-07 RX ADMIN — PANTOPRAZOLE SODIUM 40 MG: 40 TABLET, DELAYED RELEASE ORAL at 09:03

## 2020-03-07 RX ADMIN — HYDROCODONE BITARTRATE AND ACETAMINOPHEN 15 ML: 7.5; 325 SOLUTION ORAL at 11:03

## 2020-03-07 RX ADMIN — FLUTICASONE FUROATE AND VILANTEROL TRIFENATATE 1 PUFF: 100; 25 POWDER RESPIRATORY (INHALATION) at 08:03

## 2020-03-07 NOTE — SUBJECTIVE & OBJECTIVE
Interval History: No acute events. Feeling well. Throat sore.    Review of Systems   Constitutional: Negative for chills and fever.   Respiratory: Negative for cough and shortness of breath.    Cardiovascular: Negative for chest pain and palpitations.   Gastrointestinal: Negative for abdominal pain, nausea and vomiting.     Objective:     Vital Signs (Most Recent):  Temp: 97.6 °F (36.4 °C) (03/07/20 0740)  Pulse: 82 (03/07/20 0816)  Resp: 20 (03/07/20 0816)  BP: (!) 142/71 (03/07/20 0740)  SpO2: 97 % (03/07/20 0816) Vital Signs (24h Range):  Temp:  [97.5 °F (36.4 °C)-98.2 °F (36.8 °C)] 97.6 °F (36.4 °C)  Pulse:  [71-99] 82  Resp:  [16-20] 20  SpO2:  [91 %-100 %] 97 %  BP: (142-172)/(67-91) 142/71     Weight: 99.7 kg (219 lb 12.8 oz)  Body mass index is 35.48 kg/m².    Intake/Output Summary (Last 24 hours) at 3/7/2020 0947  Last data filed at 3/7/2020 0600  Gross per 24 hour   Intake 1700 ml   Output 2 ml   Net 1698 ml      Physical Exam   Constitutional: She is oriented to person, place, and time. She appears well-developed. No distress.   HENT:   Head: Normocephalic and atraumatic.   Eyes: Conjunctivae are normal. Right eye exhibits no discharge. Left eye exhibits no discharge.   Cardiovascular: Normal rate and intact distal pulses.   Pulmonary/Chest: Effort normal. No respiratory distress.   Abdominal: Soft. There is no tenderness.   Musculoskeletal: Normal range of motion. She exhibits no edema.   Neurological: She is alert and oriented to person, place, and time.   Skin: Skin is warm and dry.   Nursing note and vitals reviewed.    Significant Labs:   BMP:  Recent Labs   Lab 03/06/20  1841      K 4.3      CO2 27   BUN 13   CREATININE 0.9   *   CALCIUM 8.8     Significant Imaging:   No new imaging this morning.

## 2020-03-07 NOTE — DISCHARGE SUMMARY
[facility]  Otorhinolaryngology-Head & Neck Surgery  Discharge Summary      Patient Name: Susan Courtney  MRN: [id number]  Admission Date: 3/6/2020  Hospital Length of Stay: 0 days  Discharge Date and Time:  03/07/2020 7:39 AM  Attending Physician: Brant Pelaez, *   Discharging Provider: Brant Pelaez MD  Primary Care Provider: Archana Burgess MD     HPI:  Patient is a 60-year-old female with chronic tonsillitis additional medical problems including but not limited to anxiety, hypertension, diabetes, and obstructive sleep apnea.  She is admitted for tonsillectomy to address the chronic tonsillitis.    Procedure(s) (LRB):  TONSILLECTOMY (Bilateral)     Hospital Course:  The patient underwent a tonsillectomy without acute complications.  Postoperatively she was experiencing a significant amount of pain which was difficult to control.  Excessive use of pain medications caused some respiratory depression and it was felt best to admit the patient for observation, oxygenation, and pain control.  She did well with IV fluids, dose of IV a bupropion, additional oxygen and CPAP overnight.  She has done very well without significant complications tolerating an oral diet this morning and is in no distress. I have discussed with her liquid pain medications as well as the occasional use of Advil for pain control.    Consults:   Consults (From admission, onward)        Status Ordering Provider     Inpatient consult to Hospital Medicine  Once     Provider:  MD Daryl Corbin RUSSELL PATRICK          Significant Diagnostic Studies: Labs:   BMP:   Recent Labs   Lab 03/06/20  1841   *      K 4.3      CO2 27   BUN 13   CREATININE 0.9   CALCIUM 8.8   , CMP   Recent Labs   Lab 03/06/20  1841      K 4.3      CO2 27   *   BUN 13   CREATININE 0.9   CALCIUM 8.8   ANIONGAP 9   ESTGFRAFRICA >60   EGFRNONAA >60    and CBC No results for  input(s): WBC, HGB, HCT, PLT in the last 48 hours.    Pending Diagnostic Studies:     Procedure Component Value Units Date/Time    Specimen to Pathology, Surgery ENT [[id number]] Collected:  03/06/20 1311    Order Status:  Sent Lab Status:  In process Updated:  03/06/20 1311        Final Active Diagnoses:    Diagnosis Date Noted POA    PRINCIPAL PROBLEM:  Chronic tonsillitis [J35.01] 03/06/2020 Yes    Essential hypertension [I10] 03/06/2020 Yes     Chronic    Type 2 diabetes mellitus, without long-term current use of insulin [E11.9] 03/06/2020 Yes     Chronic    Hypoxia [R09.02] 03/06/2020 Yes    Anxiety [F41.9] 03/06/2020 Yes     Chronic    Asthma [J45.909] 03/06/2020 Yes     Chronic    Gastroesophageal reflux disease [K21.9] 07/03/2017 Yes     Chronic    Obstructive sleep apnea [G47.33] 08/28/2014 Yes     Chronic      Problems Resolved During this Admission:      Discharged Condition: good    Disposition: Home or Self Care    Follow Up:  Follow-up Information     Brant Pelaez MD In 1 week.    Specialty:  Otolaryngology  Contact information:  [address]  [facility] [address]  [phone]                 Patient Instructions:      Diet general   Order Comments: Tonsillectomy Diet     Diet general   Order Comments: Tonsillectomy Diet     Lifting restrictions     Call MD for:  persistent nausea and vomiting     Change dressing (specify)   Order Comments: Patient has the following activity restrictions: limited activity for one week.  No heavy lifting or strenuous activity, no driving for one week.     Lifting restrictions     Call MD for:  persistent nausea and vomiting     Change dressing (specify)   Order Comments: Patient has the following activity restrictions: limited activity for one week.  No heavy lifting or strenuous activity, no driving for one week.     Activity as tolerated     Activity as tolerated     Medications:  Reconciled Home Medications:      Medication List       START taking these medications    amoxicillin 400 mg/5 mL suspension  Commonly known as:  AMOXIL  Take 5 mLs (400 mg total) by mouth 2 (two) times daily. for 10 days        CHANGE how you take these medications    * HYDROcodone-acetaminophen 5-325 mg per tablet  Commonly known as:  NORCO  Take 1 tablet by mouth every 6 (six) hours as needed for Pain.  What changed:  Another medication with the same name was added. Make sure you understand how and when to take each.     * hydrocodone-apap 7.5-325 MG/15 ML oral solution  Commonly known as:  HYCET  Take 15 mLs by mouth every 4 (four) hours as needed for Pain.  What changed:  You were already taking a medication with the same name, and this prescription was added. Make sure you understand how and when to take each.     * ondansetron 4 MG Tbdl  Commonly known as:  ZOFRAN-ODT  Take 1 tablet (4 mg total) by mouth every 8 (eight) hours as needed.  What changed:  Another medication with the same name was added. Make sure you understand how and when to take each.     * ondansetron 8 MG Tbdl  Commonly known as:  ZOFRAN-ODT  Take 1 tablet (8 mg total) by mouth every 8 (eight) hours as needed.  What changed:  You were already taking a medication with the same name, and this prescription was added. Make sure you understand how and when to take each.         * This list has 4 medication(s) that are the same as other medications prescribed for you. Read the directions carefully, and ask your doctor or other care provider to review them with you.            CONTINUE taking these medications    amitriptyline 150 MG Tab  Commonly known as:  ELAVIL  Take 150 mg by mouth every evening.     atorvastatin 40 MG tablet  Commonly known as:  LIPITOR  Take 40 mg by mouth once daily.     budesonide-formoterol 160-4.5 mcg 160-4.5 mcg/actuation Hfaa  Commonly known as:  SYMBICORT  Inhale 2 puffs into the lungs every 12 (twelve) hours. Controller     citalopram 40 MG tablet  Commonly known as:   CELEXA  Take 40 mg by mouth once daily.     clonazePAM 2 MG Tab  Commonly known as:  KLONOPIN  Take 2 mg by mouth 2 (two) times daily as needed.     fluticasone propionate 50 mcg/actuation nasal spray  Commonly known as:  FLONASE  1 spray (50 mcg total) by Each Nare route 2 (two) times daily as needed.     hydroCHLOROthiazide 25 MG tablet  Commonly known as:  HYDRODIURIL  Take 25 mg by mouth once daily.     meclizine 25 mg tablet  Commonly known as:  ANTIVERT  Take 1 tablet (25 mg total) by mouth 3 (three) times daily as needed.     meloxicam 7.5 MG tablet  Commonly known as:  MOBIC  Take 7.5 mg by mouth once daily.     metFORMIN 500 mg 24hr tablet  Commonly known as:  FORTAMET  Take 500 mg by mouth 2 (two) times daily with meals.     naproxen 500 MG tablet  Commonly known as:  NAPROSYN  Take 1 tablet (500 mg total) by mouth 2 (two) times daily.     omeprazole 20 MG capsule  Commonly known as:  PRILOSEC  Take 20 mg by mouth once daily.     sumatriptan 50 MG tablet  Commonly known as:  IMITREX  Take 1 tablet (50 mg total) by mouth every 2 (two) hours as needed.     topiramate 100 MG tablet  Commonly known as:  TOPAMAX  Take 100 mg by mouth once daily.     traZODone 50 MG tablet  Commonly known as:  DESYREL  Take 50 mg by mouth every evening.     triazolam 0.25 MG Tab  Commonly known as:  HALCION  Take 0.125 mg by mouth nightly as needed.     Ventolin HFA 90 mcg/actuation inhaler  Generic drug:  albuterol  INHALE 2 PUFFS BY MOUTH EVERY 4 TO 6 HOURS AS NEEDED FOR COUGHING AND WHEEZING            Brant Pelaez MD  Otorhinolaryngology-Head & Neck Surgery  [facility]

## 2020-03-07 NOTE — PROGRESS NOTES
[facility] Hospital Medicine  Progress Note    Patient Name: Susan Courtney  MRN: [id number]  Patient Class: OP- Outpatient Recovery   Admission Date: 3/6/2020  Length of Stay: 0 days  Attending Physician: Brant Pelaez, *  Primary Care Provider: Archana Burgess MD        Subjective:     Principal Problem:Chronic tonsillitis        HPI:  Ms. Courtney is a 60/F with PMH DMII, HTN, JOSE, asthma, anxiety who presented to [facility] 03/06 for tonsillectomy with Dr. Pelaez. She reported she was in her usual state of health prior to presenting for procedure. Post-operatively she was found to be hypoxic and minimally interactive; pain control was titrated and she became more responsive but required supplemental oxygen for adequate saturations. She reported significant pain despite interventions. Hospital medicine was consulted for hypoxia / medical co-management. She reported that she has had similar hypoxia post-procedurally in the past. She notes some abdominal tenderness after having had a recent ventral hernia repair.    Overview/Hospital Course:  No notes on file    Interval History: No acute events. Feeling well. Throat sore.    Review of Systems   Constitutional: Negative for chills and fever.   Respiratory: Negative for cough and shortness of breath.    Cardiovascular: Negative for chest pain and palpitations.   Gastrointestinal: Negative for abdominal pain, nausea and vomiting.     Objective:     Vital Signs (Most Recent):  Temp: 97.6 °F (36.4 °C) (03/07/20 0740)  Pulse: 82 (03/07/20 0816)  Resp: 20 (03/07/20 0816)  BP: (!) 142/71 (03/07/20 0740)  SpO2: 97 % (03/07/20 0816) Vital Signs (24h Range):  Temp:  [97.5 °F (36.4 °C)-98.2 °F (36.8 °C)] 97.6 °F (36.4 °C)  Pulse:  [71-99] 82  Resp:  [16-20] 20  SpO2:  [91 %-100 %] 97 %  BP: (142-172)/(67-91) 142/71     Weight: 99.7 kg (219 lb 12.8 oz)  Body mass index is 35.48 kg/m².    Intake/Output Summary (Last 24 hours) at 3/7/2020 0947  Last  data filed at 3/7/2020 0600  Gross per 24 hour   Intake 1700 ml   Output 2 ml   Net 1698 ml      Physical Exam   Constitutional: She is oriented to person, place, and time. She appears well-developed. No distress.   HENT:   Head: Normocephalic and atraumatic.   Eyes: Conjunctivae are normal. Right eye exhibits no discharge. Left eye exhibits no discharge.   Cardiovascular: Normal rate and intact distal pulses.   Pulmonary/Chest: Effort normal. No respiratory distress.   Abdominal: Soft. There is no tenderness.   Musculoskeletal: Normal range of motion. She exhibits no edema.   Neurological: She is alert and oriented to person, place, and time.   Skin: Skin is warm and dry.   Nursing note and vitals reviewed.    Significant Labs:   BMP:  Recent Labs   Lab 03/06/20  1841      K 4.3      CO2 27   BUN 13   CREATININE 0.9   *   CALCIUM 8.8     Significant Imaging:   No new imaging this morning.      Assessment/Plan:      * Chronic tonsillitis  - Management, pain control as per primary.    Gastroesophageal reflux disease  - On omeprazole at home; continue PPI with pantoprazole 40mg PO daily while inpatient.    Asthma  - Home medication not available on formulary; start fluticasone-vilanterol 100-25mcg inhaled daily.  - Resume home medications upon discharge.    Anxiety  - Hold medications with sedative effect given hypoxia.  - OK to resume on discharge.    Hypoxia  - Hypoxia post-operatively in the setting of anesthetic use, pain medications.  - Resolved.    Type 2 diabetes mellitus, without long-term current use of insulin  - On metformin 1000mg PO BID at home; okay to continue / crush if too large to swallow effectively. If ER tablets were used instead would need to be whole, review of her prescription shows they should be crushable.  - Continue low-dose sliding scale insulin aspart 0-5U subQ TIDWM PRN while inpatient.    Essential hypertension  - Continue HCTZ 25mg PO daily.    Obstructive sleep  apnea  - Continue CPAP qHS.    VTE Risk Mitigation (From admission, onward)    None        Stable for discharge from medicine perspective. Please do not hesitate to contact me with any questions or concerns.     BERNARDO Russ MD  Department of Hospital Medicine   [facility]  [phone]

## 2020-03-07 NOTE — PROGRESS NOTES
Patient was seen and examined this morning.  He is in good spirits.  She is tolerating a liquid diet number pain seems to be much more controlled.  She had a good night sleeping with her CPAP.  She has no other major complaints and no additional complications. From the ENT standpoint, I believe it is safe for her to be discharged.  I will complete discharge paperwork pending medicine followup this morning.

## 2020-03-07 NOTE — ASSESSMENT & PLAN NOTE
- Home medication not available on formulary; start fluticasone-vilanterol 100-25mcg inhaled daily.  - Resume home medications upon discharge.

## 2020-03-07 NOTE — ASSESSMENT & PLAN NOTE
- On metformin 1000mg PO BID at home; okay to continue / crush if too large to swallow effectively. If ER tablets were used instead would need to be whole, review of her prescription shows they should be crushable.  - Continue low-dose sliding scale insulin aspart 0-5U subQ TIDWM PRN while inpatient.

## 2020-03-07 NOTE — PLAN OF CARE
Patient on 1.5L nc. Sats 96%. Cpap qhs, settings as documented. Pt. in no distress, will continue to monitor.

## 2020-03-07 NOTE — PLAN OF CARE
Free from falls, injury, or skin breakdown this hospital admission.Pt eager & in agreement w/ DC. VU of DC instructions and the need to attend follow-up appointment-paperwork passed & explained-- filled prescriptions at the bedside. IV removed w/ cath tip intact, WNL. Voiding, ambulating, & tolerating PO well. To be DCd home w/ family--will be escorted downstairs via  transport team once dressed, ready & ride arrives.Pt discharged in no distress.

## 2020-03-07 NOTE — PROGRESS NOTES
Admitted for iv fluids and pain control.  Will have hospital medicine follow patient as well  No acute problems

## 2020-03-07 NOTE — PLAN OF CARE
Plan of care discussed with pt. Pt AOx4.No issues overnight. Anxious to get discharged this morning.  Managing pain with PRN Liquid med. Tolerating clear liquid diet. VSS. Slept with CPAP. Denies no SOB. BG >200 covered with insulin sliding scale. Pt free of injuries this shift.  All questions addressed.  Will continue to monitor.

## 2020-03-13 LAB — FINAL PATHOLOGIC DIAGNOSIS: NORMAL

## 2020-03-19 ENCOUNTER — TELEPHONE (OUTPATIENT)
Dept: NEUROLOGY | Facility: CLINIC | Age: 60
End: 2020-03-19

## 2020-07-17 ENCOUNTER — TELEPHONE (OUTPATIENT)
Dept: NEUROLOGY | Facility: CLINIC | Age: 60
End: 2020-07-17

## 2020-07-17 NOTE — TELEPHONE ENCOUNTER
----- Message from Juilo Germain III, MD sent at 7/17/2020  2:46 PM CDT -----  Regarding: FW: Missed call  Contact: Tatiana  I didn't call this patient?  ----- Message -----  From: Yesenia Holman  Sent: 7/17/2020   2:42 PM CDT  To: Julio Germain III, MD  Subject: Missed call                                      Ms. Courtney was returning your call. She can be reached at [phone]

## 2020-07-17 NOTE — TELEPHONE ENCOUNTER
Spoke with patient we have not called her. She asked if theres openings soon and informed her at the moment we don't have soon appointment and she stated she will keep an eye on Procyrion reji for cancellations.

## 2020-10-04 ENCOUNTER — HOSPITAL ENCOUNTER (EMERGENCY)
Facility: OTHER | Age: 60
Discharge: HOME | End: 2020-10-04
Attending: EMERGENCY MEDICINE
Payer: MEDICARE

## 2020-10-04 VITALS
TEMPERATURE: 98 F | DIASTOLIC BLOOD PRESSURE: 70 MMHG | BODY MASS INDEX: 33.89 KG/M2 | RESPIRATION RATE: 18 BRPM | OXYGEN SATURATION: 100 % | WEIGHT: 210 LBS | HEART RATE: 84 BPM | SYSTOLIC BLOOD PRESSURE: 153 MMHG

## 2020-10-04 DIAGNOSIS — G43.009 MIGRAINE WITHOUT AURA AND WITHOUT STATUS MIGRAINOSUS, NOT INTRACTABLE: Primary | ICD-10-CM

## 2020-10-04 PROCEDURE — 63600175 PHARM REV CODE 636 W HCPCS: Performed by: PHYSICIAN ASSISTANT

## 2020-10-04 PROCEDURE — 96374 THER/PROPH/DIAG INJ IV PUSH: CPT

## 2020-10-04 PROCEDURE — 99284 EMERGENCY DEPT VISIT MOD MDM: CPT | Mod: 25

## 2020-10-04 PROCEDURE — 96375 TX/PRO/DX INJ NEW DRUG ADDON: CPT

## 2020-10-04 PROCEDURE — 96361 HYDRATE IV INFUSION ADD-ON: CPT

## 2020-10-04 PROCEDURE — 25000003 PHARM REV CODE 250: Performed by: PHYSICIAN ASSISTANT

## 2020-10-04 RX ORDER — DIPHENHYDRAMINE HYDROCHLORIDE 50 MG/ML
25 INJECTION INTRAMUSCULAR; INTRAVENOUS
Status: COMPLETED | OUTPATIENT
Start: 2020-10-04 | End: 2020-10-04

## 2020-10-04 RX ORDER — METOCLOPRAMIDE HYDROCHLORIDE 5 MG/ML
5 INJECTION INTRAMUSCULAR; INTRAVENOUS
Status: COMPLETED | OUTPATIENT
Start: 2020-10-04 | End: 2020-10-04

## 2020-10-04 RX ORDER — KETOROLAC TROMETHAMINE 30 MG/ML
15 INJECTION, SOLUTION INTRAMUSCULAR; INTRAVENOUS
Status: COMPLETED | OUTPATIENT
Start: 2020-10-04 | End: 2020-10-04

## 2020-10-04 RX ADMIN — KETOROLAC TROMETHAMINE 15 MG: 30 INJECTION, SOLUTION INTRAMUSCULAR at 02:10

## 2020-10-04 RX ADMIN — DIPHENHYDRAMINE HYDROCHLORIDE 25 MG: 50 INJECTION, SOLUTION INTRAMUSCULAR; INTRAVENOUS at 02:10

## 2020-10-04 RX ADMIN — METOCLOPRAMIDE 5 MG: 5 INJECTION, SOLUTION INTRAMUSCULAR; INTRAVENOUS at 02:10

## 2020-10-04 RX ADMIN — SODIUM CHLORIDE 1000 ML: 0.9 INJECTION, SOLUTION INTRAVENOUS at 02:10

## 2020-10-04 NOTE — ED PROVIDER NOTES
Encounter Date: 10/4/2020       History     Chief Complaint   Patient presents with    Headache     unrelieved by goody at 1am. pt denies vision change, n/v. PMH migraine      Afebrile 60-year-old female with PMH of abdominal hernia, anxiety, asthma, DM, HTN, migraine headaches, obesity and sleep apnea presents the ED for evaluation headache.  Patient states that headache began gradually yesterday.  She states that it felt typical of her migraine.  She states that she did try her previously prescribed Topamax with no improvement.  She states that she also attempted a good he with only modest improvement.  She states that she was able to sleep somewhat however woke up in the middle night and continued with the headache.  She does report some associated photophobia and that the pain is located to the left occipital area and radiates to the from.  She states this distribution and associated photophobia is common with her previous migraines.  She denies any additional complaints including vision changes, or a, nausea, vomiting, numbness, or weakness.  She is unsure of her migraine triggers believes that something that she ate.  This is the extent of her complaints at this time.    The history is provided by the patient.     Review of patient's allergies indicates:   Allergen Reactions    Morphine Itching     Not sure what she can take for pain.    Morphine sulfate      Itchy (skin)^     Past Medical History:   Diagnosis Date    Abdominal hernia     Anxiety     Asthma     Diabetes mellitus     Hypertension     Migraine     Migraine headache     Obesity     Sleep apnea      Past Surgical History:   Procedure Laterality Date    CHOLECYSTECTOMY      FOOT SURGERY      HERNIA REPAIR      HYSTERECTOMY      complete    TONSILLECTOMY Bilateral 3/6/2020    Procedure: TONSILLECTOMY;  Surgeon: Brant Pelaez MD;  [facility];  Service: ENT;  Laterality: Bilateral;     Family History   Problem  Relation Age of Onset    Psoriasis Neg Hx     Melanoma Neg Hx     Lupus Neg Hx      Social History     Tobacco Use    Smoking status: Never Smoker    Smokeless tobacco: Never Used   Substance Use Topics    Alcohol use: No    Drug use: No     Review of Systems   Constitutional: Negative for chills and fever.   HENT: Negative for congestion and sore throat.    Eyes: Positive for photophobia.   Respiratory: Negative for shortness of breath.    Cardiovascular: Negative for chest pain.   Gastrointestinal: Negative for nausea and vomiting.   Endocrine: Negative for polydipsia and polyuria.   Genitourinary: Negative for dysuria.   Musculoskeletal: Negative for arthralgias, back pain and neck pain.   Skin: Negative for rash.   Neurological: Positive for headaches. Negative for dizziness, syncope, weakness and light-headedness.   Hematological: Does not bruise/bleed easily.   Psychiatric/Behavioral: Negative for confusion.       Physical Exam     Initial Vitals [10/04/20 1325]   BP Pulse Resp Temp SpO2   (!) 164/79 95 18 98.3 °F (36.8 °C) 97 %      MAP       --         Physical Exam    Nursing note and vitals reviewed.  Constitutional: Vital signs are normal. She appears well-developed and well-nourished. She is cooperative.  Non-toxic appearance. She does not appear ill. She appears distressed.   Patient has some mild distress sitting in dark room   HENT:   Head: Normocephalic and atraumatic.   Eyes: Conjunctivae and lids are normal.   Neck: Neck supple. No neck rigidity.   Cardiovascular: Normal rate and regular rhythm.   Pulmonary/Chest: Breath sounds normal. No respiratory distress. She has no wheezes. She has no rhonchi.   Abdominal: Soft. Normal appearance and bowel sounds are normal. There is no abdominal tenderness. There is no rigidity and no guarding.   Musculoskeletal: Normal range of motion.   Neurological: She is alert and oriented to person, place, and time. She has normal strength. GCS score is 15. GCS  eye subscore is 4. GCS verbal subscore is 5. GCS motor subscore is 6.   Skin: Skin is warm, dry and intact. No rash noted.   Psychiatric: She has a normal mood and affect. Her speech is normal and behavior is normal. Thought content normal.         ED Course   Procedures  Labs Reviewed - No data to display       Imaging Results    None          Medical Decision Making:   Initial Assessment:   Nontoxic-appearing female in some distress presents to the ED for evaluation of headache.  Patient has history of migraines.  She states headache today is very typical of her previous migraines.  She states pain located to the left side.  She does report some associated photophobia.  Denies any other symptoms.  HEENT exam is unremarkable.  Lungs CTA; heart regular rate rhythm.  Fair range of motion of extremities with strength equal bilaterally.  Skin free of rash, pallor diaphoresis.  No focal neuro deficit.  Differential Diagnosis:   Differential Diagnosis includes, but is not limited to:  Ischemic stroke, hemorrhagic stroke, subarachnoid hemorrhage/ruptured aneurysm, intracranial lesion/mass, meningitis/encephalitis, epidural hematoma, subdural hematoma, pseudotumor cerebri, venous sinus thrombosis, CO poisoning, hypertensive encephalopathy, MI/ACS, head trauma/contusion, concussion, sinus headache, dehydration, anxiety, medication non-compliance, primary headache (tension/cluster/migraine).    ED Management:  Emergent evaluation of acute gradual onset of headache in this patient with known history of migraines.  Discuss at this time does not appear to be focal neuro deficit and low suspicion of acute emergent process given that the pain today is similar to previous migraines and Patient has no neck stiffness/meningismus, fever, elevated blood pressure, confusion, vision loss, temporal artery tenderness, rash, vomiting, photophobia or thunderclap onset to suggest pseudotumor cerebri, meningitis, tumor, ICH, temporal  arteritis, or encephalitis.  Patient did report moderate improvement pain in the ED with IV fluids, Toradol, Benadryl and Reglan.  On reassessment patient did feel comfortable going home.  We did emphasize having follow-up with her neurologist to discuss further treatment for her migraines. Strict instructions to follow up with primary care physician or reference provided for further assessment and evaluation. Given instructions to return for any acute symptoms and verbalized understanding of this medical plan.                                 Clinical Impression:       ICD-10-CM ICD-9-CM   1. Migraine without aura and without status migrainosus, not intractable  G43.009 346.10                          ED Disposition Condition    Discharge Stable        ED Prescriptions     None        Follow-up Information     Follow up With Specialties Details Why Contact Info    Your neurologist  Schedule an appointment as soon as possible for a visit       [facility]  If symptoms worsen [address] [facility] [address]  [phone]                                       SALINA Patten  10/04/20 6291

## 2020-10-04 NOTE — ED TRIAGE NOTES
Pt presents to the ED w/ c/o left sided headache onset yesterday.  Reports photophobia, sensitivity to sound and PMH of migraines.  Took OTC Goody's and other rx migraine medications without relief.  Denies nausea, vomiting, vision changes, trauma.

## 2020-10-04 NOTE — Clinical Note
"Susan Dubosezaria Courtney was seen and treated in our emergency department on 10/4/2020.  She may return to work on 10/07/2020.       If you have any questions or concerns, please don't hesitate to call.      SALINA Patten"

## 2021-04-02 ENCOUNTER — HOSPITAL ENCOUNTER (EMERGENCY)
Facility: OTHER | Age: 61
Discharge: HOME | End: 2021-04-02
Attending: EMERGENCY MEDICINE
Payer: MEDICARE

## 2021-04-02 VITALS
TEMPERATURE: 98 F | RESPIRATION RATE: 18 BRPM | DIASTOLIC BLOOD PRESSURE: 69 MMHG | HEIGHT: 65 IN | HEART RATE: 84 BPM | BODY MASS INDEX: 41.65 KG/M2 | WEIGHT: 250 LBS | OXYGEN SATURATION: 98 % | SYSTOLIC BLOOD PRESSURE: 117 MMHG

## 2021-04-02 DIAGNOSIS — M79.644 FINGER PAIN, RIGHT: Primary | ICD-10-CM

## 2021-04-02 PROCEDURE — 29130 APPL FINGER SPLINT STATIC: CPT | Mod: RT

## 2021-04-02 PROCEDURE — 99283 EMERGENCY DEPT VISIT LOW MDM: CPT | Mod: 25

## 2021-04-02 RX ORDER — NAPROXEN 500 MG/1
500 TABLET ORAL 2 TIMES DAILY WITH MEALS
Qty: 30 TABLET | Refills: 0 | Status: SHIPPED | OUTPATIENT
Start: 2021-04-02 | End: 2021-09-08

## 2021-05-04 ENCOUNTER — HOSPITAL ENCOUNTER (EMERGENCY)
Facility: OTHER | Age: 61
Discharge: HOME | End: 2021-05-04
Attending: EMERGENCY MEDICINE
Payer: MEDICARE

## 2021-05-04 VITALS
SYSTOLIC BLOOD PRESSURE: 124 MMHG | DIASTOLIC BLOOD PRESSURE: 60 MMHG | HEIGHT: 66 IN | RESPIRATION RATE: 19 BRPM | HEART RATE: 70 BPM | TEMPERATURE: 98 F | BODY MASS INDEX: 33.75 KG/M2 | OXYGEN SATURATION: 100 % | WEIGHT: 210 LBS

## 2021-05-04 DIAGNOSIS — K59.00 CONSTIPATION, UNSPECIFIED CONSTIPATION TYPE: Primary | ICD-10-CM

## 2021-05-04 DIAGNOSIS — R07.9 CHEST PAIN: ICD-10-CM

## 2021-05-04 LAB
ALBUMIN SERPL BCP-MCNC: 3.1 G/DL (ref 3.5–5.2)
ALP SERPL-CCNC: 127 U/L (ref 55–135)
ALT SERPL W/O P-5'-P-CCNC: 18 U/L (ref 10–44)
ANION GAP SERPL CALC-SCNC: 13 MMOL/L (ref 8–16)
AST SERPL-CCNC: 18 U/L (ref 10–40)
BASOPHILS # BLD AUTO: 0.04 K/UL (ref 0–0.2)
BASOPHILS NFR BLD: 0.4 % (ref 0–1.9)
BILIRUB SERPL-MCNC: 0.5 MG/DL (ref 0.1–1)
BILIRUB UR QL STRIP: NEGATIVE
BUN SERPL-MCNC: 12 MG/DL (ref 6–20)
CALCIUM SERPL-MCNC: 9.1 MG/DL (ref 8.7–10.5)
CHLORIDE SERPL-SCNC: 97 MMOL/L (ref 95–110)
CLARITY UR: CLEAR
CO2 SERPL-SCNC: 29 MMOL/L (ref 23–29)
COLOR UR: YELLOW
CREAT SERPL-MCNC: 0.9 MG/DL (ref 0.5–1.4)
DIFFERENTIAL METHOD: ABNORMAL
EOSINOPHIL # BLD AUTO: 0.3 K/UL (ref 0–0.5)
EOSINOPHIL NFR BLD: 2.4 % (ref 0–8)
ERYTHROCYTE [DISTWIDTH] IN BLOOD BY AUTOMATED COUNT: 14.6 % (ref 11.5–14.5)
EST. GFR  (AFRICAN AMERICAN): >60 ML/MIN/1.73 M^2
EST. GFR  (NON AFRICAN AMERICAN): >60 ML/MIN/1.73 M^2
GLUCOSE SERPL-MCNC: 198 MG/DL (ref 70–110)
GLUCOSE UR QL STRIP: NEGATIVE
HCT VFR BLD AUTO: 37.9 % (ref 37–48.5)
HGB BLD-MCNC: 11.9 G/DL (ref 12–16)
HGB UR QL STRIP: ABNORMAL
IMM GRANULOCYTES # BLD AUTO: 0.05 K/UL (ref 0–0.04)
IMM GRANULOCYTES NFR BLD AUTO: 0.5 % (ref 0–0.5)
KETONES UR QL STRIP: NEGATIVE
LEUKOCYTE ESTERASE UR QL STRIP: NEGATIVE
LIPASE SERPL-CCNC: 11 U/L (ref 4–60)
LYMPHOCYTES # BLD AUTO: 2.8 K/UL (ref 1–4.8)
LYMPHOCYTES NFR BLD: 25.5 % (ref 18–48)
MCH RBC QN AUTO: 25.6 PG (ref 27–31)
MCHC RBC AUTO-ENTMCNC: 31.4 G/DL (ref 32–36)
MCV RBC AUTO: 82 FL (ref 82–98)
MONOCYTES # BLD AUTO: 0.6 K/UL (ref 0.3–1)
MONOCYTES NFR BLD: 5.7 % (ref 4–15)
NEUTROPHILS # BLD AUTO: 7.1 K/UL (ref 1.8–7.7)
NEUTROPHILS NFR BLD: 65.5 % (ref 38–73)
NITRITE UR QL STRIP: NEGATIVE
NRBC BLD-RTO: 0 /100 WBC
PH UR STRIP: 6 [PH] (ref 5–8)
PLATELET # BLD AUTO: 371 K/UL (ref 150–450)
PMV BLD AUTO: 8.8 FL (ref 9.2–12.9)
POTASSIUM SERPL-SCNC: 3.8 MMOL/L (ref 3.5–5.1)
PROT SERPL-MCNC: 8 G/DL (ref 6–8.4)
PROT UR QL STRIP: NEGATIVE
RBC # BLD AUTO: 4.64 M/UL (ref 4–5.4)
SODIUM SERPL-SCNC: 139 MMOL/L (ref 136–145)
SP GR UR STRIP: 1.01 (ref 1–1.03)
TROPONIN I SERPL DL<=0.01 NG/ML-MCNC: <0.006 NG/ML (ref 0–0.03)
URN SPEC COLLECT METH UR: ABNORMAL
UROBILINOGEN UR STRIP-ACNC: 1 EU/DL
WBC # BLD AUTO: 10.84 K/UL (ref 3.9–12.7)

## 2021-05-04 PROCEDURE — 80053 COMPREHEN METABOLIC PANEL: CPT | Performed by: PHYSICIAN ASSISTANT

## 2021-05-04 PROCEDURE — 93005 ELECTROCARDIOGRAM TRACING: CPT

## 2021-05-04 PROCEDURE — 99285 EMERGENCY DEPT VISIT HI MDM: CPT | Mod: 25

## 2021-05-04 PROCEDURE — 85025 COMPLETE CBC W/AUTO DIFF WBC: CPT | Performed by: PHYSICIAN ASSISTANT

## 2021-05-04 PROCEDURE — 83690 ASSAY OF LIPASE: CPT | Performed by: PHYSICIAN ASSISTANT

## 2021-05-04 PROCEDURE — 93010 EKG 12-LEAD: ICD-10-PCS | Mod: ,,, | Performed by: INTERNAL MEDICINE

## 2021-05-04 PROCEDURE — 81003 URINALYSIS AUTO W/O SCOPE: CPT | Performed by: PHYSICIAN ASSISTANT

## 2021-05-04 PROCEDURE — 93010 ELECTROCARDIOGRAM REPORT: CPT | Mod: ,,, | Performed by: INTERNAL MEDICINE

## 2021-05-04 PROCEDURE — 36000 PLACE NEEDLE IN VEIN: CPT

## 2021-05-04 PROCEDURE — 84484 ASSAY OF TROPONIN QUANT: CPT | Performed by: PHYSICIAN ASSISTANT

## 2021-05-04 PROCEDURE — 25000003 PHARM REV CODE 250: Performed by: EMERGENCY MEDICINE

## 2021-05-04 PROCEDURE — 25500020 PHARM REV CODE 255: Performed by: EMERGENCY MEDICINE

## 2021-05-04 RX ORDER — ASPIRIN 325 MG
325 TABLET, DELAYED RELEASE (ENTERIC COATED) ORAL
Status: DISCONTINUED | OUTPATIENT
Start: 2021-05-04 | End: 2021-05-04 | Stop reason: HOSPADM

## 2021-05-04 RX ORDER — POLYETHYLENE GLYCOL 3350 17 G/17G
17 POWDER, FOR SOLUTION ORAL 3 TIMES DAILY
Qty: 100 EACH | Refills: 0 | Status: SHIPPED | OUTPATIENT
Start: 2021-05-04 | End: 2023-01-18 | Stop reason: ALTCHOICE

## 2021-05-04 RX ORDER — METOPROLOL SUCCINATE 50 MG/1
100 TABLET, EXTENDED RELEASE ORAL DAILY
COMMUNITY
End: 2021-11-16 | Stop reason: SDUPTHER

## 2021-05-04 RX ADMIN — IOHEXOL 100 ML: 350 INJECTION, SOLUTION INTRAVENOUS at 12:05

## 2021-05-04 RX ADMIN — LIDOCAINE HYDROCHLORIDE 50 ML: 20 SOLUTION ORAL; TOPICAL at 01:05

## 2021-05-20 ENCOUNTER — HOSPITAL ENCOUNTER (EMERGENCY)
Facility: OTHER | Age: 61
Discharge: HOME | End: 2021-05-20
Attending: EMERGENCY MEDICINE
Payer: MEDICARE

## 2021-05-20 VITALS
WEIGHT: 204 LBS | HEART RATE: 98 BPM | HEIGHT: 65 IN | DIASTOLIC BLOOD PRESSURE: 65 MMHG | OXYGEN SATURATION: 100 % | RESPIRATION RATE: 18 BRPM | SYSTOLIC BLOOD PRESSURE: 145 MMHG | TEMPERATURE: 99 F | BODY MASS INDEX: 33.99 KG/M2

## 2021-05-20 DIAGNOSIS — R10.9 ABDOMINAL PAIN, UNSPECIFIED ABDOMINAL LOCATION: Primary | ICD-10-CM

## 2021-05-20 DIAGNOSIS — K52.9 GASTROENTERITIS: ICD-10-CM

## 2021-05-20 LAB
ALBUMIN SERPL BCP-MCNC: 3.2 G/DL (ref 3.5–5.2)
ALP SERPL-CCNC: 130 U/L (ref 55–135)
ALT SERPL W/O P-5'-P-CCNC: 16 U/L (ref 10–44)
ANION GAP SERPL CALC-SCNC: 8 MMOL/L (ref 8–16)
AST SERPL-CCNC: 13 U/L (ref 10–40)
BASOPHILS # BLD AUTO: 0.04 K/UL (ref 0–0.2)
BASOPHILS NFR BLD: 0.4 % (ref 0–1.9)
BILIRUB SERPL-MCNC: 0.4 MG/DL (ref 0.1–1)
BILIRUB UR QL STRIP: NEGATIVE
BUN SERPL-MCNC: 10 MG/DL (ref 6–20)
CALCIUM SERPL-MCNC: 9.1 MG/DL (ref 8.7–10.5)
CHLORIDE SERPL-SCNC: 100 MMOL/L (ref 95–110)
CLARITY UR: CLEAR
CO2 SERPL-SCNC: 31 MMOL/L (ref 23–29)
COLOR UR: YELLOW
CREAT SERPL-MCNC: 0.8 MG/DL (ref 0.5–1.4)
DIFFERENTIAL METHOD: ABNORMAL
EOSINOPHIL # BLD AUTO: 0.5 K/UL (ref 0–0.5)
EOSINOPHIL NFR BLD: 4.7 % (ref 0–8)
ERYTHROCYTE [DISTWIDTH] IN BLOOD BY AUTOMATED COUNT: 14.6 % (ref 11.5–14.5)
EST. GFR  (AFRICAN AMERICAN): >60 ML/MIN/1.73 M^2
EST. GFR  (NON AFRICAN AMERICAN): >60 ML/MIN/1.73 M^2
GLUCOSE SERPL-MCNC: 113 MG/DL (ref 70–110)
GLUCOSE UR QL STRIP: NEGATIVE
HCT VFR BLD AUTO: 37.6 % (ref 37–48.5)
HGB BLD-MCNC: 12.1 G/DL (ref 12–16)
HGB UR QL STRIP: ABNORMAL
IMM GRANULOCYTES # BLD AUTO: 0.06 K/UL (ref 0–0.04)
IMM GRANULOCYTES NFR BLD AUTO: 0.5 % (ref 0–0.5)
KETONES UR QL STRIP: NEGATIVE
LEUKOCYTE ESTERASE UR QL STRIP: NEGATIVE
LIPASE SERPL-CCNC: 17 U/L (ref 4–60)
LYMPHOCYTES # BLD AUTO: 3.1 K/UL (ref 1–4.8)
LYMPHOCYTES NFR BLD: 27.5 % (ref 18–48)
MCH RBC QN AUTO: 26.2 PG (ref 27–31)
MCHC RBC AUTO-ENTMCNC: 32.2 G/DL (ref 32–36)
MCV RBC AUTO: 81 FL (ref 82–98)
MONOCYTES # BLD AUTO: 0.6 K/UL (ref 0.3–1)
MONOCYTES NFR BLD: 5.6 % (ref 4–15)
NEUTROPHILS # BLD AUTO: 7 K/UL (ref 1.8–7.7)
NEUTROPHILS NFR BLD: 61.3 % (ref 38–73)
NITRITE UR QL STRIP: NEGATIVE
NRBC BLD-RTO: 0 /100 WBC
PH UR STRIP: 6 [PH] (ref 5–8)
PLATELET # BLD AUTO: 305 K/UL (ref 150–450)
PMV BLD AUTO: 9 FL (ref 9.2–12.9)
POTASSIUM SERPL-SCNC: 3.4 MMOL/L (ref 3.5–5.1)
PROT SERPL-MCNC: 8.1 G/DL (ref 6–8.4)
PROT UR QL STRIP: NEGATIVE
RBC # BLD AUTO: 4.62 M/UL (ref 4–5.4)
SODIUM SERPL-SCNC: 139 MMOL/L (ref 136–145)
SP GR UR STRIP: <=1.005 (ref 1–1.03)
URN SPEC COLLECT METH UR: ABNORMAL
UROBILINOGEN UR STRIP-ACNC: NEGATIVE EU/DL
WBC # BLD AUTO: 11.38 K/UL (ref 3.9–12.7)

## 2021-05-20 PROCEDURE — 96361 HYDRATE IV INFUSION ADD-ON: CPT

## 2021-05-20 PROCEDURE — 96375 TX/PRO/DX INJ NEW DRUG ADDON: CPT

## 2021-05-20 PROCEDURE — 63600175 PHARM REV CODE 636 W HCPCS: Performed by: PHYSICIAN ASSISTANT

## 2021-05-20 PROCEDURE — 80053 COMPREHEN METABOLIC PANEL: CPT | Performed by: PHYSICIAN ASSISTANT

## 2021-05-20 PROCEDURE — 25000003 PHARM REV CODE 250: Performed by: NURSE PRACTITIONER

## 2021-05-20 PROCEDURE — 81003 URINALYSIS AUTO W/O SCOPE: CPT | Performed by: PHYSICIAN ASSISTANT

## 2021-05-20 PROCEDURE — 63600175 PHARM REV CODE 636 W HCPCS: Performed by: NURSE PRACTITIONER

## 2021-05-20 PROCEDURE — 96372 THER/PROPH/DIAG INJ SC/IM: CPT | Mod: 59

## 2021-05-20 PROCEDURE — 96374 THER/PROPH/DIAG INJ IV PUSH: CPT

## 2021-05-20 PROCEDURE — 25500020 PHARM REV CODE 255: Performed by: EMERGENCY MEDICINE

## 2021-05-20 PROCEDURE — 99284 EMERGENCY DEPT VISIT MOD MDM: CPT | Mod: 25

## 2021-05-20 PROCEDURE — 63600175 PHARM REV CODE 636 W HCPCS: Performed by: EMERGENCY MEDICINE

## 2021-05-20 PROCEDURE — 85025 COMPLETE CBC W/AUTO DIFF WBC: CPT | Performed by: PHYSICIAN ASSISTANT

## 2021-05-20 PROCEDURE — 83690 ASSAY OF LIPASE: CPT | Performed by: PHYSICIAN ASSISTANT

## 2021-05-20 PROCEDURE — 25000003 PHARM REV CODE 250: Performed by: EMERGENCY MEDICINE

## 2021-05-20 RX ORDER — DICYCLOMINE HYDROCHLORIDE 10 MG/ML
20 INJECTION INTRAMUSCULAR
Status: COMPLETED | OUTPATIENT
Start: 2021-05-20 | End: 2021-05-20

## 2021-05-20 RX ORDER — ONDANSETRON 2 MG/ML
4 INJECTION INTRAMUSCULAR; INTRAVENOUS
Status: COMPLETED | OUTPATIENT
Start: 2021-05-20 | End: 2021-05-20

## 2021-05-20 RX ORDER — MORPHINE SULFATE 10 MG/ML
4 INJECTION INTRAMUSCULAR; INTRAVENOUS; SUBCUTANEOUS ONCE
Status: DISCONTINUED | OUTPATIENT
Start: 2021-05-20 | End: 2021-05-20

## 2021-05-20 RX ORDER — DIPHENOXYLATE HYDROCHLORIDE AND ATROPINE SULFATE 2.5; .025 MG/1; MG/1
1 TABLET ORAL
Status: COMPLETED | OUTPATIENT
Start: 2021-05-20 | End: 2021-05-20

## 2021-05-20 RX ORDER — ONDANSETRON 4 MG/1
4 TABLET, ORALLY DISINTEGRATING ORAL EVERY 8 HOURS PRN
Qty: 15 TABLET | Refills: 0 | Status: SHIPPED | OUTPATIENT
Start: 2021-05-20 | End: 2023-01-18 | Stop reason: ALTCHOICE

## 2021-05-20 RX ORDER — OXYCODONE AND ACETAMINOPHEN 5; 325 MG/1; MG/1
1 TABLET ORAL EVERY 6 HOURS PRN
Qty: 12 TABLET | Refills: 0 | Status: SHIPPED | OUTPATIENT
Start: 2021-05-20 | End: 2021-05-23

## 2021-05-20 RX ORDER — HYDROMORPHONE HYDROCHLORIDE 1 MG/ML
0.5 INJECTION, SOLUTION INTRAMUSCULAR; INTRAVENOUS; SUBCUTANEOUS
Status: COMPLETED | OUTPATIENT
Start: 2021-05-20 | End: 2021-05-20

## 2021-05-20 RX ORDER — FENTANYL CITRATE 50 UG/ML
75 INJECTION, SOLUTION INTRAMUSCULAR; INTRAVENOUS
Status: COMPLETED | OUTPATIENT
Start: 2021-05-20 | End: 2021-05-20

## 2021-05-20 RX ADMIN — ONDANSETRON 4 MG: 2 INJECTION INTRAMUSCULAR; INTRAVENOUS at 08:05

## 2021-05-20 RX ADMIN — FENTANYL CITRATE 75 MCG: 50 INJECTION, SOLUTION INTRAMUSCULAR; INTRAVENOUS at 11:05

## 2021-05-20 RX ADMIN — DIPHENOXYLATE HYDROCHLORIDE AND ATROPINE SULFATE 1 TABLET: 2.5; .025 TABLET ORAL at 10:05

## 2021-05-20 RX ADMIN — IOHEXOL 100 ML: 350 INJECTION, SOLUTION INTRAVENOUS at 09:05

## 2021-05-20 RX ADMIN — DICYCLOMINE HYDROCHLORIDE 20 MG: 20 INJECTION, SOLUTION INTRAMUSCULAR at 08:05

## 2021-05-20 RX ADMIN — SODIUM CHLORIDE 1000 ML: 0.9 INJECTION, SOLUTION INTRAVENOUS at 08:05

## 2021-05-20 RX ADMIN — HYDROMORPHONE HYDROCHLORIDE 0.5 MG: 1 INJECTION, SOLUTION INTRAMUSCULAR; INTRAVENOUS; SUBCUTANEOUS at 09:05

## 2021-07-26 ENCOUNTER — OFFICE VISIT (OUTPATIENT)
Dept: PAIN MEDICINE | Facility: CLINIC | Age: 61
End: 2021-07-26
Payer: MEDICARE

## 2021-07-26 VITALS
TEMPERATURE: 98 F | WEIGHT: 200 LBS | SYSTOLIC BLOOD PRESSURE: 107 MMHG | HEART RATE: 83 BPM | HEIGHT: 65 IN | RESPIRATION RATE: 18 BRPM | BODY MASS INDEX: 33.32 KG/M2 | DIASTOLIC BLOOD PRESSURE: 69 MMHG

## 2021-07-26 DIAGNOSIS — M19.012 PRIMARY OSTEOARTHRITIS OF LEFT SHOULDER: ICD-10-CM

## 2021-07-26 DIAGNOSIS — M25.562 CHRONIC PAIN OF LEFT KNEE: ICD-10-CM

## 2021-07-26 DIAGNOSIS — G89.4 CHRONIC PAIN DISORDER: ICD-10-CM

## 2021-07-26 DIAGNOSIS — G89.29 CHRONIC PAIN OF LEFT KNEE: ICD-10-CM

## 2021-07-26 DIAGNOSIS — M17.12 PRIMARY OSTEOARTHRITIS OF LEFT KNEE: Primary | ICD-10-CM

## 2021-07-26 PROCEDURE — 99999 PR PBB SHADOW E&M-EST. PATIENT-LVL V: ICD-10-PCS | Mod: PBBFAC,,, | Performed by: ANESTHESIOLOGY

## 2021-07-26 PROCEDURE — 1125F PR PAIN SEVERITY QUANTIFIED, PAIN PRESENT: ICD-10-PCS | Mod: CPTII,S$GLB,, | Performed by: ANESTHESIOLOGY

## 2021-07-26 PROCEDURE — 99204 OFFICE O/P NEW MOD 45 MIN: CPT | Mod: S$GLB,,, | Performed by: ANESTHESIOLOGY

## 2021-07-26 PROCEDURE — 1159F PR MEDICATION LIST DOCUMENTED IN MEDICAL RECORD: ICD-10-PCS | Mod: CPTII,S$GLB,, | Performed by: ANESTHESIOLOGY

## 2021-07-26 PROCEDURE — 1160F RVW MEDS BY RX/DR IN RCRD: CPT | Mod: CPTII,S$GLB,, | Performed by: ANESTHESIOLOGY

## 2021-07-26 PROCEDURE — 3008F BODY MASS INDEX DOCD: CPT | Mod: CPTII,S$GLB,, | Performed by: ANESTHESIOLOGY

## 2021-07-26 PROCEDURE — 3078F DIAST BP <80 MM HG: CPT | Mod: CPTII,S$GLB,, | Performed by: ANESTHESIOLOGY

## 2021-07-26 PROCEDURE — 99204 PR OFFICE/OUTPT VISIT, NEW, LEVL IV, 45-59 MIN: ICD-10-PCS | Mod: S$GLB,,, | Performed by: ANESTHESIOLOGY

## 2021-07-26 PROCEDURE — 3078F PR MOST RECENT DIASTOLIC BLOOD PRESSURE < 80 MM HG: ICD-10-PCS | Mod: CPTII,S$GLB,, | Performed by: ANESTHESIOLOGY

## 2021-07-26 PROCEDURE — 3074F SYST BP LT 130 MM HG: CPT | Mod: CPTII,S$GLB,, | Performed by: ANESTHESIOLOGY

## 2021-07-26 PROCEDURE — 3074F PR MOST RECENT SYSTOLIC BLOOD PRESSURE < 130 MM HG: ICD-10-PCS | Mod: CPTII,S$GLB,, | Performed by: ANESTHESIOLOGY

## 2021-07-26 PROCEDURE — 99999 PR PBB SHADOW E&M-EST. PATIENT-LVL V: CPT | Mod: PBBFAC,,, | Performed by: ANESTHESIOLOGY

## 2021-07-26 PROCEDURE — 3008F PR BODY MASS INDEX (BMI) DOCUMENTED: ICD-10-PCS | Mod: CPTII,S$GLB,, | Performed by: ANESTHESIOLOGY

## 2021-07-26 PROCEDURE — 1159F MED LIST DOCD IN RCRD: CPT | Mod: CPTII,S$GLB,, | Performed by: ANESTHESIOLOGY

## 2021-07-26 PROCEDURE — 1125F AMNT PAIN NOTED PAIN PRSNT: CPT | Mod: CPTII,S$GLB,, | Performed by: ANESTHESIOLOGY

## 2021-07-26 PROCEDURE — 1160F PR REVIEW ALL MEDS BY PRESCRIBER/CLIN PHARMACIST DOCUMENTED: ICD-10-PCS | Mod: CPTII,S$GLB,, | Performed by: ANESTHESIOLOGY

## 2021-07-26 RX ORDER — DICLOFENAC SODIUM 10 MG/G
2 GEL TOPICAL 4 TIMES DAILY
Qty: 1 TUBE | Refills: 2 | Status: SHIPPED | OUTPATIENT
Start: 2021-07-26 | End: 2022-02-10

## 2021-07-26 RX ORDER — GABAPENTIN 100 MG/1
250 CAPSULE ORAL 2 TIMES DAILY
COMMUNITY
End: 2022-03-22

## 2021-08-13 ENCOUNTER — CLINICAL SUPPORT (OUTPATIENT)
Dept: REHABILITATION | Facility: OTHER | Age: 61
End: 2021-08-13
Payer: MEDICARE

## 2021-08-13 DIAGNOSIS — R26.2 DIFFICULTY WALKING: ICD-10-CM

## 2021-08-13 DIAGNOSIS — M17.12 PRIMARY OSTEOARTHRITIS OF LEFT KNEE: ICD-10-CM

## 2021-08-13 PROCEDURE — 97140 MANUAL THERAPY 1/> REGIONS: CPT | Mod: PN

## 2021-08-13 PROCEDURE — 97162 PT EVAL MOD COMPLEX 30 MIN: CPT | Mod: PN

## 2021-08-13 PROCEDURE — 97110 THERAPEUTIC EXERCISES: CPT | Mod: PN

## 2021-08-18 ENCOUNTER — CLINICAL SUPPORT (OUTPATIENT)
Dept: REHABILITATION | Facility: OTHER | Age: 61
End: 2021-08-18
Payer: MEDICARE

## 2021-08-18 DIAGNOSIS — R26.2 DIFFICULTY WALKING: ICD-10-CM

## 2021-08-18 DIAGNOSIS — M17.12 PRIMARY OSTEOARTHRITIS OF LEFT KNEE: Primary | ICD-10-CM

## 2021-08-18 PROCEDURE — 97140 MANUAL THERAPY 1/> REGIONS: CPT | Mod: PN

## 2021-08-18 PROCEDURE — 97530 THERAPEUTIC ACTIVITIES: CPT | Mod: PN

## 2021-08-18 PROCEDURE — 97110 THERAPEUTIC EXERCISES: CPT | Mod: PN

## 2021-08-23 ENCOUNTER — CLINICAL SUPPORT (OUTPATIENT)
Dept: REHABILITATION | Facility: OTHER | Age: 61
End: 2021-08-23
Payer: MEDICARE

## 2021-08-23 DIAGNOSIS — M17.12 PRIMARY OSTEOARTHRITIS OF LEFT KNEE: Primary | ICD-10-CM

## 2021-08-23 DIAGNOSIS — R26.2 DIFFICULTY WALKING: ICD-10-CM

## 2021-08-23 PROCEDURE — 97110 THERAPEUTIC EXERCISES: CPT | Mod: PN

## 2021-08-23 PROCEDURE — 97140 MANUAL THERAPY 1/> REGIONS: CPT | Mod: PN

## 2021-08-23 PROCEDURE — 97530 THERAPEUTIC ACTIVITIES: CPT | Mod: PN

## 2021-08-24 ENCOUNTER — CLINICAL SUPPORT (OUTPATIENT)
Dept: URGENT CARE | Facility: CLINIC | Age: 61
End: 2021-08-24
Payer: MEDICARE

## 2021-08-24 DIAGNOSIS — Z11.9 SCREENING EXAMINATION FOR UNSPECIFIED INFECTIOUS DISEASE: Primary | ICD-10-CM

## 2021-08-24 LAB
CTP QC/QA: YES
SARS-COV-2 RDRP RESP QL NAA+PROBE: NEGATIVE

## 2021-08-24 PROCEDURE — 99211 OFF/OP EST MAY X REQ PHY/QHP: CPT | Mod: S$GLB,,, | Performed by: FAMILY MEDICINE

## 2021-08-24 PROCEDURE — U0002: ICD-10-PCS | Mod: QW,S$GLB,, | Performed by: FAMILY MEDICINE

## 2021-08-24 PROCEDURE — 99211 PR OFFICE/OUTPT VISIT, EST, LEVL I: ICD-10-PCS | Mod: S$GLB,,, | Performed by: FAMILY MEDICINE

## 2021-08-24 PROCEDURE — U0002 COVID-19 LAB TEST NON-CDC: HCPCS | Mod: QW,S$GLB,, | Performed by: FAMILY MEDICINE

## 2021-08-25 ENCOUNTER — CLINICAL SUPPORT (OUTPATIENT)
Dept: REHABILITATION | Facility: OTHER | Age: 61
End: 2021-08-25
Payer: MEDICARE

## 2021-08-25 DIAGNOSIS — M17.12 PRIMARY OSTEOARTHRITIS OF LEFT KNEE: Primary | ICD-10-CM

## 2021-08-25 DIAGNOSIS — R26.2 DIFFICULTY WALKING: ICD-10-CM

## 2021-08-25 PROCEDURE — 97140 MANUAL THERAPY 1/> REGIONS: CPT | Mod: PN

## 2021-08-25 PROCEDURE — 97530 THERAPEUTIC ACTIVITIES: CPT | Mod: PN

## 2021-08-25 PROCEDURE — 97110 THERAPEUTIC EXERCISES: CPT | Mod: PN

## 2021-09-08 ENCOUNTER — OFFICE VISIT (OUTPATIENT)
Dept: PAIN MEDICINE | Facility: CLINIC | Age: 61
End: 2021-09-08
Payer: MEDICARE

## 2021-09-08 VITALS
HEIGHT: 65 IN | BODY MASS INDEX: 32.1 KG/M2 | SYSTOLIC BLOOD PRESSURE: 132 MMHG | WEIGHT: 192.69 LBS | TEMPERATURE: 97 F | DIASTOLIC BLOOD PRESSURE: 80 MMHG | HEART RATE: 80 BPM

## 2021-09-08 DIAGNOSIS — M25.612 DECREASED ROM OF LEFT SHOULDER: Primary | ICD-10-CM

## 2021-09-08 DIAGNOSIS — M25.562 CHRONIC PAIN OF LEFT KNEE: ICD-10-CM

## 2021-09-08 DIAGNOSIS — G89.29 CHRONIC PAIN OF LEFT KNEE: ICD-10-CM

## 2021-09-08 DIAGNOSIS — M17.12 PRIMARY OSTEOARTHRITIS OF LEFT KNEE: ICD-10-CM

## 2021-09-08 PROCEDURE — 3079F PR MOST RECENT DIASTOLIC BLOOD PRESSURE 80-89 MM HG: ICD-10-PCS | Mod: CPTII,S$GLB,, | Performed by: NURSE PRACTITIONER

## 2021-09-08 PROCEDURE — 1160F PR REVIEW ALL MEDS BY PRESCRIBER/CLIN PHARMACIST DOCUMENTED: ICD-10-PCS | Mod: CPTII,S$GLB,, | Performed by: NURSE PRACTITIONER

## 2021-09-08 PROCEDURE — 1159F MED LIST DOCD IN RCRD: CPT | Mod: CPTII,S$GLB,, | Performed by: NURSE PRACTITIONER

## 2021-09-08 PROCEDURE — 99999 PR PBB SHADOW E&M-EST. PATIENT-LVL IV: CPT | Mod: PBBFAC,,, | Performed by: NURSE PRACTITIONER

## 2021-09-08 PROCEDURE — 1160F RVW MEDS BY RX/DR IN RCRD: CPT | Mod: CPTII,S$GLB,, | Performed by: NURSE PRACTITIONER

## 2021-09-08 PROCEDURE — 99213 OFFICE O/P EST LOW 20 MIN: CPT | Mod: S$GLB,,, | Performed by: NURSE PRACTITIONER

## 2021-09-08 PROCEDURE — 3008F PR BODY MASS INDEX (BMI) DOCUMENTED: ICD-10-PCS | Mod: CPTII,S$GLB,, | Performed by: NURSE PRACTITIONER

## 2021-09-08 PROCEDURE — 3075F PR MOST RECENT SYSTOLIC BLOOD PRESS GE 130-139MM HG: ICD-10-PCS | Mod: CPTII,S$GLB,, | Performed by: NURSE PRACTITIONER

## 2021-09-08 PROCEDURE — 1159F PR MEDICATION LIST DOCUMENTED IN MEDICAL RECORD: ICD-10-PCS | Mod: CPTII,S$GLB,, | Performed by: NURSE PRACTITIONER

## 2021-09-08 PROCEDURE — 3075F SYST BP GE 130 - 139MM HG: CPT | Mod: CPTII,S$GLB,, | Performed by: NURSE PRACTITIONER

## 2021-09-08 PROCEDURE — 99213 PR OFFICE/OUTPT VISIT, EST, LEVL III, 20-29 MIN: ICD-10-PCS | Mod: S$GLB,,, | Performed by: NURSE PRACTITIONER

## 2021-09-08 PROCEDURE — 3079F DIAST BP 80-89 MM HG: CPT | Mod: CPTII,S$GLB,, | Performed by: NURSE PRACTITIONER

## 2021-09-08 PROCEDURE — 3008F BODY MASS INDEX DOCD: CPT | Mod: CPTII,S$GLB,, | Performed by: NURSE PRACTITIONER

## 2021-09-08 PROCEDURE — 99999 PR PBB SHADOW E&M-EST. PATIENT-LVL IV: ICD-10-PCS | Mod: PBBFAC,,, | Performed by: NURSE PRACTITIONER

## 2021-09-08 RX ORDER — DICLOFENAC SODIUM 75 MG/1
75 TABLET, DELAYED RELEASE ORAL 2 TIMES DAILY
Qty: 60 TABLET | Refills: 1 | Status: SHIPPED | OUTPATIENT
Start: 2021-09-08 | End: 2021-10-08

## 2021-09-16 ENCOUNTER — TELEPHONE (OUTPATIENT)
Dept: PAIN MEDICINE | Facility: CLINIC | Age: 61
End: 2021-09-16

## 2021-09-16 DIAGNOSIS — M25.562 CHRONIC PAIN OF LEFT KNEE: Primary | ICD-10-CM

## 2021-09-16 DIAGNOSIS — G89.4 CHRONIC PAIN DISORDER: ICD-10-CM

## 2021-09-16 DIAGNOSIS — G89.29 CHRONIC PAIN OF LEFT KNEE: Primary | ICD-10-CM

## 2021-09-20 ENCOUNTER — TELEPHONE (OUTPATIENT)
Dept: PAIN MEDICINE | Facility: CLINIC | Age: 61
End: 2021-09-20

## 2021-10-04 ENCOUNTER — TELEPHONE (OUTPATIENT)
Dept: PAIN MEDICINE | Facility: CLINIC | Age: 61
End: 2021-10-04

## 2021-10-18 ENCOUNTER — TELEPHONE (OUTPATIENT)
Dept: PAIN MEDICINE | Facility: CLINIC | Age: 61
End: 2021-10-18

## 2021-10-19 ENCOUNTER — OFFICE VISIT (OUTPATIENT)
Dept: PAIN MEDICINE | Facility: CLINIC | Age: 61
End: 2021-10-19
Payer: MEDICARE

## 2021-10-19 VITALS
SYSTOLIC BLOOD PRESSURE: 132 MMHG | HEART RATE: 78 BPM | WEIGHT: 193.31 LBS | HEIGHT: 65 IN | DIASTOLIC BLOOD PRESSURE: 72 MMHG | RESPIRATION RATE: 17 BRPM | BODY MASS INDEX: 32.21 KG/M2 | TEMPERATURE: 97 F

## 2021-10-19 DIAGNOSIS — R52 PAIN: Primary | ICD-10-CM

## 2021-10-19 DIAGNOSIS — M17.12 PRIMARY OSTEOARTHRITIS OF LEFT KNEE: ICD-10-CM

## 2021-10-19 DIAGNOSIS — M79.642 CHRONIC PAIN OF LEFT HAND: ICD-10-CM

## 2021-10-19 DIAGNOSIS — G89.29 CHRONIC PAIN OF BOTH FEET: ICD-10-CM

## 2021-10-19 DIAGNOSIS — M79.672 CHRONIC PAIN OF BOTH FEET: ICD-10-CM

## 2021-10-19 DIAGNOSIS — G89.29 CHRONIC PAIN OF LEFT KNEE: Primary | ICD-10-CM

## 2021-10-19 DIAGNOSIS — M25.562 CHRONIC PAIN OF LEFT KNEE: Primary | ICD-10-CM

## 2021-10-19 DIAGNOSIS — M79.671 CHRONIC PAIN OF BOTH FEET: ICD-10-CM

## 2021-10-19 DIAGNOSIS — G89.29 CHRONIC PAIN OF LEFT HAND: ICD-10-CM

## 2021-10-19 PROCEDURE — 99213 PR OFFICE/OUTPT VISIT, EST, LEVL III, 20-29 MIN: ICD-10-PCS | Mod: S$GLB,,, | Performed by: NURSE PRACTITIONER

## 2021-10-19 PROCEDURE — 1159F PR MEDICATION LIST DOCUMENTED IN MEDICAL RECORD: ICD-10-PCS | Mod: CPTII,S$GLB,, | Performed by: NURSE PRACTITIONER

## 2021-10-19 PROCEDURE — 99999 PR PBB SHADOW E&M-EST. PATIENT-LVL V: CPT | Mod: PBBFAC,,, | Performed by: NURSE PRACTITIONER

## 2021-10-19 PROCEDURE — 3008F PR BODY MASS INDEX (BMI) DOCUMENTED: ICD-10-PCS | Mod: CPTII,S$GLB,, | Performed by: NURSE PRACTITIONER

## 2021-10-19 PROCEDURE — 3008F BODY MASS INDEX DOCD: CPT | Mod: CPTII,S$GLB,, | Performed by: NURSE PRACTITIONER

## 2021-10-19 PROCEDURE — 1160F RVW MEDS BY RX/DR IN RCRD: CPT | Mod: CPTII,S$GLB,, | Performed by: NURSE PRACTITIONER

## 2021-10-19 PROCEDURE — 3075F SYST BP GE 130 - 139MM HG: CPT | Mod: CPTII,S$GLB,, | Performed by: NURSE PRACTITIONER

## 2021-10-19 PROCEDURE — 1159F MED LIST DOCD IN RCRD: CPT | Mod: CPTII,S$GLB,, | Performed by: NURSE PRACTITIONER

## 2021-10-19 PROCEDURE — 99213 OFFICE O/P EST LOW 20 MIN: CPT | Mod: S$GLB,,, | Performed by: NURSE PRACTITIONER

## 2021-10-19 PROCEDURE — 3075F PR MOST RECENT SYSTOLIC BLOOD PRESS GE 130-139MM HG: ICD-10-PCS | Mod: CPTII,S$GLB,, | Performed by: NURSE PRACTITIONER

## 2021-10-19 PROCEDURE — 3078F DIAST BP <80 MM HG: CPT | Mod: CPTII,S$GLB,, | Performed by: NURSE PRACTITIONER

## 2021-10-19 PROCEDURE — 1160F PR REVIEW ALL MEDS BY PRESCRIBER/CLIN PHARMACIST DOCUMENTED: ICD-10-PCS | Mod: CPTII,S$GLB,, | Performed by: NURSE PRACTITIONER

## 2021-10-19 PROCEDURE — 3078F PR MOST RECENT DIASTOLIC BLOOD PRESSURE < 80 MM HG: ICD-10-PCS | Mod: CPTII,S$GLB,, | Performed by: NURSE PRACTITIONER

## 2021-10-19 PROCEDURE — 99999 PR PBB SHADOW E&M-EST. PATIENT-LVL V: ICD-10-PCS | Mod: PBBFAC,,, | Performed by: NURSE PRACTITIONER

## 2021-10-20 ENCOUNTER — TELEPHONE (OUTPATIENT)
Dept: PAIN MEDICINE | Facility: CLINIC | Age: 61
End: 2021-10-20

## 2021-10-20 ENCOUNTER — TELEPHONE (OUTPATIENT)
Dept: ORTHOPEDICS | Facility: CLINIC | Age: 61
End: 2021-10-20

## 2021-10-20 DIAGNOSIS — R52 PAIN: Primary | ICD-10-CM

## 2021-10-21 ENCOUNTER — HOSPITAL ENCOUNTER (OUTPATIENT)
Dept: RADIOLOGY | Facility: OTHER | Age: 61
Discharge: HOME | End: 2021-10-21
Attending: PHYSICIAN ASSISTANT
Payer: MEDICARE

## 2021-10-21 ENCOUNTER — OFFICE VISIT (OUTPATIENT)
Dept: ORTHOPEDICS | Facility: CLINIC | Age: 61
End: 2021-10-21
Payer: MEDICARE

## 2021-10-21 VITALS
HEIGHT: 65 IN | WEIGHT: 193 LBS | BODY MASS INDEX: 32.15 KG/M2 | SYSTOLIC BLOOD PRESSURE: 118 MMHG | HEART RATE: 88 BPM | DIASTOLIC BLOOD PRESSURE: 77 MMHG

## 2021-10-21 DIAGNOSIS — R22.32 SUBCUTANEOUS MASS OF LEFT HAND: ICD-10-CM

## 2021-10-21 DIAGNOSIS — R52 PAIN: ICD-10-CM

## 2021-10-21 PROCEDURE — 3074F PR MOST RECENT SYSTOLIC BLOOD PRESSURE < 130 MM HG: ICD-10-PCS | Mod: CPTII,S$GLB,, | Performed by: PHYSICIAN ASSISTANT

## 2021-10-21 PROCEDURE — 99999 PR PBB SHADOW E&M-EST. PATIENT-LVL IV: CPT | Mod: PBBFAC,,, | Performed by: PHYSICIAN ASSISTANT

## 2021-10-21 PROCEDURE — 3074F SYST BP LT 130 MM HG: CPT | Mod: CPTII,S$GLB,, | Performed by: PHYSICIAN ASSISTANT

## 2021-10-21 PROCEDURE — 3078F DIAST BP <80 MM HG: CPT | Mod: CPTII,S$GLB,, | Performed by: PHYSICIAN ASSISTANT

## 2021-10-21 PROCEDURE — 1159F MED LIST DOCD IN RCRD: CPT | Mod: CPTII,S$GLB,, | Performed by: PHYSICIAN ASSISTANT

## 2021-10-21 PROCEDURE — 1160F RVW MEDS BY RX/DR IN RCRD: CPT | Mod: CPTII,S$GLB,, | Performed by: PHYSICIAN ASSISTANT

## 2021-10-21 PROCEDURE — 3078F PR MOST RECENT DIASTOLIC BLOOD PRESSURE < 80 MM HG: ICD-10-PCS | Mod: CPTII,S$GLB,, | Performed by: PHYSICIAN ASSISTANT

## 2021-10-21 PROCEDURE — 73130 XR HAND COMPLETE 3 VIEW LEFT: ICD-10-PCS | Mod: 26,LT,, | Performed by: RADIOLOGY

## 2021-10-21 PROCEDURE — 99999 PR PBB SHADOW E&M-EST. PATIENT-LVL IV: ICD-10-PCS | Mod: PBBFAC,,, | Performed by: PHYSICIAN ASSISTANT

## 2021-10-21 PROCEDURE — 3008F BODY MASS INDEX DOCD: CPT | Mod: CPTII,S$GLB,, | Performed by: PHYSICIAN ASSISTANT

## 2021-10-21 PROCEDURE — 3008F PR BODY MASS INDEX (BMI) DOCUMENTED: ICD-10-PCS | Mod: CPTII,S$GLB,, | Performed by: PHYSICIAN ASSISTANT

## 2021-10-21 PROCEDURE — 73130 X-RAY EXAM OF HAND: CPT | Mod: TC,FY,LT

## 2021-10-21 PROCEDURE — 73130 X-RAY EXAM OF HAND: CPT | Mod: 26,LT,, | Performed by: RADIOLOGY

## 2021-10-21 PROCEDURE — 1160F PR REVIEW ALL MEDS BY PRESCRIBER/CLIN PHARMACIST DOCUMENTED: ICD-10-PCS | Mod: CPTII,S$GLB,, | Performed by: PHYSICIAN ASSISTANT

## 2021-10-21 PROCEDURE — 99203 OFFICE O/P NEW LOW 30 MIN: CPT | Mod: S$GLB,,, | Performed by: PHYSICIAN ASSISTANT

## 2021-10-21 PROCEDURE — 1159F PR MEDICATION LIST DOCUMENTED IN MEDICAL RECORD: ICD-10-PCS | Mod: CPTII,S$GLB,, | Performed by: PHYSICIAN ASSISTANT

## 2021-10-21 PROCEDURE — 99203 PR OFFICE/OUTPT VISIT, NEW, LEVL III, 30-44 MIN: ICD-10-PCS | Mod: S$GLB,,, | Performed by: PHYSICIAN ASSISTANT

## 2021-10-22 ENCOUNTER — HOSPITAL ENCOUNTER (OUTPATIENT)
Dept: RADIOLOGY | Facility: OTHER | Age: 61
Discharge: HOME | End: 2021-10-22
Attending: PHYSICIAN ASSISTANT
Payer: MEDICARE

## 2021-10-22 DIAGNOSIS — R22.32 SUBCUTANEOUS MASS OF LEFT HAND: ICD-10-CM

## 2021-10-22 PROCEDURE — 76882 US LMTD JT/FCL EVL NVASC XTR: CPT | Mod: 26,LT,, | Performed by: STUDENT IN AN ORGANIZED HEALTH CARE EDUCATION/TRAINING PROGRAM

## 2021-10-22 PROCEDURE — 76882 US LMTD JT/FCL EVL NVASC XTR: CPT | Mod: TC,LT

## 2021-10-22 PROCEDURE — 76882 US EXTREMITY NON VASCULAR LIMITED LEFT: ICD-10-PCS | Mod: 26,LT,, | Performed by: STUDENT IN AN ORGANIZED HEALTH CARE EDUCATION/TRAINING PROGRAM

## 2021-10-26 ENCOUNTER — OFFICE VISIT (OUTPATIENT)
Dept: ORTHOPEDICS | Facility: CLINIC | Age: 61
End: 2021-10-26
Payer: MEDICARE

## 2021-10-26 VITALS
HEIGHT: 65 IN | HEART RATE: 63 BPM | DIASTOLIC BLOOD PRESSURE: 61 MMHG | SYSTOLIC BLOOD PRESSURE: 121 MMHG | BODY MASS INDEX: 32.15 KG/M2 | WEIGHT: 193 LBS

## 2021-10-26 DIAGNOSIS — R22.32 SUBCUTANEOUS MASS OF LEFT HAND: Primary | ICD-10-CM

## 2021-10-26 PROCEDURE — 1160F PR REVIEW ALL MEDS BY PRESCRIBER/CLIN PHARMACIST DOCUMENTED: ICD-10-PCS | Mod: CPTII,S$GLB,, | Performed by: PHYSICIAN ASSISTANT

## 2021-10-26 PROCEDURE — 3008F PR BODY MASS INDEX (BMI) DOCUMENTED: ICD-10-PCS | Mod: CPTII,S$GLB,, | Performed by: PHYSICIAN ASSISTANT

## 2021-10-26 PROCEDURE — 1159F MED LIST DOCD IN RCRD: CPT | Mod: CPTII,S$GLB,, | Performed by: PHYSICIAN ASSISTANT

## 2021-10-26 PROCEDURE — 1159F PR MEDICATION LIST DOCUMENTED IN MEDICAL RECORD: ICD-10-PCS | Mod: CPTII,S$GLB,, | Performed by: PHYSICIAN ASSISTANT

## 2021-10-26 PROCEDURE — 3078F PR MOST RECENT DIASTOLIC BLOOD PRESSURE < 80 MM HG: ICD-10-PCS | Mod: CPTII,S$GLB,, | Performed by: PHYSICIAN ASSISTANT

## 2021-10-26 PROCEDURE — 3008F BODY MASS INDEX DOCD: CPT | Mod: CPTII,S$GLB,, | Performed by: PHYSICIAN ASSISTANT

## 2021-10-26 PROCEDURE — 3078F DIAST BP <80 MM HG: CPT | Mod: CPTII,S$GLB,, | Performed by: PHYSICIAN ASSISTANT

## 2021-10-26 PROCEDURE — 1160F RVW MEDS BY RX/DR IN RCRD: CPT | Mod: CPTII,S$GLB,, | Performed by: PHYSICIAN ASSISTANT

## 2021-10-26 PROCEDURE — 3074F PR MOST RECENT SYSTOLIC BLOOD PRESSURE < 130 MM HG: ICD-10-PCS | Mod: CPTII,S$GLB,, | Performed by: PHYSICIAN ASSISTANT

## 2021-10-26 PROCEDURE — 99214 OFFICE O/P EST MOD 30 MIN: CPT | Mod: S$GLB,,, | Performed by: PHYSICIAN ASSISTANT

## 2021-10-26 PROCEDURE — 99999 PR PBB SHADOW E&M-EST. PATIENT-LVL IV: CPT | Mod: PBBFAC,,, | Performed by: PHYSICIAN ASSISTANT

## 2021-10-26 PROCEDURE — 99999 PR PBB SHADOW E&M-EST. PATIENT-LVL IV: ICD-10-PCS | Mod: PBBFAC,,, | Performed by: PHYSICIAN ASSISTANT

## 2021-10-26 PROCEDURE — 99214 PR OFFICE/OUTPT VISIT, EST, LEVL IV, 30-39 MIN: ICD-10-PCS | Mod: S$GLB,,, | Performed by: PHYSICIAN ASSISTANT

## 2021-10-26 PROCEDURE — 3074F SYST BP LT 130 MM HG: CPT | Mod: CPTII,S$GLB,, | Performed by: PHYSICIAN ASSISTANT

## 2021-10-27 DIAGNOSIS — R22.32 SUBCUTANEOUS MASS OF LEFT HAND: Primary | ICD-10-CM

## 2021-10-28 ENCOUNTER — OFFICE VISIT (OUTPATIENT)
Dept: PODIATRY | Facility: CLINIC | Age: 61
End: 2021-10-28
Payer: MEDICARE

## 2021-10-28 ENCOUNTER — APPOINTMENT (OUTPATIENT)
Dept: RADIOLOGY | Facility: OTHER | Age: 61
End: 2021-10-28
Attending: PODIATRIST
Payer: MEDICARE

## 2021-10-28 VITALS
WEIGHT: 193 LBS | SYSTOLIC BLOOD PRESSURE: 118 MMHG | BODY MASS INDEX: 32.15 KG/M2 | HEIGHT: 65 IN | HEART RATE: 75 BPM | DIASTOLIC BLOOD PRESSURE: 78 MMHG

## 2021-10-28 DIAGNOSIS — M79.672 CHRONIC PAIN OF BOTH FEET: ICD-10-CM

## 2021-10-28 DIAGNOSIS — M79.671 FOOT PAIN, RIGHT: ICD-10-CM

## 2021-10-28 DIAGNOSIS — M79.671 CHRONIC PAIN OF BOTH FEET: ICD-10-CM

## 2021-10-28 DIAGNOSIS — G89.29 CHRONIC PAIN OF BOTH FEET: ICD-10-CM

## 2021-10-28 DIAGNOSIS — M72.2 PLANTAR FIBROMATOSIS: Primary | ICD-10-CM

## 2021-10-28 DIAGNOSIS — M72.2 PLANTAR FIBROMATOSIS: ICD-10-CM

## 2021-10-28 PROCEDURE — 73630 X-RAY EXAM OF FOOT: CPT | Mod: TC,RT

## 2021-10-28 PROCEDURE — 3074F PR MOST RECENT SYSTOLIC BLOOD PRESSURE < 130 MM HG: ICD-10-PCS | Mod: CPTII,S$GLB,, | Performed by: PODIATRIST

## 2021-10-28 PROCEDURE — 99999 PR PBB SHADOW E&M-EST. PATIENT-LVL V: CPT | Mod: PBBFAC,,, | Performed by: PODIATRIST

## 2021-10-28 PROCEDURE — 73630 XR FOOT COMPLETE 3 VIEW RIGHT: ICD-10-PCS | Mod: 26,RT,, | Performed by: RADIOLOGY

## 2021-10-28 PROCEDURE — 3074F SYST BP LT 130 MM HG: CPT | Mod: CPTII,S$GLB,, | Performed by: PODIATRIST

## 2021-10-28 PROCEDURE — 99204 OFFICE O/P NEW MOD 45 MIN: CPT | Mod: 25,S$GLB,, | Performed by: PODIATRIST

## 2021-10-28 PROCEDURE — 1159F MED LIST DOCD IN RCRD: CPT | Mod: CPTII,S$GLB,, | Performed by: PODIATRIST

## 2021-10-28 PROCEDURE — 3008F PR BODY MASS INDEX (BMI) DOCUMENTED: ICD-10-PCS | Mod: CPTII,S$GLB,, | Performed by: PODIATRIST

## 2021-10-28 PROCEDURE — 99999 PR PBB SHADOW E&M-EST. PATIENT-LVL V: ICD-10-PCS | Mod: PBBFAC,,, | Performed by: PODIATRIST

## 2021-10-28 PROCEDURE — 4010F ACE/ARB THERAPY RXD/TAKEN: CPT | Mod: CPTII,S$GLB,, | Performed by: PODIATRIST

## 2021-10-28 PROCEDURE — 4010F PR ACE/ARB THEARPY RXD/TAKEN: ICD-10-PCS | Mod: CPTII,S$GLB,, | Performed by: PODIATRIST

## 2021-10-28 PROCEDURE — 3078F DIAST BP <80 MM HG: CPT | Mod: CPTII,S$GLB,, | Performed by: PODIATRIST

## 2021-10-28 PROCEDURE — 1160F PR REVIEW ALL MEDS BY PRESCRIBER/CLIN PHARMACIST DOCUMENTED: ICD-10-PCS | Mod: CPTII,S$GLB,, | Performed by: PODIATRIST

## 2021-10-28 PROCEDURE — 1160F RVW MEDS BY RX/DR IN RCRD: CPT | Mod: CPTII,S$GLB,, | Performed by: PODIATRIST

## 2021-10-28 PROCEDURE — 29540 PR STRAPPING; ANKLE &/OR FOOT: ICD-10-PCS | Mod: RT,S$GLB,, | Performed by: PODIATRIST

## 2021-10-28 PROCEDURE — 3008F BODY MASS INDEX DOCD: CPT | Mod: CPTII,S$GLB,, | Performed by: PODIATRIST

## 2021-10-28 PROCEDURE — 3078F PR MOST RECENT DIASTOLIC BLOOD PRESSURE < 80 MM HG: ICD-10-PCS | Mod: CPTII,S$GLB,, | Performed by: PODIATRIST

## 2021-10-28 PROCEDURE — 29540 STRAPPING ANKLE &/FOOT: CPT | Mod: RT,S$GLB,, | Performed by: PODIATRIST

## 2021-10-28 PROCEDURE — 99204 PR OFFICE/OUTPT VISIT, NEW, LEVL IV, 45-59 MIN: ICD-10-PCS | Mod: 25,S$GLB,, | Performed by: PODIATRIST

## 2021-10-28 PROCEDURE — 73630 X-RAY EXAM OF FOOT: CPT | Mod: 26,RT,, | Performed by: RADIOLOGY

## 2021-10-28 PROCEDURE — 1159F PR MEDICATION LIST DOCUMENTED IN MEDICAL RECORD: ICD-10-PCS | Mod: CPTII,S$GLB,, | Performed by: PODIATRIST

## 2021-10-28 RX ORDER — DIAZEPAM 10 MG/1
10 TABLET ORAL EVERY MORNING
Status: ON HOLD | COMMUNITY
Start: 2021-09-09 | End: 2021-12-01

## 2021-10-28 RX ORDER — ZOLPIDEM TARTRATE 10 MG/1
12 TABLET ORAL NIGHTLY PRN
COMMUNITY
Start: 2021-10-08 | End: 2022-03-22 | Stop reason: SDUPTHER

## 2021-10-28 RX ORDER — FLUTICASONE FUROATE, UMECLIDINIUM BROMIDE AND VILANTEROL TRIFENATATE 100; 62.5; 25 UG/1; UG/1; UG/1
1 POWDER RESPIRATORY (INHALATION) DAILY
COMMUNITY
Start: 2021-10-08 | End: 2023-01-18 | Stop reason: ALTCHOICE

## 2021-10-28 RX ORDER — LIDOCAINE HYDROCHLORIDE 20 MG/ML
JELLY TOPICAL
Qty: 30 ML | Refills: 2 | Status: ON HOLD | OUTPATIENT
Start: 2021-10-28 | End: 2021-12-01

## 2021-10-28 RX ORDER — LOSARTAN POTASSIUM 25 MG/1
25 TABLET ORAL DAILY
COMMUNITY
Start: 2021-10-27 | End: 2023-01-18 | Stop reason: ALTCHOICE

## 2021-10-28 RX ORDER — EMPAGLIFLOZIN 10 MG/1
10 TABLET, FILM COATED ORAL DAILY
COMMUNITY
Start: 2021-10-05 | End: 2023-01-18 | Stop reason: SDUPTHER

## 2021-10-28 RX ORDER — ALPRAZOLAM 2 MG/1
2 TABLET ORAL 2 TIMES DAILY PRN
COMMUNITY
Start: 2021-10-08 | End: 2023-01-18 | Stop reason: ALTCHOICE

## 2021-11-01 ENCOUNTER — TELEPHONE (OUTPATIENT)
Dept: PAIN MEDICINE | Facility: CLINIC | Age: 61
End: 2021-11-01
Payer: MEDICARE

## 2021-11-02 ENCOUNTER — PROCEDURE VISIT (OUTPATIENT)
Dept: PAIN MEDICINE | Facility: CLINIC | Age: 61
End: 2021-11-02
Payer: MEDICARE

## 2021-11-02 VITALS
DIASTOLIC BLOOD PRESSURE: 67 MMHG | RESPIRATION RATE: 18 BRPM | BODY MASS INDEX: 31.59 KG/M2 | HEIGHT: 65 IN | SYSTOLIC BLOOD PRESSURE: 125 MMHG | TEMPERATURE: 97 F | HEART RATE: 59 BPM | WEIGHT: 189.63 LBS

## 2021-11-02 DIAGNOSIS — M17.12 PRIMARY OSTEOARTHRITIS OF LEFT KNEE: Primary | ICD-10-CM

## 2021-11-02 PROCEDURE — 20610 DRAIN/INJ JOINT/BURSA W/O US: CPT | Mod: LT,S$GLB,, | Performed by: ANESTHESIOLOGY

## 2021-11-02 PROCEDURE — 20610 PR DRAIN/INJECT LARGE JOINT/BURSA: ICD-10-PCS | Mod: LT,S$GLB,, | Performed by: ANESTHESIOLOGY

## 2021-11-07 ENCOUNTER — HOSPITAL ENCOUNTER (EMERGENCY)
Facility: OTHER | Age: 61
Discharge: LEFT BEFORE DISCHARGE | End: 2021-11-07
Attending: EMERGENCY MEDICINE
Payer: MEDICARE

## 2021-11-07 VITALS
WEIGHT: 189 LBS | RESPIRATION RATE: 17 BRPM | HEART RATE: 57 BPM | HEIGHT: 66 IN | BODY MASS INDEX: 30.37 KG/M2 | OXYGEN SATURATION: 99 % | DIASTOLIC BLOOD PRESSURE: 70 MMHG | TEMPERATURE: 98 F | SYSTOLIC BLOOD PRESSURE: 164 MMHG

## 2021-11-07 DIAGNOSIS — Z53.29 LEFT AGAINST MEDICAL ADVICE: Primary | ICD-10-CM

## 2021-11-07 DIAGNOSIS — R51.9 NONINTRACTABLE HEADACHE, UNSPECIFIED CHRONICITY PATTERN, UNSPECIFIED HEADACHE TYPE: ICD-10-CM

## 2021-11-07 DIAGNOSIS — R70.0 ELEVATED SEDIMENTATION RATE: ICD-10-CM

## 2021-11-07 LAB
ALBUMIN SERPL BCP-MCNC: 3.2 G/DL (ref 3.5–5.2)
ALP SERPL-CCNC: 85 U/L (ref 55–135)
ALT SERPL W/O P-5'-P-CCNC: 19 U/L (ref 10–44)
ANION GAP SERPL CALC-SCNC: 8 MMOL/L (ref 8–16)
AST SERPL-CCNC: 13 U/L (ref 10–40)
BASOPHILS # BLD AUTO: 0.03 K/UL (ref 0–0.2)
BASOPHILS NFR BLD: 0.4 % (ref 0–1.9)
BILIRUB SERPL-MCNC: 0.4 MG/DL (ref 0.1–1)
BUN SERPL-MCNC: 15 MG/DL (ref 6–20)
CALCIUM SERPL-MCNC: 9 MG/DL (ref 8.7–10.5)
CHLORIDE SERPL-SCNC: 105 MMOL/L (ref 95–110)
CO2 SERPL-SCNC: 28 MMOL/L (ref 23–29)
CREAT SERPL-MCNC: 0.9 MG/DL (ref 0.5–1.4)
CTP QC/QA: YES
CTP QC/QA: YES
DIFFERENTIAL METHOD: ABNORMAL
EOSINOPHIL # BLD AUTO: 0.3 K/UL (ref 0–0.5)
EOSINOPHIL NFR BLD: 3.9 % (ref 0–8)
ERYTHROCYTE [DISTWIDTH] IN BLOOD BY AUTOMATED COUNT: 15.2 % (ref 11.5–14.5)
ERYTHROCYTE [SEDIMENTATION RATE] IN BLOOD: 46 MM/HR (ref 0–20)
EST. GFR  (AFRICAN AMERICAN): >60 ML/MIN/1.73 M^2
EST. GFR  (NON AFRICAN AMERICAN): >60 ML/MIN/1.73 M^2
GLUCOSE SERPL-MCNC: 127 MG/DL (ref 70–110)
HCT VFR BLD AUTO: 39.6 % (ref 37–48.5)
HGB BLD-MCNC: 12 G/DL (ref 12–16)
IMM GRANULOCYTES # BLD AUTO: 0.01 K/UL (ref 0–0.04)
IMM GRANULOCYTES NFR BLD AUTO: 0.1 % (ref 0–0.5)
LYMPHOCYTES # BLD AUTO: 3 K/UL (ref 1–4.8)
LYMPHOCYTES NFR BLD: 39 % (ref 18–48)
MCH RBC QN AUTO: 25.3 PG (ref 27–31)
MCHC RBC AUTO-ENTMCNC: 30.3 G/DL (ref 32–36)
MCV RBC AUTO: 84 FL (ref 82–98)
MONOCYTES # BLD AUTO: 0.6 K/UL (ref 0.3–1)
MONOCYTES NFR BLD: 8.1 % (ref 4–15)
NEUTROPHILS # BLD AUTO: 3.7 K/UL (ref 1.8–7.7)
NEUTROPHILS NFR BLD: 48.5 % (ref 38–73)
NRBC BLD-RTO: 0 /100 WBC
PLATELET # BLD AUTO: 319 K/UL (ref 150–450)
PMV BLD AUTO: 9.4 FL (ref 9.2–12.9)
POC MOLECULAR INFLUENZA A AGN: NEGATIVE
POC MOLECULAR INFLUENZA B AGN: NEGATIVE
POTASSIUM SERPL-SCNC: 3.9 MMOL/L (ref 3.5–5.1)
PROT SERPL-MCNC: 7.5 G/DL (ref 6–8.4)
RBC # BLD AUTO: 4.74 M/UL (ref 4–5.4)
SARS-COV-2 RDRP RESP QL NAA+PROBE: NEGATIVE
SODIUM SERPL-SCNC: 141 MMOL/L (ref 136–145)
WBC # BLD AUTO: 7.65 K/UL (ref 3.9–12.7)

## 2021-11-07 PROCEDURE — 96375 TX/PRO/DX INJ NEW DRUG ADDON: CPT

## 2021-11-07 PROCEDURE — U0002 COVID-19 LAB TEST NON-CDC: HCPCS | Performed by: NURSE PRACTITIONER

## 2021-11-07 PROCEDURE — 99284 EMERGENCY DEPT VISIT MOD MDM: CPT | Mod: 25

## 2021-11-07 PROCEDURE — 85025 COMPLETE CBC W/AUTO DIFF WBC: CPT | Performed by: NURSE PRACTITIONER

## 2021-11-07 PROCEDURE — 80053 COMPREHEN METABOLIC PANEL: CPT | Performed by: NURSE PRACTITIONER

## 2021-11-07 PROCEDURE — 96374 THER/PROPH/DIAG INJ IV PUSH: CPT

## 2021-11-07 PROCEDURE — 85651 RBC SED RATE NONAUTOMATED: CPT | Performed by: NURSE PRACTITIONER

## 2021-11-07 PROCEDURE — 63600175 PHARM REV CODE 636 W HCPCS: Performed by: NURSE PRACTITIONER

## 2021-11-07 RX ORDER — KETOROLAC TROMETHAMINE 30 MG/ML
15 INJECTION, SOLUTION INTRAMUSCULAR; INTRAVENOUS
Status: COMPLETED | OUTPATIENT
Start: 2021-11-07 | End: 2021-11-07

## 2021-11-07 RX ORDER — METHYLPREDNISOLONE SOD SUCC 125 MG
125 VIAL (EA) INJECTION
Status: DISCONTINUED | OUTPATIENT
Start: 2021-11-07 | End: 2021-11-07

## 2021-11-07 RX ORDER — METOCLOPRAMIDE HYDROCHLORIDE 5 MG/ML
10 INJECTION INTRAMUSCULAR; INTRAVENOUS
Status: COMPLETED | OUTPATIENT
Start: 2021-11-07 | End: 2021-11-07

## 2021-11-07 RX ADMIN — KETOROLAC TROMETHAMINE 15 MG: 30 INJECTION, SOLUTION INTRAMUSCULAR at 10:11

## 2021-11-07 RX ADMIN — METOCLOPRAMIDE 10 MG: 5 INJECTION, SOLUTION INTRAMUSCULAR; INTRAVENOUS at 10:11

## 2021-11-15 ENCOUNTER — HOSPITAL ENCOUNTER (OUTPATIENT)
Facility: OTHER | Age: 61
Discharge: HOME | End: 2021-11-16
Attending: EMERGENCY MEDICINE | Admitting: EMERGENCY MEDICINE
Payer: MEDICARE

## 2021-11-15 ENCOUNTER — TELEPHONE (OUTPATIENT)
Dept: PAIN MEDICINE | Facility: CLINIC | Age: 61
End: 2021-11-15
Payer: MEDICARE

## 2021-11-15 DIAGNOSIS — E11.69 TYPE 2 DIABETES MELLITUS WITH OTHER SPECIFIED COMPLICATION, WITHOUT LONG-TERM CURRENT USE OF INSULIN: Chronic | ICD-10-CM

## 2021-11-15 DIAGNOSIS — I10 ESSENTIAL HYPERTENSION: Chronic | ICD-10-CM

## 2021-11-15 DIAGNOSIS — R07.9 CHEST PAIN: ICD-10-CM

## 2021-11-15 DIAGNOSIS — R07.9 INTERMITTENT CHEST PAIN: Primary | ICD-10-CM

## 2021-11-15 LAB
ALBUMIN SERPL BCP-MCNC: 3.5 G/DL (ref 3.5–5.2)
ALP SERPL-CCNC: 91 U/L (ref 55–135)
ALT SERPL W/O P-5'-P-CCNC: 11 U/L (ref 10–44)
ANION GAP SERPL CALC-SCNC: 11 MMOL/L (ref 8–16)
AST SERPL-CCNC: 12 U/L (ref 10–40)
BASOPHILS # BLD AUTO: 0.03 K/UL (ref 0–0.2)
BASOPHILS NFR BLD: 0.4 % (ref 0–1.9)
BILIRUB SERPL-MCNC: 0.3 MG/DL (ref 0.1–1)
BNP SERPL-MCNC: 101 PG/ML (ref 0–99)
BUN SERPL-MCNC: 15 MG/DL (ref 6–20)
CALCIUM SERPL-MCNC: 9.6 MG/DL (ref 8.7–10.5)
CHLORIDE SERPL-SCNC: 105 MMOL/L (ref 95–110)
CO2 SERPL-SCNC: 26 MMOL/L (ref 23–29)
CREAT SERPL-MCNC: 0.8 MG/DL (ref 0.5–1.4)
CTP QC/QA: YES
DIFFERENTIAL METHOD: ABNORMAL
EOSINOPHIL # BLD AUTO: 0.3 K/UL (ref 0–0.5)
EOSINOPHIL NFR BLD: 4 % (ref 0–8)
ERYTHROCYTE [DISTWIDTH] IN BLOOD BY AUTOMATED COUNT: 15.6 % (ref 11.5–14.5)
EST. GFR  (AFRICAN AMERICAN): >60 ML/MIN/1.73 M^2
EST. GFR  (NON AFRICAN AMERICAN): >60 ML/MIN/1.73 M^2
GLUCOSE SERPL-MCNC: 101 MG/DL (ref 70–110)
HCT VFR BLD AUTO: 38.6 % (ref 37–48.5)
HCV AB SERPL QL IA: NEGATIVE
HGB BLD-MCNC: 11.8 G/DL (ref 12–16)
HIV 1+2 AB+HIV1 P24 AG SERPL QL IA: NEGATIVE
IMM GRANULOCYTES # BLD AUTO: 0.03 K/UL (ref 0–0.04)
IMM GRANULOCYTES NFR BLD AUTO: 0.4 % (ref 0–0.5)
LYMPHOCYTES # BLD AUTO: 2.9 K/UL (ref 1–4.8)
LYMPHOCYTES NFR BLD: 34.4 % (ref 18–48)
MCH RBC QN AUTO: 25.1 PG (ref 27–31)
MCHC RBC AUTO-ENTMCNC: 30.6 G/DL (ref 32–36)
MCV RBC AUTO: 82 FL (ref 82–98)
MONOCYTES # BLD AUTO: 0.6 K/UL (ref 0.3–1)
MONOCYTES NFR BLD: 7.6 % (ref 4–15)
NEUTROPHILS # BLD AUTO: 4.4 K/UL (ref 1.8–7.7)
NEUTROPHILS NFR BLD: 53.2 % (ref 38–73)
NRBC BLD-RTO: 0 /100 WBC
PLATELET # BLD AUTO: 304 K/UL (ref 150–450)
PMV BLD AUTO: 8.9 FL (ref 9.2–12.9)
POTASSIUM SERPL-SCNC: 3.5 MMOL/L (ref 3.5–5.1)
PROT SERPL-MCNC: 7.7 G/DL (ref 6–8.4)
RBC # BLD AUTO: 4.7 M/UL (ref 4–5.4)
SARS-COV-2 RDRP RESP QL NAA+PROBE: NEGATIVE
SODIUM SERPL-SCNC: 142 MMOL/L (ref 136–145)
TROPONIN I SERPL DL<=0.01 NG/ML-MCNC: <0.006 NG/ML (ref 0–0.03)
WBC # BLD AUTO: 8.31 K/UL (ref 3.9–12.7)

## 2021-11-15 PROCEDURE — 25000003 PHARM REV CODE 250: Performed by: EMERGENCY MEDICINE

## 2021-11-15 PROCEDURE — 85025 COMPLETE CBC W/AUTO DIFF WBC: CPT | Performed by: NURSE PRACTITIONER

## 2021-11-15 PROCEDURE — G0378 HOSPITAL OBSERVATION PER HR: HCPCS

## 2021-11-15 PROCEDURE — U0002 COVID-19 LAB TEST NON-CDC: HCPCS | Performed by: EMERGENCY MEDICINE

## 2021-11-15 PROCEDURE — 86803 HEPATITIS C AB TEST: CPT | Performed by: EMERGENCY MEDICINE

## 2021-11-15 PROCEDURE — 93010 EKG 12-LEAD: ICD-10-PCS | Mod: ,,, | Performed by: INTERNAL MEDICINE

## 2021-11-15 PROCEDURE — 93010 ELECTROCARDIOGRAM REPORT: CPT | Mod: ,,, | Performed by: INTERNAL MEDICINE

## 2021-11-15 PROCEDURE — 99285 EMERGENCY DEPT VISIT HI MDM: CPT | Mod: 25

## 2021-11-15 PROCEDURE — 93005 ELECTROCARDIOGRAM TRACING: CPT

## 2021-11-15 PROCEDURE — 84484 ASSAY OF TROPONIN QUANT: CPT | Mod: 91 | Performed by: NURSE PRACTITIONER

## 2021-11-15 PROCEDURE — 87389 HIV-1 AG W/HIV-1&-2 AB AG IA: CPT | Performed by: EMERGENCY MEDICINE

## 2021-11-15 PROCEDURE — 83880 ASSAY OF NATRIURETIC PEPTIDE: CPT | Performed by: NURSE PRACTITIONER

## 2021-11-15 PROCEDURE — 80053 COMPREHEN METABOLIC PANEL: CPT | Performed by: NURSE PRACTITIONER

## 2021-11-15 RX ORDER — TALC
6 POWDER (GRAM) TOPICAL NIGHTLY PRN
Status: DISCONTINUED | OUTPATIENT
Start: 2021-11-15 | End: 2021-11-16 | Stop reason: HOSPADM

## 2021-11-15 RX ORDER — SODIUM CHLORIDE 0.9 % (FLUSH) 0.9 %
10 SYRINGE (ML) INJECTION
Status: DISCONTINUED | OUTPATIENT
Start: 2021-11-15 | End: 2021-11-16 | Stop reason: HOSPADM

## 2021-11-15 RX ORDER — NAPROXEN SODIUM 220 MG/1
162 TABLET, FILM COATED ORAL
Status: COMPLETED | OUTPATIENT
Start: 2021-11-15 | End: 2021-11-15

## 2021-11-15 RX ADMIN — ASPIRIN 81 MG CHEWABLE TABLET 162 MG: 81 TABLET CHEWABLE at 10:11

## 2021-11-16 ENCOUNTER — TELEPHONE (OUTPATIENT)
Dept: ORTHOPEDICS | Facility: CLINIC | Age: 61
End: 2021-11-16
Payer: MEDICARE

## 2021-11-16 VITALS
OXYGEN SATURATION: 94 % | SYSTOLIC BLOOD PRESSURE: 131 MMHG | DIASTOLIC BLOOD PRESSURE: 74 MMHG | BODY MASS INDEX: 30.37 KG/M2 | HEART RATE: 66 BPM | HEIGHT: 66 IN | RESPIRATION RATE: 14 BRPM | WEIGHT: 189 LBS | TEMPERATURE: 98 F

## 2021-11-16 LAB
ALBUMIN SERPL BCP-MCNC: 3.1 G/DL (ref 3.5–5.2)
ALP SERPL-CCNC: 88 U/L (ref 55–135)
ALT SERPL W/O P-5'-P-CCNC: 9 U/L (ref 10–44)
ANION GAP SERPL CALC-SCNC: 8 MMOL/L (ref 8–16)
AST SERPL-CCNC: 13 U/L (ref 10–40)
AV INDEX (PROSTH): 0.81
AV MEAN GRADIENT: 5 MMHG
AV PEAK GRADIENT: 9 MMHG
AV VALVE AREA: 2.14 CM2
AV VELOCITY RATIO: 0.82
BASOPHILS # BLD AUTO: 0.03 K/UL (ref 0–0.2)
BASOPHILS NFR BLD: 0.5 % (ref 0–1.9)
BILIRUB SERPL-MCNC: 0.3 MG/DL (ref 0.1–1)
BSA FOR ECHO PROCEDURE: 2 M2
BUN SERPL-MCNC: 13 MG/DL (ref 6–20)
CALCIUM SERPL-MCNC: 9.2 MG/DL (ref 8.7–10.5)
CHLORIDE SERPL-SCNC: 107 MMOL/L (ref 95–110)
CO2 SERPL-SCNC: 28 MMOL/L (ref 23–29)
CREAT SERPL-MCNC: 0.8 MG/DL (ref 0.5–1.4)
CV ECHO LV RWT: 0.32 CM
DIFFERENTIAL METHOD: ABNORMAL
DOP CALC AO PEAK VEL: 1.52 M/S
DOP CALC AO VTI: 36.48 CM
DOP CALC LVOT AREA: 2.6 CM2
DOP CALC LVOT DIAMETER: 1.83 CM
DOP CALC LVOT PEAK VEL: 1.24 M/S
DOP CALC LVOT STROKE VOLUME: 77.97 CM3
DOP CALCLVOT PEAK VEL VTI: 29.66 CM
E WAVE DECELERATION TIME: 175.9 MSEC
E/A RATIO: 0.89
E/E' RATIO: 7.05 M/S
ECHO LV POSTERIOR WALL: 0.8 CM (ref 0.6–1.1)
EJECTION FRACTION: 65 %
EOSINOPHIL # BLD AUTO: 0.3 K/UL (ref 0–0.5)
EOSINOPHIL NFR BLD: 5.6 % (ref 0–8)
ERYTHROCYTE [DISTWIDTH] IN BLOOD BY AUTOMATED COUNT: 15.7 % (ref 11.5–14.5)
EST. GFR  (AFRICAN AMERICAN): >60 ML/MIN/1.73 M^2
EST. GFR  (NON AFRICAN AMERICAN): >60 ML/MIN/1.73 M^2
ESTIMATED AVG GLUCOSE: 177 MG/DL (ref 68–131)
FRACTIONAL SHORTENING: 32 % (ref 28–44)
GLUCOSE SERPL-MCNC: 156 MG/DL (ref 70–110)
HBA1C MFR BLD: 7.8 % (ref 4–5.6)
HCT VFR BLD AUTO: 37.3 % (ref 37–48.5)
HGB BLD-MCNC: 11.5 G/DL (ref 12–16)
IMM GRANULOCYTES # BLD AUTO: 0.01 K/UL (ref 0–0.04)
IMM GRANULOCYTES NFR BLD AUTO: 0.2 % (ref 0–0.5)
INTERVENTRICULAR SEPTUM: 0.83 CM (ref 0.6–1.1)
IVRT: 57.09 MSEC
LA MAJOR: 5.17 CM
LA MINOR: 4.84 CM
LA WIDTH: 3.78 CM
LEFT ATRIUM SIZE: 3.15 CM
LEFT ATRIUM VOLUME INDEX MOD: 31.3 ML/M2
LEFT ATRIUM VOLUME INDEX: 25.9 ML/M2
LEFT ATRIUM VOLUME MOD: 61 CM3
LEFT ATRIUM VOLUME: 50.6 CM3
LEFT INTERNAL DIMENSION IN SYSTOLE: 3.36 CM (ref 2.1–4)
LEFT VENTRICLE DIASTOLIC VOLUME INDEX: 59.08 ML/M2
LEFT VENTRICLE DIASTOLIC VOLUME: 115.21 ML
LEFT VENTRICLE MASS INDEX: 70 G/M2
LEFT VENTRICLE SYSTOLIC VOLUME INDEX: 23.6 ML/M2
LEFT VENTRICLE SYSTOLIC VOLUME: 46.08 ML
LEFT VENTRICULAR INTERNAL DIMENSION IN DIASTOLE: 4.94 CM (ref 3.5–6)
LEFT VENTRICULAR MASS: 136.25 G
LV LATERAL E/E' RATIO: 8.38 M/S
LV SEPTAL E/E' RATIO: 6.09 M/S
LYMPHOCYTES # BLD AUTO: 2.2 K/UL (ref 1–4.8)
LYMPHOCYTES NFR BLD: 35.9 % (ref 18–48)
MCH RBC QN AUTO: 25.3 PG (ref 27–31)
MCHC RBC AUTO-ENTMCNC: 30.8 G/DL (ref 32–36)
MCV RBC AUTO: 82 FL (ref 82–98)
MONOCYTES # BLD AUTO: 0.6 K/UL (ref 0.3–1)
MONOCYTES NFR BLD: 9.4 % (ref 4–15)
MV PEAK A VEL: 0.75 M/S
MV PEAK E VEL: 0.67 M/S
MV STENOSIS PRESSURE HALF TIME: 51.01 MS
MV VALVE AREA P 1/2 METHOD: 4.31 CM2
NEUTROPHILS # BLD AUTO: 3 K/UL (ref 1.8–7.7)
NEUTROPHILS NFR BLD: 48.4 % (ref 38–73)
NRBC BLD-RTO: 0 /100 WBC
PISA MRMAX VEL: 0.05 M/S
PISA TR MAX VEL: 2.37 M/S
PLATELET # BLD AUTO: 290 K/UL (ref 150–450)
PMV BLD AUTO: 9.1 FL (ref 9.2–12.9)
POCT GLUCOSE: 129 MG/DL (ref 70–110)
POCT GLUCOSE: 144 MG/DL (ref 70–110)
POCT GLUCOSE: 174 MG/DL (ref 70–110)
POTASSIUM SERPL-SCNC: 3.7 MMOL/L (ref 3.5–5.1)
PROT SERPL-MCNC: 6.8 G/DL (ref 6–8.4)
PULM VEIN S/D RATIO: 1.34
PV PEAK D VEL: 0.41 M/S
PV PEAK S VEL: 0.55 M/S
PV PEAK VELOCITY: 0.84 CM/S
RA MAJOR: 5.44 CM
RA PRESSURE: 3 MMHG
RA WIDTH: 3.59 CM
RBC # BLD AUTO: 4.54 M/UL (ref 4–5.4)
SINUS: 2.42 CM
SODIUM SERPL-SCNC: 143 MMOL/L (ref 136–145)
TDI LATERAL: 0.08 M/S
TDI SEPTAL: 0.11 M/S
TDI: 0.1 M/S
TR MAX PG: 22 MMHG
TROPONIN I SERPL DL<=0.01 NG/ML-MCNC: <0.006 NG/ML (ref 0–0.03)
TROPONIN I SERPL DL<=0.01 NG/ML-MCNC: <0.006 NG/ML (ref 0–0.03)
TV REST PULMONARY ARTERY PRESSURE: 25 MMHG
WBC # BLD AUTO: 6.08 K/UL (ref 3.9–12.7)

## 2021-11-16 PROCEDURE — 25000242 PHARM REV CODE 250 ALT 637 W/ HCPCS: Performed by: NURSE PRACTITIONER

## 2021-11-16 PROCEDURE — 36415 COLL VENOUS BLD VENIPUNCTURE: CPT | Performed by: NURSE PRACTITIONER

## 2021-11-16 PROCEDURE — 85025 COMPLETE CBC W/AUTO DIFF WBC: CPT | Performed by: NURSE PRACTITIONER

## 2021-11-16 PROCEDURE — 99220 PR INITIAL OBSERVATION CARE,LEVL III: ICD-10-PCS | Mod: ,,, | Performed by: INTERNAL MEDICINE

## 2021-11-16 PROCEDURE — 25000003 PHARM REV CODE 250: Performed by: NURSE PRACTITIONER

## 2021-11-16 PROCEDURE — 94761 N-INVAS EAR/PLS OXIMETRY MLT: CPT

## 2021-11-16 PROCEDURE — 84484 ASSAY OF TROPONIN QUANT: CPT | Performed by: NURSE PRACTITIONER

## 2021-11-16 PROCEDURE — 82962 GLUCOSE BLOOD TEST: CPT

## 2021-11-16 PROCEDURE — 94640 AIRWAY INHALATION TREATMENT: CPT

## 2021-11-16 PROCEDURE — 99220 PR INITIAL OBSERVATION CARE,LEVL III: CPT | Mod: ,,, | Performed by: INTERNAL MEDICINE

## 2021-11-16 PROCEDURE — 80053 COMPREHEN METABOLIC PANEL: CPT | Performed by: NURSE PRACTITIONER

## 2021-11-16 PROCEDURE — 83036 HEMOGLOBIN GLYCOSYLATED A1C: CPT | Performed by: NURSE PRACTITIONER

## 2021-11-16 PROCEDURE — G0378 HOSPITAL OBSERVATION PER HR: HCPCS

## 2021-11-16 RX ORDER — ACETAMINOPHEN 325 MG/1
650 TABLET ORAL EVERY 6 HOURS PRN
Status: DISCONTINUED | OUTPATIENT
Start: 2021-11-16 | End: 2021-11-16 | Stop reason: HOSPADM

## 2021-11-16 RX ORDER — ZOLPIDEM TARTRATE 5 MG/1
5 TABLET ORAL NIGHTLY PRN
Status: DISCONTINUED | OUTPATIENT
Start: 2021-11-16 | End: 2021-11-16 | Stop reason: HOSPADM

## 2021-11-16 RX ORDER — GABAPENTIN 300 MG/1
300 CAPSULE ORAL 2 TIMES DAILY
Status: DISCONTINUED | OUTPATIENT
Start: 2021-11-16 | End: 2021-11-16 | Stop reason: HOSPADM

## 2021-11-16 RX ORDER — METOPROLOL SUCCINATE 50 MG/1
100 TABLET, EXTENDED RELEASE ORAL DAILY
Status: DISCONTINUED | OUTPATIENT
Start: 2021-11-16 | End: 2021-11-16 | Stop reason: HOSPADM

## 2021-11-16 RX ORDER — IBUPROFEN 200 MG
24 TABLET ORAL
Status: DISCONTINUED | OUTPATIENT
Start: 2021-11-16 | End: 2021-11-16 | Stop reason: HOSPADM

## 2021-11-16 RX ORDER — TRAZODONE HYDROCHLORIDE 50 MG/1
50 TABLET ORAL NIGHTLY
Status: DISCONTINUED | OUTPATIENT
Start: 2021-11-16 | End: 2021-11-16 | Stop reason: HOSPADM

## 2021-11-16 RX ORDER — PANTOPRAZOLE SODIUM 40 MG/1
40 TABLET, DELAYED RELEASE ORAL DAILY
Status: DISCONTINUED | OUTPATIENT
Start: 2021-11-16 | End: 2021-11-16 | Stop reason: HOSPADM

## 2021-11-16 RX ORDER — METOPROLOL SUCCINATE 50 MG/1
100 TABLET, EXTENDED RELEASE ORAL DAILY
Qty: 60 TABLET | Refills: 0 | Status: SHIPPED | OUTPATIENT
Start: 2021-11-16 | End: 2023-04-20 | Stop reason: ALTCHOICE

## 2021-11-16 RX ORDER — FLUTICASONE FUROATE AND VILANTEROL 100; 25 UG/1; UG/1
1 POWDER RESPIRATORY (INHALATION) DAILY
Status: DISCONTINUED | OUTPATIENT
Start: 2021-11-16 | End: 2021-11-16 | Stop reason: HOSPADM

## 2021-11-16 RX ORDER — IBUPROFEN 200 MG
16 TABLET ORAL
Status: DISCONTINUED | OUTPATIENT
Start: 2021-11-16 | End: 2021-11-16 | Stop reason: HOSPADM

## 2021-11-16 RX ORDER — DIPHENHYDRAMINE HYDROCHLORIDE 50 MG/ML
25 INJECTION INTRAMUSCULAR; INTRAVENOUS NIGHTLY PRN
Status: DISCONTINUED | OUTPATIENT
Start: 2021-11-16 | End: 2021-11-16 | Stop reason: HOSPADM

## 2021-11-16 RX ORDER — TOPIRAMATE 25 MG/1
100 TABLET ORAL DAILY
Status: DISCONTINUED | OUTPATIENT
Start: 2021-11-16 | End: 2021-11-16 | Stop reason: HOSPADM

## 2021-11-16 RX ORDER — ATORVASTATIN CALCIUM 20 MG/1
40 TABLET, FILM COATED ORAL DAILY
Status: DISCONTINUED | OUTPATIENT
Start: 2021-11-16 | End: 2021-11-16 | Stop reason: HOSPADM

## 2021-11-16 RX ORDER — HYDROCODONE BITARTRATE AND ACETAMINOPHEN 5; 325 MG/1; MG/1
1 TABLET ORAL EVERY 8 HOURS PRN
Status: DISCONTINUED | OUTPATIENT
Start: 2021-11-16 | End: 2021-11-16 | Stop reason: HOSPADM

## 2021-11-16 RX ORDER — NAPROXEN 500 MG/1
500 TABLET ORAL 2 TIMES DAILY
COMMUNITY
End: 2022-02-10

## 2021-11-16 RX ORDER — KETOROLAC TROMETHAMINE 30 MG/ML
10 INJECTION, SOLUTION INTRAMUSCULAR; INTRAVENOUS EVERY 8 HOURS PRN
Status: DISCONTINUED | OUTPATIENT
Start: 2021-11-16 | End: 2021-11-16 | Stop reason: HOSPADM

## 2021-11-16 RX ORDER — HYDROCHLOROTHIAZIDE 25 MG/1
25 TABLET ORAL DAILY
Status: DISCONTINUED | OUTPATIENT
Start: 2021-11-16 | End: 2021-11-16 | Stop reason: HOSPADM

## 2021-11-16 RX ORDER — INSULIN ASPART 100 [IU]/ML
0-5 INJECTION, SOLUTION INTRAVENOUS; SUBCUTANEOUS
Status: DISCONTINUED | OUTPATIENT
Start: 2021-11-16 | End: 2021-11-16 | Stop reason: HOSPADM

## 2021-11-16 RX ORDER — DIAZEPAM 5 MG/1
10 TABLET ORAL EVERY MORNING
Status: DISCONTINUED | OUTPATIENT
Start: 2021-11-16 | End: 2021-11-16 | Stop reason: HOSPADM

## 2021-11-16 RX ORDER — GLUCAGON 1 MG
1 KIT INJECTION
Status: DISCONTINUED | OUTPATIENT
Start: 2021-11-16 | End: 2021-11-16 | Stop reason: HOSPADM

## 2021-11-16 RX ORDER — NITROGLYCERIN 0.4 MG/1
0.4 TABLET SUBLINGUAL EVERY 5 MIN PRN
Status: DISCONTINUED | OUTPATIENT
Start: 2021-11-16 | End: 2021-11-16 | Stop reason: HOSPADM

## 2021-11-16 RX ORDER — LOSARTAN POTASSIUM 25 MG/1
25 TABLET ORAL DAILY
Status: DISCONTINUED | OUTPATIENT
Start: 2021-11-16 | End: 2021-11-16 | Stop reason: HOSPADM

## 2021-11-16 RX ORDER — CITALOPRAM 20 MG/1
40 TABLET, FILM COATED ORAL DAILY
Status: DISCONTINUED | OUTPATIENT
Start: 2021-11-16 | End: 2021-11-16 | Stop reason: HOSPADM

## 2021-11-16 RX ORDER — ONDANSETRON 2 MG/ML
4 INJECTION INTRAMUSCULAR; INTRAVENOUS EVERY 8 HOURS PRN
Status: DISCONTINUED | OUTPATIENT
Start: 2021-11-16 | End: 2021-11-16 | Stop reason: HOSPADM

## 2021-11-16 RX ADMIN — DIAZEPAM 10 MG: 5 TABLET ORAL at 07:11

## 2021-11-16 RX ADMIN — Medication 6 MG: at 12:11

## 2021-11-16 RX ADMIN — ACETAMINOPHEN 650 MG: 325 TABLET ORAL at 05:11

## 2021-11-16 RX ADMIN — FLUTICASONE FUROATE AND VILANTEROL TRIFENATATE 1 PUFF: 100; 25 POWDER RESPIRATORY (INHALATION) at 07:11

## 2021-11-16 RX ADMIN — TRAZODONE HYDROCHLORIDE 50 MG: 50 TABLET ORAL at 12:11

## 2021-11-16 RX ADMIN — METOPROLOL SUCCINATE 100 MG: 50 TABLET, EXTENDED RELEASE ORAL at 09:11

## 2021-11-16 RX ADMIN — GABAPENTIN 300 MG: 300 CAPSULE ORAL at 09:11

## 2021-11-16 RX ADMIN — ATORVASTATIN CALCIUM 40 MG: 20 TABLET, FILM COATED ORAL at 09:11

## 2021-11-16 RX ADMIN — LOSARTAN POTASSIUM 25 MG: 25 TABLET, FILM COATED ORAL at 09:11

## 2021-11-16 RX ADMIN — HYDROCODONE BITARTRATE AND ACETAMINOPHEN 1 TABLET: 5; 325 TABLET ORAL at 10:11

## 2021-11-16 RX ADMIN — PANTOPRAZOLE SODIUM 40 MG: 40 TABLET, DELAYED RELEASE ORAL at 09:11

## 2021-11-18 ENCOUNTER — OFFICE VISIT (OUTPATIENT)
Dept: PAIN MEDICINE | Facility: CLINIC | Age: 61
End: 2021-11-18
Payer: MEDICARE

## 2021-11-18 VITALS
RESPIRATION RATE: 18 BRPM | HEIGHT: 66 IN | DIASTOLIC BLOOD PRESSURE: 85 MMHG | WEIGHT: 194.69 LBS | OXYGEN SATURATION: 100 % | SYSTOLIC BLOOD PRESSURE: 145 MMHG | TEMPERATURE: 97 F | BODY MASS INDEX: 31.29 KG/M2 | HEART RATE: 65 BPM

## 2021-11-18 DIAGNOSIS — M25.562 CHRONIC PAIN OF LEFT KNEE: ICD-10-CM

## 2021-11-18 DIAGNOSIS — G89.29 CHRONIC PAIN OF LEFT KNEE: ICD-10-CM

## 2021-11-18 DIAGNOSIS — M17.12 PRIMARY OSTEOARTHRITIS OF LEFT KNEE: Primary | ICD-10-CM

## 2021-11-18 DIAGNOSIS — M25.612 DECREASED ROM OF LEFT SHOULDER: ICD-10-CM

## 2021-11-18 PROCEDURE — 99999 PR PBB SHADOW E&M-EST. PATIENT-LVL III: CPT | Mod: PBBFAC,,, | Performed by: NURSE PRACTITIONER

## 2021-11-18 PROCEDURE — 3079F DIAST BP 80-89 MM HG: CPT | Mod: CPTII,S$GLB,, | Performed by: NURSE PRACTITIONER

## 2021-11-18 PROCEDURE — 3008F PR BODY MASS INDEX (BMI) DOCUMENTED: ICD-10-PCS | Mod: CPTII,S$GLB,, | Performed by: NURSE PRACTITIONER

## 2021-11-18 PROCEDURE — 1159F MED LIST DOCD IN RCRD: CPT | Mod: CPTII,S$GLB,, | Performed by: NURSE PRACTITIONER

## 2021-11-18 PROCEDURE — 99213 PR OFFICE/OUTPT VISIT, EST, LEVL III, 20-29 MIN: ICD-10-PCS | Mod: S$GLB,,, | Performed by: NURSE PRACTITIONER

## 2021-11-18 PROCEDURE — 4010F PR ACE/ARB THEARPY RXD/TAKEN: ICD-10-PCS | Mod: CPTII,S$GLB,, | Performed by: NURSE PRACTITIONER

## 2021-11-18 PROCEDURE — 3008F BODY MASS INDEX DOCD: CPT | Mod: CPTII,S$GLB,, | Performed by: NURSE PRACTITIONER

## 2021-11-18 PROCEDURE — 3077F PR MOST RECENT SYSTOLIC BLOOD PRESSURE >= 140 MM HG: ICD-10-PCS | Mod: CPTII,S$GLB,, | Performed by: NURSE PRACTITIONER

## 2021-11-18 PROCEDURE — 1160F RVW MEDS BY RX/DR IN RCRD: CPT | Mod: CPTII,S$GLB,, | Performed by: NURSE PRACTITIONER

## 2021-11-18 PROCEDURE — 3077F SYST BP >= 140 MM HG: CPT | Mod: CPTII,S$GLB,, | Performed by: NURSE PRACTITIONER

## 2021-11-18 PROCEDURE — 3051F HG A1C>EQUAL 7.0%<8.0%: CPT | Mod: CPTII,S$GLB,, | Performed by: NURSE PRACTITIONER

## 2021-11-18 PROCEDURE — 1159F PR MEDICATION LIST DOCUMENTED IN MEDICAL RECORD: ICD-10-PCS | Mod: CPTII,S$GLB,, | Performed by: NURSE PRACTITIONER

## 2021-11-18 PROCEDURE — 4010F ACE/ARB THERAPY RXD/TAKEN: CPT | Mod: CPTII,S$GLB,, | Performed by: NURSE PRACTITIONER

## 2021-11-18 PROCEDURE — 99999 PR PBB SHADOW E&M-EST. PATIENT-LVL III: ICD-10-PCS | Mod: PBBFAC,,, | Performed by: NURSE PRACTITIONER

## 2021-11-18 PROCEDURE — 1160F PR REVIEW ALL MEDS BY PRESCRIBER/CLIN PHARMACIST DOCUMENTED: ICD-10-PCS | Mod: CPTII,S$GLB,, | Performed by: NURSE PRACTITIONER

## 2021-11-18 PROCEDURE — 99213 OFFICE O/P EST LOW 20 MIN: CPT | Mod: S$GLB,,, | Performed by: NURSE PRACTITIONER

## 2021-11-18 PROCEDURE — 3051F PR MOST RECENT HEMOGLOBIN A1C LEVEL 7.0 - < 8.0%: ICD-10-PCS | Mod: CPTII,S$GLB,, | Performed by: NURSE PRACTITIONER

## 2021-11-18 PROCEDURE — 3079F PR MOST RECENT DIASTOLIC BLOOD PRESSURE 80-89 MM HG: ICD-10-PCS | Mod: CPTII,S$GLB,, | Performed by: NURSE PRACTITIONER

## 2021-11-30 ENCOUNTER — OFFICE VISIT (OUTPATIENT)
Dept: PODIATRY | Facility: CLINIC | Age: 61
End: 2021-11-30
Payer: MEDICARE

## 2021-11-30 ENCOUNTER — TELEPHONE (OUTPATIENT)
Dept: ORTHOPEDICS | Facility: CLINIC | Age: 61
End: 2021-11-30
Payer: MEDICARE

## 2021-11-30 VITALS
HEART RATE: 72 BPM | DIASTOLIC BLOOD PRESSURE: 58 MMHG | WEIGHT: 198 LBS | BODY MASS INDEX: 31.82 KG/M2 | SYSTOLIC BLOOD PRESSURE: 129 MMHG | HEIGHT: 66 IN

## 2021-11-30 DIAGNOSIS — G89.29 CHRONIC PAIN OF BOTH FEET: Primary | ICD-10-CM

## 2021-11-30 DIAGNOSIS — R22.30 MASS OF PALM: ICD-10-CM

## 2021-11-30 DIAGNOSIS — R22.32 SUBCUTANEOUS MASS OF LEFT HAND: Primary | ICD-10-CM

## 2021-11-30 DIAGNOSIS — M79.671 CHRONIC PAIN OF BOTH FEET: Primary | ICD-10-CM

## 2021-11-30 DIAGNOSIS — E11.42 TYPE 2 DIABETES MELLITUS WITH DIABETIC POLYNEUROPATHY, UNSPECIFIED WHETHER LONG TERM INSULIN USE: ICD-10-CM

## 2021-11-30 DIAGNOSIS — M79.671 FOOT PAIN, RIGHT: ICD-10-CM

## 2021-11-30 DIAGNOSIS — M79.672 CHRONIC PAIN OF BOTH FEET: Primary | ICD-10-CM

## 2021-11-30 DIAGNOSIS — M72.2 PLANTAR FIBROMATOSIS: ICD-10-CM

## 2021-11-30 PROCEDURE — 4010F ACE/ARB THERAPY RXD/TAKEN: CPT | Mod: CPTII,S$GLB,, | Performed by: PODIATRIST

## 2021-11-30 PROCEDURE — 99213 PR OFFICE/OUTPT VISIT, EST, LEVL III, 20-29 MIN: ICD-10-PCS | Mod: S$GLB,,, | Performed by: PODIATRIST

## 2021-11-30 PROCEDURE — 4010F PR ACE/ARB THEARPY RXD/TAKEN: ICD-10-PCS | Mod: CPTII,S$GLB,, | Performed by: PODIATRIST

## 2021-11-30 PROCEDURE — 99999 PR PBB SHADOW E&M-EST. PATIENT-LVL III: CPT | Mod: PBBFAC,,, | Performed by: PODIATRIST

## 2021-11-30 PROCEDURE — 99999 PR PBB SHADOW E&M-EST. PATIENT-LVL III: ICD-10-PCS | Mod: PBBFAC,,, | Performed by: PODIATRIST

## 2021-11-30 PROCEDURE — 99213 OFFICE O/P EST LOW 20 MIN: CPT | Mod: S$GLB,,, | Performed by: PODIATRIST

## 2021-11-30 RX ORDER — SITAGLIPTIN 100 MG/1
100 TABLET, FILM COATED ORAL DAILY
COMMUNITY
Start: 2021-10-28 | End: 2023-01-18 | Stop reason: ALTCHOICE

## 2021-11-30 RX ORDER — TRAMADOL HYDROCHLORIDE 50 MG/1
50 TABLET ORAL EVERY 6 HOURS PRN
Qty: 10 TABLET | Refills: 0 | Status: SHIPPED | OUTPATIENT
Start: 2021-11-30 | End: 2021-12-03 | Stop reason: SDUPTHER

## 2021-12-01 ENCOUNTER — HOSPITAL ENCOUNTER (OUTPATIENT)
Facility: OTHER | Age: 61
Discharge: HOME | End: 2021-12-01
Attending: ORTHOPAEDIC SURGERY | Admitting: ORTHOPAEDIC SURGERY
Payer: MEDICARE

## 2021-12-01 ENCOUNTER — ANESTHESIA (OUTPATIENT)
Dept: SURGERY | Facility: OTHER | Age: 61
End: 2021-12-01
Payer: MEDICARE

## 2021-12-01 ENCOUNTER — ANESTHESIA EVENT (OUTPATIENT)
Dept: SURGERY | Facility: OTHER | Age: 61
End: 2021-12-01
Payer: MEDICARE

## 2021-12-01 VITALS
OXYGEN SATURATION: 98 % | RESPIRATION RATE: 16 BRPM | SYSTOLIC BLOOD PRESSURE: 119 MMHG | HEART RATE: 69 BPM | TEMPERATURE: 98 F | HEIGHT: 66 IN | WEIGHT: 198 LBS | DIASTOLIC BLOOD PRESSURE: 66 MMHG | BODY MASS INDEX: 31.82 KG/M2

## 2021-12-01 DIAGNOSIS — R22.32 SUBCUTANEOUS MASS OF LEFT HAND: ICD-10-CM

## 2021-12-01 DIAGNOSIS — Z01.818 PRE-OP TESTING: Primary | ICD-10-CM

## 2021-12-01 DIAGNOSIS — R22.30 MASS OF PALM: ICD-10-CM

## 2021-12-01 LAB — POCT GLUCOSE: 164 MG/DL (ref 70–110)

## 2021-12-01 PROCEDURE — 37000008 HC ANESTHESIA 1ST 15 MINUTES: Performed by: ORTHOPAEDIC SURGERY

## 2021-12-01 PROCEDURE — 88307 TISSUE EXAM BY PATHOLOGIST: CPT | Mod: 26,,, | Performed by: PATHOLOGY

## 2021-12-01 PROCEDURE — 36000706: Performed by: ORTHOPAEDIC SURGERY

## 2021-12-01 PROCEDURE — 36000707: Performed by: ORTHOPAEDIC SURGERY

## 2021-12-01 PROCEDURE — 25000003 PHARM REV CODE 250: Performed by: ORTHOPAEDIC SURGERY

## 2021-12-01 PROCEDURE — 63600175 PHARM REV CODE 636 W HCPCS: Performed by: NURSE ANESTHETIST, CERTIFIED REGISTERED

## 2021-12-01 PROCEDURE — 88307 PR  SURG PATH,LEVEL V: ICD-10-PCS | Mod: 26,,, | Performed by: PATHOLOGY

## 2021-12-01 PROCEDURE — 82962 GLUCOSE BLOOD TEST: CPT | Performed by: ORTHOPAEDIC SURGERY

## 2021-12-01 PROCEDURE — 26115 PR EXC TUM/VASC MAL SFT TISS HAND/FNGR SUBQ <1.5CM: ICD-10-PCS | Mod: LT,,, | Performed by: ORTHOPAEDIC SURGERY

## 2021-12-01 PROCEDURE — 25000003 PHARM REV CODE 250: Performed by: NURSE ANESTHETIST, CERTIFIED REGISTERED

## 2021-12-01 PROCEDURE — 26115 EXC HAND LES SC < 1.5 CM: CPT | Mod: LT,,, | Performed by: ORTHOPAEDIC SURGERY

## 2021-12-01 PROCEDURE — 37000009 HC ANESTHESIA EA ADD 15 MINS: Performed by: ORTHOPAEDIC SURGERY

## 2021-12-01 PROCEDURE — 71000016 HC POSTOP RECOV ADDL HR: Performed by: ORTHOPAEDIC SURGERY

## 2021-12-01 PROCEDURE — 71000015 HC POSTOP RECOV 1ST HR: Performed by: ORTHOPAEDIC SURGERY

## 2021-12-01 PROCEDURE — 88307 TISSUE EXAM BY PATHOLOGIST: CPT | Performed by: PATHOLOGY

## 2021-12-01 PROCEDURE — 63600175 PHARM REV CODE 636 W HCPCS: Performed by: STUDENT IN AN ORGANIZED HEALTH CARE EDUCATION/TRAINING PROGRAM

## 2021-12-01 RX ORDER — HYDROMORPHONE HYDROCHLORIDE 2 MG/ML
0.4 INJECTION, SOLUTION INTRAMUSCULAR; INTRAVENOUS; SUBCUTANEOUS EVERY 5 MIN PRN
Status: DISCONTINUED | OUTPATIENT
Start: 2021-12-01 | End: 2021-12-01 | Stop reason: HOSPADM

## 2021-12-01 RX ORDER — MIDAZOLAM HYDROCHLORIDE 1 MG/ML
INJECTION INTRAMUSCULAR; INTRAVENOUS
Status: DISCONTINUED | OUTPATIENT
Start: 2021-12-01 | End: 2021-12-01

## 2021-12-01 RX ORDER — LIDOCAINE HYDROCHLORIDE 10 MG/ML
INJECTION, SOLUTION EPIDURAL; INFILTRATION; INTRACAUDAL; PERINEURAL
Status: DISCONTINUED | OUTPATIENT
Start: 2021-12-01 | End: 2021-12-01 | Stop reason: HOSPADM

## 2021-12-01 RX ORDER — ONDANSETRON 8 MG/1
8 TABLET, ORALLY DISINTEGRATING ORAL EVERY 8 HOURS PRN
Status: CANCELLED | OUTPATIENT
Start: 2021-12-01

## 2021-12-01 RX ORDER — BUPIVACAINE HYDROCHLORIDE 2.5 MG/ML
INJECTION, SOLUTION EPIDURAL; INFILTRATION; INTRACAUDAL
Status: DISCONTINUED | OUTPATIENT
Start: 2021-12-01 | End: 2021-12-01 | Stop reason: HOSPADM

## 2021-12-01 RX ORDER — PROCHLORPERAZINE EDISYLATE 5 MG/ML
5 INJECTION INTRAMUSCULAR; INTRAVENOUS EVERY 30 MIN PRN
Status: DISCONTINUED | OUTPATIENT
Start: 2021-12-01 | End: 2021-12-01 | Stop reason: HOSPADM

## 2021-12-01 RX ORDER — OXYCODONE HYDROCHLORIDE 5 MG/1
5 TABLET ORAL EVERY 4 HOURS PRN
Status: CANCELLED | OUTPATIENT
Start: 2021-12-01

## 2021-12-01 RX ORDER — ACETAMINOPHEN 325 MG/1
650 TABLET ORAL EVERY 4 HOURS PRN
Status: CANCELLED | OUTPATIENT
Start: 2021-12-01

## 2021-12-01 RX ORDER — OXYCODONE HYDROCHLORIDE 5 MG/1
10 TABLET ORAL EVERY 4 HOURS PRN
Status: CANCELLED | OUTPATIENT
Start: 2021-12-01

## 2021-12-01 RX ORDER — SODIUM CHLORIDE 0.9 % (FLUSH) 0.9 %
10 SYRINGE (ML) INJECTION
Status: DISCONTINUED | OUTPATIENT
Start: 2021-12-01 | End: 2021-12-01 | Stop reason: HOSPADM

## 2021-12-01 RX ORDER — OXYCODONE HYDROCHLORIDE 5 MG/1
5 TABLET ORAL
Status: DISCONTINUED | OUTPATIENT
Start: 2021-12-01 | End: 2021-12-01 | Stop reason: HOSPADM

## 2021-12-01 RX ORDER — LIDOCAINE HCL/PF 100 MG/5ML
SYRINGE (ML) INTRAVENOUS
Status: DISCONTINUED | OUTPATIENT
Start: 2021-12-01 | End: 2021-12-01

## 2021-12-01 RX ORDER — FENTANYL CITRATE 50 UG/ML
INJECTION, SOLUTION INTRAMUSCULAR; INTRAVENOUS
Status: DISCONTINUED | OUTPATIENT
Start: 2021-12-01 | End: 2021-12-01

## 2021-12-01 RX ORDER — PROPOFOL 10 MG/ML
VIAL (ML) INTRAVENOUS CONTINUOUS PRN
Status: DISCONTINUED | OUTPATIENT
Start: 2021-12-01 | End: 2021-12-01

## 2021-12-01 RX ORDER — CEFAZOLIN SODIUM 1 G/3ML
2 INJECTION, POWDER, FOR SOLUTION INTRAMUSCULAR; INTRAVENOUS
Status: COMPLETED | OUTPATIENT
Start: 2021-12-01 | End: 2021-12-01

## 2021-12-01 RX ORDER — PHENYLEPHRINE HYDROCHLORIDE 10 MG/ML
INJECTION INTRAVENOUS
Status: DISCONTINUED | OUTPATIENT
Start: 2021-12-01 | End: 2021-12-01

## 2021-12-01 RX ORDER — PROPOFOL 10 MG/ML
VIAL (ML) INTRAVENOUS
Status: DISCONTINUED | OUTPATIENT
Start: 2021-12-01 | End: 2021-12-01

## 2021-12-01 RX ORDER — MEPERIDINE HYDROCHLORIDE 25 MG/ML
12.5 INJECTION INTRAMUSCULAR; INTRAVENOUS; SUBCUTANEOUS ONCE AS NEEDED
Status: DISCONTINUED | OUTPATIENT
Start: 2021-12-01 | End: 2021-12-01 | Stop reason: HOSPADM

## 2021-12-01 RX ORDER — SODIUM CHLORIDE 0.9 % (FLUSH) 0.9 %
3 SYRINGE (ML) INJECTION
Status: DISCONTINUED | OUTPATIENT
Start: 2021-12-01 | End: 2021-12-01 | Stop reason: HOSPADM

## 2021-12-01 RX ADMIN — PHENYLEPHRINE HYDROCHLORIDE 100 MCG: 10 INJECTION INTRAVENOUS at 08:12

## 2021-12-01 RX ADMIN — MIDAZOLAM HYDROCHLORIDE 2 MG: 1 INJECTION, SOLUTION INTRAMUSCULAR; INTRAVENOUS at 07:12

## 2021-12-01 RX ADMIN — PROPOFOL 100 MCG/KG/MIN: 10 INJECTION, EMULSION INTRAVENOUS at 07:12

## 2021-12-01 RX ADMIN — CEFAZOLIN 2 G: 330 INJECTION, POWDER, FOR SOLUTION INTRAMUSCULAR; INTRAVENOUS at 07:12

## 2021-12-01 RX ADMIN — LIDOCAINE HYDROCHLORIDE 20 MG: 20 INJECTION, SOLUTION INTRAVENOUS at 07:12

## 2021-12-01 RX ADMIN — PROPOFOL 70 MG: 10 INJECTION, EMULSION INTRAVENOUS at 07:12

## 2021-12-01 RX ADMIN — FENTANYL CITRATE 50 MCG: 50 INJECTION, SOLUTION INTRAMUSCULAR; INTRAVENOUS at 07:12

## 2021-12-02 ENCOUNTER — TELEPHONE (OUTPATIENT)
Dept: ORTHOPEDICS | Facility: CLINIC | Age: 61
End: 2021-12-02
Payer: MEDICARE

## 2021-12-03 ENCOUNTER — TELEPHONE (OUTPATIENT)
Dept: ORTHOPEDICS | Facility: CLINIC | Age: 61
End: 2021-12-03
Payer: MEDICARE

## 2021-12-03 DIAGNOSIS — Z98.890 POST-OPERATIVE STATE: Primary | ICD-10-CM

## 2021-12-03 RX ORDER — TRAMADOL HYDROCHLORIDE 50 MG/1
50 TABLET ORAL EVERY 6 HOURS PRN
Qty: 10 TABLET | Refills: 0 | Status: SHIPPED | OUTPATIENT
Start: 2021-12-03 | End: 2022-03-22 | Stop reason: SDUPTHER

## 2021-12-08 ENCOUNTER — NURSE TRIAGE (OUTPATIENT)
Dept: ADMINISTRATIVE | Facility: CLINIC | Age: 61
End: 2021-12-08
Payer: MEDICARE

## 2021-12-09 LAB
FINAL PATHOLOGIC DIAGNOSIS: NORMAL
Lab: NORMAL

## 2021-12-15 ENCOUNTER — OFFICE VISIT (OUTPATIENT)
Dept: ORTHOPEDICS | Facility: CLINIC | Age: 61
End: 2021-12-15
Payer: MEDICARE

## 2021-12-15 VITALS
DIASTOLIC BLOOD PRESSURE: 69 MMHG | WEIGHT: 198 LBS | HEIGHT: 66 IN | BODY MASS INDEX: 31.82 KG/M2 | SYSTOLIC BLOOD PRESSURE: 110 MMHG | HEART RATE: 59 BPM

## 2021-12-15 DIAGNOSIS — M25.642 DECREASED RANGE OF MOTION OF FINGER OF LEFT HAND: ICD-10-CM

## 2021-12-15 DIAGNOSIS — R22.30 MASS OF PALM: Primary | ICD-10-CM

## 2021-12-15 DIAGNOSIS — Z47.89 ORTHOPEDIC AFTERCARE: ICD-10-CM

## 2021-12-15 PROCEDURE — 99999 PR PBB SHADOW E&M-EST. PATIENT-LVL V: CPT | Mod: PBBFAC,,, | Performed by: PHYSICIAN ASSISTANT

## 2021-12-15 PROCEDURE — 99999 PR PBB SHADOW E&M-EST. PATIENT-LVL V: ICD-10-PCS | Mod: PBBFAC,,, | Performed by: PHYSICIAN ASSISTANT

## 2021-12-15 PROCEDURE — 4010F PR ACE/ARB THEARPY RXD/TAKEN: ICD-10-PCS | Mod: CPTII,S$GLB,, | Performed by: PHYSICIAN ASSISTANT

## 2021-12-15 PROCEDURE — 99024 POSTOP FOLLOW-UP VISIT: CPT | Mod: S$GLB,,, | Performed by: PHYSICIAN ASSISTANT

## 2021-12-15 PROCEDURE — 99024 PR POST-OP FOLLOW-UP VISIT: ICD-10-PCS | Mod: S$GLB,,, | Performed by: PHYSICIAN ASSISTANT

## 2021-12-15 PROCEDURE — 4010F ACE/ARB THERAPY RXD/TAKEN: CPT | Mod: CPTII,S$GLB,, | Performed by: PHYSICIAN ASSISTANT

## 2021-12-20 ENCOUNTER — CLINICAL SUPPORT (OUTPATIENT)
Dept: REHABILITATION | Facility: HOSPITAL | Age: 61
End: 2021-12-20
Payer: MEDICARE

## 2021-12-20 ENCOUNTER — TELEPHONE (OUTPATIENT)
Dept: NEUROLOGY | Facility: CLINIC | Age: 61
End: 2021-12-20
Payer: MEDICARE

## 2021-12-20 DIAGNOSIS — Z74.09 STIFFNESS DUE TO IMMOBILITY: ICD-10-CM

## 2021-12-20 DIAGNOSIS — R53.1 WEAKNESS: ICD-10-CM

## 2021-12-20 DIAGNOSIS — M25.60 STIFFNESS DUE TO IMMOBILITY: ICD-10-CM

## 2021-12-20 DIAGNOSIS — R52 PAIN: ICD-10-CM

## 2021-12-20 PROCEDURE — 97110 THERAPEUTIC EXERCISES: CPT

## 2021-12-20 PROCEDURE — 97165 OT EVAL LOW COMPLEX 30 MIN: CPT

## 2021-12-29 ENCOUNTER — CLINICAL SUPPORT (OUTPATIENT)
Dept: REHABILITATION | Facility: OTHER | Age: 61
End: 2021-12-29
Payer: MEDICARE

## 2021-12-29 DIAGNOSIS — M54.2 NECK PAIN: ICD-10-CM

## 2021-12-29 DIAGNOSIS — G89.29 CHRONIC BILATERAL LOW BACK PAIN WITHOUT SCIATICA: ICD-10-CM

## 2021-12-29 DIAGNOSIS — M54.50 CHRONIC BILATERAL LOW BACK PAIN WITHOUT SCIATICA: ICD-10-CM

## 2021-12-29 PROCEDURE — 97162 PT EVAL MOD COMPLEX 30 MIN: CPT | Mod: PN | Performed by: PHYSICAL THERAPIST

## 2021-12-29 PROCEDURE — 97110 THERAPEUTIC EXERCISES: CPT | Mod: PN | Performed by: PHYSICAL THERAPIST

## 2021-12-30 PROBLEM — M54.50 CHRONIC BILATERAL LOW BACK PAIN WITHOUT SCIATICA: Status: ACTIVE | Noted: 2021-12-30

## 2021-12-30 PROBLEM — G89.29 CHRONIC BILATERAL LOW BACK PAIN WITHOUT SCIATICA: Status: ACTIVE | Noted: 2021-12-30

## 2021-12-30 PROBLEM — M54.2 NECK PAIN: Status: ACTIVE | Noted: 2021-12-30

## 2022-01-10 ENCOUNTER — TELEPHONE (OUTPATIENT)
Dept: ORTHOPEDICS | Facility: CLINIC | Age: 62
End: 2022-01-10
Payer: MEDICARE

## 2022-01-10 NOTE — TELEPHONE ENCOUNTER
----- Message from Irina Mena sent at 1/10/2022 11:53 AM CST -----  Contact: YUNG VILLEGAS [[id number]]  Type: Call Back    Who called:YUNG VILLEGAS [[id number]]    What is the request in detail: Patient is requesting a call back in regards to rescheduling her post op appointment. She states that she has COVID. Please advise.     Can the clinic reply by [facility]? No    Would the patient rather a call back or a response via [facility]? Call back     Best call back number: [phone] (mobile)    Additional Information:

## 2022-01-24 ENCOUNTER — CLINICAL SUPPORT (OUTPATIENT)
Dept: REHABILITATION | Facility: OTHER | Age: 62
End: 2022-01-24
Payer: MEDICARE

## 2022-01-24 DIAGNOSIS — G89.29 CHRONIC BILATERAL LOW BACK PAIN WITHOUT SCIATICA: ICD-10-CM

## 2022-01-24 DIAGNOSIS — M54.50 CHRONIC BILATERAL LOW BACK PAIN WITHOUT SCIATICA: ICD-10-CM

## 2022-01-24 DIAGNOSIS — M54.2 NECK PAIN: ICD-10-CM

## 2022-01-24 PROCEDURE — 97110 THERAPEUTIC EXERCISES: CPT | Mod: PN | Performed by: PHYSICAL THERAPIST

## 2022-01-24 PROCEDURE — 97140 MANUAL THERAPY 1/> REGIONS: CPT | Mod: PN | Performed by: PHYSICAL THERAPIST

## 2022-01-24 NOTE — PROGRESS NOTES
[facility]   Physical Therapy Treatment Note     Name: Susan Courtney  Clinic Number: [id number]    Therapy Diagnosis:   Encounter Diagnoses   Name Primary?    Neck pain     Chronic bilateral low back pain without sciatica      Physician: Paulo Holland FNP-C    Visit Date: 1/24/2022      Physician Orders: PT Eval and Treat Cervical and Back    Medical Diagnosis from Referral: M54.50 (ICD-10-CM) - Lumbago   Evaluation Date: 12/29/2021  Authorization Period Expiration: 1/12/2022  Plan of Care Expiration: 3/25/2022  Progress Note Due: 1/28/2022  Visit # / Visits authorized: 2/ 6   FOTO: 12/29/21      Precautions: Standard      PTA Visit #: 0/5     Time In: 1430  Time Out: 1515  Total Billable Time: 45 minutes    SUBJECTIVE     Pt reports: she hasn't been back to therapy 2* cynthia covid. She has been sleeping on firm surface and using IcyHot patches for low back.  She was compliant with home exercise program. Limited 2* illness and hand surgery  Response to previous treatment: no change  Functional change: no change    Pain: 8/10 (NAD)  Location: B low back, B neck and UT      OBJECTIVE     Objective Measures updated at progress report unless specified.     Treatment     Susan received the treatments listed below:      therapeutic exercises to develop strength, endurance, ROM, flexibility, posture and core stabilization for 20 minutes including:  Reassessment and review of HEP. Dry needling consent and education regarding procedure prior to treatment.     manual therapy techniques: Dry Needling and Taping were applied to the: CSP/LSP for 25 minutes, including:    15 min x Application of TDN: Pt educated on benefits and potential side effects of dry needling. Educated pt on benefits, precautions, side effects following TDN. Educated pt to use heat following treatment sessions if pt is experiencing pain or soreness. Pt verbalized good understanding of education.  Pt signed  written consent to dry needling. Pt gave verbal consent for DN    Pt received dry needling to the below listed muscles using 40mm needles.  B:  UT  LS  L1-5 PVM  Winding performed every 5 minutes during treatment.    10 min x preparation and application of Ktape. I strips to LSP and B UT for inhibition. Patient received education regarding appropriate care and removal of Kinesiotape. Patient instructed in proper removal techniques if skin irritation occurs.      Patient Education and Home Exercises     Home Exercises Provided and Patient Education Provided     Education provided:   - pt instructed to use heat as needed for any soreness following dry needling. Patient received education regarding appropriate care and removal of Kinesiotape. Patient instructed in proper removal techniques if skin irritation occurs.    Written Home Exercises Provided: yes. Exercises were reviewed and Susan was able to demonstrate them prior to the end of the session.  Susan demonstrated good  understanding of the education provided. See EMR under Patient Instructions for exercises provided during therapy sessions    ASSESSMENT     Pt returns for first treatment session following inial evaluation 12/29/2021, absence 2* covid infection. No significant changes in status report on system review. Dry needling initiated today to address c/o soft tissue restrictions, written and verbal consent obtained prior to treatment. Good soft tissue response to dry needling evident by increased grasp with unilateral winding at all insertion points. Winding performed every 5 minutes during treatment. No adverse effects following treatment. Trial of Ktape to B UT and lumbar PVM for inhibition and pain relief.     Susan Is progressing well towards her goals.   Pt prognosis is Good.     Pt will continue to benefit from skilled outpatient physical therapy to address the deficits listed in the problem list box on initial evaluation, provide pt/family  education and to maximize pt's level of independence in the home and community environment.     Pt's spiritual, cultural and educational needs considered and pt agreeable to plan of care and goals.     Anticipated barriers to physical therapy: none    Goals: IE 12/29/2022  Short Term Goals (4 Weeks):   1. Pt will report 20% reduction in pain of the lumbar spine and neck for ease with ADL's. Progressing, not met  2. PT will demonstrate improved upright posture with minimal cuing for ease with functional positioning in home and community. Progressing, not met  3. Pt will demonstrate improved cervical and lumbar spine ROM in all directions by 10% for ease with bending activities and ADL's. Progressing, not met  4. Pt to demonstrate improved functional ability with FOTO limitation <=40% disability. Progressing, not met     Long Term Goals (12 Weeks):   1. Pt will report being independent with HEP for maintenance of improvements gained during therapy sessions. Progressing, not met  2. Pt will report 50% reduction of pain of the back and neck for ease with sitting tolerance with activities in her home. Progressing, not met  3. Pt will demonstrate trunk and extremity strength to >=4+/5 without the provocation of pain for ease with community mobility. Progressing, not met  4. Pt will demonstrate appropriate upright posture without external cueing for ease with computer use. Progressing, not met  5. Pt to demonstrate improved functional ability with FOTO limitation <=30% disability. Progressing, not met      PLAN   Plan of care Certification: 12/29/2021 to 3/25/2022.     Continue per POC with focus on improving core and hip stability and flexibility. Assess response to dry needling and continue as needed.     Irina Rome, PT

## 2022-01-26 ENCOUNTER — CLINICAL SUPPORT (OUTPATIENT)
Dept: REHABILITATION | Facility: OTHER | Age: 62
End: 2022-01-26
Payer: MEDICARE

## 2022-01-26 DIAGNOSIS — M54.50 CHRONIC BILATERAL LOW BACK PAIN WITHOUT SCIATICA: ICD-10-CM

## 2022-01-26 DIAGNOSIS — G89.29 CHRONIC BILATERAL LOW BACK PAIN WITHOUT SCIATICA: ICD-10-CM

## 2022-01-26 DIAGNOSIS — M54.2 NECK PAIN: Primary | ICD-10-CM

## 2022-01-26 PROCEDURE — 97110 THERAPEUTIC EXERCISES: CPT | Mod: PN | Performed by: PHYSICAL THERAPIST

## 2022-01-26 NOTE — PROGRESS NOTES
"[facility]   Physical Therapy Treatment Note     Name: Susan Courtney  Clinic Number: [id number]    Therapy Diagnosis:   Encounter Diagnoses   Name Primary?    Neck pain Yes    Chronic bilateral low back pain without sciatica      Physician: Paulo Holland FNP-C    Visit Date: 2022      Physician Orders: PT Eval and Treat Cervical and Back    Medical Diagnosis from Referral: M54.50 (ICD-10-CM) - Lumbago   Evaluation Date: 2021  Authorization Period Expiration: 2022  Plan of Care Expiration: 3/25/2022  Progress Note Due: 2022  Visit # / Visits authorized: 3/ 12   FOTO: 21      Precautions: Standard      PTA Visit #: 0/5     Time In: 1015  Time Out: 1110  Total Billable Time: 55 minutes, 1:1 x 40 min    SUBJECTIVE     Pt reports: she's feeling significantly improved after dry needling. She was able to sleep through the night and has no pain today. She reports having some limitation to R shoulder motion at times.   She was compliant with home exercise program. Limited 2* illness and hand surgery  Response to previous treatment: no change  Functional change: no change    Pain: 0/10   Location: B low back, B neck and UT      OBJECTIVE     Objective Measures updated at progress report unless specified.     Treatment     Susan received the treatments listed below:      therapeutic exercises to develop strength, endurance, ROM, flexibility, posture and core stabilization for 50 minutes including:    +LTR x 3 min  +SKTC 10" x 10  +DKTC on ball x 2 min  +Open books 10" x 10  +SLR flex with pilates circles press for TrA, 10x  +PPT 5" x 2 min  +Bridges 20x    manual therapy techniques: Dry Needling and Taping were applied to the: CSP/LSP for 5 minutes, includin min x Application of TDN: Pt educated on benefits and potential side effects of dry needling. Educated pt on benefits, precautions, side effects following TDN. Educated pt to use heat following " treatment sessions if pt is experiencing pain or soreness. Pt verbalized good understanding of education.  Pt signed written consent to dry needling. Pt gave verbal consent for DN    Pt received dry needling to the below listed muscles using 40mm needles.  B:  UT  LS  L1-5 PVM  Winding performed every 5 minutes during treatment.    5 min x preparation and application of Ktape. I strips to LSP and B UT for inhibition. Patient received education regarding appropriate care and removal of Kinesiotape. Patient instructed in proper removal techniques if skin irritation occurs.      Patient Education and Home Exercises     Home Exercises Provided and Patient Education Provided     Education provided:   - pt instructed to use heat as needed for any soreness following dry needling. Patient received education regarding appropriate care and removal of Kinesiotape. Patient instructed in proper removal techniques if skin irritation occurs.    Written Home Exercises Provided: yes. Exercises were reviewed and Susan was able to demonstrate them prior to the end of the session.  Susan demonstrated good  understanding of the education provided. See EMR under Patient Instructions for exercises provided during therapy sessions    ASSESSMENT     Progression of therex today as pt reports no pain this morning. Pt requires heavy cuing for technique with all exercises. Arm positioned modified with open books (hand on ribs) to avoid shoulder pain. Ktape applied to lumbar PVM and UT per pt request.     Susan Is progressing well towards her goals.   Pt prognosis is Good.     Pt will continue to benefit from skilled outpatient physical therapy to address the deficits listed in the problem list box on initial evaluation, provide pt/family education and to maximize pt's level of independence in the home and community environment.     Pt's spiritual, cultural and educational needs considered and pt agreeable to plan of care and goals.      Anticipated barriers to physical therapy: none    Goals: IE 12/29/2022  Short Term Goals (4 Weeks):   1. Pt will report 20% reduction in pain of the lumbar spine and neck for ease with ADL's. Progressing, not met  2. PT will demonstrate improved upright posture with minimal cuing for ease with functional positioning in home and community. Progressing, not met  3. Pt will demonstrate improved cervical and lumbar spine ROM in all directions by 10% for ease with bending activities and ADL's. Progressing, not met  4. Pt to demonstrate improved functional ability with FOTO limitation <=40% disability. Progressing, not met     Long Term Goals (12 Weeks):   1. Pt will report being independent with HEP for maintenance of improvements gained during therapy sessions. Progressing, not met  2. Pt will report 50% reduction of pain of the back and neck for ease with sitting tolerance with activities in her home. Progressing, not met  3. Pt will demonstrate trunk and extremity strength to >=4+/5 without the provocation of pain for ease with community mobility. Progressing, not met  4. Pt will demonstrate appropriate upright posture without external cueing for ease with computer use. Progressing, not met  5. Pt to demonstrate improved functional ability with FOTO limitation <=30% disability. Progressing, not met      PLAN   Plan of care Certification: 12/29/2021 to 3/25/2022.     Continue per POC with focus on improving core and hip stability and flexibility. Assess response to dry needling and continue as needed.     Irina Rome, PT

## 2022-02-02 ENCOUNTER — CLINICAL SUPPORT (OUTPATIENT)
Dept: REHABILITATION | Facility: OTHER | Age: 62
End: 2022-02-02
Payer: MEDICARE

## 2022-02-02 DIAGNOSIS — M54.50 CHRONIC BILATERAL LOW BACK PAIN WITHOUT SCIATICA: ICD-10-CM

## 2022-02-02 DIAGNOSIS — G89.29 CHRONIC BILATERAL LOW BACK PAIN WITHOUT SCIATICA: ICD-10-CM

## 2022-02-02 DIAGNOSIS — M54.2 NECK PAIN: ICD-10-CM

## 2022-02-02 PROCEDURE — 97110 THERAPEUTIC EXERCISES: CPT | Mod: PN

## 2022-02-02 NOTE — PROGRESS NOTES
"[facility]   Physical Therapy Treatment Note     Name: Susan Courtney  Clinic Number: [id number]    Therapy Diagnosis:   Encounter Diagnoses   Name Primary?    Neck pain     Chronic bilateral low back pain without sciatica      Physician: Paulo Holland FNP-C    Visit Date: 2022      Physician Orders: PT Eval and Treat Cervical and Back    Medical Diagnosis from Referral: M54.50 (ICD-10-CM) - Lumbago   Evaluation Date: 2021  Authorization Period Expiration: 2022  Plan of Care Expiration: 3/25/2022  Progress Note Due: 2022  Visit # / Visits authorized: 3/ 12   FOTO: 21      Precautions: Standard      PTA Visit #: 0/5     Time In: 1013 - late for appt  Time Out: 10:45  Total Billable Time: 32 minutes, 1:1 x 32 min    SUBJECTIVE     Pt reports: new clicking in the right shoulder today. F/U with MD in 2 weeks.    She was compliant with home exercise program. Limited 2* illness and hand surgery  Response to previous treatment: no change  Functional change: no change    Pain: 0/10   Location: B low back, B neck and UT      OBJECTIVE     Objective Measures updated at progress report unless specified.     Treatment     Susan received the treatments listed below:      therapeutic exercises to develop strength, endurance, ROM, flexibility, posture and core stabilization for 32 minutes including:    LTR x 3 min  SKTC 10" x 10  DKTC on ball x 2 min  Open books 10" x 10  SLR flex with pilates circles press for TrA, 10x  PPT 5" x 2 min  Bridges 20x    +supine Joaquin ER 2 x 10 x OTB  +supine Joaquin Abd 2 x 10 x OTB    manual therapy techniques: Dry Needling and Taping were applied to the: CSP/LSP for 00 minutes, includin min x Application of TDN: Pt educated on benefits and potential side effects of dry needling. Educated pt on benefits, precautions, side effects following TDN. Educated pt to use heat following treatment sessions if pt is experiencing pain or " soreness. Pt verbalized good understanding of education.  Pt signed written consent to dry needling. Pt gave verbal consent for DN    Pt received dry needling to the below listed muscles using 40mm needles.  B:  UT  LS  L1-5 PVM  Winding performed every 5 minutes during treatment.    00 min x preparation and application of Ktape. I strips to LSP and B UT for inhibition. Patient received education regarding appropriate care and removal of Kinesiotape. Patient instructed in proper removal techniques if skin irritation occurs.      Patient Education and Home Exercises     Home Exercises Provided and Patient Education Provided     Education provided:   - pt instructed to use heat as needed for any soreness following dry needling. Patient received education regarding appropriate care and removal of Kinesiotape. Patient instructed in proper removal techniques if skin irritation occurs.    Written Home Exercises Provided: yes. Exercises were reviewed and Susan was able to demonstrate them prior to the end of the session.  Susan demonstrated good  understanding of the education provided. See EMR under Patient Instructions for exercises provided during therapy sessions    ASSESSMENT     Deferred manual therapy today as pt was late to appointment. New exercises added to promote strength and stabilization of the shoulder to address c/o R shoulder clicking and pain. Good tolerance with overall session with appropriate training effect noted.    Susan Is progressing well towards her goals.   Pt prognosis is Good.     Pt will continue to benefit from skilled outpatient physical therapy to address the deficits listed in the problem list box on initial evaluation, provide pt/family education and to maximize pt's level of independence in the home and community environment.     Pt's spiritual, cultural and educational needs considered and pt agreeable to plan of care and goals.     Anticipated barriers to physical therapy:  none    Goals: IE 12/29/2022  Short Term Goals (4 Weeks):   1. Pt will report 20% reduction in pain of the lumbar spine and neck for ease with ADL's. Progressing, not met  2. PT will demonstrate improved upright posture with minimal cuing for ease with functional positioning in home and community. Progressing, not met  3. Pt will demonstrate improved cervical and lumbar spine ROM in all directions by 10% for ease with bending activities and ADL's. Progressing, not met  4. Pt to demonstrate improved functional ability with FOTO limitation <=40% disability. Progressing, not met     Long Term Goals (12 Weeks):   1. Pt will report being independent with HEP for maintenance of improvements gained during therapy sessions. Progressing, not met  2. Pt will report 50% reduction of pain of the back and neck for ease with sitting tolerance with activities in her home. Progressing, not met  3. Pt will demonstrate trunk and extremity strength to >=4+/5 without the provocation of pain for ease with community mobility. Progressing, not met  4. Pt will demonstrate appropriate upright posture without external cueing for ease with computer use. Progressing, not met  5. Pt to demonstrate improved functional ability with FOTO limitation <=30% disability. Progressing, not met      PLAN   Plan of care Certification: 12/29/2021 to 3/25/2022.     Continue per POC with focus on improving core and hip stability and flexibility. Assess response to dry needling and continue as needed.     Lynda Wing, PT

## 2022-02-04 ENCOUNTER — CLINICAL SUPPORT (OUTPATIENT)
Dept: REHABILITATION | Facility: OTHER | Age: 62
End: 2022-02-04
Payer: MEDICARE

## 2022-02-04 DIAGNOSIS — M54.2 NECK PAIN: Primary | ICD-10-CM

## 2022-02-04 DIAGNOSIS — G89.29 CHRONIC BILATERAL LOW BACK PAIN WITHOUT SCIATICA: ICD-10-CM

## 2022-02-04 DIAGNOSIS — M54.50 CHRONIC BILATERAL LOW BACK PAIN WITHOUT SCIATICA: ICD-10-CM

## 2022-02-04 PROCEDURE — 97140 MANUAL THERAPY 1/> REGIONS: CPT | Mod: PN | Performed by: PHYSICAL THERAPIST

## 2022-02-04 NOTE — PROGRESS NOTES
"[facility]   Physical Therapy Treatment Note     Name: Susan Courtney  Clinic Number: [id number]    Therapy Diagnosis:   Encounter Diagnoses   Name Primary?    Neck pain Yes    Chronic bilateral low back pain without sciatica      Physician: Paulo Holland FNP-C    Visit Date: 2/4/2022      Physician Orders: PT Eval and Treat Cervical and Back    Medical Diagnosis from Referral: M54.50 (ICD-10-CM) - Lumbago   Evaluation Date: 12/29/2021  Authorization Period Expiration: 1/12/2022  Plan of Care Expiration: 3/25/2022  Progress Note Due: 1/28/2022  Visit # / Visits authorized: 4/ 12   FOTO: 12/29/21      Precautions: Standard      PTA Visit #: 0/5     Time In: 0840 (late arrival)  Time Out: 0855 (pt requested to leave early)  Total Billable Time: 15 minutes    SUBJECTIVE     Pt reports: pt says neck and back have been feeling better, but L shoulder is still hurting.   She was compliant with home exercise program. Limited 2* illness and hand surgery  Response to previous treatment: no change  Functional change: no change    Pain: 0/10   Location: B low back, B neck and UT      OBJECTIVE     Objective Measures updated at progress report unless specified.     Treatment     Susan received the treatments listed below:      therapeutic exercises to develop strength, endurance, ROM, flexibility, posture and core stabilization for 00 minutes including:    LTR x 3 min  SKTC 10" x 10  DKTC on ball x 2 min  Open books 10" x 10  SLR flex with pilates circles press for TrA, 10x  PPT 5" x 2 min  Bridges 20x    +supine Joaquin ER 2 x 10 x OTB  +supine Joaquin Abd 2 x 10 x OTB    manual therapy techniques: Dry Needling and Taping were applied to the: CSP/LSP for 15 minutes, including:    10 min x Application of TDN: Pt educated on benefits and potential side effects of dry needling. Educated pt on benefits, precautions, side effects following TDN. Educated pt to use heat following treatment sessions if pt " is experiencing pain or soreness. Pt verbalized good understanding of education.  Pt signed written consent to dry needling. Pt gave verbal consent for DN    Pt received dry needling to the below listed muscles using 40 and 60mm needles.  In sitting  L proximal biceps   L SS bursa  Pecking and removal     5 min x preparation and application of Ktape. I strip anchored at medial scap border, tension around HH for postural cuing. Patient received education regarding appropriate care and removal of Kinesiotape. Patient instructed in proper removal techniques if skin irritation occurs.      Patient Education and Home Exercises     Home Exercises Provided and Patient Education Provided     Education provided:   - pt instructed to use heat as needed for any soreness following dry needling. Patient received education regarding appropriate care and removal of Kinesiotape. Patient instructed in proper removal techniques if skin irritation occurs.    Written Home Exercises Provided: yes. Exercises were reviewed and Susan was able to demonstrate them prior to the end of the session.  Susan demonstrated good  understanding of the education provided. See EMR under Patient Instructions for exercises provided during therapy sessions    ASSESSMENT     Pt arrived late for treatment. Endorses continued L shoulder pain consistent with upper crossed posture. Dry needling performed per pt request with good soft tissue response and no adverse effects following treatment. Kineseotape applied for postural cuing to decrease anterior shift of L shoulder. Pt requested to leave after manual interventions today.     Susan Is progressing well towards her goals.   Pt prognosis is Good.     Pt will continue to benefit from skilled outpatient physical therapy to address the deficits listed in the problem list box on initial evaluation, provide pt/family education and to maximize pt's level of independence in the home and community environment.      Pt's spiritual, cultural and educational needs considered and pt agreeable to plan of care and goals.     Anticipated barriers to physical therapy: none    Goals: IE 12/29/2022  Short Term Goals (4 Weeks):   1. Pt will report 20% reduction in pain of the lumbar spine and neck for ease with ADL's. Progressing, not met  2. PT will demonstrate improved upright posture with minimal cuing for ease with functional positioning in home and community. Progressing, not met  3. Pt will demonstrate improved cervical and lumbar spine ROM in all directions by 10% for ease with bending activities and ADL's. Progressing, not met  4. Pt to demonstrate improved functional ability with FOTO limitation <=40% disability. Progressing, not met     Long Term Goals (12 Weeks):   1. Pt will report being independent with HEP for maintenance of improvements gained during therapy sessions. Progressing, not met  2. Pt will report 50% reduction of pain of the back and neck for ease with sitting tolerance with activities in her home. Progressing, not met  3. Pt will demonstrate trunk and extremity strength to >=4+/5 without the provocation of pain for ease with community mobility. Progressing, not met  4. Pt will demonstrate appropriate upright posture without external cueing for ease with computer use. Progressing, not met  5. Pt to demonstrate improved functional ability with FOTO limitation <=30% disability. Progressing, not met      PLAN   Plan of care Certification: 12/29/2021 to 3/25/2022.     Continue per POC with focus on improving core and hip stability and flexibility. Assess response to dry needling and continue as needed.     Irina Rome, PT

## 2022-02-09 ENCOUNTER — CLINICAL SUPPORT (OUTPATIENT)
Dept: REHABILITATION | Facility: OTHER | Age: 62
End: 2022-02-09
Payer: MEDICARE

## 2022-02-09 DIAGNOSIS — M54.50 CHRONIC BILATERAL LOW BACK PAIN WITHOUT SCIATICA: ICD-10-CM

## 2022-02-09 DIAGNOSIS — G89.29 CHRONIC BILATERAL LOW BACK PAIN WITHOUT SCIATICA: ICD-10-CM

## 2022-02-09 DIAGNOSIS — M54.2 NECK PAIN: ICD-10-CM

## 2022-02-09 PROCEDURE — 97140 MANUAL THERAPY 1/> REGIONS: CPT | Mod: PN

## 2022-02-09 NOTE — PROGRESS NOTES
"[facility]   Physical Therapy Treatment Note     Name: Susan Courtney  Clinic Number: [id number]    Therapy Diagnosis:   Encounter Diagnoses   Name Primary?    Neck pain     Chronic bilateral low back pain without sciatica      Physician: Paulo Holland FNP-C    Visit Date: 2022      Physician Orders: PT Eval and Treat Cervical and Back    Medical Diagnosis from Referral: M54.50 (ICD-10-CM) - Lumbago   Evaluation Date: 2021  Authorization Period Expiration: 2022  Plan of Care Expiration: 3/25/2022  Progress Note Due: 2022  Visit # / Visits authorized:    FOTO: 21      Precautions: Standard      PTA Visit #: 0/5     Time In: 0830   Time Out: 0908  Total Billable Time: 38 minutes    SUBJECTIVE     Pt reports: the needling has helped greatly and she says that her mid back is hurting her and would like to try it there. Pt says she has one more visit and says that she is ready to make it her last visit as she feels significantly better and has no pain.    She was compliant with home exercise program. Limited 2* illness and hand surgery  Response to previous treatment: no change  Functional change: no change    Pain: 0/10   Location: B low back, B neck and UT      OBJECTIVE     Objective Measures updated at progress report unless specified.     Treatment     Susan received the treatments listed below:      therapeutic exercises to develop strength, endurance, ROM, flexibility, posture and core stabilization for 00 minutes including:    LTR x 3 min  SKTC 10" x 10  DKTC on ball x 2 min  Open books 10" x 10  SLR flex with pilates circles press for TrA, 10x  PPT 5" x 2 min  Bridges 20x    +supine Joaquin ER 2 x 10 x OTB  +supine Joaquin Abd 2 x 10 x OTB    manual therapy techniques: Dry Needling and Taping were applied to the: CSP/LSP for 38 minutes, includin min x Application of TDN: Pt educated on benefits and potential side effects of dry needling. " Educated pt on benefits, precautions, side effects following TDN. Educated pt to use heat following treatment sessions if pt is experiencing pain or soreness. Pt verbalized good understanding of education.  Pt signed written consent to dry needling. Pt gave verbal consent for DN    Pt received dry needling to the below listed muscles using 40 and 60mm needles.    Prone today:  suboccipitials DESTINY  T8-L1 paraspinals DESTINY    In sitting  L proximal biceps   L SS bursa  Pecking and removal     5 min x preparation and application of Ktape. I strip anchored at medial scap border, tension around HH for postural cuing. Patient received education regarding appropriate care and removal of Kinesiotape. Patient instructed in proper removal techniques if skin irritation occurs.      Patient Education and Home Exercises     Home Exercises Provided and Patient Education Provided     Education provided:   - pt instructed to use heat as needed for any soreness following dry needling. Patient received education regarding appropriate care and removal of Kinesiotape. Patient instructed in proper removal techniques if skin irritation occurs.    Written Home Exercises Provided: yes. Exercises were reviewed and Susan was able to demonstrate them prior to the end of the session.  Susan demonstrated good  understanding of the education provided. See EMR under Patient Instructions for exercises provided during therapy sessions    ASSESSMENT     Resumed dry needling and taping today for muscle relxaatoin, pain modulation, and improved mobility. Good tolerance with overall session. Consider next visit pt's discharge day per her request and return to [facility].    Susan Is progressing well towards her goals.   Pt prognosis is Good.     Pt will continue to benefit from skilled outpatient physical therapy to address the deficits listed in the problem list box on initial evaluation, provide pt/family education and to maximize pt's level of  independence in the home and community environment.     Pt's spiritual, cultural and educational needs considered and pt agreeable to plan of care and goals.     Anticipated barriers to physical therapy: none    Goals: IE 12/29/2022  Short Term Goals (4 Weeks):   1. Pt will report 20% reduction in pain of the lumbar spine and neck for ease with ADL's. Progressing, not met  2. PT will demonstrate improved upright posture with minimal cuing for ease with functional positioning in home and community. Progressing, not met  3. Pt will demonstrate improved cervical and lumbar spine ROM in all directions by 10% for ease with bending activities and ADL's. Progressing, not met  4. Pt to demonstrate improved functional ability with FOTO limitation <=40% disability. Progressing, not met     Long Term Goals (12 Weeks):   1. Pt will report being independent with HEP for maintenance of improvements gained during therapy sessions. Progressing, not met  2. Pt will report 50% reduction of pain of the back and neck for ease with sitting tolerance with activities in her home. Progressing, not met  3. Pt will demonstrate trunk and extremity strength to >=4+/5 without the provocation of pain for ease with community mobility. Progressing, not met  4. Pt will demonstrate appropriate upright posture without external cueing for ease with computer use. Progressing, not met  5. Pt to demonstrate improved functional ability with FOTO limitation <=30% disability. Progressing, not met      PLAN   Plan of care Certification: 12/29/2021 to 3/25/2022.     Continue per POC with focus on improving core and hip stability and flexibility. Assess response to dry needling and continue as needed.     Lynda Wing, PT

## 2022-02-10 ENCOUNTER — OFFICE VISIT (OUTPATIENT)
Dept: PODIATRY | Facility: CLINIC | Age: 62
End: 2022-02-10
Payer: MEDICARE

## 2022-02-10 ENCOUNTER — CLINICAL SUPPORT (OUTPATIENT)
Dept: REHABILITATION | Facility: OTHER | Age: 62
End: 2022-02-10
Payer: MEDICARE

## 2022-02-10 VITALS
DIASTOLIC BLOOD PRESSURE: 60 MMHG | HEIGHT: 66 IN | BODY MASS INDEX: 31.82 KG/M2 | WEIGHT: 198 LBS | HEART RATE: 80 BPM | SYSTOLIC BLOOD PRESSURE: 124 MMHG

## 2022-02-10 DIAGNOSIS — G89.29 CHRONIC BILATERAL LOW BACK PAIN WITHOUT SCIATICA: ICD-10-CM

## 2022-02-10 DIAGNOSIS — M54.2 NECK PAIN: ICD-10-CM

## 2022-02-10 DIAGNOSIS — R22.32 MASS OF FINGER OF LEFT HAND: ICD-10-CM

## 2022-02-10 DIAGNOSIS — M79.671 CHRONIC PAIN OF BOTH FEET: Primary | ICD-10-CM

## 2022-02-10 DIAGNOSIS — M54.50 CHRONIC BILATERAL LOW BACK PAIN WITHOUT SCIATICA: ICD-10-CM

## 2022-02-10 DIAGNOSIS — G89.29 CHRONIC PAIN OF BOTH FEET: Primary | ICD-10-CM

## 2022-02-10 DIAGNOSIS — M79.672 CHRONIC PAIN OF BOTH FEET: Primary | ICD-10-CM

## 2022-02-10 DIAGNOSIS — M72.2 PLANTAR FIBROMATOSIS: ICD-10-CM

## 2022-02-10 PROCEDURE — 99214 OFFICE O/P EST MOD 30 MIN: CPT | Mod: S$GLB,,, | Performed by: PODIATRIST

## 2022-02-10 PROCEDURE — 3008F BODY MASS INDEX DOCD: CPT | Mod: CPTII,S$GLB,, | Performed by: PODIATRIST

## 2022-02-10 PROCEDURE — 3074F PR MOST RECENT SYSTOLIC BLOOD PRESSURE < 130 MM HG: ICD-10-PCS | Mod: CPTII,S$GLB,, | Performed by: PODIATRIST

## 2022-02-10 PROCEDURE — 3078F PR MOST RECENT DIASTOLIC BLOOD PRESSURE < 80 MM HG: ICD-10-PCS | Mod: CPTII,S$GLB,, | Performed by: PODIATRIST

## 2022-02-10 PROCEDURE — 1159F PR MEDICATION LIST DOCUMENTED IN MEDICAL RECORD: ICD-10-PCS | Mod: CPTII,S$GLB,, | Performed by: PODIATRIST

## 2022-02-10 PROCEDURE — 3078F DIAST BP <80 MM HG: CPT | Mod: CPTII,S$GLB,, | Performed by: PODIATRIST

## 2022-02-10 PROCEDURE — 97140 MANUAL THERAPY 1/> REGIONS: CPT | Mod: PN | Performed by: PHYSICAL THERAPIST

## 2022-02-10 PROCEDURE — 1160F PR REVIEW ALL MEDS BY PRESCRIBER/CLIN PHARMACIST DOCUMENTED: ICD-10-PCS | Mod: CPTII,S$GLB,, | Performed by: PODIATRIST

## 2022-02-10 PROCEDURE — 97110 THERAPEUTIC EXERCISES: CPT | Mod: PN | Performed by: PHYSICAL THERAPIST

## 2022-02-10 PROCEDURE — 3074F SYST BP LT 130 MM HG: CPT | Mod: CPTII,S$GLB,, | Performed by: PODIATRIST

## 2022-02-10 PROCEDURE — 99999 PR PBB SHADOW E&M-EST. PATIENT-LVL III: CPT | Mod: PBBFAC,,, | Performed by: PODIATRIST

## 2022-02-10 PROCEDURE — 99214 PR OFFICE/OUTPT VISIT, EST, LEVL IV, 30-39 MIN: ICD-10-PCS | Mod: S$GLB,,, | Performed by: PODIATRIST

## 2022-02-10 PROCEDURE — 1159F MED LIST DOCD IN RCRD: CPT | Mod: CPTII,S$GLB,, | Performed by: PODIATRIST

## 2022-02-10 PROCEDURE — 1160F RVW MEDS BY RX/DR IN RCRD: CPT | Mod: CPTII,S$GLB,, | Performed by: PODIATRIST

## 2022-02-10 PROCEDURE — 99999 PR PBB SHADOW E&M-EST. PATIENT-LVL III: ICD-10-PCS | Mod: PBBFAC,,, | Performed by: PODIATRIST

## 2022-02-10 PROCEDURE — 3008F PR BODY MASS INDEX (BMI) DOCUMENTED: ICD-10-PCS | Mod: CPTII,S$GLB,, | Performed by: PODIATRIST

## 2022-02-10 PROCEDURE — 99213 OFFICE O/P EST LOW 20 MIN: CPT | Mod: PBBFAC,PN | Performed by: PODIATRIST

## 2022-02-10 RX ORDER — MELOXICAM 15 MG/1
15 TABLET ORAL DAILY
Qty: 30 TABLET | Refills: 0 | Status: SHIPPED | OUTPATIENT
Start: 2022-02-10 | End: 2022-04-28

## 2022-02-10 RX ORDER — SUMATRIPTAN 50 MG/1
TABLET, FILM COATED ORAL
COMMUNITY
Start: 2021-12-08 | End: 2022-03-30

## 2022-02-10 RX ORDER — ELETRIPTAN HYDROBROMIDE 40 MG/1
TABLET, FILM COATED ORAL
COMMUNITY
Start: 2021-12-16 | End: 2022-03-30

## 2022-02-10 RX ORDER — DULAGLUTIDE 0.75 MG/.5ML
0.75 INJECTION, SOLUTION SUBCUTANEOUS
COMMUNITY
Start: 2022-01-27 | End: 2023-07-21 | Stop reason: ALTCHOICE

## 2022-02-10 RX ORDER — BENZONATATE 200 MG/1
CAPSULE ORAL
COMMUNITY
Start: 2022-01-06 | End: 2023-01-18 | Stop reason: ALTCHOICE

## 2022-02-10 RX ORDER — MONTELUKAST SODIUM 10 MG/1
TABLET ORAL
COMMUNITY
Start: 2022-01-24 | End: 2023-01-18 | Stop reason: SDUPTHER

## 2022-02-10 NOTE — PROGRESS NOTES
Subjective:      Patient ID: Susan Courtney is a 62 y.o. female.    Chief Complaint: Follow-up (Right foot)    Intermittent throbbing deep pain in the bottom of the right arch/foot with bump.  Gradual onset,  Improved gradually over the past month or so.  Aggravated by increased weight-bearing and some shoes.  Denies trauma and surgery right foot.  Stretches, inserts, athletic shoes help.  Lidocaine gel no improvement.  No self-treatment.  Patient has similar deformity in the palm the left hand which is scheduled to be surgically removed.     x-rays [id number] for acute injury fracture dislocation apparent on the film.    Review of Systems   Constitutional: Negative for chills, diaphoresis, fever, malaise/fatigue and night sweats.   Cardiovascular: Negative for claudication, cyanosis, leg swelling and syncope.   Skin: Positive for suspicious lesions. Negative for color change, dry skin, nail changes, rash and unusual hair distribution.   Musculoskeletal: Negative for falls, joint pain, joint swelling, muscle cramps, muscle weakness and stiffness.   Gastrointestinal: Negative for constipation, diarrhea, nausea and vomiting.   Neurological: Positive for paresthesias and sensory change. Negative for brief paralysis, disturbances in coordination, focal weakness, numbness and tremors.           Objective:      Physical Exam  Constitutional:       General: She is not in acute distress.     Appearance: She is well-developed. She is not diaphoretic.   Cardiovascular:      Pulses:           Popliteal pulses are 2+ on the right side and 2+ on the left side.        Dorsalis pedis pulses are 2+ on the right side and 2+ on the left side.        Posterior tibial pulses are 2+ on the right side and 2+ on the left side.      Comments: Capillary refill 3 seconds all toes/distal feet, all toes/both feet warm to touch.      Negative lymphadenopathy bilateral popliteal fossa and tarsal tunnel.      Negavie lower extremity edema  bilateral.    Musculoskeletal:      Right ankle: No swelling, deformity, ecchymosis or lacerations. Normal range of motion. Normal pulse.      Right Achilles Tendon: Normal. No defects. Godoy's test negative.      Comments: Less than 1 cm diameter firm minimally mobile mass intimately associated with the medial band of the plantar fascia the plantar distal aspect of the right arch without loss of function or signs of acute trauma right foot.     sharp deep pain to palpation of the styloid process lateral aspect of the right foot without deformity, loss of function, signs of acute trauma.    Otherwise,Normal angle, base, station of gait. All ten toes without clubbing, cyanosis, or signs of ischemia.  No pain to palpation bilateral lower extremities.  Range of motion, stability, muscle strength, and muscle tone normal bilateral feet and legs.    Lymphadenopathy:      Lower Body: No right inguinal adenopathy. No left inguinal adenopathy.      Comments: Negative lymphadenopathy bilateral popliteal fossa and tarsal tunnel.    Negative lymphangitic streaking bilateral feet/ankles/legs.   Skin:     General: Skin is warm and dry.      Capillary Refill: Capillary refill takes 2 to 3 seconds.      Coloration: Skin is not pale.      Findings: No abrasion, bruising, burn, ecchymosis, erythema, laceration, lesion or rash.      Nails: There is no clubbing.      Comments:   Skin is normal age and health appropriate color, turgor, texture, and temperature bilateral lower extremities without ulceration, hyperpigmentation, discoloration, masses nodules or cords palpated.  No ecchymosis, erythema, edema, or cardinal signs of infection bilateral lower extremities.     Neurological:      Mental Status: She is alert and oriented to person, place, and time.      Sensory: No sensory deficit.      Motor: No tremor, atrophy or abnormal muscle tone.      Gait: Gait normal.      Comments: Negative tinel sign to percussion sural, superficial  peroneal, deep peroneal, saphenous, and posterior tibial nerves right and left ankles and feet.    Negative allodynia both feet    Paresthesias, and burning bilateral feet with no clearly identified trigger or source.     Psychiatric:         Behavior: Behavior is cooperative.               Assessment:       Encounter Diagnoses   Name Primary?    Chronic pain of both feet Yes    Plantar fibromatosis     Mass of finger of left hand          Plan:       Susan was seen today for follow-up.    Diagnoses and all orders for this visit:    Chronic pain of both feet    Plantar fibromatosis    Mass of finger of left hand  -     Ambulatory referral/consult to Orthopedics; Future    Other orders  -     meloxicam (MOBIC) 15 MG tablet; Take 1 tablet (15 mg total) by mouth once daily.      I counseled the patient on her conditions, their implications and medical management.    Discussed very low chance of cancer with any mass in body only disprovable by biopsy and pathology.  Patient verbalizes understanding and declines biopsy/immaging today.      Patient will stretch the tendo achilles complex three times daily as demonstrated in the office.  Literature was dispensed illustrating proper stretching technique.      Continue stretches, inserts, athletic shoes, activity to tolerance.      Fill DM shoe/inserts Rx.    Rx meloxicam    The patient was advised that NSAID-type medications have two very important potential side effects: gastrointestinal irritation including hemorrhage and renal injuries. She was asked to take the medication with food and to stop if she experiences any GI upset. I asked her to call for vomiting, abdominal pain or black/bloody stools. The patient expresses understanding of these issues and questions were answered.    1 month, sooner prn.    Sherif-Patel left hand/finger mass.          No follow-ups on file.

## 2022-02-10 NOTE — PLAN OF CARE
[facility]  Physical Therapy Discharge Note    Name: Susan Courtney  Clinic Number: [id number]    Therapy Diagnosis:   Encounter Diagnoses   Name Primary?    Neck pain     Chronic bilateral low back pain without sciatica      Physician: Paulo Holland FNP-C    Physician Orders: PT Eval and Treat Cervical and Back    Medical Diagnosis from Referral: M54.50 (ICD-10-CM) - Lumbago   Evaluation Date: 12/29/2021      Date of Last visit: 2/10/2022  Total Visits Received: 5    ASSESSMENT      Overall Susan has made excellent progress with therapy and reports resolution of symptoms. She reports improved function with sleep tolerance and functional mobility and in home and community. Based on progress, presentation, and pt request, recommend discharge to independent [facility] at this time      Discharge reason: Patient is now asymptomatic and Patient requested discharge    Discharge FOTO Score: 31    Goals: 8/9 goals met    PLAN   This patient is discharged from Physical Therapy      Irina Rome, PT

## 2022-02-10 NOTE — PROGRESS NOTES
[facility]   Physical Therapy Treatment Note     Name: Susan Courtney  Clinic Number: [id number]    Therapy Diagnosis:   Encounter Diagnoses   Name Primary?    Neck pain     Chronic bilateral low back pain without sciatica      Physician: Paulo Holland FNP-C    Visit Date: 2/10/2022      Physician Orders: PT Eval and Treat Cervical and Back    Medical Diagnosis from Referral: M54.50 (ICD-10-CM) - Lumbago   Evaluation Date: 12/29/2021  Authorization Period Expiration: 1/12/2022  Plan of Care Expiration: 3/25/2022  Progress Note Due: 1/28/2022  Visit # / Visits authorized: 5/ 12   FOTO: 12/29/21      Precautions: Standard      PTA Visit #: 0/5     Time In: 1010  Time Out: 1040  Total Billable Time: 30 minutes    SUBJECTIVE     Pt reports: she's feeling really good and wants to finish with therapy today. She says the dry needling has been very helpful. She reports that she no longer needs to put a book or firm item under her back to sleep, and isn't having to reach for the Tylenol any more.     She was compliant with home exercise program. Limited 2* illness and hand surgery  Response to previous treatment: no change  Functional change: no change    Pain: 0/10   Location: B low back, B neck and UT      OBJECTIVE     Objective Measures updated at progress report unless specified.   2/10/2022    Lumbar Range of Motion:     Percent WFL Pain   Flexion 75%       Extension 50%       Left Side Bending 75%          Right Side Bending 75%          Left rotation    75%          Right Rotation    75%              Cervical AROM:   Cervical AROM Approximate degrees (Normal) Comments if applicable    Flexion  50 (50-60)     Extension  60 (60-70)     R Rotation  60 (70-90)    L Rotation  60 (70-90)    R Side bending  30 (20-45)    L Side bending 30 (20-45)          CMS Impairment/Limitation/Restriction for FOTO Lumbar Spine Survey    Therapist reviewed FOTO scores for Susan Courtney on  "2/10/2022.   FOTO documents entered into [employer] - see Media section.    Evaluation: 44%    Current : 31%    Goal: 27%    Discharge: 31%             Treatment     Susan received the treatments listed below:      therapeutic exercises to develop strength, endurance, ROM, flexibility, posture and core stabilization for 15 minutes including:    15 min x reassessment and discharge  LTR x 3 min  SKTC 10" x 10  DKTC on ball x 2 min  Open books 10" x 10  SLR flex with pilates circles press for TrA, 10x  PPT 5" x 2 min  Bridges 20x    +supine Joaquin ER 2 x 10 x OTB  +supine Joaquin Abd 2 x 10 x OTB    manual therapy techniques: Dry Needling and Taping were applied to the: CSP/LSP for 15 minutes, including:    15 min x Application of TDN: Pt educated on benefits and potential side effects of dry needling. Educated pt on benefits, precautions, side effects following TDN. Educated pt to use heat following treatment sessions if pt is experiencing pain or soreness. Pt verbalized good understanding of education.  Pt signed written consent to dry needling. Pt gave verbal consent for DN    Pt received dry needling to the below listed muscles using 40 and 60mm needles.      In sitting  L proximal biceps   L SS bursa  Pecking and removal     0 min x preparation and application of Ktape. I strip anchored at medial scap border, tension around HH for postural cuing. Patient received education regarding appropriate care and removal of Kinesiotape. Patient instructed in proper removal techniques if skin irritation occurs.      Patient Education and Home Exercises     Home Exercises Provided and Patient Education Provided     Education provided:   - pt instructed to use heat as needed for any soreness following dry needling. Patient received education regarding appropriate care and removal of Kinesiotape. Patient instructed in proper removal techniques if skin irritation occurs.    Written Home Exercises Provided: yes. Exercises were reviewed and " Susan was able to demonstrate them prior to the end of the session.  Susan demonstrated good  understanding of the education provided. See EMR under Patient Instructions for exercises provided during therapy sessions    ASSESSMENT     Overall Susan has made excellent progress with therapy and reports resolution of symptoms. She reports improved function with sleep tolerance and functional mobility and in home and community. Based on progress, presentation, and pt request, recommend discharge to independent [facility] at this time.       Pt's spiritual, cultural and educational needs considered and pt agreeable to plan of care and goals.     Anticipated barriers to physical therapy: none    Goals: IE 12/29/2022  Short Term Goals (4 Weeks):   1. Pt will report 20% reduction in pain of the lumbar spine and neck for ease with ADL's. Met 2/10/22  2. PT will demonstrate improved upright posture with minimal cuing for ease with functional positioning in home and community. Met 2/10/22  3. Pt will demonstrate improved cervical and lumbar spine ROM in all directions by 10% for ease with bending activities and ADL's. Met 2/10/22  4. Pt to demonstrate improved functional ability with FOTO limitation <=40% disability. Met 2/10/22     Long Term Goals (12 Weeks):   1. Pt will report being independent with HEP for maintenance of improvements gained during therapy sessions. Met 2/10/22  2. Pt will report 50% reduction of pain of the back and neck for ease with sitting tolerance with activities in her home. Met 2/10/22  3. Pt will demonstrate trunk and extremity strength to >=4+/5 without the provocation of pain for ease with community mobility. Met 2/10/22  4. Pt will demonstrate appropriate upright posture without external cueing for ease with computer use. Met 2/10/22  5. Pt to demonstrate improved functional ability with FOTO limitation <=30% disability. Progressing, not met (31% at discharge)      PLAN   Plan of care Certification:  12/29/2021 to 3/25/2022.     Discharge to [facility]    Irina Rome, PT

## 2022-02-22 ENCOUNTER — TELEPHONE (OUTPATIENT)
Dept: ORTHOPEDICS | Facility: CLINIC | Age: 62
End: 2022-02-22
Payer: MEDICARE

## 2022-02-23 ENCOUNTER — TELEPHONE (OUTPATIENT)
Dept: ORTHOPEDICS | Facility: CLINIC | Age: 62
End: 2022-02-23
Payer: MEDICARE

## 2022-02-24 ENCOUNTER — OFFICE VISIT (OUTPATIENT)
Dept: ORTHOPEDICS | Facility: CLINIC | Age: 62
End: 2022-02-24
Payer: MEDICARE

## 2022-02-24 VITALS — BODY MASS INDEX: 31.82 KG/M2 | WEIGHT: 198 LBS | HEIGHT: 66 IN

## 2022-02-24 DIAGNOSIS — M72.0 DUPUYTREN CONTRACTURE: ICD-10-CM

## 2022-02-24 DIAGNOSIS — R22.32 SUBCUTANEOUS MASS OF LEFT HAND: Primary | ICD-10-CM

## 2022-02-24 PROCEDURE — 1159F MED LIST DOCD IN RCRD: CPT | Mod: CPTII,S$GLB,, | Performed by: PHYSICIAN ASSISTANT

## 2022-02-24 PROCEDURE — 99999 PR PBB SHADOW E&M-EST. PATIENT-LVL II: CPT | Mod: PBBFAC,,, | Performed by: PHYSICIAN ASSISTANT

## 2022-02-24 PROCEDURE — 99024 PR POST-OP FOLLOW-UP VISIT: ICD-10-PCS | Mod: S$GLB,,, | Performed by: PHYSICIAN ASSISTANT

## 2022-02-24 PROCEDURE — 99999 PR PBB SHADOW E&M-EST. PATIENT-LVL II: ICD-10-PCS | Mod: PBBFAC,,, | Performed by: PHYSICIAN ASSISTANT

## 2022-02-24 PROCEDURE — 1159F PR MEDICATION LIST DOCUMENTED IN MEDICAL RECORD: ICD-10-PCS | Mod: CPTII,S$GLB,, | Performed by: PHYSICIAN ASSISTANT

## 2022-02-24 PROCEDURE — 3008F PR BODY MASS INDEX (BMI) DOCUMENTED: ICD-10-PCS | Mod: CPTII,S$GLB,, | Performed by: PHYSICIAN ASSISTANT

## 2022-02-24 PROCEDURE — 99024 POSTOP FOLLOW-UP VISIT: CPT | Mod: S$GLB,,, | Performed by: PHYSICIAN ASSISTANT

## 2022-02-24 PROCEDURE — 3008F BODY MASS INDEX DOCD: CPT | Mod: CPTII,S$GLB,, | Performed by: PHYSICIAN ASSISTANT

## 2022-02-24 NOTE — PROGRESS NOTES
"Ms. Courtney is here today for a post-operative visit.  She is 3 mos status post mass excision left palm by Dr. Sharp on 12/1/2021. She reports that she is doing well.  Pain is mild.  She is not taking pain medication.  She denies fever, chills, and sweats since the time of the surgery.     PATHOLOGY:  SOFT TISSUE, LEFT PALM, EXCISION:  -Consistent with superficial fibromatosis.    Physical exam:    Vitals:    02/24/22 0821   Weight: 89.8 kg (198 lb)   Height: 5' 6" (1.676 m)   PainSc: 10-Worst pain ever     Vital signs are stable, patient is afebrile.  Patient is well dressed and well groomed, no acute distress.  Alert and oriented to person, place, and time.  Incision is well healed minimal scar tissue vs possible adjacent dupuytrens nodule. She has a small cyst at the volar base of the ring P1.  There is no erythema or exudate.  There is no sign of any infection. She is NVI.  Good ROM.    Assessment:  status post mass excision left palm by Dr. Sharp on 12/1/2021    Plan:  Susan was seen today for pain.    Diagnoses and all orders for this visit:    Subcutaneous mass of left hand  -     US Extremity Non Vascular Complete Left; Future    Dupuytren contracture  -     US Extremity Non Vascular Complete Left; Future        Surgical site is well healed and non tender with minimal scar tissue and possible adjacent dupuytrens nodule   On the ring finger she has a small cyst at the volar base of P1   Discussed treatment options discussed monitoring pt would like to get an US will eval cyst pt also wishes to examine the surgical area we discussed this is either scar tissue vs possible dupuytrens   Pt to call or msg following US for results otherwise doing well we discussed dupuytrens in detail       "

## 2022-03-10 ENCOUNTER — HOSPITAL ENCOUNTER (OUTPATIENT)
Dept: RADIOLOGY | Facility: OTHER | Age: 62
Discharge: HOME | End: 2022-03-10
Attending: INTERNAL MEDICINE
Payer: MEDICARE

## 2022-03-10 DIAGNOSIS — R14.0 BLOATING: ICD-10-CM

## 2022-03-10 DIAGNOSIS — Z80.0 FAMILY HISTORY OF COLON CANCER: ICD-10-CM

## 2022-03-10 DIAGNOSIS — Z86.010 PERSONAL HISTORY OF COLONIC POLYPS: ICD-10-CM

## 2022-03-10 PROCEDURE — 74019 RADEX ABDOMEN 2 VIEWS: CPT | Mod: TC,FY

## 2022-03-10 PROCEDURE — 74019 XR ABDOMEN FLAT AND ERECT: ICD-10-PCS | Mod: 26,,, | Performed by: RADIOLOGY

## 2022-03-10 PROCEDURE — 74019 RADEX ABDOMEN 2 VIEWS: CPT | Mod: 26,,, | Performed by: RADIOLOGY

## 2022-03-21 ENCOUNTER — TELEPHONE (OUTPATIENT)
Dept: PAIN MEDICINE | Facility: CLINIC | Age: 62
End: 2022-03-21
Payer: MEDICARE

## 2022-03-21 NOTE — TELEPHONE ENCOUNTER
This message is for patient in regards to his/her appointment 03/22/22 at 10:20am       [facility] Policy: For the safety of all patients and staff members.     Patient Visitor policy: Due to social distancing and limited seating staff are requesting patient to arrive to their schedule appointments on time. During this visit we're asking all patients to only have one visitor over the age of 18yrs old to accompany to be seen by RIA Jang. If patient do not required assistance with their visit, we're asking all visitors to remain outside the waiting area.    Upon arriving to your schedule appointment; patients are required to wear a face mask, if patient do not have a face mask one will be provided entering the facility. We ask patients to contact clinical staff at this number [phone] to notify staff that they have arrived or they may do so by utilizing the Mobile checked in [place](if patient have patient portal; clinical staff will send a message through there letting them know it's okay to proceed to their visit). If you have any questions or concerns please contact [phone]

## 2022-03-22 ENCOUNTER — HOSPITAL ENCOUNTER (OUTPATIENT)
Dept: RADIOLOGY | Facility: OTHER | Age: 62
Discharge: HOME | End: 2022-03-22
Attending: NURSE PRACTITIONER
Payer: MEDICARE

## 2022-03-22 ENCOUNTER — TELEPHONE (OUTPATIENT)
Dept: PAIN MEDICINE | Facility: CLINIC | Age: 62
End: 2022-03-22

## 2022-03-22 ENCOUNTER — OFFICE VISIT (OUTPATIENT)
Dept: PAIN MEDICINE | Facility: CLINIC | Age: 62
End: 2022-03-22
Payer: MEDICARE

## 2022-03-22 VITALS
WEIGHT: 185.19 LBS | RESPIRATION RATE: 18 BRPM | TEMPERATURE: 97 F | BODY MASS INDEX: 29.76 KG/M2 | HEIGHT: 66 IN | OXYGEN SATURATION: 100 % | HEART RATE: 64 BPM | SYSTOLIC BLOOD PRESSURE: 144 MMHG | DIASTOLIC BLOOD PRESSURE: 65 MMHG

## 2022-03-22 DIAGNOSIS — M25.511 CHRONIC RIGHT SHOULDER PAIN: ICD-10-CM

## 2022-03-22 DIAGNOSIS — G89.29 CHRONIC RIGHT SHOULDER PAIN: ICD-10-CM

## 2022-03-22 DIAGNOSIS — M17.12 PRIMARY OSTEOARTHRITIS OF LEFT KNEE: ICD-10-CM

## 2022-03-22 DIAGNOSIS — G89.4 CHRONIC PAIN DISORDER: ICD-10-CM

## 2022-03-22 DIAGNOSIS — M25.511 CHRONIC RIGHT SHOULDER PAIN: Primary | ICD-10-CM

## 2022-03-22 DIAGNOSIS — G89.29 CHRONIC RIGHT SHOULDER PAIN: Primary | ICD-10-CM

## 2022-03-22 PROCEDURE — 99214 PR OFFICE/OUTPT VISIT, EST, LEVL IV, 30-39 MIN: ICD-10-PCS | Mod: S$GLB,,, | Performed by: NURSE PRACTITIONER

## 2022-03-22 PROCEDURE — 73030 XR SHOULDER COMPLETE 2 OR MORE VIEWS RIGHT: ICD-10-PCS | Mod: 26,RT,, | Performed by: RADIOLOGY

## 2022-03-22 PROCEDURE — 1159F PR MEDICATION LIST DOCUMENTED IN MEDICAL RECORD: ICD-10-PCS | Mod: CPTII,S$GLB,, | Performed by: NURSE PRACTITIONER

## 2022-03-22 PROCEDURE — 99999 PR PBB SHADOW E&M-EST. PATIENT-LVL V: CPT | Mod: PBBFAC,,, | Performed by: NURSE PRACTITIONER

## 2022-03-22 PROCEDURE — 3008F BODY MASS INDEX DOCD: CPT | Mod: CPTII,S$GLB,, | Performed by: NURSE PRACTITIONER

## 2022-03-22 PROCEDURE — 3077F PR MOST RECENT SYSTOLIC BLOOD PRESSURE >= 140 MM HG: ICD-10-PCS | Mod: CPTII,S$GLB,, | Performed by: NURSE PRACTITIONER

## 2022-03-22 PROCEDURE — 99999 PR PBB SHADOW E&M-EST. PATIENT-LVL V: ICD-10-PCS | Mod: PBBFAC,,, | Performed by: NURSE PRACTITIONER

## 2022-03-22 PROCEDURE — 73030 X-RAY EXAM OF SHOULDER: CPT | Mod: TC,FY,RT

## 2022-03-22 PROCEDURE — 3077F SYST BP >= 140 MM HG: CPT | Mod: CPTII,S$GLB,, | Performed by: NURSE PRACTITIONER

## 2022-03-22 PROCEDURE — 1160F RVW MEDS BY RX/DR IN RCRD: CPT | Mod: CPTII,S$GLB,, | Performed by: NURSE PRACTITIONER

## 2022-03-22 PROCEDURE — 3078F PR MOST RECENT DIASTOLIC BLOOD PRESSURE < 80 MM HG: ICD-10-PCS | Mod: CPTII,S$GLB,, | Performed by: NURSE PRACTITIONER

## 2022-03-22 PROCEDURE — 73030 X-RAY EXAM OF SHOULDER: CPT | Mod: 26,RT,, | Performed by: RADIOLOGY

## 2022-03-22 PROCEDURE — 3008F PR BODY MASS INDEX (BMI) DOCUMENTED: ICD-10-PCS | Mod: CPTII,S$GLB,, | Performed by: NURSE PRACTITIONER

## 2022-03-22 PROCEDURE — 1159F MED LIST DOCD IN RCRD: CPT | Mod: CPTII,S$GLB,, | Performed by: NURSE PRACTITIONER

## 2022-03-22 PROCEDURE — 3078F DIAST BP <80 MM HG: CPT | Mod: CPTII,S$GLB,, | Performed by: NURSE PRACTITIONER

## 2022-03-22 PROCEDURE — 1160F PR REVIEW ALL MEDS BY PRESCRIBER/CLIN PHARMACIST DOCUMENTED: ICD-10-PCS | Mod: CPTII,S$GLB,, | Performed by: NURSE PRACTITIONER

## 2022-03-22 PROCEDURE — 99214 OFFICE O/P EST MOD 30 MIN: CPT | Mod: S$GLB,,, | Performed by: NURSE PRACTITIONER

## 2022-03-22 RX ORDER — CELECOXIB 200 MG/1
200 CAPSULE ORAL 2 TIMES DAILY PRN
Qty: 60 CAPSULE | Refills: 1 | Status: SHIPPED | OUTPATIENT
Start: 2022-03-22 | End: 2022-05-16

## 2022-03-30 ENCOUNTER — OFFICE VISIT (OUTPATIENT)
Dept: NEUROLOGY | Facility: CLINIC | Age: 62
End: 2022-03-30
Payer: MEDICARE

## 2022-03-30 VITALS — SYSTOLIC BLOOD PRESSURE: 119 MMHG | HEART RATE: 74 BPM | DIASTOLIC BLOOD PRESSURE: 80 MMHG

## 2022-03-30 DIAGNOSIS — G43.709 CHRONIC MIGRAINE WITHOUT AURA WITHOUT STATUS MIGRAINOSUS, NOT INTRACTABLE: Primary | ICD-10-CM

## 2022-03-30 DIAGNOSIS — G44.86 CERVICOGENIC HEADACHE: ICD-10-CM

## 2022-03-30 PROCEDURE — 1159F MED LIST DOCD IN RCRD: CPT | Mod: CPTII,S$GLB,, | Performed by: PSYCHIATRY & NEUROLOGY

## 2022-03-30 PROCEDURE — 3079F PR MOST RECENT DIASTOLIC BLOOD PRESSURE 80-89 MM HG: ICD-10-PCS | Mod: CPTII,S$GLB,, | Performed by: PSYCHIATRY & NEUROLOGY

## 2022-03-30 PROCEDURE — 1160F PR REVIEW ALL MEDS BY PRESCRIBER/CLIN PHARMACIST DOCUMENTED: ICD-10-PCS | Mod: CPTII,S$GLB,, | Performed by: PSYCHIATRY & NEUROLOGY

## 2022-03-30 PROCEDURE — 99999 PR PBB SHADOW E&M-EST. PATIENT-LVL IV: CPT | Mod: PBBFAC,,, | Performed by: PSYCHIATRY & NEUROLOGY

## 2022-03-30 PROCEDURE — 99999 PR PBB SHADOW E&M-EST. PATIENT-LVL IV: ICD-10-PCS | Mod: PBBFAC,,, | Performed by: PSYCHIATRY & NEUROLOGY

## 2022-03-30 PROCEDURE — 1160F RVW MEDS BY RX/DR IN RCRD: CPT | Mod: CPTII,S$GLB,, | Performed by: PSYCHIATRY & NEUROLOGY

## 2022-03-30 PROCEDURE — 3079F DIAST BP 80-89 MM HG: CPT | Mod: CPTII,S$GLB,, | Performed by: PSYCHIATRY & NEUROLOGY

## 2022-03-30 PROCEDURE — 99214 OFFICE O/P EST MOD 30 MIN: CPT | Mod: PBBFAC | Performed by: PSYCHIATRY & NEUROLOGY

## 2022-03-30 PROCEDURE — 3074F SYST BP LT 130 MM HG: CPT | Mod: CPTII,S$GLB,, | Performed by: PSYCHIATRY & NEUROLOGY

## 2022-03-30 PROCEDURE — 99214 OFFICE O/P EST MOD 30 MIN: CPT | Mod: S$GLB,,, | Performed by: PSYCHIATRY & NEUROLOGY

## 2022-03-30 PROCEDURE — 99214 PR OFFICE/OUTPT VISIT, EST, LEVL IV, 30-39 MIN: ICD-10-PCS | Mod: S$GLB,,, | Performed by: PSYCHIATRY & NEUROLOGY

## 2022-03-30 PROCEDURE — 1159F PR MEDICATION LIST DOCUMENTED IN MEDICAL RECORD: ICD-10-PCS | Mod: CPTII,S$GLB,, | Performed by: PSYCHIATRY & NEUROLOGY

## 2022-03-30 PROCEDURE — 3074F PR MOST RECENT SYSTOLIC BLOOD PRESSURE < 130 MM HG: ICD-10-PCS | Mod: CPTII,S$GLB,, | Performed by: PSYCHIATRY & NEUROLOGY

## 2022-03-30 RX ORDER — NARATRIPTAN 1 MG/1
1 TABLET ORAL ONCE AS NEEDED
COMMUNITY
End: 2022-03-30 | Stop reason: SDUPTHER

## 2022-03-30 RX ORDER — NARATRIPTAN 1 MG/1
1 TABLET ORAL 2 TIMES DAILY PRN
Qty: 9 TABLET | Refills: 12 | Status: SHIPPED | OUTPATIENT
Start: 2022-03-30 | End: 2022-04-18 | Stop reason: SDUPTHER

## 2022-03-30 NOTE — PROGRESS NOTES
Subjective:       Patient ID: Susan Courtney is a 62 y.o. female.    Reason for Consult: Follow-up      Interval History:  Susan Courtney is here for follow up. Their condition had changed.  The patient notes that her common triggers for migraine include and continue to be he triggers and certain spices.  She notes that around Thanksgiving she was eating a sweet and spicy be jerky and this caused her to have significant left-sided temple pain.  She went to the emergency room and was told that she might have what sounds like temporal arteritis.  She had some tests at that time and was told that she needed to be hospitalized.  At that time she wanted to get a follow-up opinion with me but had not realized that I had moved from the [facility] location to the [facility] location.  She notes that she still has a relatively low frequency of headaches, noting that she has only had 1 other headache since November at a low severity 4/10.  She notes that primary care physician had prescribed her naratriptan which seems to help her when she has her migraine headaches.  She had previously tried and failed Imitrex, Maxalt, Relpax, Celexa, tizanidine, Topamax and is currently on metoprolol 100 mg p.o. b.i.d..  The patient notes that she does not do her neck exercises that she learned in physical therapy from several years ago on a regular basis.  She notes that dry needling helps her at that time when she went to physical therapy.  She notes that the [facility] is the closest to where she lives.    Objective:     Vitals:    03/30/22 1044   BP: 119/80   Pulse: 74     Tenderness to palpation bilateral cervical paraspinal musculature with positive muscle twitch response, as well as suboccipital musculature.  Focused examination was undertaken today. Most of the visit time was spent giving guidance, counseling and discussing treatment options.    Results for orders placed or performed  during the hospital encounter of 04/25/14   MRI Brain Without Contrast    Narrative    Procedure: MRI the brain without contrast.    Technique: Sagittal and axial T1, axial/coronal T2, axial FLAIR, axial gradient, and axial diffusion imaging of the whole brain.    Comparison: None    Findings: 1-2 questionable punctate Foci of flair hyperintensity left frontal subcortical white matter versus volume averaging artifact without corresponding signal abnormality on additional sequences in this region.  Overall of doubtful clinical   significance.  Brain parenchyma is normal in contour.  The ventricles are normal in size and configuration without evidence for hydrocephalus.  There is no midline shift or mass-effect.  There is no diffusion signal abnormality to suggest acute   infarction.  There is no abnormal parenchymal gradient susceptibility.  The major intracranial T2 flow voids are present.  No abnormal intra-or extra-axial fluid collection.  Study is slightly limited by lack of contrast.    Impression     Unremarkable noncontrast MRI of the brain as detailed above specifically without evidence of a acute infarction.      Electronically signed by: JAYJAY HERNANDEZ DO  Date:     04/25/14  Time:    10:13        Assessment/Plan:     Problem List Items Addressed This Visit        Neuro    Headache    Relevant Orders    Ambulatory referral/consult to Physical/Occupational Therapy    X-Ray Cervical Spine Complete 5 view      Other Visit Diagnoses     Chronic migraine without aura without status migrainosus, not intractable    -  Primary    Relevant Medications    naratriptan (AMERGE) 1 MG Tab    Other Relevant Orders    Ambulatory referral/consult to Physical/Occupational Therapy        62-year-old female seen in follow-up for migraine.  She has had a normal ESR CRP and seems have headaches in her left temple which were typical for her migraine.  She has known triggers with a new trigger of spicy and sweet preserved meats.   She will avoid her triggers.  We have discussed adding a refill of her naratriptan to be used judiciously for her migraine headaches.  We have also discussed physical therapy at her preferred location as she notes that this helped her significantly before.  I will see the patient back in about 3 months.      The patient verbalizes understanding and agreement with the treatment plan. I have discussed risks, benefits and alternatives to the treatment plan. Questions were sought and answered to her stated verbal satisfaction.        Melanie Germain MD    This note is dictated on M*Modal Fluency Direct word recognition program. There are word recognition mistakes that are occasionally missed on review.    Based on our encounter today, my overall Medical Decision Making is a Level 4 because of Moderate = 2 or more stable chronic illnesses; 1 or more chronic illnesses with exacerbation, progression, or side effects of treatment; 1 undiagnosed new problem with systemic symptoms; 1 acute complicated injury and Moderate = Moderate risk of morbidity from additional diagnostic testing or treatment (e.g. Prescription drug management, Decision regarding minor surgery with identified patient or procedure risk factors, Decision regarding elective major surgery without identified patient or procedure risk factors, Diagnosis or treatment significantly limited by social determinants of health) based on Number of Problems or Complexity of Problems and Risk of Complications and/or Morbidity

## 2022-03-31 LAB — CRC RECOMMENDATION EXT: NORMAL

## 2022-04-18 ENCOUNTER — TELEPHONE (OUTPATIENT)
Dept: NEUROLOGY | Facility: CLINIC | Age: 62
End: 2022-04-18
Payer: MEDICARE

## 2022-04-18 DIAGNOSIS — G43.709 CHRONIC MIGRAINE WITHOUT AURA WITHOUT STATUS MIGRAINOSUS, NOT INTRACTABLE: ICD-10-CM

## 2022-04-18 NOTE — TELEPHONE ENCOUNTER
----- Message from Skye Burnett sent at 4/18/2022 11:24 AM CDT -----  Regarding: Medication Request  Contact: [phone]  Patient Susan is calling. Patient is having to take 12 pills of her Amerge instead of 9 and she's out of pills and her head is hurting. Patient spoke with the pharm and was told to have office call in a new rx with increased dose of 12 naratriptan (AMERGE) 1 MG Tab. Please send rx to [facility] pharm. Patient would also like to know when is she able to have her MRI done to see what's causing her pain. Please call patient at [phone]      [facility]/pharmacy #[id number] - [place], [place] - [address]   Phone: [phone]  Fax:  [phone]        Thank You

## 2022-04-18 NOTE — TELEPHONE ENCOUNTER
Spoke with the patient and she wants to know if Eloisa can be called in (dispensed 12 pills). Patient previously acquired 12 from [facility] as she couldn't bear the headache pain. Also informed the patient that provider is not in clinic but is there anything that we can do for the patient in the mean time?

## 2022-04-18 NOTE — TELEPHONE ENCOUNTER
----- Message from Ana Singh sent at 4/18/2022 12:43 PM CDT -----  Contact: pt  Who Called: PT  Regarding: The pt is returning the nurse call and is requesting a callback.   Would the patient rather a call back or a response via [facility]? Call back  Best Call Back Number: [phone]  Additional Information:

## 2022-04-21 ENCOUNTER — TELEPHONE (OUTPATIENT)
Dept: PAIN MEDICINE | Facility: CLINIC | Age: 62
End: 2022-04-21
Payer: MEDICARE

## 2022-04-21 RX ORDER — NARATRIPTAN 1 MG/1
1 TABLET ORAL 2 TIMES DAILY PRN
Qty: 12 TABLET | Refills: 12 | Status: SHIPPED | OUTPATIENT
Start: 2022-04-21 | End: 2022-07-27 | Stop reason: SDUPTHER

## 2022-04-25 ENCOUNTER — CLINICAL SUPPORT (OUTPATIENT)
Dept: REHABILITATION | Facility: OTHER | Age: 62
End: 2022-04-25
Payer: MEDICARE

## 2022-04-25 DIAGNOSIS — G43.709 CHRONIC MIGRAINE WITHOUT AURA WITHOUT STATUS MIGRAINOSUS, NOT INTRACTABLE: ICD-10-CM

## 2022-04-25 DIAGNOSIS — G44.86 CERVICOGENIC HEADACHE: ICD-10-CM

## 2022-04-25 DIAGNOSIS — R29.3 POSTURE IMBALANCE: ICD-10-CM

## 2022-04-25 PROCEDURE — 97162 PT EVAL MOD COMPLEX 30 MIN: CPT | Mod: PN | Performed by: PHYSICAL THERAPIST

## 2022-04-25 NOTE — PLAN OF CARE
[facility]  Physical Therapy Initial Evaluation    Name: Susan Courtney  Clinic Number: [id number]    Therapy Diagnosis:   Encounter Diagnoses   Name Primary?    Chronic migraine without aura without status migrainosus, not intractable     Cervicogenic headache     Posture imbalance      Physician: Julio Germain III, MD    Physician Orders: PT Eval and Treat: Dry Needling  Medical Diagnosis from Referral: G43.709 (ICD-10-CM) - Chronic migraine without aura without status migrainosus, not intractable G44.86 (ICD-10-CM) - Cervicogenic headache   Evaluation Date: 4/25/2022  Authorization Period Expiration: 5/23/2022 (eval only)  Plan of Care Expiration: 7/21/2022  Progress Note Due: 5/27/2022  Visit # / Visits authorized: 1/ 1 (auth pending)   FOTO: 1/ 3: 4/25/2022     Precautions: Standard    Time In: 1015  Time Out: 1045  Total Appointment Time (timed & untimed codes): 30 minutes    Subjective   Date of onset: chronic hx of HA  History of current condition - Susan reports: chronic hx of headaches, reports family hx of migraine. She says she has had to go to the hospital in the past to get IV fluids and medication. Has some food triggers that she's cautious of. HA is typically to L side of head. No positional triggers, but notices tension and pain with increased activity and stress. Denies radiculopathy or falls. Reports significant difficulty sleeping, hx insomnia.         Past Medical History:   Diagnosis Date    Abdominal hernia     Anxiety     Asthma     Diabetes mellitus     Hypertension     Migraine     Migraine headache     Obesity     Sleep apnea      Susan Courtney  has a past surgical history that includes Cholecystectomy; Hysterectomy; Foot surgery; Hernia repair; Tonsillectomy (Bilateral, 3/6/2020); and Excision of lesion (Left, 12/1/2021).    Susan has a current medication list which includes the following prescription(s): alprazolam, benzonatate,  budesonide-formoterol 160-4.5 mcg, celecoxib, clonazepam, fluticasone propionate, januvia, jardiance, losartan, meclizine, meloxicam, metformin, metoprolol succinate, montelukast, naratriptan, omeprazole, ondansetron, polyethylene glycol, trelegy ellipta, triazolam, trulicity, and ventolin hfa.    Review of patient's allergies indicates:   Allergen Reactions    Morphine Itching     Not sure what she can take for pain.    Morphine sulfate      Itchy (skin)^        Imaging, none    Prior Therapy: at this facility for back, shoulder, and neck  Social History: Pt lives alone  Occupation: retired   Prior Level of Function: I with ADL's and driving  Current Level of Function: I with ADL's and driving. When HA present function is limited. Notes tension and HA with stress     Pain:  Current 5/10, worst 10/10, best 5/10   Location: R occiput to L temporalis, tight B shoulders  Description: Aching and pressure  Aggravating Factors: stress and increased activity (cooking and housework)  Easing Factors: lying down, applying alcorub, hospital when pain is severe    Pts goals: get back to doing more activities     Objective     Observations:  Pt is alert and oriented, good historian. Pt presents with upper crossed posture; head forward with rounded shoulders    Handedness (R: Right, L: Left): R    Shoulder screening: WFL and equal B     Cervical AROM:   Cervical AROM Approximate degrees (Normal) Comments if applicable    Flexion  50 (50-60)    Extension  60 (60-70)    R Rotation  50 (70-90) Pain R   L Rotation  50 (70-90) Pain R   R Side bending  20 (20-45) Tight L, pain R   L Side bending 20 (20-45) Tight R     Muscle flexibility (Y: Yes, N: No)  - Upper trapezius muscle tightness with passive stretching: Y  - Levator scapulae musce tightness with passive stretching: Y      Upper Extremity Strength  (R) UE  (L) UE    Shoulder flexion: 5/5 Shoulder flexion: 5/5   Shoulder Abduction: 5/5 Shoulder abduction: 5/5   Shoulder ER  "5/5 Shoulder ER 5/5   Shoulder IR 5/5 Shoulder IR 5/5   Elbow flexion: 5/5 Elbow flexion: 5/5   Elbow extension: 5/5 Elbow extension: 5/5   Wrist flexion: 5/5 Wrist flexion: 5/5   Wrist extension: 5/5 Wrist extension: 5/5       Dermatomes: WNL grossly to light touch       Myotomes: WFL Y/N:   - C4 (Shoulder shrug) = Y  - C5 (Shoulder abduction) = Y  - C6 (Elbow flexion/biceps and wrist extension) = Y  - C7 (Elbow extension/triceps and wrist flexion) = Y   - C8 (Thumb extension) = Y  - T1 (Finger abduction) = Y    Palpation: Pt presents with tenderness to palpation to upper trap, levator scap, C3-4 PVM, suboccipitals, grossly R>L    Joint Mobility: unable to assess 2* time constraints, to be assessed at next visit     Special testing cluster exams:  - Criteria for cervical radiculopathy:        - Cervical Rotation AROM < 60*, relief with distraction, s/s with Spurlings, + ULTTA  - Met 1/4 criteria for cervical radiculopathy  Cervical myelopathy cluster:   - Gait deviations/coordination deficits, (+) Holley, (+) Inverted supinator sign, (+) Babinski, Age>46 y/o   - Met 1/5 for cervical myelopathy (1/5 rule out, 3/5 rule in)    Upper Cervical Clearing Screening (+/-):  - Sharp-Malu = -  - Alar ligament test = -  - Transverse ligament test = -      Oliver C spine Rules:                CMS Impairment/Limitation/Restriction for FOTO CNS Survey    Therapist reviewed FOTO scores for Susan Courtney on 4/25/2022.   FOTO documents entered into [facility] - see Media section.    Limitation Score: 14%    Goal: 13%         TREATMENT   Treatment Time In: 1040  Treatment Time Out: 145  Total Treatment time separate from Evaluation: 5 minutes    Susan received therapeutic exercises to develop flexibility for 5 minutes including:  Reviewed and demonstrated for HEP:  Use of still point inducer and lacrosse ball for STM and suboccipital release  Upper trap and levator scap stretches 3 x 30" each      Home Exercises and Patient " Education Provided    Education provided:   - Therapy rationale and plan of care    Written Home Exercises Provided: yes.  Exercises were reviewed and Susan was able to demonstrate them prior to the end of the session.  Susan demonstrated good  understanding of the education provided.     See EMR under Patient Instructions for exercises provided 4/25/2022.    Assessment   Susan is a 62 y.o. female referred to outpatient Physical Therapy with a medical diagnosis of G43.709 (ICD-10-CM) - Chronic migraine without aura without status migrainosus, not intractable G44.86 (ICD-10-CM) - Cervicogenic headache. Pt presents with s/s consistent with referring diagnosis. Decreased ROM to CSP noted in lateral bending and rotation with pain to R mid cervical spine reported. Muscle guarding and TTP noted to upper traps, levator scap, mid-cervical PVM, and suboccipitals, all R>L. Pt is a good candidate for dry needling to address current complaints.     Pt prognosis is Good.   Pt will benefit from skilled outpatient Physical Therapy to address the deficits stated above and in the chart below, provide pt/family education, and to maximize pt's level of independence.     Plan of care discussed with patient: Yes  Pt's spiritual, cultural and educational needs considered and patient is agreeable to the plan of care and goals as stated below:     Anticipated Barriers for therapy: standard    Medical Necessity is demonstrated by the following  History  Co-morbidities and personal factors that may impact the plan of care Co-morbidities:   anxiety, COPD/asthma, diabetes, difficulty sleeping, financial considerations and HTN    Personal Factors:   coping style  lifestyle     moderate   Examination  Body Structures and Functions, activity limitations and participation restrictions that may impact the plan of care Body Regions:   head  neck  trunk    Body Systems:    gross symmetry  ROM  strength  motor control  motor  learning    Participation Restrictions:   Driving, lifting, carrying, cooking, sleeping    Activity limitations:   Learning and applying knowledge  no deficits    General Tasks and Commands  no deficits    Communication  no deficits    Mobility  lifting and carrying objects  driving (bike, car, motorcycle)    Self care  no deficits    Domestic Life  cooking  assisting others    Interactions/Relationships  no deficits    Life Areas  no deficits    Community and Social Life  community life  recreation and leisure         moderate   Clinical Presentation evolving clinical presentation with changing clinical characteristics moderate   Decision Making/ Complexity Score: moderate     Goals:  Short Term Goals (4 Weeks):   1. Pt will report 20% reduction in pain of the cervical spine and head for ease with ADL's.  2. PT will demonstrate 1/3 MMT improvement in periscapular strength for ease with upright posture.  3. Pt will demonstrate improved cervical spine ROM in all directions by 5 degrees for ease with driving.    Long Term Goals (12 Weeks):   1. Pt will report being independent with HEP for maintenance of improvements gained during therapy sessions  2. PT will report 50% reduction of pain of the neck and head for ease with caring for grandchildren.  3. Pt will demonstrate full BUE and periscapular strength without the provocation of pain for ease with lifting task in home.  4. Pt will demonstrate appropriate upright posture without external cueing for ease with community mobility.     Plan   Plan of care Certification: 4/25/2022 to 7/21/2022.    Outpatient Physical Therapy 2 times weekly for 12 weeks to include the following interventions: Manual Therapy, Moist Heat/ Ice, Neuromuscular Re-ed, Patient Education, Self Care, Therapeutic Activities, Therapeutic Exercise and Dry Needling.     Irina Rome, PT

## 2022-04-26 PROBLEM — G44.86 CERVICOGENIC HEADACHE: Status: ACTIVE | Noted: 2022-04-26

## 2022-04-26 PROBLEM — R29.3 POSTURE IMBALANCE: Status: ACTIVE | Noted: 2022-04-26

## 2022-04-27 ENCOUNTER — TELEPHONE (OUTPATIENT)
Dept: PAIN MEDICINE | Facility: CLINIC | Age: 62
End: 2022-04-27
Payer: MEDICARE

## 2022-04-27 DIAGNOSIS — G43.709 CHRONIC MIGRAINE WITHOUT AURA WITHOUT STATUS MIGRAINOSUS, NOT INTRACTABLE: ICD-10-CM

## 2022-04-27 RX ORDER — NARATRIPTAN 1 MG/1
1 TABLET ORAL 2 TIMES DAILY PRN
Qty: 12 TABLET | Refills: 12 | OUTPATIENT
Start: 2022-04-27

## 2022-04-27 NOTE — TELEPHONE ENCOUNTER
naratriptan (AMERGE) 1 MG Tab 12 tablet 12 4/21/2022     Sig - Route: Take 1 tablet (1 mg total) by mouth 2 (two) times daily as needed (headache). Take 1mg po; may repeat x1 after 4 hours as needed for migraine headache - Oral    Sent to pharmacy as: naratriptan (AMERGE) 1 MG Tab    E-Prescribing Status: Receipt confirmed by pharmacy (4/21/2022  9:22 AM CDT)

## 2022-04-27 NOTE — TELEPHONE ENCOUNTER
----- Message from Juliana Herrera sent at 4/27/2022  8:19 AM CDT -----  Type:  RX Refill Request    Who Called: Susan     Refill or New Rx: Refill     RX Name and Strength:naratriptan (AMERGE) 1 MG Tab    Preferred Pharmacy with phone number:[facility]/PHARMACY #[id number] - [place] [id number]    Local or Mail Order:Local     Ordering Provider:Julio Germain III, MD    Best Call Back Number:[phone]    Pt states that she has been out of this medication for a week and is requesting this is filled today. Please Advise

## 2022-04-27 NOTE — TELEPHONE ENCOUNTER
This message is for patient in regards to his/her appointment 04/28/22 at 8:40am      [facility] Policy: For the safety of all patients and staff members.     Patient Visitor policy: During this visit we're asking all patients to only have one visitor over the age of 18yrs old to accompany to be seen by RIA Jnag. If patient do not required assistance with their visit, we're asking all visitors to remain outside the waiting area.    Upon arriving to your schedule appointment; patients are required to wear a face mask, if patient do not have a face mask one will be provided entering the facility. If you have any questions or concerns please contact [phone]

## 2022-04-28 ENCOUNTER — OFFICE VISIT (OUTPATIENT)
Dept: PAIN MEDICINE | Facility: CLINIC | Age: 62
End: 2022-04-28
Payer: MEDICARE

## 2022-04-28 VITALS
SYSTOLIC BLOOD PRESSURE: 155 MMHG | DIASTOLIC BLOOD PRESSURE: 73 MMHG | WEIGHT: 185 LBS | BODY MASS INDEX: 29.73 KG/M2 | HEART RATE: 89 BPM | RESPIRATION RATE: 18 BRPM | HEIGHT: 66 IN

## 2022-04-28 DIAGNOSIS — G89.29 CHRONIC RIGHT SHOULDER PAIN: ICD-10-CM

## 2022-04-28 DIAGNOSIS — M25.511 CHRONIC RIGHT SHOULDER PAIN: ICD-10-CM

## 2022-04-28 DIAGNOSIS — G89.4 CHRONIC PAIN DISORDER: Primary | ICD-10-CM

## 2022-04-28 DIAGNOSIS — M17.12 PRIMARY OSTEOARTHRITIS OF LEFT KNEE: ICD-10-CM

## 2022-04-28 PROCEDURE — 3077F PR MOST RECENT SYSTOLIC BLOOD PRESSURE >= 140 MM HG: ICD-10-PCS | Mod: CPTII,S$GLB,, | Performed by: NURSE PRACTITIONER

## 2022-04-28 PROCEDURE — 4010F ACE/ARB THERAPY RXD/TAKEN: CPT | Mod: CPTII,S$GLB,, | Performed by: NURSE PRACTITIONER

## 2022-04-28 PROCEDURE — 99999 PR PBB SHADOW E&M-EST. PATIENT-LVL IV: CPT | Mod: PBBFAC,,, | Performed by: NURSE PRACTITIONER

## 2022-04-28 PROCEDURE — 1159F MED LIST DOCD IN RCRD: CPT | Mod: CPTII,S$GLB,, | Performed by: NURSE PRACTITIONER

## 2022-04-28 PROCEDURE — 99999 PR PBB SHADOW E&M-EST. PATIENT-LVL IV: ICD-10-PCS | Mod: PBBFAC,,, | Performed by: NURSE PRACTITIONER

## 2022-04-28 PROCEDURE — 99213 PR OFFICE/OUTPT VISIT, EST, LEVL III, 20-29 MIN: ICD-10-PCS | Mod: S$GLB,,, | Performed by: NURSE PRACTITIONER

## 2022-04-28 PROCEDURE — 3008F BODY MASS INDEX DOCD: CPT | Mod: CPTII,S$GLB,, | Performed by: NURSE PRACTITIONER

## 2022-04-28 PROCEDURE — 3008F PR BODY MASS INDEX (BMI) DOCUMENTED: ICD-10-PCS | Mod: CPTII,S$GLB,, | Performed by: NURSE PRACTITIONER

## 2022-04-28 PROCEDURE — 3077F SYST BP >= 140 MM HG: CPT | Mod: CPTII,S$GLB,, | Performed by: NURSE PRACTITIONER

## 2022-04-28 PROCEDURE — 99213 OFFICE O/P EST LOW 20 MIN: CPT | Mod: S$GLB,,, | Performed by: NURSE PRACTITIONER

## 2022-04-28 PROCEDURE — 1159F PR MEDICATION LIST DOCUMENTED IN MEDICAL RECORD: ICD-10-PCS | Mod: CPTII,S$GLB,, | Performed by: NURSE PRACTITIONER

## 2022-04-28 PROCEDURE — 1160F PR REVIEW ALL MEDS BY PRESCRIBER/CLIN PHARMACIST DOCUMENTED: ICD-10-PCS | Mod: CPTII,S$GLB,, | Performed by: NURSE PRACTITIONER

## 2022-04-28 PROCEDURE — 4010F PR ACE/ARB THEARPY RXD/TAKEN: ICD-10-PCS | Mod: CPTII,S$GLB,, | Performed by: NURSE PRACTITIONER

## 2022-04-28 PROCEDURE — 1160F RVW MEDS BY RX/DR IN RCRD: CPT | Mod: CPTII,S$GLB,, | Performed by: NURSE PRACTITIONER

## 2022-04-28 PROCEDURE — 3078F PR MOST RECENT DIASTOLIC BLOOD PRESSURE < 80 MM HG: ICD-10-PCS | Mod: CPTII,S$GLB,, | Performed by: NURSE PRACTITIONER

## 2022-04-28 PROCEDURE — 3078F DIAST BP <80 MM HG: CPT | Mod: CPTII,S$GLB,, | Performed by: NURSE PRACTITIONER

## 2022-05-06 ENCOUNTER — CLINICAL SUPPORT (OUTPATIENT)
Dept: REHABILITATION | Facility: OTHER | Age: 62
End: 2022-05-06
Payer: MEDICARE

## 2022-05-06 DIAGNOSIS — G44.86 CERVICOGENIC HEADACHE: Primary | ICD-10-CM

## 2022-05-06 DIAGNOSIS — R29.3 POSTURE IMBALANCE: ICD-10-CM

## 2022-05-06 PROBLEM — Z74.09 DECREASED FUNCTIONAL MOBILITY AND ENDURANCE: Status: RESOLVED | Noted: 2019-01-10 | Resolved: 2022-05-06

## 2022-05-06 PROBLEM — R26.2 DIFFICULTY WALKING: Status: RESOLVED | Noted: 2021-08-13 | Resolved: 2022-05-06

## 2022-05-06 PROBLEM — M25.60 STIFFNESS DUE TO IMMOBILITY: Status: RESOLVED | Noted: 2021-12-20 | Resolved: 2022-05-06

## 2022-05-06 PROBLEM — R52 PAIN: Status: RESOLVED | Noted: 2021-12-20 | Resolved: 2022-05-06

## 2022-05-06 PROBLEM — M25.561 CHRONIC PAIN OF RIGHT KNEE: Status: RESOLVED | Noted: 2019-01-10 | Resolved: 2022-05-06

## 2022-05-06 PROBLEM — M17.12 PRIMARY OSTEOARTHRITIS OF LEFT KNEE: Status: RESOLVED | Noted: 2021-08-13 | Resolved: 2022-05-06

## 2022-05-06 PROBLEM — Z74.09 STIFFNESS DUE TO IMMOBILITY: Status: RESOLVED | Noted: 2021-12-20 | Resolved: 2022-05-06

## 2022-05-06 PROBLEM — G89.29 CHRONIC PAIN OF RIGHT KNEE: Status: RESOLVED | Noted: 2019-01-10 | Resolved: 2022-05-06

## 2022-05-06 PROBLEM — R53.1 WEAKNESS: Status: RESOLVED | Noted: 2021-12-20 | Resolved: 2022-05-06

## 2022-05-06 PROBLEM — M25.612 DECREASED ROM OF LEFT SHOULDER: Status: RESOLVED | Noted: 2019-01-10 | Resolved: 2022-05-06

## 2022-05-06 PROCEDURE — 97140 MANUAL THERAPY 1/> REGIONS: CPT | Mod: PN

## 2022-05-06 PROCEDURE — 97110 THERAPEUTIC EXERCISES: CPT | Mod: PN

## 2022-05-06 NOTE — PROGRESS NOTES
"[facility]   Physical Therapy Treatment Note     Name: Susan Courtney  Clinic Number: [id number]    Therapy Diagnosis:   Encounter Diagnoses   Name Primary?    Cervicogenic headache Yes    Posture imbalance      Physician: Julio Germain III, MD    Visit Date: 5/6/2022    Physician Orders: PT Eval and Treat: Dry Needling  Medical Diagnosis from Referral: G43.709 (ICD-10-CM) - Chronic migraine without aura without status migrainosus, not intractable G44.86 (ICD-10-CM) - Cervicogenic headache   Evaluation Date: 4/25/2022  Authorization Period Expiration: 5/23/2022 (eval only)  Plan of Care Expiration: 7/21/2022  Progress Note Due: 5/27/2022  Visit # / Visits authorized: 2/ 1 (auth pending)   FOTO: 1/ 3: 4/25/2022      Precautions: Standard    PTA Visit #: 0/5     Time In: 1:30  Time Out: 2:15  Total Billable Time: 45 minutes    SUBJECTIVE     Pt reports: min improvement since previous visit. Continues to have headaches.  She was compliant with home exercise program.  Response to previous treatment: fair  Functional change: none    Pain: 8/10  Location: R occiput to L temporalis, tight B shoulders        OBJECTIVE     Objective Measures updated at progress report unless specified.     Treatment     Susan received the treatments listed below:      therapeutic exercises to develop strength, endurance, ROM, flexibility, posture and core stabilization for 15 minutes including:    +still point inducer suboccipt release x 3'  +Chin tucks over still point inducer x 20  Upper trap and levator scap stretches 3 x 30" each    1/2 foam series --  NV   pec stretch   ray OH flex   SA punches   Bear hug <> horiz abd   ray ER   Ray Abd    manual therapy techniques: Joint mobilizations, Manual traction, Myofacial release, Soft tissue Mobilization and Friction Massage were applied to the: neck for 30 minutes, including:  10 min x joint mobs to TSP, CSP  20 min x dry needling - see note by Lynda " Karley PT    neuromuscular re-education activities to improve: Balance, Coordination, Kinesthetic, Sense, Proprioception and Posture for 00 minutes. The following activities were included:    Prone scap retractions - NV  Prone Is - NV  Prone Ts - NV  Prone Ys - NV    therapeutic activities to improve functional performance for 00  minutes, including:  Rows - NV  SALPD - NV  Anti rotations - NV        Patient Education and Home Exercises     Home Exercises Provided and Patient Education Provided     Education provided:   - none today    Written Home Exercises Provided: Patient instructed to cont prior HEP. Exercises were reviewed and Susan was able to demonstrate them prior to the end of the session.  Susan demonstrated good  understanding of the education provided. See EMR under Patient Instructions for exercises provided during therapy sessions    ASSESSMENT     Pt presents with flexed posture and rounded shoulders with c/o Joaquin upper trap pain and headaches along the base of the skull. Noted marked myofascial restrictions in suboccipitals, cervical paraspinals, and JOAQUIN upper traps. Introduced dry needling to pain modulation and improving mobility. Good tolerance with notable improvement in mobility and pain afterwards. Good return technique of HEP indicating good compliance at home. This is the patients first visit since her initial evaluation on 4/25/2022.    Susan Is progressing well towards her goals.   Pt prognosis is Good.     Pt will continue to benefit from skilled outpatient physical therapy to address the deficits listed in the problem list box on initial evaluation, provide pt/family education and to maximize pt's level of independence in the home and community environment.     Pt's spiritual, cultural and educational needs considered and pt agreeable to plan of care and goals.     Anticipated barriers to physical therapy: standard       Goals:   Short Term Goals (4 Weeks):   1. Pt will report 20%  reduction in pain of the cervical spine and head for ease with ADL's.  2. PT will demonstrate 1/3 MMT improvement in periscapular strength for ease with upright posture.  3. Pt will demonstrate improved cervical spine ROM in all directions by 5 degrees for ease with driving.     Long Term Goals (12 Weeks):   1. Pt will report being independent with HEP for maintenance of improvements gained during therapy sessions  2. PT will report 50% reduction of pain of the neck and head for ease with caring for grandchildren.  3. Pt will demonstrate full BUE and periscapular strength without the provocation of pain for ease with lifting task in home.  4. Pt will demonstrate appropriate upright posture without external cueing for ease with community mobility.       PLAN     Continue with POC for mobility, postural strength.    Lynda Wing, PT

## 2022-05-09 ENCOUNTER — DOCUMENTATION ONLY (OUTPATIENT)
Dept: REHABILITATION | Facility: OTHER | Age: 62
End: 2022-05-09
Payer: MEDICARE

## 2022-05-09 DIAGNOSIS — G44.86 CERVICOGENIC HEADACHE: Primary | ICD-10-CM

## 2022-05-09 DIAGNOSIS — R29.3 POSTURE IMBALANCE: ICD-10-CM

## 2022-05-09 NOTE — PROGRESS NOTES
Physical Therapy No Show Note       Patient was scheduled for physical therapy at [facility] for 5/9/2022. Pt failed to appear for appointment without prior notification for today.        Irina Rome, PT

## 2022-05-13 ENCOUNTER — CLINICAL SUPPORT (OUTPATIENT)
Dept: REHABILITATION | Facility: OTHER | Age: 62
End: 2022-05-13
Payer: MEDICARE

## 2022-05-13 DIAGNOSIS — R29.3 POSTURE IMBALANCE: ICD-10-CM

## 2022-05-13 DIAGNOSIS — G44.86 CERVICOGENIC HEADACHE: Primary | ICD-10-CM

## 2022-05-13 PROCEDURE — 97112 NEUROMUSCULAR REEDUCATION: CPT | Mod: PN

## 2022-05-13 PROCEDURE — 97140 MANUAL THERAPY 1/> REGIONS: CPT | Mod: PN

## 2022-05-13 NOTE — PROGRESS NOTES
"[facility]   Physical Therapy Treatment Note     Name: Susan Courtney  Clinic Number: [id number]    Therapy Diagnosis:   Encounter Diagnoses   Name Primary?    Cervicogenic headache Yes    Posture imbalance      Physician: Julio Germain III, MD    Visit Date: 5/13/2022    Physician Orders: PT Eval and Treat: Dry Needling  Medical Diagnosis from Referral: G43.709 (ICD-10-CM) - Chronic migraine without aura without status migrainosus, not intractable G44.86 (ICD-10-CM) - Cervicogenic headache   Evaluation Date: 4/25/2022  Authorization Period Expiration: 5/23/2022 (eval only)  Plan of Care Expiration: 7/21/2022  Progress Note Due: 5/27/2022  Visit # / Visits authorized: 3/ 1 (auth pending)   FOTO: 1/ 3: 4/25/2022      Precautions: Standard    PTA Visit #: 0/5     Time In: 1:30  Time Out: 2:15  Total Billable Time: 45 minutes    SUBJECTIVE     Pt reports: min improvement since previous visit. Continues to have headaches.  She was compliant with home exercise program.  Response to previous treatment: fair  Functional change: none    Pain: 8/10  Location: R occiput to L temporalis, tight B shoulders        OBJECTIVE     Objective Measures updated at progress report unless specified.     Treatment     Susan received the treatments listed below:      therapeutic exercises to develop strength, endurance, ROM, flexibility, posture and core stabilization for 00 minutes including:    still point inducer suboccipt release x 3'  Chin tucks over still point inducer x 20  Upper trap and levator scap stretches 3 x 30" each    1/2 foam series --  NV   pec stretch   ray OH flex   SA punches   Bear hug <> horiz abd   ray ER   Ray Abd    manual therapy techniques: Joint mobilizations, Manual traction, Myofacial release, Soft tissue Mobilization and Friction Massage were applied to the: neck for 30 minutes, including:  10 min x joint mobs to TSP, CSP  20 min x dry needling - see note by Lynda " Karley PT    neuromuscular re-education activities to improve: Balance, Coordination, Kinesthetic, Sense, Proprioception and Posture for 15 minutes. The following activities were included:    +Prone scap retractions -1 x 10  +Prone Is - 1 x 15  +Prone Ts - 1 x 15  Prone Ys - NV    therapeutic activities to improve functional performance for 00  minutes, including:  Rows - NV  SALPD - NV  Anti rotations - NV        Patient Education and Home Exercises     Home Exercises Provided and Patient Education Provided     Education provided:   - none today    Written Home Exercises Provided: Patient instructed to cont prior HEP. Exercises were reviewed and Susan was able to demonstrate them prior to the end of the session.  Susan demonstrated good  understanding of the education provided. See EMR under Patient Instructions for exercises provided during therapy sessions    ASSESSMENT     Able to progress prone scapular initiation exercises today. Good tolerance with intermittent VC/TC for scapular positioning to limit over activation of upper traps. Reports feeling good by end of session. Plan to progress postural strength next visit pending pt tolerance.    Susan Is progressing well towards her goals.   Pt prognosis is Good.     Pt will continue to benefit from skilled outpatient physical therapy to address the deficits listed in the problem list box on initial evaluation, provide pt/family education and to maximize pt's level of independence in the home and community environment.     Pt's spiritual, cultural and educational needs considered and pt agreeable to plan of care and goals.     Anticipated barriers to physical therapy: standard       Goals:   Short Term Goals (4 Weeks):   1. Pt will report 20% reduction in pain of the cervical spine and head for ease with ADL's.  2. PT will demonstrate 1/3 MMT improvement in periscapular strength for ease with upright posture.  3. Pt will demonstrate improved cervical spine  ROM in all directions by 5 degrees for ease with driving.     Long Term Goals (12 Weeks):   1. Pt will report being independent with HEP for maintenance of improvements gained during therapy sessions  2. PT will report 50% reduction of pain of the neck and head for ease with caring for grandchildren.  3. Pt will demonstrate full BUE and periscapular strength without the provocation of pain for ease with lifting task in home.  4. Pt will demonstrate appropriate upright posture without external cueing for ease with community mobility.       PLAN     Continue with POC for mobility, postural strength.    Lynda Wing, PT

## 2022-05-13 NOTE — PROGRESS NOTES
Dry Needling Daily Note     Patient ID: Susan Courtney is a 62 y.o. female.  Diagnosis:   1. Cervicogenic headache     2. Posture imbalance       Date:  05/13/2022    Total Time:  20min    Subjective:     Pt reports: feeling tight since last visit but feeling better. C/c is on tops of both shoulders  Pain Scale: Susan rates pain on a scale of 0-10 to be 3 currently.    Objective:     Pt signed written consent to dry needling Rx.  Pt gave verbal consent for DN.   Pt rec'd dry needling to Joaquin neck with 1-2 in needles with no adverse effects.    Homeostatic points:  1. Deep Radial  2. Greater Auricular  3. Spinal Accessory  4. Saphenous  5. Deep Fibular  6. Tibial  7. Greater Occipital  8. Suprascapular ( infraspinatus)  9. Lateral Antebrachial Cutaneous  10. Sural  11. Lateral Popliteal  12. Superficial Radial  13. Dorsal Scapular  14. Superior Cluneal  15. Posterior Cutaneous L 2  16. Inferior Gluteal  17. Lateral Pectoral  18. Ilitotibal  19. Infraorbital  20. Spinous process T7  21. Posterior cutaneous  T6  22. Posterior cutaneous L 5  23. Supraorbital  24. Common fibular    Paravertebral Points:  C4-5, T1-3 JOAQUIN    Symptomatic Points:   UT, LS JOAQUIN     Assessment:     Patient demonstrated appropriate response to FDN. Presents with increased tone and tenderness at beginning of session. Winding technique used every 5 minutes. Marked improvement in myofascial mobility by end of session.    Patient Education/Response:     Education provided re:   Purpose, benefits, and potential side effects of dry needling.   Educated pt to use heat following treatment sessions to reduce c/o pain or soreness and to improve circulation to needled sites.   Encouraged pt to continue with HEP to maintain flexibility, ROM, and functional mobility.  Susan verbalized good understanding of education     Plans and Goals:     Monitor response to FDN. Continue with FDN in POC as tolerated.     Lynda Wing,  I think that your causes of passing out is related to your intestinal issues.  It is possible you could have a seizure disorder but less likely.  I have made a referral to neurology to further look into this.  If you develop new or worsening symptoms return to the emergency department.  Otherwise be careful when you feel like you might pass out and have your  help lower your to the ground so you did not injure yourself.   PT  5/13/2022

## 2022-05-31 ENCOUNTER — OFFICE VISIT (OUTPATIENT)
Dept: URGENT CARE | Facility: CLINIC | Age: 62
End: 2022-05-31
Payer: MEDICARE

## 2022-05-31 VITALS
DIASTOLIC BLOOD PRESSURE: 65 MMHG | WEIGHT: 185 LBS | BODY MASS INDEX: 29.73 KG/M2 | HEART RATE: 67 BPM | SYSTOLIC BLOOD PRESSURE: 127 MMHG | RESPIRATION RATE: 18 BRPM | OXYGEN SATURATION: 96 % | HEIGHT: 66 IN | TEMPERATURE: 99 F

## 2022-05-31 DIAGNOSIS — F41.1 ANXIETY IN ACUTE STRESS REACTION: Primary | ICD-10-CM

## 2022-05-31 DIAGNOSIS — F43.0 ANXIETY IN ACUTE STRESS REACTION: Primary | ICD-10-CM

## 2022-05-31 PROCEDURE — 3078F PR MOST RECENT DIASTOLIC BLOOD PRESSURE < 80 MM HG: ICD-10-PCS | Mod: CPTII,S$GLB,, | Performed by: NURSE PRACTITIONER

## 2022-05-31 PROCEDURE — 3008F BODY MASS INDEX DOCD: CPT | Mod: CPTII,S$GLB,, | Performed by: NURSE PRACTITIONER

## 2022-05-31 PROCEDURE — 1159F MED LIST DOCD IN RCRD: CPT | Mod: CPTII,S$GLB,, | Performed by: NURSE PRACTITIONER

## 2022-05-31 PROCEDURE — 4010F PR ACE/ARB THEARPY RXD/TAKEN: ICD-10-PCS | Mod: CPTII,S$GLB,, | Performed by: NURSE PRACTITIONER

## 2022-05-31 PROCEDURE — 3074F PR MOST RECENT SYSTOLIC BLOOD PRESSURE < 130 MM HG: ICD-10-PCS | Mod: CPTII,S$GLB,, | Performed by: NURSE PRACTITIONER

## 2022-05-31 PROCEDURE — 1160F RVW MEDS BY RX/DR IN RCRD: CPT | Mod: CPTII,S$GLB,, | Performed by: NURSE PRACTITIONER

## 2022-05-31 PROCEDURE — 99214 OFFICE O/P EST MOD 30 MIN: CPT | Mod: S$GLB,,, | Performed by: NURSE PRACTITIONER

## 2022-05-31 PROCEDURE — 3074F SYST BP LT 130 MM HG: CPT | Mod: CPTII,S$GLB,, | Performed by: NURSE PRACTITIONER

## 2022-05-31 PROCEDURE — 1159F PR MEDICATION LIST DOCUMENTED IN MEDICAL RECORD: ICD-10-PCS | Mod: CPTII,S$GLB,, | Performed by: NURSE PRACTITIONER

## 2022-05-31 PROCEDURE — 3078F DIAST BP <80 MM HG: CPT | Mod: CPTII,S$GLB,, | Performed by: NURSE PRACTITIONER

## 2022-05-31 PROCEDURE — 1160F PR REVIEW ALL MEDS BY PRESCRIBER/CLIN PHARMACIST DOCUMENTED: ICD-10-PCS | Mod: CPTII,S$GLB,, | Performed by: NURSE PRACTITIONER

## 2022-05-31 PROCEDURE — 99214 PR OFFICE/OUTPT VISIT, EST, LEVL IV, 30-39 MIN: ICD-10-PCS | Mod: S$GLB,,, | Performed by: NURSE PRACTITIONER

## 2022-05-31 PROCEDURE — 3008F PR BODY MASS INDEX (BMI) DOCUMENTED: ICD-10-PCS | Mod: CPTII,S$GLB,, | Performed by: NURSE PRACTITIONER

## 2022-05-31 PROCEDURE — 4010F ACE/ARB THERAPY RXD/TAKEN: CPT | Mod: CPTII,S$GLB,, | Performed by: NURSE PRACTITIONER

## 2022-05-31 NOTE — PATIENT INSTRUCTIONS
"Return to Urgent Care or go to ER if symptoms worsen or fail to improve.  Follow up with PCP as recommended for further management.   Keep previously scheduled appointment with PCP in 2 days.     TODAY YOU WERE EVALUATED IN THE URGENT CARE FOR CHEST PAIN.   Although your EKG did not show an acute ST elevation MI, there are other types of "heart attack" and other serious causes for your Chest Pain. Further testing for other causes of your chest pain is beyond the scope of Urgent Care and if there is a concern, you should go to the ER for further evaluation. Based on your current signs and symptoms and diagnostic testing, it appears there is a low likelihood that this is due to a life threatening cardiopulmonary etiology, but it cannot be completely ruled out in the Urgent Care setting. If your chest pain persists or worsens and you develop additional symptoms such as Shortness of Breath, changes in mental status, profuse sweating or loss of consciousness, then you must go to the ER or call 911.         "

## 2022-05-31 NOTE — PROGRESS NOTES
"Subjective:       Patient ID: Susan Courtney is a 62 y.o. female.    Vitals:  height is 5' 6" (1.676 m) and weight is 83.9 kg (185 lb). Her temperature is 98.8 °F (37.1 °C). Her blood pressure is 127/65 and her pulse is 67. Her respiration is 18 and oxygen saturation is 96%.     Chief Complaint: Chest Pain    Pt reports that she is having a sharp pain left side of chest x 2 days.  Points to 2 locations-- above breast and below breast.  No n/v.  No sob.  No diaphoresis.      Chest Pain   This is a new problem. The current episode started in the past 7 days (Sunday ). The onset quality is sudden. The problem occurs intermittently. The problem has been unchanged. The pain is at a severity of 7/10. The pain is moderate. The quality of the pain is described as sharp. The pain does not radiate. Pertinent negatives include no abdominal pain, back pain, claudication, cough, diaphoresis, dizziness, exertional chest pressure, fever, headaches, hemoptysis, irregular heartbeat, leg pain, lower extremity edema, malaise/fatigue, nausea, near-syncope, numbness, orthopnea, palpitations, PND, shortness of breath, sputum production, syncope, vomiting or weakness. The pain is aggravated by nothing. She has tried nothing for the symptoms. The treatment provided no relief. Risk factors include stress.   Her past medical history is significant for anxiety/panic attacks, diabetes and hypertension.   Pertinent negatives for past medical history include no arrhythmia, no MI and no strokes.       Constitution: Negative for sweating, fatigue, fever and generalized weakness.   Cardiovascular: Positive for chest pain. Negative for leg swelling, palpitations, sob on exertion and passing out.   Respiratory: Negative for chest tightness, cough, sputum production, bloody sputum and shortness of breath.         Points to 2 Left sided IC spaces -- approx. ICS 3 and 9 left sided midclavicular line--reproducible with palpation and deep breaths " "  Gastrointestinal: Negative for abdominal pain, nausea and vomiting.   Musculoskeletal: Negative for back pain.   Neurological: Negative for dizziness, headaches, altered mental status, loss of consciousness and numbness.   Psychiatric/Behavioral: Negative for altered mental status.       Objective:      Physical Exam   Constitutional: She is oriented to person, place, and time.  Non-toxic appearance. She does not appear ill. No distress.   Cardiovascular: Normal rate and regular rhythm.   Pulmonary/Chest: Effort normal and breath sounds normal. No respiratory distress.       Musculoskeletal:      Right lower leg: No edema.      Left lower leg: No edema.   Neurological: She is oriented to person, place, and time.   Skin: Skin is not diaphoretic.   Nursing note and vitals reviewed.        The EKG shows a normal, regular Sinus Rhythm, at a rate of 62bpm;  ND: 194ms; QRS: 84ms; QTc: 410ms.  There are not ST Changes. There is a previous EKG (11/2021 & 5/4/2021) for comparison.  This EKG shows no significant change from previous EKG on 5/4/2021. This EKG was interpreted by me.     Assessment:       1. Anxiety in acute stress reaction          Plan:         Anxiety in acute stress reaction              Additional MDM:   Heart Score:    History:          Slightly suspicious.  ECG:             Normal  Age:               45-65 years  Risk factors: >= 3 risk factors or history of atherosclerotic disease    BELEN Score:   Age over 65:                                    ___   > or = to 3 CAD risk factors:           ___  Established CAD:                            ___  > or = to 2 anginal events in the past 24 hours: ___  Use of ASA in past 7 days:              ___  Elevated Enzymes:                         ___  ST Depression > or = to 0.05 mV:  ___        Shared MDM: Discussed with patient  that the EKG did not show an acute ST elevation MI, but that is not the only type of "heart attack".  Explained that further testing for " other types of heart attacks is beyond the scope of Urgent Care and if there is a concern, then patient should go to the ER. Explained that based on signs and symptoms and diagnostic testing, it appears there is a low likelihood that this is due to a cardiac or pulmonary etiology, but it cannot be completely ruled out in the Urgent Care setting.  ER precautions given and patient verbalized understanding.

## 2022-06-07 ENCOUNTER — OFFICE VISIT (OUTPATIENT)
Dept: SLEEP MEDICINE | Facility: CLINIC | Age: 62
End: 2022-06-07
Payer: MEDICARE

## 2022-06-07 VITALS — WEIGHT: 185 LBS | BODY MASS INDEX: 29.73 KG/M2 | HEIGHT: 66 IN

## 2022-06-07 DIAGNOSIS — F41.9 ANXIETY: Chronic | ICD-10-CM

## 2022-06-07 DIAGNOSIS — E11.69 TYPE 2 DIABETES MELLITUS WITH OTHER SPECIFIED COMPLICATION, WITHOUT LONG-TERM CURRENT USE OF INSULIN: Chronic | ICD-10-CM

## 2022-06-07 DIAGNOSIS — G47.33 OSA (OBSTRUCTIVE SLEEP APNEA): ICD-10-CM

## 2022-06-07 DIAGNOSIS — I10 ESSENTIAL HYPERTENSION: Primary | Chronic | ICD-10-CM

## 2022-06-07 DIAGNOSIS — G47.00 INSOMNIA, UNSPECIFIED TYPE: ICD-10-CM

## 2022-06-07 PROCEDURE — 99204 PR OFFICE/OUTPT VISIT, NEW, LEVL IV, 45-59 MIN: ICD-10-PCS | Mod: S$GLB,,, | Performed by: NURSE PRACTITIONER

## 2022-06-07 PROCEDURE — 3051F HG A1C>EQUAL 7.0%<8.0%: CPT | Mod: CPTII,S$GLB,, | Performed by: NURSE PRACTITIONER

## 2022-06-07 PROCEDURE — 1159F MED LIST DOCD IN RCRD: CPT | Mod: CPTII,S$GLB,, | Performed by: NURSE PRACTITIONER

## 2022-06-07 PROCEDURE — 1159F PR MEDICATION LIST DOCUMENTED IN MEDICAL RECORD: ICD-10-PCS | Mod: CPTII,S$GLB,, | Performed by: NURSE PRACTITIONER

## 2022-06-07 PROCEDURE — 3051F PR MOST RECENT HEMOGLOBIN A1C LEVEL 7.0 - < 8.0%: ICD-10-PCS | Mod: CPTII,S$GLB,, | Performed by: NURSE PRACTITIONER

## 2022-06-07 PROCEDURE — 4010F ACE/ARB THERAPY RXD/TAKEN: CPT | Mod: CPTII,S$GLB,, | Performed by: NURSE PRACTITIONER

## 2022-06-07 PROCEDURE — 99999 PR PBB SHADOW E&M-EST. PATIENT-LVL III: ICD-10-PCS | Mod: PBBFAC,,, | Performed by: NURSE PRACTITIONER

## 2022-06-07 PROCEDURE — 3008F BODY MASS INDEX DOCD: CPT | Mod: CPTII,S$GLB,, | Performed by: NURSE PRACTITIONER

## 2022-06-07 PROCEDURE — 99999 PR PBB SHADOW E&M-EST. PATIENT-LVL III: CPT | Mod: PBBFAC,,, | Performed by: NURSE PRACTITIONER

## 2022-06-07 PROCEDURE — 3008F PR BODY MASS INDEX (BMI) DOCUMENTED: ICD-10-PCS | Mod: CPTII,S$GLB,, | Performed by: NURSE PRACTITIONER

## 2022-06-07 PROCEDURE — 99204 OFFICE O/P NEW MOD 45 MIN: CPT | Mod: S$GLB,,, | Performed by: NURSE PRACTITIONER

## 2022-06-07 PROCEDURE — 4010F PR ACE/ARB THEARPY RXD/TAKEN: ICD-10-PCS | Mod: CPTII,S$GLB,, | Performed by: NURSE PRACTITIONER

## 2022-06-07 RX ORDER — AMITRIPTYLINE HYDROCHLORIDE 50 MG/1
50-100 TABLET, FILM COATED ORAL NIGHTLY
Qty: 60 TABLET | Refills: 5 | Status: SHIPPED | OUTPATIENT
Start: 2022-06-07 | End: 2022-10-12

## 2022-06-07 NOTE — PROGRESS NOTES
"Referred by RIA Holland    CHIEF COMPLAINT:JOSE    HISTORY OF PRESENT ILLNESS: Last seen 2016. witnessed apneic pauses. Staying up all night then sleeps during daytime but cat bothers her. Has  lost wgt. Since last study. Was intolerable to the mask use/pap use. +palpitations/seeing outside cards soon. Family tells her she snores. +disrupted sleep when does sleep  Sees psychiatrist/has run out of meds  Sees neuro headaches  HgBA1c 7.8(11/2021)    TRIED(ambien, halcion)  SH: former [facility], .LOVES to [facility] for family    PSG 7/25/14 (244#) AHI 12.5/low sat? Not noted, titrated effectively CPAP 14-15cm.     PHYSICAL EXAM:   Ht 5' 6" (1.676 m)   Wt 83.9 kg (185 lb)   BMI 29.86 kg/m²   GENERAL: Overweight body habitus, well groomed       ASSESSMENT:   JOSE, mild. Was intolerable to CPAP, has ongoing symptoms but has also lost wgt.   She has medical comorbidities of hypertension, anxiety, DM2  Insomnia    PLAN:   1. REqual Home Sleep Study, discussed plan of care    2. Discussed etiology of JOSE and potential ramifications of untreated JOSE, including stroke, heart disease, HTN.  We discussed potential treatment options, which could include revisiting continuous positive airway pressure (CPAP-definitive), mandibular advancement splint by dentist, or referral for surgical consideration/[facility].   3. Resume Amitriptyline 50mg qhs, if unimproved 100mg and notify psychiatrist    Thank you for allowing me the opportunity to participate in the care of your patient      "

## 2022-06-09 ENCOUNTER — TELEPHONE (OUTPATIENT)
Dept: SLEEP MEDICINE | Facility: OTHER | Age: 62
End: 2022-06-09
Payer: MEDICARE

## 2022-06-13 ENCOUNTER — TELEPHONE (OUTPATIENT)
Dept: SLEEP MEDICINE | Facility: OTHER | Age: 62
End: 2022-06-13
Payer: MEDICARE

## 2022-06-13 ENCOUNTER — HOSPITAL ENCOUNTER (OUTPATIENT)
Dept: SLEEP MEDICINE | Facility: OTHER | Age: 62
Discharge: HOME | End: 2022-06-13
Attending: NURSE PRACTITIONER
Payer: MEDICARE

## 2022-06-13 DIAGNOSIS — G47.33 OSA (OBSTRUCTIVE SLEEP APNEA): ICD-10-CM

## 2022-06-13 DIAGNOSIS — G47.20 SLEEP STAGE DYSFUNCTION: ICD-10-CM

## 2022-06-13 PROCEDURE — 95806 PR SLEEP STUDY, UNATTENDED, SIMUL RECORD HR/O2 SAT/RESP FLOW/RESP EFFT: ICD-10-PCS | Mod: 26,52,, | Performed by: PSYCHIATRY & NEUROLOGY

## 2022-06-13 PROCEDURE — 95806 SLEEP STUDY UNATT&RESP EFFT: CPT | Mod: 26,52,, | Performed by: PSYCHIATRY & NEUROLOGY

## 2022-06-13 PROCEDURE — 95800 SLP STDY UNATTENDED: CPT | Mod: 52

## 2022-06-20 NOTE — PROCEDURES
PHYSICIAN INTERPRETATION AND COMMENTS: Significant number of respiratory events was noted. This study was not  technically adequate to allow for interpretation due to short recording time (0.57 hrs).  CLINICAL HISTORY: 62 year old female presented with: 15 inch neck, BMI of 29.9, an [place] sleepiness score of 4, history of  hypertension, diabetes, depression and symptoms of nocturnal waking up choking and witnessed apneas. Based on the  clinical history, the patient has a high pre-test probability of having Moderate JOSE.  SLEEP STUDY FINDINGS: Patient underwent a 1 night Home Sleep Test and by behavioral criteria, slept for approximately  0.57 hours, with a sleep latency of 25 minutes and a sleep efficiency of 22.2%.  TREATMENT CONSIDERATIONS: Repeat HST; use prescription sleep aid if needed. If the patient can not tolerate HST,  consider in lab PSG.  DISEASE MANAGEMENT CONSIDERATIONS: Definitive steps are recommended given significant number of respiratory  events.  Signature:

## 2022-06-21 ENCOUNTER — TELEPHONE (OUTPATIENT)
Dept: SLEEP MEDICINE | Facility: CLINIC | Age: 62
End: 2022-06-21
Payer: MEDICARE

## 2022-06-21 DIAGNOSIS — G47.33 OSA (OBSTRUCTIVE SLEEP APNEA): Primary | ICD-10-CM

## 2022-06-21 NOTE — TELEPHONE ENCOUNTER
She wasn't able to keep hst on >2h so repeat. She has now ambien to take bedtime prn. Higher amitriptyline ineffective

## 2022-06-24 ENCOUNTER — TELEPHONE (OUTPATIENT)
Dept: SLEEP MEDICINE | Facility: OTHER | Age: 62
End: 2022-06-24
Payer: MEDICARE

## 2022-06-27 ENCOUNTER — HOSPITAL ENCOUNTER (OUTPATIENT)
Dept: SLEEP MEDICINE | Facility: OTHER | Age: 62
Discharge: HOME | End: 2022-06-27
Attending: NURSE PRACTITIONER
Payer: MEDICARE

## 2022-06-27 DIAGNOSIS — G47.33 OSA (OBSTRUCTIVE SLEEP APNEA): ICD-10-CM

## 2022-06-27 PROCEDURE — 95806 PR SLEEP STUDY, UNATTENDED, SIMUL RECORD HR/O2 SAT/RESP FLOW/RESP EFFT: ICD-10-PCS | Mod: 26,52,, | Performed by: PSYCHIATRY & NEUROLOGY

## 2022-06-27 PROCEDURE — 95806 SLEEP STUDY UNATT&RESP EFFT: CPT | Mod: 26,52,, | Performed by: PSYCHIATRY & NEUROLOGY

## 2022-06-27 PROCEDURE — 95800 SLP STDY UNATTENDED: CPT

## 2022-06-27 NOTE — PROGRESS NOTES
Per physician orders, patient was given home sleep testing device and instructed on how to apply the device before going to bed tonight.  I sized the device and showed the patient using a mirror how the device fits and what it should look like so they can use a mirror when putting it on themselves at home.  We reviewed the instruction booklet and the number to call if they have any questions at night.  Patient understood and was instructed to return the device the next day back to the sleep lab

## 2022-07-05 NOTE — PROCEDURES
PHYSICIAN INTERPRETATION AND COMMENTS: Significant number of respiratory events was noted. This study was not  technically adequate to allow for interpretation due to short recording time (0.57 hrs).  CLINICAL HISTORY: 62 year old female presented with: 15 inch neck, BMI of 29.9, an [place] sleepiness score of 4, history of  hypertension, diabetes, depression and symptoms of nocturnal waking up choking and witnessed apneas. Based on the  clinical history, the patient has a high pre-test probability of having Moderate JOSE.  SLEEP STUDY FINDINGS: Patient underwent a 1 night Home Sleep Test and by behavioral criteria, slept for approximately  0.57 hours, with a sleep latency of 25 minutes and a sleep efficiency of 22.2%.  TREATMENT CONSIDERATIONS: Repeat HST; use prescription sleep aid if needed. If the patient can not tolerate HST,  consider in lab PSG.  DISEASE MANAGEMENT CONSIDERATIONS: Definitive steps are recommended given significant number of respiratory  events.  Signature:

## 2022-07-08 ENCOUNTER — TELEPHONE (OUTPATIENT)
Dept: NEUROLOGY | Facility: CLINIC | Age: 62
End: 2022-07-08
Payer: MEDICARE

## 2022-07-08 ENCOUNTER — TELEPHONE (OUTPATIENT)
Dept: SLEEP MEDICINE | Facility: CLINIC | Age: 62
End: 2022-07-08
Payer: MEDICARE

## 2022-07-08 NOTE — TELEPHONE ENCOUNTER
Left message inquiring what problem was night of HST, determines moving forward either to repeat HST or pursue PSG.

## 2022-07-08 NOTE — TELEPHONE ENCOUNTER
Spoke with patient and informed that provider will not be available at appointment on 7/28.    Patient request to put in an MRI order.    Also assisted in schedule an xray order that patient hasn't done yet. Xray order was put in back in March 2022    Next available won't be until September 20, can we open any slots in August?

## 2022-07-08 NOTE — TELEPHONE ENCOUNTER
Spoke with patient and informed her that provider is not available at the moment. Once update arises then patient will be notified.

## 2022-07-08 NOTE — TELEPHONE ENCOUNTER
----- Message from Dorothea Valdes sent at 7/8/2022  1:26 PM CDT -----  Regarding: call back  Contact: Pt  Pt requesting to you in regards to a Appointment, and MRI    Please call    Phone [phone]

## 2022-07-09 ENCOUNTER — HOSPITAL ENCOUNTER (OUTPATIENT)
Dept: RADIOLOGY | Facility: HOSPITAL | Age: 62
Discharge: HOME | End: 2022-07-09
Attending: PSYCHIATRY & NEUROLOGY
Payer: MEDICARE

## 2022-07-09 DIAGNOSIS — G44.86 CERVICOGENIC HEADACHE: ICD-10-CM

## 2022-07-09 PROCEDURE — 72050 XR CERVICAL SPINE COMPLETE 5 VIEW: ICD-10-PCS | Mod: 26,,, | Performed by: RADIOLOGY

## 2022-07-09 PROCEDURE — 72050 X-RAY EXAM NECK SPINE 4/5VWS: CPT | Mod: 26,,, | Performed by: RADIOLOGY

## 2022-07-09 PROCEDURE — 72050 X-RAY EXAM NECK SPINE 4/5VWS: CPT | Mod: TC

## 2022-07-11 ENCOUNTER — TELEPHONE (OUTPATIENT)
Dept: NEUROLOGY | Facility: CLINIC | Age: 62
End: 2022-07-11
Payer: MEDICARE

## 2022-07-11 DIAGNOSIS — G47.30 SLEEP APNEA, UNSPECIFIED TYPE: Primary | ICD-10-CM

## 2022-07-11 NOTE — TELEPHONE ENCOUNTER
----- Message from Gulshan Carroll sent at 7/11/2022  8:59 AM CDT -----  Patient  requesting call back in regards to scheduling appointment. She also stated that she had an X-ray done on Saturday and really needs to be seen.       Patient @[phone]

## 2022-07-11 NOTE — TELEPHONE ENCOUNTER
Spoke with patient and scheduled for follow up on Wednesday, July 27 at 9:20.    Patient informed that she did xray orders that was put in back in march over the weekend. Also patient informed about wanting MRI order and will let provider know. However, told patient to bring it up at next appointment should I not get back to her with a response.

## 2022-07-12 ENCOUNTER — TELEPHONE (OUTPATIENT)
Dept: SLEEP MEDICINE | Facility: CLINIC | Age: 62
End: 2022-07-12
Payer: MEDICARE

## 2022-07-12 NOTE — TELEPHONE ENCOUNTER
----- Message from Krystal Savage MD sent at 7/11/2022  7:30 PM CDT -----  Joseph,    Please notify the patient that the sleep study has not recorded enough hours of sleep, so it is not valid.  I have ordered a new home study for the patient.  Please tell her that it's OK to use sleep aids before the home study.    Thank you!    ----- Message -----  From: Erin Olvera  Sent: 7/11/2022  10:32 AM CDT  To: Marybeth Winter NP    Spoke to the patient. She stated you called her to go over the results of her home sleep study. Looks like she had a home sleep study done at the end of June. Did you want her to do an in lab study?  ----- Message -----  From: Dinora Burnham  Sent: 7/11/2022   9:10 AM CDT  To: [facility] SHANE Staff    Type: Patient Call Back    Who called: self     What is the request in detail: Patient calling to schedule her in clinic sleep study.     Can the clinic reply by [facility]? No     Would the patient rather a call back or a response via [facility]? Call back     Best call back number: [phone]

## 2022-07-12 NOTE — TELEPHONE ENCOUNTER
Spoke to the patient and informed her that Dr. Savage put in an order for her to do another sleep study. I informed the patient that she will be receiving a call from the sleep lab to schedule.

## 2022-07-27 ENCOUNTER — OFFICE VISIT (OUTPATIENT)
Dept: NEUROLOGY | Facility: CLINIC | Age: 62
End: 2022-07-27
Payer: MEDICARE

## 2022-07-27 VITALS
WEIGHT: 189.63 LBS | DIASTOLIC BLOOD PRESSURE: 71 MMHG | BODY MASS INDEX: 31.59 KG/M2 | HEIGHT: 65 IN | HEART RATE: 68 BPM | SYSTOLIC BLOOD PRESSURE: 137 MMHG

## 2022-07-27 DIAGNOSIS — G44.86 CERVICOGENIC HEADACHE: ICD-10-CM

## 2022-07-27 DIAGNOSIS — G43.709 CHRONIC MIGRAINE WITHOUT AURA WITHOUT STATUS MIGRAINOSUS, NOT INTRACTABLE: ICD-10-CM

## 2022-07-27 DIAGNOSIS — M50.30 DDD (DEGENERATIVE DISC DISEASE), CERVICAL: Primary | ICD-10-CM

## 2022-07-27 PROCEDURE — 3075F SYST BP GE 130 - 139MM HG: CPT | Mod: CPTII,S$GLB,, | Performed by: PSYCHIATRY & NEUROLOGY

## 2022-07-27 PROCEDURE — 1159F PR MEDICATION LIST DOCUMENTED IN MEDICAL RECORD: ICD-10-PCS | Mod: CPTII,S$GLB,, | Performed by: PSYCHIATRY & NEUROLOGY

## 2022-07-27 PROCEDURE — 3078F DIAST BP <80 MM HG: CPT | Mod: CPTII,S$GLB,, | Performed by: PSYCHIATRY & NEUROLOGY

## 2022-07-27 PROCEDURE — 99999 PR PBB SHADOW E&M-EST. PATIENT-LVL III: ICD-10-PCS | Mod: PBBFAC,,, | Performed by: PSYCHIATRY & NEUROLOGY

## 2022-07-27 PROCEDURE — 1159F MED LIST DOCD IN RCRD: CPT | Mod: CPTII,S$GLB,, | Performed by: PSYCHIATRY & NEUROLOGY

## 2022-07-27 PROCEDURE — 4010F ACE/ARB THERAPY RXD/TAKEN: CPT | Mod: CPTII,S$GLB,, | Performed by: PSYCHIATRY & NEUROLOGY

## 2022-07-27 PROCEDURE — 1160F RVW MEDS BY RX/DR IN RCRD: CPT | Mod: CPTII,S$GLB,, | Performed by: PSYCHIATRY & NEUROLOGY

## 2022-07-27 PROCEDURE — 99214 OFFICE O/P EST MOD 30 MIN: CPT | Mod: S$GLB,,, | Performed by: PSYCHIATRY & NEUROLOGY

## 2022-07-27 PROCEDURE — 3078F PR MOST RECENT DIASTOLIC BLOOD PRESSURE < 80 MM HG: ICD-10-PCS | Mod: CPTII,S$GLB,, | Performed by: PSYCHIATRY & NEUROLOGY

## 2022-07-27 PROCEDURE — 99214 PR OFFICE/OUTPT VISIT, EST, LEVL IV, 30-39 MIN: ICD-10-PCS | Mod: S$GLB,,, | Performed by: PSYCHIATRY & NEUROLOGY

## 2022-07-27 PROCEDURE — 3008F PR BODY MASS INDEX (BMI) DOCUMENTED: ICD-10-PCS | Mod: CPTII,S$GLB,, | Performed by: PSYCHIATRY & NEUROLOGY

## 2022-07-27 PROCEDURE — 1160F PR REVIEW ALL MEDS BY PRESCRIBER/CLIN PHARMACIST DOCUMENTED: ICD-10-PCS | Mod: CPTII,S$GLB,, | Performed by: PSYCHIATRY & NEUROLOGY

## 2022-07-27 PROCEDURE — 3008F BODY MASS INDEX DOCD: CPT | Mod: CPTII,S$GLB,, | Performed by: PSYCHIATRY & NEUROLOGY

## 2022-07-27 PROCEDURE — 3075F PR MOST RECENT SYSTOLIC BLOOD PRESS GE 130-139MM HG: ICD-10-PCS | Mod: CPTII,S$GLB,, | Performed by: PSYCHIATRY & NEUROLOGY

## 2022-07-27 PROCEDURE — 99999 PR PBB SHADOW E&M-EST. PATIENT-LVL III: CPT | Mod: PBBFAC,,, | Performed by: PSYCHIATRY & NEUROLOGY

## 2022-07-27 PROCEDURE — 4010F PR ACE/ARB THEARPY RXD/TAKEN: ICD-10-PCS | Mod: CPTII,S$GLB,, | Performed by: PSYCHIATRY & NEUROLOGY

## 2022-07-27 RX ORDER — NARATRIPTAN 1 MG/1
1 TABLET ORAL 2 TIMES DAILY PRN
Qty: 12 TABLET | Refills: 12 | Status: SHIPPED | OUTPATIENT
Start: 2022-07-27 | End: 2023-10-17

## 2022-07-27 NOTE — PROGRESS NOTES
Subjective:       Patient ID: Susan Courtney is a 62 y.o. female.    Reason for Consult: Follow-up      Interval History:  Susan Courtney is here for follow up. Their condition has improved.  She notes about 1 day of headache per month.  She notes that the physical therapy was very helpful for her but she was only able to go to 6 rounds of physical therapy.  We have reviewed her x-ray for today's visit.  She notes that there was a medication that she got an ER visit in November of 2021. I have reviewed the electronic medical record and found out that this was Toradol.  I have explained to the patient that Toradol can cause kidney failure insert patients.  The patient notes that her sister is on dialysis currently and that is concerning to her.  We have discussed that under judicious you she can take oral Toradol as needed for her headaches.  I have explained that she does have a significant degenerative issue in her neck that could impact her headaches overall.  The patient defers the Toradol at this time.  We have discussed that physical therapy seems like a conservative approach that may work for her.      Objective:     Vitals:    07/27/22 0919   BP: 137/71   Pulse: 68     Patient is awake alert oriented to person place and time.  Moves all 4 extremities against gravity.  Gait and station within normal limits.  Cranial nerves 2-12 were without focal deficits.  Focused examination was undertaken today. Most of the visit time was spent giving guidance, counseling and discussing treatment options.    Right-sided neural foraminal stenosis mild to moderate at C4-C5      Assessment/Plan:     Problem List Items Addressed This Visit        Neuro    Cervicogenic headache    Relevant Orders    Ambulatory referral/consult to Physical/Occupational Therapy      Other Visit Diagnoses     DDD (degenerative disc disease), cervical    -  Primary    Relevant Orders    Ambulatory referral/consult to  Physical/Occupational Therapy    Chronic migraine without aura without status migrainosus, not intractable        Relevant Medications    naratriptan (AMERGE) 1 MG Tab        62-year-old female presents for evaluation of multiple complaints as outlined above.  At this time the patient is having about 1 day of headache per month.  The patient has asked about an MRI of the brain.  I have explained to the patient that previously she was having 30 days of headache per month and now is only having 1 day of headache per month so not sure the clinical utility of obtaining brain imaging at this time.  I have reviewed red flags with her including blindness, facial droop, limb weakness, incontinence of bowel or bladder, gait instability.  I have explained to her that if she has anyone of the symptoms that she will message me and I would obtain imaging because of this clinical change.  I have explained to her that if she has to the symptoms that she should call 911. For now we have agreed on conservative physical therapy for her cervical spine.  She has deferred Toradol due to her concern about kidney injury, which I agree is reasonable.  I will refer her to conservative physical therapy.  I will see the patient back in about 3 months.  As she does have degenerative changes of the cervical spine and has completed 6 sessions of physical therapy, if she continues to have cervical spine issues at next visit I would consider an MRI of the cervical spine.      The patient verbalizes understanding and agreement with the treatment plan. I have discussed risks, benefits and alternatives to the treatment plan. Questions were sought and answered to her stated verbal satisfaction.        Melanie Germain MD    This note is dictated on M*Modal Fluency Direct word recognition program. There are word recognition mistakes that are occasionally missed on review.    Based on our encounter today, my overall Medical Decision Making is a Level 4  because of Moderate = Review of prior external note(s) from each unique source; Review of the result(s) of each unique test; Ordering of each unique test; and Assessment requiring an independent historian(s) OR Independent interpretation of a test performed by another physician/other qualified health care professional (not separately reported) OR Discussion of management or test interpretation with external physician/other qualified health care professional\appropriate source (not separately reported)  and Moderate = Moderate risk of morbidity from additional diagnostic testing or treatment (e.g. Prescription drug management, Decision regarding minor surgery with identified patient or procedure risk factors, Decision regarding elective major surgery without identified patient or procedure risk factors, Diagnosis or treatment significantly limited by social determinants of health) based on Amount and/or Complexity of Data to be Reviewed and Analyzed and Risk of Complications and/or Morbidity

## 2022-08-08 ENCOUNTER — HOSPITAL ENCOUNTER (EMERGENCY)
Facility: OTHER | Age: 62
Discharge: HOME | End: 2022-08-08
Attending: EMERGENCY MEDICINE
Payer: MEDICARE

## 2022-08-08 VITALS
OXYGEN SATURATION: 99 % | HEIGHT: 65 IN | HEART RATE: 69 BPM | BODY MASS INDEX: 31.55 KG/M2 | RESPIRATION RATE: 18 BRPM | SYSTOLIC BLOOD PRESSURE: 172 MMHG | TEMPERATURE: 99 F | DIASTOLIC BLOOD PRESSURE: 74 MMHG

## 2022-08-08 DIAGNOSIS — M62.838 MUSCLE SPASM: Primary | ICD-10-CM

## 2022-08-08 PROCEDURE — 63600175 PHARM REV CODE 636 W HCPCS: Performed by: EMERGENCY MEDICINE

## 2022-08-08 PROCEDURE — 99284 EMERGENCY DEPT VISIT MOD MDM: CPT | Mod: 25

## 2022-08-08 PROCEDURE — 96372 THER/PROPH/DIAG INJ SC/IM: CPT | Performed by: EMERGENCY MEDICINE

## 2022-08-08 RX ORDER — CYCLOBENZAPRINE HCL 10 MG
10 TABLET ORAL 3 TIMES DAILY PRN
Qty: 15 TABLET | Refills: 0 | Status: SHIPPED | OUTPATIENT
Start: 2022-08-08 | End: 2022-08-13

## 2022-08-08 RX ORDER — KETOROLAC TROMETHAMINE 10 MG/1
10 TABLET, FILM COATED ORAL EVERY 8 HOURS PRN
Qty: 9 TABLET | Refills: 0 | Status: SHIPPED | OUTPATIENT
Start: 2022-08-08 | End: 2022-08-11

## 2022-08-08 RX ORDER — ORPHENADRINE CITRATE 30 MG/ML
60 INJECTION INTRAMUSCULAR; INTRAVENOUS
Status: COMPLETED | OUTPATIENT
Start: 2022-08-08 | End: 2022-08-08

## 2022-08-08 RX ORDER — KETOROLAC TROMETHAMINE 30 MG/ML
15 INJECTION, SOLUTION INTRAMUSCULAR; INTRAVENOUS
Status: COMPLETED | OUTPATIENT
Start: 2022-08-08 | End: 2022-08-08

## 2022-08-08 RX ADMIN — ORPHENADRINE CITRATE 60 MG: 30 INJECTION INTRAMUSCULAR; INTRAVENOUS at 09:08

## 2022-08-08 RX ADMIN — KETOROLAC TROMETHAMINE 15 MG: 30 INJECTION, SOLUTION INTRAMUSCULAR; INTRAVENOUS at 09:08

## 2022-08-09 NOTE — ED PROVIDER NOTES
Encounter Date: 8/8/2022    SCRIBE #1 NOTE: I, Yo Gomez, am scribing for, and in the presence of,  Patricia Montenegro MD. I have scribed the following portions of the note - Other sections scribed: HPI, ROS, PE.       History     Chief Complaint   Patient presents with    Flank Pain     Pt presents with left flank pain for approx 10 days. Pt reports taking otc medication with minimal relief. (-) recent trauma/injury. (-) Gu symptoms     Time seen by provider: 9:26 PM    This is a 62 y.o. female who presents with complaint of left flank pain starting ten days ago. Patient reports that she has a remote injury to the area, as she fell onto a chair five to six years ago. She was evaluated at [facility] at the time with no acute findings on x-ray. No recent trauma. Patient does endorse recent recent heavy lifting while helping a friend. She now describes sharp pain which worsens when turning to the opposite side. Her pain improves when at rest. She reports that the affected area feels hard to touch. No attempted treatments at home. No recent fever, chills, constipation, diarrhea, body aches, difficulty urinating, or dysuria. Known allergy to Morphine, which causes itching.    The history is provided by the patient.     Review of patient's allergies indicates:   Allergen Reactions    Morphine Itching     Not sure what she can take for pain.    Morphine sulfate      Itchy (skin)^     Past Medical History:   Diagnosis Date    Abdominal hernia     Anxiety     Asthma     Diabetes mellitus     Hypertension     Migraine     Migraine headache     Obesity     Sleep apnea      Past Surgical History:   Procedure Laterality Date    CHOLECYSTECTOMY      EXCISION OF LESION Left 12/1/2021    Procedure: EXCISION, LESION, LEFT PALM;  Surgeon: Amy Sharp MD;  Location: [facility];  Service: Orthopedics;  Laterality: Left;    FOOT SURGERY      HERNIA REPAIR      HYSTERECTOMY      complete    TONSILLECTOMY Bilateral  3/6/2020    Procedure: TONSILLECTOMY;  Surgeon: Brant Pelaez MD;  Location: [facility];  Service: ENT;  Laterality: Bilateral;     Family History   Problem Relation Age of Onset    Psoriasis Neg Hx     Melanoma Neg Hx     Lupus Neg Hx      Social History     Tobacco Use    Smoking status: Never Smoker    Smokeless tobacco: Never Used   Substance Use Topics    Alcohol use: No    Drug use: No     Review of Systems   Constitutional: Negative for chills and fever.   HENT: Negative for sore throat.    Respiratory: Negative for shortness of breath.    Cardiovascular: Negative for chest pain.   Gastrointestinal: Negative for constipation and diarrhea.   Genitourinary: Positive for flank pain. Negative for difficulty urinating and dysuria.   Musculoskeletal: Negative for arthralgias and myalgias.   Skin: Negative for rash.   Neurological: Negative for weakness.   Hematological: Does not bruise/bleed easily.   All other systems reviewed and are negative.      Physical Exam     Initial Vitals [08/08/22 2012]   BP Pulse Resp Temp SpO2   (!) 141/75 78 18 99.2 °F (37.3 °C) 98 %      MAP       --         Physical Exam    Nursing note and vitals reviewed.  Constitutional: She appears well-developed and well-nourished. She does not have a sickly appearance. No distress.   HENT:   Head: Normocephalic and atraumatic.   Right Ear: External ear normal.   Left Ear: External ear normal.   Eyes: Conjunctivae, EOM and lids are normal. Right eye exhibits no discharge. Left eye exhibits no discharge. Right conjunctiva is not injected. Right conjunctiva has no hemorrhage. Left conjunctiva is not injected. Left conjunctiva has no hemorrhage. No scleral icterus.   Neck: Phonation normal. No stridor present. No tracheal deviation present.   Normal range of motion.  Cardiovascular: Normal rate, regular rhythm and normal heart sounds. Exam reveals no friction rub.    No murmur heard.  Pulses:       Radial pulses are 2+ on the right  side and 2+ on the left side.        Dorsalis pedis pulses are 2+ on the right side and 2+ on the left side.   Pulmonary/Chest: Breath sounds normal. No respiratory distress. She has no wheezes. She has no rhonchi. She has no rales.   Musculoskeletal:      Cervical back: Normal range of motion.      Comments: No midline or paraspinal cervical, thoracic, or lumbar spinal tenderness. Pain reproducible with movement of the torso.     Neurological: She is alert and oriented to person, place, and time. She has normal strength. GCS eye subscore is 4. GCS verbal subscore is 5. GCS motor subscore is 6.   Skin: Skin is warm.   Psychiatric: She has a normal mood and affect. Her speech is normal and behavior is normal. Judgment and thought content normal. Cognition and memory are normal.         ED Course   Procedures  Labs Reviewed - No data to display       Imaging Results    None          Medications   orphenadrine injection 60 mg (60 mg Intramuscular Given 8/8/22 2152)   ketorolac injection 15 mg (15 mg Intramuscular Given 8/8/22 2152)     Medical Decision Making:   History:   Old Medical Records: I decided to obtain old medical records.    Additional MDM:   Comments: Atraumatic left-sided flank pain.  Pain is reproducible with movement of her torso but not palpation.  Presentation most consistent with muscle spasm.  Nor flex and Toradol given and on reassessment she reports symptomatic improvement.  She is able to move more fluidly.  Will prescribe Flexeril and ibuprofen for.  She was also counseled supportive care for home and will be discharged at this time stable condition.  PCP follow-up for re-evaluation as needed..        Scribe Attestation:   Scribe #1: I performed the above scribed service and the documentation accurately describes the services I performed. I attest to the accuracy of the note.              I, Patricia Montenegro  , personally performed the services described in this documentation. All medical record  entries made by the scribe were at my direction and in my presence. I have reviewed the chart and agree that the record reflects my personal performance and is accurate and complete.      Clinical Impression:   Final diagnoses:  [M62.838] Muscle spasm (Primary)          ED Disposition Condition    Discharge Stable        ED Prescriptions     Medication Sig Dispense Start Date End Date Auth. Provider    cyclobenzaprine (FLEXERIL) 10 MG tablet Take 1 tablet (10 mg total) by mouth 3 (three) times daily as needed for Muscle spasms. 15 tablet 8/8/2022 8/13/2022 Patricia Montenegro MD    ketorolac (TORADOL) 10 mg tablet Take 1 tablet (10 mg total) by mouth every 8 (eight) hours as needed for Pain. 9 tablet 8/8/2022 8/11/2022 Patricia Montenegro MD        Follow-up Information     Follow up With Specialties Details Why Contact Info    Archana Burgess MD General Practice Schedule an appointment as soon as possible for a visit  For re-evaluation of symptoms persist despite these interventions. [address] [place] [address]  [phone]             Patricia Montenegro MD  08/08/22 0665

## 2022-08-09 NOTE — ED TRIAGE NOTES
"Pt complains of left side pain. Pt denies flank, chest, pelvic pain. Pt reports that 4-5 years ago she was standing on a folding chair and fell. Pt states the chair hit her on that spot. Pt reports "hard mass" pt states pain is worse with moving "it catches" no relief with pain meds/ otc goodies. Pt is aaox4/ NAD.   "

## 2022-08-15 ENCOUNTER — CLINICAL SUPPORT (OUTPATIENT)
Dept: REHABILITATION | Facility: OTHER | Age: 62
End: 2022-08-15
Payer: MEDICARE

## 2022-08-15 DIAGNOSIS — G44.86 CERVICOGENIC HEADACHE: ICD-10-CM

## 2022-08-15 DIAGNOSIS — M43.6 NECK STIFFNESS: ICD-10-CM

## 2022-08-15 DIAGNOSIS — R29.3 POSTURE IMBALANCE: ICD-10-CM

## 2022-08-15 DIAGNOSIS — M50.30 DDD (DEGENERATIVE DISC DISEASE), CERVICAL: ICD-10-CM

## 2022-08-15 PROCEDURE — 97110 THERAPEUTIC EXERCISES: CPT | Mod: PN | Performed by: PHYSICAL THERAPIST

## 2022-08-15 PROCEDURE — 97162 PT EVAL MOD COMPLEX 30 MIN: CPT | Mod: PN | Performed by: PHYSICAL THERAPIST

## 2022-08-15 NOTE — PLAN OF CARE
[facility]  Physical Therapy Initial Evaluation    Name: Susan Courtney  Clinic Number: [id number]    Therapy Diagnosis:   Encounter Diagnoses   Name Primary?    Cervicogenic headache     DDD (degenerative disc disease), cervical     Neck stiffness     Posture imbalance      Physician: Julio Germain III, MD    Physician Orders: PT Eval and Treat   Medical Diagnosis from Referral: G44.86 (ICD-10-CM) - Cervicogenic headache M50.30 (ICD-10-CM) - DDD (degenerative disc disease), cervical   Evaluation Date: 8/15/2022  Authorization Period Expiration: 7/27/2023  Plan of Care Expiration: 11/11/2022  Progress Note Due: 9/15/2022  Visit # / Visits authorized: 1/ 1   FOTO: 1/ 3: 8/15/2022     Precautions: Standard    Time In: 1005  Time Out: 1045  Total Appointment Time (timed & untimed codes): 40 minutes    Subjective   Date of onset: chronic  History of current condition - Susan reports: with recent MD visit with X-rays indicated some stiffness and tightness to upper back and shoulders that are causing her Has. HA frequency is decreased to 1x/month. She says she sleeps more easily using a travel neck pillow, but wants to get a mattress that can incline up.        Past Medical History:   Diagnosis Date    Abdominal hernia     Anxiety     Asthma     Diabetes mellitus     Hypertension     Migraine     Migraine headache     Obesity     Sleep apnea      Susan Courtney  has a past surgical history that includes Cholecystectomy; Hysterectomy; Foot surgery; Hernia repair; Tonsillectomy (Bilateral, 3/6/2020); and Excision of lesion (Left, 12/1/2021).    Susan has a current medication list which includes the following prescription(s): alprazolam, amitriptyline, benzonatate, budesonide-formoterol 160-4.5 mcg, celecoxib, clonazepam, fluticasone propionate, januvia, jardiance, losartan, meclizine, metformin, metoprolol succinate, montelukast, naratriptan, omeprazole, ondansetron,  polyethylene glycol, trelegy ellipta, trulicity, and ventolin hfa.    Review of patient's allergies indicates:   Allergen Reactions    Morphine Itching     Not sure what she can take for pain.    Morphine sulfate      Itchy (skin)^        Imaging, bone scan films: FINDINGS:  Visualization to the level of C7 on lateral view.  No advanced discogenic abnormality.  Some anterior spurring at C4-C5.  Normal precervical soft tissue thickness.      Prior Therapy: several episodes at this facility, most recently May/22  Social History: Pt lives alone  Occupation: retired  Prior Level of Function: I with ADL's and driving   Current Level of Function: I with ADL's and driving. Tension to neck with prolonged forward flexion (as looking down at phone)    Pain:  Current 0/10, worst 8/10, best 0/10   Location: bilateral shoulders and lower CSP  Description: Aching and Tight  Aggravating Factors: sleeping, lifting, prolonged flexion  Easing Factors: pain medication, ice, massage    Pts goals: feel looser, be able to move more easily     Objective     Observations:  Pt is alert and oriented, good historian. Pt presents with upper crossed posture; head forward with rounded shoulders    Handedness (R: Right, L: Left): R    Shoulder screening: WFL ROM equal B     Cervical AROM:   Cervical AROM Approximate degrees (Normal) Comments if applicable    Flexion  40 (50-60) Tension into upper TSP   Extension  50 (60-70) Pain to lower CSP   R Rotation  60 (70-90) Tension L   L Rotation  60 (70-90) Tension R   R Side bending  20 (20-45) Tension L   L Side bending 20 (20-45) Tension R   Quadrant testing (+ or - for s/s):  Right - vs Left -      Muscle flexibility (Y: Yes, N: No)  - Upper trapezius muscle tightness with passive stretching: Y  - Levator scapulae musce tightness with passive stretching: Y      Upper Extremity Strength  (R) UE  (L) UE    Shoulder flexion: 4/5 Shoulder flexion: 4/5   Shoulder Abduction: 4+/5 Shoulder abduction:  4+/5   Shoulder ER 4+/5 Shoulder ER 4+/5   Shoulder IR 5/5 Shoulder IR 5/5   Elbow flexion: 5/5 Elbow flexion: 5/5   Elbow extension: 5/5 Elbow extension: 5/5   Wrist flexion: 5/5 Wrist flexion: 5/5   Wrist extension: 5/5 Wrist extension: 5/5   Lower Trap 2/5 Lower Trap 3-/5   Upper Trap 4/5 Upper Trap 4/5   Rhomboids 4-/5 Rhomboids 4-/5       Dermatomes: WNL grossly to light touch       Myotomes: WFL Y/N:   - C4 (Shoulder shrug) = Y  - C5 (Shoulder abduction) = Y  - C6 (Elbow flexion/biceps and wrist extension) = Y  - C7 (Elbow extension/triceps and wrist flexion) = Y   - C8 (Thumb extension) = Y  - T1 (Finger abduction) = Y    Palpation: Pt presents with tenderness to palpation to suboccipitals, UT, LS, rhomboids, all R>L. Guarding and mm hypertrophy noted to UT B, R>L    Joint Mobility: CPA's hypomobility to mid CSP and mid TSP,    Transverse glides hypomobile B C3-6      Special testing cluster exams:  - Criteria for cervical radiculopathy:        - Cervical Rotation AROM < 60*, relief with distraction, s/s with Spurlings, + ULTTA  - Met 0/4 criteria for cervical radiculopathy  Cervical myelopathy cluster:   - Gait deviations/coordination deficits, (+) Holley, (+) Inverted supinator sign, (+) Babinski, Age>44 y/o   - Met 1/5 for cervical myelopathy (1/5 rule out, 3/5 rule in)    Upper Cervical Clearing Screening (+/-):  - Sharp-Malu = -  - Alar ligament test = -  - Transverse ligament test = -      [place] C spine Rules:                CMS Impairment/Limitation/Restriction for FOTO Neck Survey    Therapist reviewed FOTO scores for Susan Courtney on 8/15/2022.   FOTO documents entered into [employer] - see Media section.    Limitation Score: 31%    Goal: 20%         TREATMENT   Treatment Time In: 1030  Treatment Time Out: 1045  Total Treatment time separate from Evaluation: 15 minutes    Susan received therapeutic exercises to develop flexibility and posture for 10 minutes including:  Reviewed and  "demonstrated for HEP:  Chin tucks 5" x 20  Scap squeezes 5" x 20  UT and LS stretches 3 x 30"    Susan received the following manual therapy techniques: Joint mobilizations were applied to the: CSP for 5 minutes, including:  HVLAT to mid-CSP with audible cavitations noted      Home Exercises and Patient Education Provided    Education provided:   - Therapy rationale and plan of care    Written Home Exercises Provided: yes.  Exercises were reviewed and Susan was able to demonstrate them prior to the end of the session.  Susan demonstrated good  understanding of the education provided.     See EMR under Patient Instructions for exercises provided 8/15/2022.    Assessment   Susan is a 62 y.o. female referred to outpatient Physical Therapy with a medical diagnosis of G44.86 (ICD-10-CM) - Cervicogenic headache M50.30 (ICD-10-CM) - DDD (degenerative disc disease), cervical. Pt presents with s/s consistent with referring diagnosis. Tenderness and guarding noted to B upper trap, levator scap, rhomboid, and suboccipitals. Limited cervical ROM in flexion, lateral bend, and rotation. HVLAT to mid CSP with audible cavitation noted and improved motion following. Marked weakness to lower trap B with MMT.     Pt prognosis is Good.   Pt will benefit from skilled outpatient Physical Therapy to address the deficits stated above and in the chart below, provide pt/family education, and to maximize pt's level of independence.     Plan of care discussed with patient: Yes  Pt's spiritual, cultural and educational needs considered and patient is agreeable to the plan of care and goals as stated below:     Anticipated Barriers for therapy: standard, schedule    Medical Necessity is demonstrated by the following  History  Co-morbidities and personal factors that may impact the plan of care Co-morbidities:   depression, difficulty sleeping, financial considerations and HTN    Personal Factors:   lifestyle     moderate   Examination  Body " Structures and Functions, activity limitations and participation restrictions that may impact the plan of care Body Regions:   head  neck  upper extremities  trunk    Body Systems:    gross symmetry  ROM  strength  motor control  motor learning    Participation Restrictions:   Lifting, carrying, reading, sleeping    Activity limitations:   Learning and applying knowledge  no deficits    General Tasks and Commands  no deficits    Communication  no deficits    Mobility  lifting and carrying objects    Self care  no deficits    Domestic Life  shopping  cooking  doing house work (cleaning house, washing dishes, laundry)    Interactions/Relationships  no deficits    Life Areas  no deficits    Community and Social Life  community life  recreation and leisure         moderate   Clinical Presentation evolving clinical presentation with changing clinical characteristics moderate   Decision Making/ Complexity Score: moderate     Goals:  Short Term Goals (4 Weeks):   1. Pt will report 20% reduction in pain of the cervical spine and shoulders for ease with sleeping tolerance.  2. PT will demonstrate 1/3 MMT improvement in periscapular strength for ease with upright posture.  3. Pt will demonstrate improved cervical spine ROM in all directions by 5 degrees for ease with driving.    Long Term Goals (12 Weeks):   1. Pt will report being independent with HEP for maintenance of improvements gained during therapy sessions  2. PT will report 50% reduction of pain of the neck and shoulders for ease with sleeping.  3. Pt will demonstrate full BUE and periscapular strength without the provocation of pain for ease with lifting tasks in home and community.  4. Pt will demonstrate appropriate upright posture without external cueing for ease with reading.     Plan   Plan of care Certification: 8/15/2022 to 11/11/2022.    Outpatient Physical Therapy 2 times weekly for 12 weeks to include the following interventions: Cervical/Lumbar Traction,  Manual Therapy, Moist Heat/ Ice, Neuromuscular Re-ed, Patient Education, Self Care, Therapeutic Activities, Therapeutic Exercise and Dry Needling.     Irina Rome, PT

## 2022-08-28 ENCOUNTER — HOSPITAL ENCOUNTER (EMERGENCY)
Facility: OTHER | Age: 62
Discharge: HOME | End: 2022-08-29
Attending: EMERGENCY MEDICINE
Payer: MEDICARE

## 2022-08-28 VITALS
TEMPERATURE: 98 F | RESPIRATION RATE: 19 BRPM | OXYGEN SATURATION: 95 % | DIASTOLIC BLOOD PRESSURE: 73 MMHG | SYSTOLIC BLOOD PRESSURE: 150 MMHG | BODY MASS INDEX: 31.65 KG/M2 | HEIGHT: 65 IN | HEART RATE: 80 BPM | WEIGHT: 190 LBS

## 2022-08-28 DIAGNOSIS — G47.30 SLEEP APNEA, UNSPECIFIED TYPE: ICD-10-CM
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
## 2022-08-28 DIAGNOSIS — G47.19 DAYTIME HYPERSOMNOLENCE: ICD-10-CM

## 2022-08-28 LAB
ALBUMIN SERPL BCP-MCNC: 3.6 G/DL (ref 3.5–5.2)
ALP SERPL-CCNC: 110 U/L (ref 55–135)
ALT SERPL W/O P-5'-P-CCNC: 14 U/L (ref 10–44)
AMPHET+METHAMPHET UR QL: NEGATIVE
ANION GAP SERPL CALC-SCNC: 13 MMOL/L (ref 8–16)
AST SERPL-CCNC: 19 U/L (ref 10–40)
BACTERIA #/AREA URNS HPF: ABNORMAL /HPF
BARBITURATES UR QL SCN>200 NG/ML: NEGATIVE
BASOPHILS # BLD AUTO: 0.03 K/UL (ref 0–0.2)
BASOPHILS NFR BLD: 0.4 % (ref 0–1.9)
BENZODIAZ UR QL SCN>200 NG/ML: ABNORMAL
BILIRUB SERPL-MCNC: 0.7 MG/DL (ref 0.1–1)
BILIRUB UR QL STRIP: NEGATIVE
BUN SERPL-MCNC: 18 MG/DL (ref 8–23)
BZE UR QL SCN: NEGATIVE
CALCIUM SERPL-MCNC: 9.7 MG/DL (ref 8.7–10.5)
CANNABINOIDS UR QL SCN: NEGATIVE
CHLORIDE SERPL-SCNC: 106 MMOL/L (ref 95–110)
CLARITY UR: CLEAR
CO2 SERPL-SCNC: 25 MMOL/L (ref 23–29)
COLOR UR: YELLOW
CREAT SERPL-MCNC: 0.9 MG/DL (ref 0.5–1.4)
CREAT UR-MCNC: 132.5 MG/DL (ref 15–325)
DIFFERENTIAL METHOD: ABNORMAL
EOSINOPHIL # BLD AUTO: 0.2 K/UL (ref 0–0.5)
EOSINOPHIL NFR BLD: 3.2 % (ref 0–8)
ERYTHROCYTE [DISTWIDTH] IN BLOOD BY AUTOMATED COUNT: 13.6 % (ref 11.5–14.5)
EST. GFR  (NO RACE VARIABLE): >60 ML/MIN/1.73 M^2
ETHANOL SERPL-MCNC: <10 MG/DL
GLUCOSE SERPL-MCNC: 122 MG/DL (ref 70–110)
GLUCOSE UR QL STRIP: ABNORMAL
HCT VFR BLD AUTO: 41.7 % (ref 37–48.5)
HGB BLD-MCNC: 13.2 G/DL (ref 12–16)
HGB UR QL STRIP: NEGATIVE
IMM GRANULOCYTES # BLD AUTO: 0.01 K/UL (ref 0–0.04)
IMM GRANULOCYTES NFR BLD AUTO: 0.1 % (ref 0–0.5)
KETONES UR QL STRIP: NEGATIVE
LEUKOCYTE ESTERASE UR QL STRIP: NEGATIVE
LYMPHOCYTES # BLD AUTO: 2.6 K/UL (ref 1–4.8)
LYMPHOCYTES NFR BLD: 35.2 % (ref 18–48)
MCH RBC QN AUTO: 26.6 PG (ref 27–31)
MCHC RBC AUTO-ENTMCNC: 31.7 G/DL (ref 32–36)
MCV RBC AUTO: 84 FL (ref 82–98)
METHADONE UR QL SCN>300 NG/ML: NEGATIVE
MICROSCOPIC COMMENT: ABNORMAL
MONOCYTES # BLD AUTO: 0.7 K/UL (ref 0.3–1)
MONOCYTES NFR BLD: 9.8 % (ref 4–15)
NEUTROPHILS # BLD AUTO: 3.7 K/UL (ref 1.8–7.7)
NEUTROPHILS NFR BLD: 51.3 % (ref 38–73)
NITRITE UR QL STRIP: NEGATIVE
NRBC BLD-RTO: 0 /100 WBC
OPIATES UR QL SCN: NEGATIVE
PCP UR QL SCN>25 NG/ML: NEGATIVE
PH UR STRIP: 6 [PH] (ref 5–8)
PLATELET # BLD AUTO: 320 K/UL (ref 150–450)
PMV BLD AUTO: 9.4 FL (ref 9.2–12.9)
POTASSIUM SERPL-SCNC: 3.7 MMOL/L (ref 3.5–5.1)
PROT SERPL-MCNC: 7.8 G/DL (ref 6–8.4)
PROT UR QL STRIP: NEGATIVE
RBC # BLD AUTO: 4.96 M/UL (ref 4–5.4)
RBC #/AREA URNS HPF: 2 /HPF (ref 0–4)
SODIUM SERPL-SCNC: 144 MMOL/L (ref 136–145)
SP GR UR STRIP: 1.02 (ref 1–1.03)
SQUAMOUS #/AREA URNS HPF: 5 /HPF
TOXICOLOGY INFORMATION: ABNORMAL
TSH SERPL DL<=0.005 MIU/L-ACNC: 0.82 UIU/ML (ref 0.4–4)
URN SPEC COLLECT METH UR: ABNORMAL
UROBILINOGEN UR STRIP-ACNC: NEGATIVE EU/DL
WBC # BLD AUTO: 7.25 K/UL (ref 3.9–12.7)
WBC #/AREA URNS HPF: 3 /HPF (ref 0–5)
YEAST URNS QL MICRO: ABNORMAL

## 2022-08-28 PROCEDURE — 82077 ASSAY SPEC XCP UR&BREATH IA: CPT | Performed by: EMERGENCY MEDICINE

## 2022-08-28 PROCEDURE — 84443 ASSAY THYROID STIM HORMONE: CPT | Performed by: EMERGENCY MEDICINE

## 2022-08-28 PROCEDURE — 93010 EKG 12-LEAD: ICD-10-PCS | Mod: ,,, | Performed by: INTERNAL MEDICINE

## 2022-08-28 PROCEDURE — 93010 ELECTROCARDIOGRAM REPORT: CPT | Mod: ,,, | Performed by: INTERNAL MEDICINE

## 2022-08-28 PROCEDURE — 99285 EMERGENCY DEPT VISIT HI MDM: CPT | Mod: 25

## 2022-08-28 PROCEDURE — 80053 COMPREHEN METABOLIC PANEL: CPT | Performed by: EMERGENCY MEDICINE

## 2022-08-28 PROCEDURE — 81000 URINALYSIS NONAUTO W/SCOPE: CPT | Mod: 59 | Performed by: EMERGENCY MEDICINE

## 2022-08-28 PROCEDURE — 85025 COMPLETE CBC W/AUTO DIFF WBC: CPT | Performed by: EMERGENCY MEDICINE

## 2022-08-28 PROCEDURE — 93005 ELECTROCARDIOGRAM TRACING: CPT

## 2022-08-28 PROCEDURE — 80307 DRUG TEST PRSMV CHEM ANLYZR: CPT | Performed by: EMERGENCY MEDICINE

## 2022-08-29 ENCOUNTER — TELEPHONE (OUTPATIENT)
Dept: PODIATRY | Facility: CLINIC | Age: 62
End: 2022-08-29
Payer: MEDICARE
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
## 2022-08-29 NOTE — DISCHARGE INSTRUCTIONS
Please follow-up with your primary doctor this week as scheduled.  Let her know that you are taking multiple insomnia medications with excessive daytime drowsiness.  Let her know you are not using your CPAP machine.    Return to [facility] for new or concerning symptoms.  Schedule sleep medicine follow-up appointment.  Do not drive until drowsiness/sluggishness is resolved.

## 2022-08-29 NOTE — TELEPHONE ENCOUNTER
----- Message from Jayne Perkins sent at 8/29/2022 12:06 PM CDT -----  Regarding: Requesting Orders  Contact: YUNG VILLEGAS [[id number]]  Name of Who is Calling:YUNG VILLEGAS [[id number]]          What is the request in detail:  Pt states he needs another pair of shoes, she states the pair she has , has worn out. Please  advise she is requesting a call back in regards         Can the clinic reply by [facility]: No          What Number to Call Back [facility] not [facility]:[phone]

## 2022-09-06 ENCOUNTER — OFFICE VISIT (OUTPATIENT)
Dept: PODIATRY | Facility: CLINIC | Age: 62
End: 2022-09-06
Payer: MEDICARE

## 2022-09-06 VITALS
BODY MASS INDEX: 31.65 KG/M2 | HEART RATE: 79 BPM | HEIGHT: 65 IN | SYSTOLIC BLOOD PRESSURE: 141 MMHG | WEIGHT: 190 LBS | DIASTOLIC BLOOD PRESSURE: 65 MMHG

## 2022-09-06 DIAGNOSIS — G89.29 CHRONIC PAIN OF BOTH FEET: Primary | ICD-10-CM

## 2022-09-06 DIAGNOSIS — E11.42 TYPE 2 DIABETES MELLITUS WITH DIABETIC POLYNEUROPATHY, UNSPECIFIED WHETHER LONG TERM INSULIN USE: ICD-10-CM

## 2022-09-06 DIAGNOSIS — M79.671 CHRONIC PAIN OF BOTH FEET: Primary | ICD-10-CM

## 2022-09-06 DIAGNOSIS — M79.672 CHRONIC PAIN OF BOTH FEET: Primary | ICD-10-CM

## 2022-09-06 DIAGNOSIS — M72.2 PLANTAR FIBROMATOSIS: ICD-10-CM

## 2022-09-06 PROCEDURE — 3078F DIAST BP <80 MM HG: CPT | Mod: CPTII,S$GLB,, | Performed by: PODIATRIST

## 2022-09-06 PROCEDURE — 1159F MED LIST DOCD IN RCRD: CPT | Mod: CPTII,S$GLB,, | Performed by: PODIATRIST

## 2022-09-06 PROCEDURE — 4010F PR ACE/ARB THEARPY RXD/TAKEN: ICD-10-PCS | Mod: CPTII,S$GLB,, | Performed by: PODIATRIST

## 2022-09-06 PROCEDURE — 3008F PR BODY MASS INDEX (BMI) DOCUMENTED: ICD-10-PCS | Mod: CPTII,S$GLB,, | Performed by: PODIATRIST

## 2022-09-06 PROCEDURE — 1160F RVW MEDS BY RX/DR IN RCRD: CPT | Mod: CPTII,S$GLB,, | Performed by: PODIATRIST

## 2022-09-06 PROCEDURE — 3008F BODY MASS INDEX DOCD: CPT | Mod: CPTII,S$GLB,, | Performed by: PODIATRIST

## 2022-09-06 PROCEDURE — 99213 OFFICE O/P EST LOW 20 MIN: CPT | Mod: S$GLB,,, | Performed by: PODIATRIST

## 2022-09-06 PROCEDURE — 3077F SYST BP >= 140 MM HG: CPT | Mod: CPTII,S$GLB,, | Performed by: PODIATRIST

## 2022-09-06 PROCEDURE — 99999 PR PBB SHADOW E&M-EST. PATIENT-LVL IV: ICD-10-PCS | Mod: PBBFAC,,, | Performed by: PODIATRIST

## 2022-09-06 PROCEDURE — 4010F ACE/ARB THERAPY RXD/TAKEN: CPT | Mod: CPTII,S$GLB,, | Performed by: PODIATRIST

## 2022-09-06 PROCEDURE — 99499 UNLISTED E&M SERVICE: CPT | Mod: S$GLB,,, | Performed by: PODIATRIST

## 2022-09-06 PROCEDURE — 3077F PR MOST RECENT SYSTOLIC BLOOD PRESSURE >= 140 MM HG: ICD-10-PCS | Mod: CPTII,S$GLB,, | Performed by: PODIATRIST

## 2022-09-06 PROCEDURE — 99213 PR OFFICE/OUTPT VISIT, EST, LEVL III, 20-29 MIN: ICD-10-PCS | Mod: S$GLB,,, | Performed by: PODIATRIST

## 2022-09-06 PROCEDURE — 99499 RISK ADDL DX/OHS AUDIT: ICD-10-PCS | Mod: S$GLB,,, | Performed by: PODIATRIST

## 2022-09-06 PROCEDURE — 1160F PR REVIEW ALL MEDS BY PRESCRIBER/CLIN PHARMACIST DOCUMENTED: ICD-10-PCS | Mod: CPTII,S$GLB,, | Performed by: PODIATRIST

## 2022-09-06 PROCEDURE — 3078F PR MOST RECENT DIASTOLIC BLOOD PRESSURE < 80 MM HG: ICD-10-PCS | Mod: CPTII,S$GLB,, | Performed by: PODIATRIST

## 2022-09-06 PROCEDURE — 1159F PR MEDICATION LIST DOCUMENTED IN MEDICAL RECORD: ICD-10-PCS | Mod: CPTII,S$GLB,, | Performed by: PODIATRIST

## 2022-09-06 PROCEDURE — 99999 PR PBB SHADOW E&M-EST. PATIENT-LVL IV: CPT | Mod: PBBFAC,,, | Performed by: PODIATRIST

## 2022-09-06 NOTE — PROGRESS NOTES
Subjective:      Patient ID: Susan Courtney is a 62 y.o. female.    Chief Complaint: Foot Pain (R foot)    Intermittent throbbing deep pain in the bottom of the right arch/foot with bump.  Gradual onset,  Improved gradually over the past several months.  Aggravated by increased weight-bearing and some shoes.  Denies trauma and surgery right foot.  Stretches, inserts, athletic shoes help.  No self-treatment.  Patient has similar deformity in the palm the left hand which is scheduled to be surgically removed.    Cc2 Diabetes, increased risk amputation needing evaluation/management/optomization of foot care.      Review of Systems   Constitutional: Negative for chills, diaphoresis, fever, malaise/fatigue and night sweats.   Cardiovascular: Negative for claudication, cyanosis, leg swelling and syncope.   Skin: Positive for suspicious lesions. Negative for color change, dry skin, nail changes, rash and unusual hair distribution.   Musculoskeletal: Negative for falls, joint pain, joint swelling, muscle cramps, muscle weakness and stiffness.   Gastrointestinal: Negative for constipation, diarrhea, nausea and vomiting.   Neurological: Positive for paresthesias and sensory change. Negative for brief paralysis, disturbances in coordination, focal weakness, numbness and tremors.           Objective:      Physical Exam  Constitutional:       General: She is not in acute distress.     Appearance: She is well-developed. She is not diaphoretic.   Cardiovascular:      Pulses:           Popliteal pulses are 2+ on the right side and 2+ on the left side.        Dorsalis pedis pulses are 2+ on the right side and 2+ on the left side.        Posterior tibial pulses are 2+ on the right side and 2+ on the left side.      Comments: Capillary refill 3 seconds all toes/distal feet, all toes/both feet warm to touch.      Negative lymphadenopathy bilateral popliteal fossa and tarsal tunnel.      Negavie lower extremity edema  bilateral.    Musculoskeletal:      Right ankle: No swelling, deformity, ecchymosis or lacerations. Normal range of motion. Normal pulse.      Right Achilles Tendon: Normal. No defects. Godoy's test negative.      Comments: Less than 1 cm diameter firm minimally mobile mass intimately associated with the medial band of the plantar fascia the plantar distal aspect of the right arch without loss of function or signs of acute trauma right foot.     sharp deep pain to palpation of the styloid process lateral aspect of the right foot without deformity, loss of function, signs of acute trauma.    Otherwise,Normal angle, base, station of gait. All ten toes without clubbing, cyanosis, or signs of ischemia.  No pain to palpation bilateral lower extremities.  Range of motion, stability, muscle strength, and muscle tone normal bilateral feet and legs.    Lymphadenopathy:      Lower Body: No right inguinal adenopathy. No left inguinal adenopathy.      Comments: Negative lymphadenopathy bilateral popliteal fossa and tarsal tunnel.    Negative lymphangitic streaking bilateral feet/ankles/legs.   Skin:     General: Skin is warm and dry.      Capillary Refill: Capillary refill takes 2 to 3 seconds.      Coloration: Skin is not pale.      Findings: No abrasion, bruising, burn, ecchymosis, erythema, laceration, lesion or rash.      Nails: There is no clubbing.      Comments:   Skin is normal age and health appropriate color, turgor, texture, and temperature bilateral lower extremities without ulceration, hyperpigmentation, discoloration, masses nodules or cords palpated.  No ecchymosis, erythema, edema, or cardinal signs of infection bilateral lower extremities.     Neurological:      Mental Status: She is alert and oriented to person, place, and time.      Sensory: No sensory deficit.      Motor: No tremor, atrophy or abnormal muscle tone.      Gait: Gait normal.      Comments: Negative tinel sign to percussion sural, superficial  peroneal, deep peroneal, saphenous, and posterior tibial nerves right and left ankles and feet.    Negative allodynia both feet    Paresthesias, and burning bilateral feet with no clearly identified trigger or source.     Psychiatric:         Behavior: Behavior is cooperative.               Assessment:       Encounter Diagnoses   Name Primary?    Chronic pain of both feet Yes    Plantar fibromatosis     Type 2 diabetes mellitus with diabetic polyneuropathy, unspecified whether long term insulin use          Plan:       Susan was seen today for foot pain.    Diagnoses and all orders for this visit:    Chronic pain of both feet    Plantar fibromatosis    Type 2 diabetes mellitus with diabetic polyneuropathy, unspecified whether long term insulin use    I counseled the patient on her conditions, their implications and medical management.    The patient has received literature on basic diabetic foot care.  Patient will inspect feet daily, wear protective shoe gear when ambulatory, and apply moisturizer to skin as needed to maintain elasticity and help prevent ulceration.     Discussed very low chance of cancer with any mass in body only disprovable by biopsy and pathology.  Patient verbalizes understanding and declines biopsy/immaging today.      Patient will stretch the tendo achilles complex three times daily as demonstrated in the office.  Literature was dispensed illustrating proper stretching technique.      Continue stretches, inserts, athletic shoes, activity to tolerance.      declines lidocaine, Voltaren gel, diabetic shoes.              Follow up in about 1 year (around 9/6/2023).

## 2022-09-12 ENCOUNTER — CLINICAL SUPPORT (OUTPATIENT)
Dept: REHABILITATION | Facility: OTHER | Age: 62
End: 2022-09-12
Payer: MEDICARE

## 2022-09-12 DIAGNOSIS — G44.86 CERVICOGENIC HEADACHE: ICD-10-CM

## 2022-09-12 DIAGNOSIS — M43.6 NECK STIFFNESS: Primary | ICD-10-CM

## 2022-09-12 DIAGNOSIS — R29.3 POSTURE IMBALANCE: ICD-10-CM

## 2022-09-12 PROCEDURE — 97140 MANUAL THERAPY 1/> REGIONS: CPT | Mod: PN | Performed by: PHYSICAL THERAPIST

## 2022-09-12 PROCEDURE — 97110 THERAPEUTIC EXERCISES: CPT | Mod: PN | Performed by: PHYSICAL THERAPIST

## 2022-09-12 NOTE — PROGRESS NOTES
[facility]   Physical Therapy Treatment Note     Name: Susan Courtney  Clinic Number: [id number]    Therapy Diagnosis:   Encounter Diagnoses   Name Primary?    Neck stiffness Yes    Posture imbalance     Cervicogenic headache      Physician: Julio Germain III, MD    Visit Date: 9/12/2022    Physician Orders: PT Eval and Treat   Medical Diagnosis from Referral: G44.86 (ICD-10-CM) - Cervicogenic headache M50.30 (ICD-10-CM) - DDD (degenerative disc disease), cervical   Evaluation Date: 8/15/2022  Authorization Period Expiration: 7/27/2023  Plan of Care Expiration: 11/11/2022  Progress Note Due: 9/15/2022  Visit # / Visits authorized: 2/ 12   FOTO: 1/ 3: 8/15/2022      Precautions: Standard    PTA Visit #: 0/5     Time In: 0935 (late arrival)  Time Out: 1020  Total Billable Time: 45 minutes    SUBJECTIVE     Pt reports: overall she's been doing well, has not been having many HA's recently. She's feeling tightness through L shoulder this morning. She had a fall onto L side about 2 weeks ago, not sure what happened. She went to ED (all tests were clear), and is following up with her MD.     She was compliant with home exercise program.  Response to previous treatment: relief to neck with joint manipulation  Functional change: no change    Pain: 3/10  Location: bilateral shoulders and lower CSP     OBJECTIVE     Objective Measures updated at progress report unless specified.     Treatment     Susan received the treatments listed below:      therapeutic exercises to develop strength, endurance, ROM, flexibility, posture, and core stabilization for 25 minutes including:    Pt education, FOTO, system review, consent prior to dry needling    manual therapy techniques: Dry needling were applied to the: neck and shoulders for 20 minutes, including:    Application of TDN: Pt educated on benefits and potential side effects of dry needling. Educated pt on benefits, precautions, side effects  following TDN. Educated pt to use heat following treatment sessions if pt is experiencing pain or soreness. Pt verbalized good understanding of education.  Pt signed written consent to dry needling. Pt gave verbal consent for DN    Pt received dry needling to the below listed muscles using 40 mm needles.  In sitting:   L proximal biceps, pecking and removal  L SS bursa, pecking and removal    In prone:  L UT  L LS  Winding performed every 5 minutes during treatment.            Patient Education and Home Exercises     Home Exercises Provided and Patient Education Provided     Education provided:   - Therapy rationale. Dry needling consent reviewed prior to treatment. Pt educated on bra wear, suggested bra fitting to decrease shoulder pressure    Written Home Exercises Provided: yes. Exercises were reviewed and uSsan was able to demonstrate them prior to the end of the session.  Susan demonstrated good  understanding of the education provided. See EMR under Patient Instructions for exercises provided during therapy sessions    ASSESSMENT     Good tolerance to treatment today. Pt's FOTO today indicates worsening of function, but pt reports that she has been doing well with decreased HA frequency and improved cervical motion since evaluation. Dry needling initiated today with pt's consent to address c/o tightness to L shoulder. Good soft tissue response to dry needling evident by increased grasp with unilateral winding at all insertion points. Winding performed every 5 minutes during treatment. No adverse effects following treatment. Noted divot to shoulder consistent with chronic tightness/pressure from pressure to shoulder from bra strap. Pt educated on recommended fit with bra and suggested to go receive a bra fitting.     Susan Is progressing well towards her goals.   Pt prognosis is Good.     Pt will continue to benefit from skilled outpatient physical therapy to address the deficits listed in the problem list  box on initial evaluation, provide pt/family education and to maximize pt's level of independence in the home and community environment.     Pt's spiritual, cultural and educational needs considered and pt agreeable to plan of care and goals.     Anticipated barriers to physical therapy: standard, schedule     Goals:   Short Term Goals (4 Weeks):   1. Pt will report 20% reduction in pain of the cervical spine and shoulders for ease with sleeping tolerance. Progressing, not met  2. PT will demonstrate 1/3 MMT improvement in periscapular strength for ease with upright posture. Progressing, not met  3. Pt will demonstrate improved cervical spine ROM in all directions by 5 degrees for ease with driving. Progressing, not met     Long Term Goals (12 Weeks):   1. Pt will report being independent with HEP for maintenance of improvements gained during therapy sessions. Progressing, not met  2. PT will report 50% reduction of pain of the neck and shoulders for ease with sleeping. Progressing, not met  3. Pt will demonstrate full BUE and periscapular strength without the provocation of pain for ease with lifting tasks in home and community. Progressing, not met  4. Pt will demonstrate appropriate upright posture without external cueing for ease with reading. Progressing, not met    PLAN   Plan of care Certification: 8/15/2022 to 11/11/2022.   Continue per plan of care with focus on postural stability. Assess response to dry needling and continue as needed.     Irina Rome, PT

## 2022-09-19 ENCOUNTER — PATIENT MESSAGE (OUTPATIENT)
Dept: REHABILITATION | Facility: OTHER | Age: 62
End: 2022-09-19

## 2022-10-06 ENCOUNTER — HOSPITAL ENCOUNTER (EMERGENCY)
Facility: OTHER | Age: 62
Discharge: HOME | End: 2022-10-06
Attending: EMERGENCY MEDICINE
Payer: MEDICARE

## 2022-10-06 VITALS
SYSTOLIC BLOOD PRESSURE: 161 MMHG | TEMPERATURE: 98 F | DIASTOLIC BLOOD PRESSURE: 70 MMHG | OXYGEN SATURATION: 100 % | HEIGHT: 65 IN | WEIGHT: 182 LBS | RESPIRATION RATE: 18 BRPM | HEART RATE: 72 BPM | BODY MASS INDEX: 30.32 KG/M2

## 2022-10-06 DIAGNOSIS — M25.561 ACUTE PAIN OF RIGHT KNEE: Primary | ICD-10-CM

## 2022-10-06 DIAGNOSIS — M54.31 SCIATICA OF RIGHT SIDE: ICD-10-CM

## 2022-10-06 LAB — POCT GLUCOSE: 103 MG/DL (ref 70–110)

## 2022-10-06 PROCEDURE — 25000003 PHARM REV CODE 250: Performed by: PHYSICIAN ASSISTANT

## 2022-10-06 PROCEDURE — 99284 EMERGENCY DEPT VISIT MOD MDM: CPT | Mod: 25

## 2022-10-06 PROCEDURE — 96372 THER/PROPH/DIAG INJ SC/IM: CPT | Performed by: PHYSICIAN ASSISTANT

## 2022-10-06 PROCEDURE — 63600175 PHARM REV CODE 636 W HCPCS: Performed by: PHYSICIAN ASSISTANT

## 2022-10-06 PROCEDURE — 82962 GLUCOSE BLOOD TEST: CPT

## 2022-10-06 RX ORDER — LIDOCAINE 50 MG/G
1 PATCH TOPICAL
Status: DISCONTINUED | OUTPATIENT
Start: 2022-10-06 | End: 2022-10-07 | Stop reason: HOSPADM

## 2022-10-06 RX ORDER — ORPHENADRINE CITRATE 100 MG/1
100 TABLET, EXTENDED RELEASE ORAL
Status: COMPLETED | OUTPATIENT
Start: 2022-10-06 | End: 2022-10-06

## 2022-10-06 RX ORDER — ETODOLAC 500 MG/1
500 TABLET, FILM COATED ORAL EVERY 12 HOURS PRN
Qty: 18 TABLET | Refills: 0 | Status: SHIPPED | OUTPATIENT
Start: 2022-10-06 | End: 2023-01-18 | Stop reason: ALTCHOICE

## 2022-10-06 RX ORDER — ORPHENADRINE CITRATE 100 MG/1
100 TABLET, EXTENDED RELEASE ORAL EVERY 12 HOURS PRN
Qty: 16 TABLET | Refills: 0 | Status: SHIPPED | OUTPATIENT
Start: 2022-10-06 | End: 2023-01-18 | Stop reason: ALTCHOICE

## 2022-10-06 RX ORDER — KETOROLAC TROMETHAMINE 30 MG/ML
30 INJECTION, SOLUTION INTRAMUSCULAR; INTRAVENOUS
Status: COMPLETED | OUTPATIENT
Start: 2022-10-06 | End: 2022-10-06

## 2022-10-06 RX ADMIN — LIDOCAINE 1 PATCH: 50 PATCH CUTANEOUS at 08:10

## 2022-10-06 RX ADMIN — ORPHENADRINE CITRATE 100 MG: 100 TABLET, EXTENDED RELEASE ORAL at 08:10

## 2022-10-06 RX ADMIN — KETOROLAC TROMETHAMINE 30 MG: 30 INJECTION, SOLUTION INTRAMUSCULAR; INTRAVENOUS at 08:10
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
## 2022-10-14 ENCOUNTER — PATIENT MESSAGE (OUTPATIENT)
Dept: SLEEP MEDICINE | Facility: CLINIC | Age: 62
End: 2022-10-14
Payer: MEDICARE

## 2022-10-14 ENCOUNTER — TELEPHONE (OUTPATIENT)
Dept: SLEEP MEDICINE | Facility: CLINIC | Age: 62
End: 2022-10-14
Payer: MEDICARE

## 2022-10-14 NOTE — TELEPHONE ENCOUNTER
"Staff spoke with the patient "former patient of [facility]" patient stated she had a follow-up appointment scheduled with Dr Diggs. Patient informed me she had no clue on why she had to come see another provider. Staff read patient notes and saw the patient had a home sleep study 7/22 with failed result I sent Marybeth a message to write order for patient in lab sleep sleep study.  "

## 2022-12-08 ENCOUNTER — HOSPITAL ENCOUNTER (EMERGENCY)
Facility: HOSPITAL | Age: 62
Discharge: HOME | End: 2022-12-08
Attending: EMERGENCY MEDICINE
Payer: MEDICARE

## 2022-12-08 VITALS
RESPIRATION RATE: 18 BRPM | HEART RATE: 72 BPM | DIASTOLIC BLOOD PRESSURE: 68 MMHG | OXYGEN SATURATION: 98 % | TEMPERATURE: 99 F | SYSTOLIC BLOOD PRESSURE: 149 MMHG

## 2022-12-08 DIAGNOSIS — J06.9 VIRAL URI WITH COUGH: Primary | ICD-10-CM

## 2022-12-08 PROCEDURE — 99284 PR EMERGENCY DEPT VISIT,LEVEL IV: ICD-10-PCS | Mod: ,,, | Performed by: PHYSICIAN ASSISTANT

## 2022-12-08 PROCEDURE — 99284 EMERGENCY DEPT VISIT MOD MDM: CPT

## 2022-12-08 PROCEDURE — 99284 EMERGENCY DEPT VISIT MOD MDM: CPT | Mod: ,,, | Performed by: PHYSICIAN ASSISTANT

## 2022-12-08 PROCEDURE — 25000003 PHARM REV CODE 250: Performed by: PHYSICIAN ASSISTANT

## 2022-12-08 RX ORDER — METHOCARBAMOL 500 MG/1
1000 TABLET, FILM COATED ORAL 3 TIMES DAILY
Qty: 30 TABLET | Refills: 0 | Status: SHIPPED | OUTPATIENT
Start: 2022-12-08 | End: 2022-12-13

## 2022-12-08 RX ORDER — AMOXICILLIN AND CLAVULANATE POTASSIUM 875; 125 MG/1; MG/1
1 TABLET, FILM COATED ORAL
Status: COMPLETED | OUTPATIENT
Start: 2022-12-08 | End: 2022-12-08

## 2022-12-08 RX ORDER — IBUPROFEN 800 MG/1
800 TABLET ORAL 3 TIMES DAILY
Qty: 20 TABLET | Refills: 0 | Status: SHIPPED | OUTPATIENT
Start: 2022-12-08 | End: 2023-04-20

## 2022-12-08 RX ORDER — AMOXICILLIN AND CLAVULANATE POTASSIUM 875; 125 MG/1; MG/1
1 TABLET, FILM COATED ORAL 2 TIMES DAILY
Qty: 14 TABLET | Refills: 0 | Status: SHIPPED | OUTPATIENT
Start: 2022-12-08 | End: 2023-01-19

## 2022-12-08 RX ORDER — IBUPROFEN 400 MG/1
800 TABLET ORAL
Status: COMPLETED | OUTPATIENT
Start: 2022-12-08 | End: 2022-12-08

## 2022-12-08 RX ORDER — METHOCARBAMOL 500 MG/1
1000 TABLET, FILM COATED ORAL
Status: COMPLETED | OUTPATIENT
Start: 2022-12-08 | End: 2022-12-08

## 2022-12-08 RX ADMIN — METHOCARBAMOL 1000 MG: 500 TABLET ORAL at 04:12

## 2022-12-08 RX ADMIN — IBUPROFEN 800 MG: 400 TABLET, FILM COATED ORAL at 04:12

## 2022-12-08 RX ADMIN — AMOXICILLIN AND CLAVULANATE POTASSIUM 1 TABLET: 875; 125 TABLET, FILM COATED ORAL at 04:12

## 2022-12-08 NOTE — DISCHARGE INSTRUCTIONS

## 2022-12-08 NOTE — ED NOTES
Patient identifiers for Susan Courtney checked and correct.  LOC: Patient is awake, alert, and aware of environment with an appropriate affect. Patient is oriented x 3 and speaking appropriately.  APPEARANCE: Patient resting comfortably and in no acute distress. Patient is clean and well groomed, patient's clothing is properly fastened.  SKIN: The skin is warm and dry. Patient has normal skin turgor and moist mucus membrances. Skin is intact; no bruising or breakdown noted.  MUSKULOSKELETAL: Patient is moving all extremities well, no obvious deformities noted. Pulses intact.   RESPIRATORY: Airway is open and patent. Respirations are spontaneous and non-labored with normal effort and rate.  CARDIAC: Patient has a normal rate and rhythm. No peripheral edema noted. Capillary refill < 3 seconds.  ABDOMEN: No distention noted. Bowel sounds active in all 4 quadrants. Soft and non-tender upon palpation.  NEUROLOGICAL: PERRL. Facial expression is symmetrical. Hand grasps are equal bilaterally. Normal sensation in all extremities when touched with finger.  Allergies reported:   Review of patient's allergies indicates:   Allergen Reactions    Morphine Itching     Not sure what she can take for pain.    Morphine sulfate      Itchy (skin)^    Tramadol Itching

## 2022-12-08 NOTE — ED PROVIDER NOTES
Encounter Date: 12/8/2022       History     Chief Complaint   Patient presents with    Sore Throat     Sore throat and productive cough x1 week. Also having muscle spasms since falling in [employer] 2 weeks ago. Denies fever or chills.      62-year-old female arrives to the ER with a chief complaint of a sore throat and a cough.  Symptoms present for 2 weeks.  Seen by PCP, prescribed cough medication allergy medication and Mucinex without much relief.  Also reports a fall 2 weeks ago.  Having intermittent back discomfort.  Reports a pulling sensation to the lower back.  Denies any bowel or bladder incontinence or saddle anesthesia.  No radiculopathy.  Took Tylenol at home over the past week without much relief.    Review of patient's allergies indicates:   Allergen Reactions    Morphine Itching     Not sure what she can take for pain.    Morphine sulfate      Itchy (skin)^    Tramadol Itching     Past Medical History:   Diagnosis Date    Abdominal hernia     Anxiety     Asthma     Diabetes mellitus     Hypertension     Migraine     Migraine headache     Obesity     Sleep apnea      Past Surgical History:   Procedure Laterality Date    CHOLECYSTECTOMY      EXCISION OF LESION Left 12/1/2021    Procedure: EXCISION, LESION, LEFT PALM;  Surgeon: Amy Sharp MD;  Location: [facility];  Service: Orthopedics;  Laterality: Left;    FOOT SURGERY      HERNIA REPAIR      HYSTERECTOMY      complete    TONSILLECTOMY Bilateral 3/6/2020    Procedure: TONSILLECTOMY;  Surgeon: Brant Pelaez MD;  Location: [facility];  Service: ENT;  Laterality: Bilateral;     Family History   Problem Relation Age of Onset    Psoriasis Neg Hx     Melanoma Neg Hx     Lupus Neg Hx      Social History     Tobacco Use    Smoking status: Never    Smokeless tobacco: Never   Substance Use Topics    Alcohol use: No    Drug use: No     Review of Systems   Constitutional:  Negative for fever.   HENT:  Positive for sore throat.    Respiratory:   Positive for cough. Negative for shortness of breath.    Cardiovascular:  Negative for chest pain.   Gastrointestinal:  Negative for nausea.   Genitourinary:  Negative for dysuria.   Musculoskeletal:  Positive for back pain.   Skin:  Negative for rash.   Neurological:  Negative for weakness.   Hematological:  Does not bruise/bleed easily.     Physical Exam     Initial Vitals [12/08/22 0251]   BP Pulse Resp Temp SpO2   (!) 149/68 72 18 99.2 °F (37.3 °C) 98 %      MAP       --         Physical Exam    Constitutional: Vital signs are normal. She appears well-developed and well-nourished.   HENT:   Head: Normocephalic and atraumatic.   Right Ear: Hearing normal.   Left Ear: Hearing normal.   Posterior oropharynx is erythematous  Palpable anterior lymphadenopathy  No identifiable abscess.  Normal voice, normal range of motion of the night   Eyes: Conjunctivae are normal.   Cardiovascular:  Normal rate and regular rhythm.           Pulmonary/Chest:   Clear on exam   Abdominal: Abdomen is soft. Bowel sounds are normal.   Musculoskeletal:         General: Normal range of motion.      Comments: Moving her extremities well.  Spine is not tender.  No step-offs.  Paraspinal muscular tenderness on exam  Range of motion is normal.  Gait is normal.  Pelvis is not tender.     Neurological: She is alert and oriented to person, place, and time.   Skin: Skin is warm and intact.   Psychiatric: She has a normal mood and affect. Her speech is normal and behavior is normal. Cognition and memory are normal.       ED Course   Procedures  Labs Reviewed   HIV 1 / 2 ANTIBODY   HEPATITIS C ANTIBODY          Imaging Results    None          Medications   methocarbamoL tablet 1,000 mg (has no administration in time range)   ibuprofen tablet 800 mg (has no administration in time range)   amoxicillin-clavulanate 875-125mg per tablet 1 tablet (has no administration in time range)     Medical Decision Making:   History:   Old Medical Records: I  decided to obtain old medical records.  Old Records Summarized: records from clinic visits.  Initial Assessment:   62-year-old female presenting with a cough and sore throat.  Also reporting back discomfort past couple weeks after a fall  Differential Diagnosis:   Pharyngitis, upper respiratory infection, lumbago, radiculopathy, sciatica  ED Management:  Plan:   Physical exam was without acute findings other than redness to the posterior.  Symptoms consistent with an upper respiratory infection.  No evidence to suggest deep space neck infection or peritonsillar abscess.  Speech is clear.   Lungs are clear on exam, afebrile.  Not hypoxic tachypneic or tachycardic  Productive cough, I doubt lower respiratory tract infection.   Plan to treat with antibiotics for upper respiratory infection with symptoms for 2 weeks.    Regarding her back no acute findings on exam, no neurological red flags or deficits.  Suspect muscular discomfort in injury, will treat with anti-inflammatory medication and muscle relaxants.  No evidence on exam to suggest spinal cord injury.  Return precautions were given.  She is stable for discharge.                        Clinical Impression:   Final diagnoses:  [J06.9] Viral URI with cough (Primary)        ED Disposition Condition    Discharge Stable          ED Prescriptions       Medication Sig Dispense Start Date End Date Auth. Provider    amoxicillin-clavulanate 875-125mg (AUGMENTIN) 875-125 mg per tablet Take 1 tablet by mouth 2 (two) times daily. 14 tablet 12/8/2022 -- Luis F Diego PA-C    methocarbamoL (ROBAXIN) 500 MG Tab Take 2 tablets (1,000 mg total) by mouth 3 (three) times daily. for 5 days 30 tablet 12/8/2022 12/13/2022 Luis F Diego PA-C    ibuprofen (ADVIL,MOTRIN) 800 MG tablet Take 1 tablet (800 mg total) by mouth 3 (three) times daily. 20 tablet 12/8/2022 -- Luis F Diego PA-C          Follow-up Information    None          Luis F Diego PA-C  12/08/22 1805

## 2022-12-08 NOTE — ED TRIAGE NOTES
Pt states that she has been having acough for the past few weeks that has been getting progressively worse. PT states that she is now coughing up thick green mucus. PT was seen by PCP last week and has been taking Mucinex. Pt also c/o muscle spasms that are chronic from a previous fall 2 weeks ago. PT states that prescription medication is not working.

## 2022-12-16 ENCOUNTER — TELEPHONE (OUTPATIENT)
Dept: NEUROLOGY | Facility: CLINIC | Age: 62
End: 2022-12-16
Payer: MEDICARE

## 2023-01-18 ENCOUNTER — OFFICE VISIT (OUTPATIENT)
Dept: INTERNAL MEDICINE | Facility: CLINIC | Age: 63
End: 2023-01-18
Payer: MEDICARE

## 2023-01-18 ENCOUNTER — TELEPHONE (OUTPATIENT)
Dept: PULMONOLOGY | Facility: CLINIC | Age: 63
End: 2023-01-18
Payer: MEDICARE

## 2023-01-18 VITALS
DIASTOLIC BLOOD PRESSURE: 58 MMHG | HEIGHT: 65 IN | HEART RATE: 78 BPM | SYSTOLIC BLOOD PRESSURE: 117 MMHG | BODY MASS INDEX: 31.29 KG/M2 | OXYGEN SATURATION: 98 % | WEIGHT: 187.81 LBS

## 2023-01-18 DIAGNOSIS — I10 PRIMARY HYPERTENSION: ICD-10-CM

## 2023-01-18 DIAGNOSIS — M54.50 CHRONIC BILATERAL LOW BACK PAIN WITHOUT SCIATICA: ICD-10-CM

## 2023-01-18 DIAGNOSIS — G47.30 SLEEP APNEA, UNSPECIFIED TYPE: ICD-10-CM

## 2023-01-18 DIAGNOSIS — R00.2 PALPITATIONS: ICD-10-CM

## 2023-01-18 DIAGNOSIS — J45.909 ASTHMA, UNSPECIFIED ASTHMA SEVERITY, UNSPECIFIED WHETHER COMPLICATED, UNSPECIFIED WHETHER PERSISTENT: Primary | ICD-10-CM

## 2023-01-18 DIAGNOSIS — K21.9 GASTROESOPHAGEAL REFLUX DISEASE WITHOUT ESOPHAGITIS: ICD-10-CM

## 2023-01-18 DIAGNOSIS — Z00.00 HEALTH MAINTENANCE EXAMINATION: Primary | ICD-10-CM

## 2023-01-18 DIAGNOSIS — E11.65 TYPE 2 DIABETES MELLITUS WITH HYPERGLYCEMIA, WITHOUT LONG-TERM CURRENT USE OF INSULIN: Chronic | ICD-10-CM

## 2023-01-18 DIAGNOSIS — R05.1 ACUTE COUGH: ICD-10-CM

## 2023-01-18 DIAGNOSIS — J45.40 MODERATE PERSISTENT ASTHMA, UNSPECIFIED WHETHER COMPLICATED: ICD-10-CM

## 2023-01-18 DIAGNOSIS — Z23 NEED FOR VACCINATION: ICD-10-CM

## 2023-01-18 DIAGNOSIS — G47.00 INSOMNIA, UNSPECIFIED TYPE: ICD-10-CM

## 2023-01-18 DIAGNOSIS — G89.29 CHRONIC BILATERAL LOW BACK PAIN WITHOUT SCIATICA: ICD-10-CM

## 2023-01-18 DIAGNOSIS — F41.1 GENERALIZED ANXIETY DISORDER: ICD-10-CM

## 2023-01-18 DIAGNOSIS — E78.5 HYPERLIPIDEMIA, UNSPECIFIED HYPERLIPIDEMIA TYPE: ICD-10-CM

## 2023-01-18 PROCEDURE — 3074F PR MOST RECENT SYSTOLIC BLOOD PRESSURE < 130 MM HG: ICD-10-PCS | Mod: CPTII,S$GLB,, | Performed by: STUDENT IN AN ORGANIZED HEALTH CARE EDUCATION/TRAINING PROGRAM

## 2023-01-18 PROCEDURE — 3044F PR MOST RECENT HEMOGLOBIN A1C LEVEL <7.0%: ICD-10-PCS | Mod: CPTII,S$GLB,, | Performed by: STUDENT IN AN ORGANIZED HEALTH CARE EDUCATION/TRAINING PROGRAM

## 2023-01-18 PROCEDURE — 3061F PR NEG MICROALBUMINURIA RESULT DOCUMENTED/REVIEW: ICD-10-PCS | Mod: CPTII,S$GLB,, | Performed by: STUDENT IN AN ORGANIZED HEALTH CARE EDUCATION/TRAINING PROGRAM

## 2023-01-18 PROCEDURE — 4010F ACE/ARB THERAPY RXD/TAKEN: CPT | Mod: CPTII,S$GLB,, | Performed by: STUDENT IN AN ORGANIZED HEALTH CARE EDUCATION/TRAINING PROGRAM

## 2023-01-18 PROCEDURE — 3078F DIAST BP <80 MM HG: CPT | Mod: CPTII,S$GLB,, | Performed by: STUDENT IN AN ORGANIZED HEALTH CARE EDUCATION/TRAINING PROGRAM

## 2023-01-18 PROCEDURE — 99204 OFFICE O/P NEW MOD 45 MIN: CPT | Mod: S$GLB,,, | Performed by: STUDENT IN AN ORGANIZED HEALTH CARE EDUCATION/TRAINING PROGRAM

## 2023-01-18 PROCEDURE — 3008F BODY MASS INDEX DOCD: CPT | Mod: CPTII,S$GLB,, | Performed by: STUDENT IN AN ORGANIZED HEALTH CARE EDUCATION/TRAINING PROGRAM

## 2023-01-18 PROCEDURE — 3008F PR BODY MASS INDEX (BMI) DOCUMENTED: ICD-10-PCS | Mod: CPTII,S$GLB,, | Performed by: STUDENT IN AN ORGANIZED HEALTH CARE EDUCATION/TRAINING PROGRAM

## 2023-01-18 PROCEDURE — 3066F PR DOCUMENTATION OF TREATMENT FOR NEPHROPATHY: ICD-10-PCS | Mod: CPTII,S$GLB,, | Performed by: STUDENT IN AN ORGANIZED HEALTH CARE EDUCATION/TRAINING PROGRAM

## 2023-01-18 PROCEDURE — 99204 PR OFFICE/OUTPT VISIT, NEW, LEVL IV, 45-59 MIN: ICD-10-PCS | Mod: S$GLB,,, | Performed by: STUDENT IN AN ORGANIZED HEALTH CARE EDUCATION/TRAINING PROGRAM

## 2023-01-18 PROCEDURE — 3044F HG A1C LEVEL LT 7.0%: CPT | Mod: CPTII,S$GLB,, | Performed by: STUDENT IN AN ORGANIZED HEALTH CARE EDUCATION/TRAINING PROGRAM

## 2023-01-18 PROCEDURE — 4010F PR ACE/ARB THEARPY RXD/TAKEN: ICD-10-PCS | Mod: CPTII,S$GLB,, | Performed by: STUDENT IN AN ORGANIZED HEALTH CARE EDUCATION/TRAINING PROGRAM

## 2023-01-18 PROCEDURE — 99999 PR PBB SHADOW E&M-EST. PATIENT-LVL IV: ICD-10-PCS | Mod: PBBFAC,,, | Performed by: STUDENT IN AN ORGANIZED HEALTH CARE EDUCATION/TRAINING PROGRAM

## 2023-01-18 PROCEDURE — 3078F PR MOST RECENT DIASTOLIC BLOOD PRESSURE < 80 MM HG: ICD-10-PCS | Mod: CPTII,S$GLB,, | Performed by: STUDENT IN AN ORGANIZED HEALTH CARE EDUCATION/TRAINING PROGRAM

## 2023-01-18 PROCEDURE — 3066F NEPHROPATHY DOC TX: CPT | Mod: CPTII,S$GLB,, | Performed by: STUDENT IN AN ORGANIZED HEALTH CARE EDUCATION/TRAINING PROGRAM

## 2023-01-18 PROCEDURE — 99999 PR PBB SHADOW E&M-EST. PATIENT-LVL IV: CPT | Mod: PBBFAC,,, | Performed by: STUDENT IN AN ORGANIZED HEALTH CARE EDUCATION/TRAINING PROGRAM

## 2023-01-18 PROCEDURE — 3074F SYST BP LT 130 MM HG: CPT | Mod: CPTII,S$GLB,, | Performed by: STUDENT IN AN ORGANIZED HEALTH CARE EDUCATION/TRAINING PROGRAM

## 2023-01-18 PROCEDURE — 3061F NEG MICROALBUMINURIA REV: CPT | Mod: CPTII,S$GLB,, | Performed by: STUDENT IN AN ORGANIZED HEALTH CARE EDUCATION/TRAINING PROGRAM

## 2023-01-18 RX ORDER — FAMOTIDINE 40 MG/1
40 TABLET, FILM COATED ORAL 2 TIMES DAILY PRN
Qty: 180 TABLET | Refills: 1 | Status: SHIPPED | OUTPATIENT
Start: 2023-01-18 | End: 2023-08-29 | Stop reason: ALTCHOICE

## 2023-01-18 RX ORDER — CITALOPRAM 20 MG/1
20 TABLET, FILM COATED ORAL
COMMUNITY
Start: 2022-09-19

## 2023-01-18 RX ORDER — KETOROLAC TROMETHAMINE 10 MG/1
10 TABLET, FILM COATED ORAL EVERY 6 HOURS PRN
COMMUNITY
Start: 2022-10-27 | End: 2023-01-18 | Stop reason: ALTCHOICE

## 2023-01-18 RX ORDER — FAMOTIDINE 40 MG/1
TABLET, FILM COATED ORAL
COMMUNITY
Start: 2022-05-17 | End: 2023-01-18 | Stop reason: SDUPTHER

## 2023-01-18 RX ORDER — BUDESONIDE AND FORMOTEROL FUMARATE DIHYDRATE 160; 4.5 UG/1; UG/1
2 AEROSOL RESPIRATORY (INHALATION) EVERY 12 HOURS
Qty: 10.2 G | Refills: 5 | Status: SHIPPED | OUTPATIENT
Start: 2023-01-18 | End: 2023-01-19

## 2023-01-18 RX ORDER — AMLODIPINE BESYLATE 5 MG/1
5 TABLET ORAL DAILY
Qty: 90 TABLET | Refills: 1 | Status: SHIPPED | OUTPATIENT
Start: 2023-01-18 | End: 2024-02-04

## 2023-01-18 RX ORDER — ATORVASTATIN CALCIUM 40 MG/1
40 TABLET, FILM COATED ORAL DAILY
Qty: 90 TABLET | Refills: 1 | Status: SHIPPED | OUTPATIENT
Start: 2023-01-18 | End: 2023-10-07

## 2023-01-18 RX ORDER — EMPAGLIFLOZIN 10 MG/1
10 TABLET, FILM COATED ORAL DAILY
Qty: 90 TABLET | Refills: 1 | Status: SHIPPED | OUTPATIENT
Start: 2023-01-18 | End: 2023-04-20 | Stop reason: SDUPTHER

## 2023-01-18 RX ORDER — PROMETHAZINE HYDROCHLORIDE AND DEXTROMETHORPHAN HYDROBROMIDE 6.25; 15 MG/5ML; MG/5ML
5 SYRUP ORAL EVERY 6 HOURS PRN
Qty: 120 ML | Refills: 0 | Status: SHIPPED | OUTPATIENT
Start: 2023-01-18 | End: 2023-01-28

## 2023-01-18 RX ORDER — AMLODIPINE BESYLATE 5 MG/1
5 TABLET ORAL DAILY
COMMUNITY
Start: 2022-11-28 | End: 2023-01-18 | Stop reason: SDUPTHER

## 2023-01-18 RX ORDER — VALSARTAN 320 MG/1
320 TABLET ORAL DAILY
Qty: 90 TABLET | Refills: 1 | Status: SHIPPED | OUTPATIENT
Start: 2023-01-18 | End: 2024-02-19

## 2023-01-18 RX ORDER — METFORMIN HYDROCHLORIDE EXTENDED-RELEASE TABLETS 500 MG/1
1000 TABLET, FILM COATED, EXTENDED RELEASE ORAL 2 TIMES DAILY WITH MEALS
Qty: 90 TABLET | Refills: 1 | Status: SHIPPED | OUTPATIENT
Start: 2023-01-18 | End: 2023-08-29 | Stop reason: ALTCHOICE

## 2023-01-18 RX ORDER — ATORVASTATIN CALCIUM 40 MG/1
40 TABLET, FILM COATED ORAL DAILY
COMMUNITY
Start: 2022-04-19 | End: 2023-01-18 | Stop reason: SDUPTHER

## 2023-01-18 RX ORDER — ZOLPIDEM TARTRATE 10 MG/1
20 TABLET ORAL NIGHTLY
COMMUNITY
Start: 2022-12-19

## 2023-01-18 RX ORDER — SUCRALFATE 1 G/1
1 TABLET ORAL 4 TIMES DAILY
COMMUNITY
Start: 2023-01-10 | End: 2023-10-26

## 2023-01-18 RX ORDER — MONTELUKAST SODIUM 10 MG/1
10 TABLET ORAL NIGHTLY
Qty: 90 TABLET | Refills: 1 | Status: SHIPPED | OUTPATIENT
Start: 2023-01-18 | End: 2023-04-20 | Stop reason: SDUPTHER

## 2023-01-18 RX ORDER — AZELASTINE 1 MG/ML
2 SPRAY, METERED NASAL 2 TIMES DAILY
Qty: 30 ML | Refills: 1 | Status: SHIPPED | OUTPATIENT
Start: 2023-01-18 | End: 2023-04-24

## 2023-01-18 RX ORDER — VALSARTAN 320 MG/1
TABLET ORAL
COMMUNITY
Start: 2022-10-18 | End: 2023-01-18 | Stop reason: SDUPTHER

## 2023-01-18 NOTE — PROGRESS NOTES
Subjective:       Patient ID: Susan Courtney is a 63 y.o. female.    Chief Complaint: Health maintenance examination [Z00.00]    Patient is new to me, here to establish care.    Health maintenance -   Colonoscopy performed last year per patient with [facility]. Unsure screening interval.  Denies family history of colorectal cancer.   Mammogram BI-RADS 1 in DEC2022.  Denies history of prior abnormal mammogram.  Family history of breast cancers, mother.  Denies family history of ovarian cancers.  Hysterectomy due to fibroids.   History of prior abnormal pap smears, underwent LEEP?  Denies family history of osteoporosis.  Denies significant family history of cardiac disease.  UTD on COVID19 primary/booster, shingles (1/2), influenza vaccinations.  Due for COVID19 bivalent, shingles (2nd dose), Tdap vaccinations.  Never smoker.  Denies alcohol and drug use.  Completed HIV and Hep C screening.    T2DM -   Currently taking metformin, Jardiance, Trulicity  Was changed to Mounjaro for short period due to unavailability of Trulicity  Checking blood glucose 1-2 times per day  Seeing Dr. Saba for podiatry  Due for eye exam  Lab Results       Component                Value               Date                       HGBA1C                   7.8 (H)             11/16/2021                 HGBA1C                   8.5 (H)             03/06/2020              HTN -   Currently prescribed valsartan, amlodipine.  Patient endorses taking medication as directed.  Denies side effects or concerns while taking medication.  Denies headaches, vision changes, CP, palpitations, or other concerning symptoms.  Due for micro albumin creatinine ratio.   BP Readings from Last 3 Encounters:  01/18/23 : (!) 117/58  12/08/22 : (!) 149/68  10/06/22 : (!) 161/70    Was given metoprolol for palpitations   Still having palpitations  Was supposed to follow up with cardiology, but has not yet had appointment  Not currently taking metoprolol    HLD -  "  Endorses taking atorvastatin as directed  Denies side effects or concerns while taking medication  Due for lipid recheck.    Sleep apnea -   Endorses following with sleep medicine  Is planned for another sleep study  Does not currently have CPAP    Seeing Dr. Goldy Henderson for psychiatry  Taking citalopram for anxiety with good effect  Taking alprazolam 2 mg for anxiety daily   Taking Ambien 10 mg for insomnia nightly     Migraines -   Following with Dr. Germain for neurology   Taking naratriptan for abortive treatment with good effect    Going to [email or web address] for physical therapy for back pain and balance difficulties   States seeing specialist in [place] soon    Asthma -   Using rescue inhaler twice daily, scheduled.  Uses Symbicort for daily controller.   Taking Singulair nightly with good effect  Never wakes up at night with symptoms.   Flares are triggered by allergies, seasonal change.  Had PFT's [id number]:  "IMPRESSION:  Spirometry and lung volumes reveal mild reduction in lung volumes with no significant improvement post bronchodilator. Diffusion capacity is within normal limits when corrected for lung size."    Endorses recent cough with allergy flare  Experiencing upper respiratory congestion, rhinorrhea    Taking famotidine for GERD with incomplete effect  Endorses having diarrhea with antibiotics and ibuprofen taking for URI  Had EGD at [facility] >1 year ago, endorses was told ok        Review of Systems   Constitutional:  Negative for appetite change, chills, fatigue, fever and unexpected weight change.   HENT:  Positive for congestion, postnasal drip, rhinorrhea and sinus pressure.    Respiratory:  Positive for cough. Negative for shortness of breath.    Cardiovascular:  Negative for chest pain, palpitations and leg swelling.   Gastrointestinal:  Negative for abdominal pain, constipation, diarrhea, nausea and vomiting.   Skin:  Negative for rash.   Neurological:  Negative for dizziness, syncope, " "weakness and headaches.       Current Outpatient Medications   Medication Instructions    ALPRAZolam (XANAX) 2 mg, Oral, 2 times daily PRN    amitriptyline (ELAVIL)  mg, Oral, Nightly    amoxicillin-clavulanate 875-125mg (AUGMENTIN) 875-125 mg per tablet 1 tablet, Oral, 2 times daily    benzonatate (TESSALON) 200 MG capsule Oral    budesonide-formoterol 160-4.5 mcg (SYMBICORT) 160-4.5 mcg/actuation HFAA 2 puffs, Inhalation, Every 12 hours, Controller     celecoxib (CELEBREX) 200 MG capsule TAKE 1 CAPSULE BY MOUTH 2 TIMES DAILY AS NEEDED FOR PAIN.    clonazePAM (KLONOPIN) 2 mg, Oral, 2 times daily PRN    etodolac (LODINE) 500 mg, Oral, Every 12 hours PRN    fluticasone propionate (FLONASE) 50 mcg, Each Nostril, 2 times daily PRN    ibuprofen (ADVIL,MOTRIN) 800 mg, Oral, 3 times daily    JANUVIA 100 mg, Oral, Daily    JARDIANCE 10 mg, Oral, Daily    losartan (COZAAR) 25 mg, Oral, Daily    meclizine (ANTIVERT) 25 mg, Oral, 3 times daily PRN    metFORMIN (FORTAMET) 1,000 mg, Oral, 2 times daily with meals    metoprolol succinate (TOPROL-XL) 100 mg, Oral, Daily    montelukast (SINGULAIR) 10 mg tablet TAKE 1 TABLET BY MOUTH ONCE DAILY FOR ALLERGIES/ASTHMA CONTROL    naratriptan (AMERGE) 1 mg, Oral, 2 times daily PRN, Take 1mg po; may repeat x1 after 4 hours as needed for migraine headache    omeprazole (PRILOSEC) 20 mg, Daily    ondansetron (ZOFRAN-ODT) 4 mg, Oral, Every 8 hours PRN    orphenadrine (NORFLEX) 100 mg, Oral, Every 12 hours PRN    polyethylene glycol (GLYCOLAX) 17 g, Oral, 3 times daily    TRELEGY ELLIPTA 100-62.5-25 mcg DsDv 1 puff, Inhalation, Daily    TRULICITY 0.75 mg, Subcutaneous, Every 7 days    VENTOLIN HFA 90 mcg/actuation inhaler INHALE 2 PUFFS BY MOUTH EVERY 4 TO 6 HOURS AS NEEDED FOR COUGHING AND WHEEZING     Objective:      Vitals:    01/18/23 0808   BP: (!) 117/58   Pulse: 78   SpO2: 98%   Weight: 85.2 kg (187 lb 13.3 oz)   Height: 5' 5" (1.651 m)   PainSc: 0-No pain     Body mass index is " 31.26 kg/m².    Physical Exam  Vitals reviewed.   Constitutional:       General: She is not in acute distress.     Appearance: Normal appearance. She is not ill-appearing or diaphoretic.   HENT:      Head: Normocephalic and atraumatic.      Right Ear: Tympanic membrane, ear canal and external ear normal. There is no impacted cerumen.      Left Ear: Tympanic membrane, ear canal and external ear normal. There is no impacted cerumen.      Nose: Congestion and rhinorrhea present. Rhinorrhea is clear.      Right Turbinates: Swollen. Not pale.      Left Turbinates: Swollen. Not pale.      Mouth/Throat:      Mouth: Mucous membranes are moist.      Pharynx: Oropharynx is clear. Posterior oropharyngeal erythema present. No pharyngeal swelling, oropharyngeal exudate or uvula swelling.   Eyes:      General: No scleral icterus.        Right eye: No discharge.         Left eye: No discharge.      Conjunctiva/sclera: Conjunctivae normal.   Neck:      Thyroid: No thyromegaly or thyroid tenderness.      Trachea: Trachea normal.   Cardiovascular:      Rate and Rhythm: Normal rate and regular rhythm.      Heart sounds: Normal heart sounds. No murmur heard.    No friction rub. No gallop.   Pulmonary:      Effort: Pulmonary effort is normal. No respiratory distress.      Breath sounds: Normal breath sounds. No stridor. No wheezing, rhonchi or rales.   Abdominal:      General: There is no distension.      Palpations: Abdomen is soft.      Tenderness: There is no abdominal tenderness. There is no guarding or rebound.   Musculoskeletal:         General: No swelling or deformity.      Cervical back: Neck supple.   Lymphadenopathy:      Head:      Right side of head: No submandibular or posterior auricular adenopathy.      Left side of head: No submandibular or posterior auricular adenopathy.      Cervical: No cervical adenopathy.      Right cervical: No superficial, deep or posterior cervical adenopathy.     Left cervical: No superficial,  deep or posterior cervical adenopathy.      Upper Body:      Right upper body: No supraclavicular adenopathy.      Left upper body: No supraclavicular adenopathy.   Skin:     General: Skin is warm and dry.   Neurological:      General: No focal deficit present.      Mental Status: She is alert. Mental status is at baseline.      Gait: Gait normal.   Psychiatric:         Mood and Affect: Mood normal.         Behavior: Behavior normal.       Assessment:       1. Health maintenance examination    2. Type 2 diabetes mellitus with hyperglycemia, without long-term current use of insulin    3. Primary hypertension    4. Palpitations    5. Hyperlipidemia, unspecified hyperlipidemia type    6. Sleep apnea, unspecified type    7. Generalized anxiety disorder    8. Insomnia, unspecified type    9. Chronic bilateral low back pain without sciatica    10. Moderate persistent asthma, unspecified whether complicated    11. Acute cough    12. Gastroesophageal reflux disease without esophagitis    13. Need for vaccination        Plan:       Type 2 diabetes mellitus with hyperglycemia, without long-term current use of insulin  Continue current medications.  RTC in 3 months for follow up.  -     Diabetic Eye Screening Photo; Future  -     CBC Auto Differential; Future  -     Comprehensive Metabolic Panel; Future  -     Hemoglobin A1C; Future  -     Microalbumin/Creatinine Ratio, Urine; Future  -     JARDIANCE 10 mg tablet; Take 1 tablet (10 mg total) by mouth once daily.  -     metFORMIN (FORTAMET) 500 mg 24hr tablet; Take 2 tablets (1,000 mg total) by mouth 2 (two) times daily with meals.    Primary hypertension  Continue current medications.  RTC in 3 months for follow up.  -     CBC Auto Differential; Future  -     Comprehensive Metabolic Panel; Future  -     TSH; Future  -     amLODIPine (NORVASC) 5 MG tablet; Take 1 tablet (5 mg total) by mouth Daily.  -     valsartan (DIOVAN) 320 MG tablet; Take 1 tablet (320 mg total) by mouth  once daily.    Palpitations  Continue current medications.  Referral to cardiology  RTC in 3 months for follow up.  -     Ambulatory referral/consult to Cardiology; Future  -     SCHEDULED EKG 12-LEAD (to Muse); Future    Hyperlipidemia, unspecified hyperlipidemia type  Continue current medications.  RTC in 3 months for follow up.  -     Lipid Panel; Future  -     atorvastatin (LIPITOR) 40 MG tablet; Take 1 tablet (40 mg total) by mouth Daily.    Sleep apnea, unspecified type  -     Ambulatory referral/consult to Sleep Disorders; Future    Generalized anxiety disorder  Insomnia, unspecified type  Continue medication, evaluation, and management per psychiatrist.    Chronic bilateral low back pain without sciatica  Continue medication, evaluation, and management per pain management.    Moderate persistent asthma, unspecified whether complicated  Continue current medications.  Referral to pulmonology  RTC in 3 months for follow up.  -     montelukast (SINGULAIR) 10 mg tablet; Take 1 tablet (10 mg total) by mouth every evening.  -     budesonide-formoterol 160-4.5 mcg (SYMBICORT) 160-4.5 mcg/actuation HFAA; Inhale 2 puffs into the lungs every 12 (twelve) hours. Controller  -     Ambulatory referral/consult to Pulmonology; Future    Acute cough  Recommend daily antihistamine and nasal corticosteroid.  Try nasal saline rinses using only sterile or distilled water daily.   Trial of azelastine nasal spray for symptom relief PRN.   Promethazine-DM PRN cough  RTC if worsening or unimproved in the next 3-5 days  -     azelastine (ASTELIN) 137 mcg (0.1 %) nasal spray; 2 sprays (274 mcg total) by Nasal route 2 (two) times daily.  -     promethazine-dextromethorphan (PROMETHAZINE-DM) 6.25-15 mg/5 mL Syrp; Take 5 mLs by mouth every 6 (six) hours as needed (cough).    Gastroesophageal reflux disease without esophagitis  Continue current medications.  RTC in 3 months for follow up.  -     famotidine (PEPCID) 40 MG tablet; Take 1  tablet (40 mg total) by mouth 2 (two) times daily as needed for Heartburn.    Health maintenance examination  Reviewed and discussed age appropriate screenings and immunizations.  -     CBC Auto Differential; Future  -     Comprehensive Metabolic Panel; Future  -     TSH; Future  -     Lipid Panel; Future  -     Hemoglobin A1C; Future  -     Microalbumin/Creatinine Ratio, Urine; Future  -     SCHEDULED EKG 12-LEAD (to Muse); Future    Need for vaccination  -     diphth,pertus,acell,,tetanus (BOOSTRIX) 2.5-8-5 Lf-mcg-Lf/0.5mL Susp; Inject 0.5 mLs into the muscle once. for 1 dose      Letty Kumar MD  1/18/2023

## 2023-01-19 ENCOUNTER — HOSPITAL ENCOUNTER (OUTPATIENT)
Dept: PULMONOLOGY | Facility: CLINIC | Age: 63
Discharge: HOME | End: 2023-01-19
Payer: MEDICARE

## 2023-01-19 ENCOUNTER — OFFICE VISIT (OUTPATIENT)
Dept: PULMONOLOGY | Facility: CLINIC | Age: 63
End: 2023-01-19
Payer: MEDICARE

## 2023-01-19 ENCOUNTER — HOSPITAL ENCOUNTER (OUTPATIENT)
Dept: RADIOLOGY | Facility: HOSPITAL | Age: 63
Discharge: HOME | End: 2023-01-19
Attending: INTERNAL MEDICINE
Payer: MEDICARE

## 2023-01-19 VITALS
HEIGHT: 65 IN | BODY MASS INDEX: 30.67 KG/M2 | WEIGHT: 184.06 LBS | DIASTOLIC BLOOD PRESSURE: 78 MMHG | HEART RATE: 113 BPM | OXYGEN SATURATION: 94 % | SYSTOLIC BLOOD PRESSURE: 132 MMHG

## 2023-01-19 DIAGNOSIS — J45.909 ASTHMA, UNSPECIFIED ASTHMA SEVERITY, UNSPECIFIED WHETHER COMPLICATED, UNSPECIFIED WHETHER PERSISTENT: ICD-10-CM

## 2023-01-19 DIAGNOSIS — J45.40 MODERATE PERSISTENT ASTHMA, UNSPECIFIED WHETHER COMPLICATED: ICD-10-CM

## 2023-01-19 DIAGNOSIS — R05.9 COUGH, UNSPECIFIED TYPE: Primary | ICD-10-CM

## 2023-01-19 DIAGNOSIS — J45.50 SEVERE PERSISTENT ASTHMA WITHOUT COMPLICATION: ICD-10-CM

## 2023-01-19 PROCEDURE — 94729 DIFFUSING CAPACITY: CPT | Mod: S$GLB,,, | Performed by: INTERNAL MEDICINE

## 2023-01-19 PROCEDURE — 3078F PR MOST RECENT DIASTOLIC BLOOD PRESSURE < 80 MM HG: ICD-10-PCS | Mod: CPTII,S$GLB,, | Performed by: INTERNAL MEDICINE

## 2023-01-19 PROCEDURE — 94060 PR EVAL OF BRONCHOSPASM: ICD-10-PCS | Mod: S$GLB,,, | Performed by: INTERNAL MEDICINE

## 2023-01-19 PROCEDURE — 4010F ACE/ARB THERAPY RXD/TAKEN: CPT | Mod: CPTII,S$GLB,, | Performed by: INTERNAL MEDICINE

## 2023-01-19 PROCEDURE — 94727 PR PULM FUNCTION TEST BY GAS: ICD-10-PCS | Mod: S$GLB,,, | Performed by: INTERNAL MEDICINE

## 2023-01-19 PROCEDURE — 99999 PR PBB SHADOW E&M-EST. PATIENT-LVL IV: CPT | Mod: PBBFAC,,, | Performed by: INTERNAL MEDICINE

## 2023-01-19 PROCEDURE — 99999 PR PBB SHADOW E&M-EST. PATIENT-LVL IV: ICD-10-PCS | Mod: PBBFAC,,, | Performed by: INTERNAL MEDICINE

## 2023-01-19 PROCEDURE — 94729 PR C02/MEMBANE DIFFUSE CAPACITY: ICD-10-PCS | Mod: S$GLB,,, | Performed by: INTERNAL MEDICINE

## 2023-01-19 PROCEDURE — 3066F PR DOCUMENTATION OF TREATMENT FOR NEPHROPATHY: ICD-10-PCS | Mod: CPTII,S$GLB,, | Performed by: INTERNAL MEDICINE

## 2023-01-19 PROCEDURE — 3044F PR MOST RECENT HEMOGLOBIN A1C LEVEL <7.0%: ICD-10-PCS | Mod: CPTII,S$GLB,, | Performed by: INTERNAL MEDICINE

## 2023-01-19 PROCEDURE — 94727 GAS DIL/WSHOT DETER LNG VOL: CPT | Mod: S$GLB,,, | Performed by: INTERNAL MEDICINE

## 2023-01-19 PROCEDURE — 1160F RVW MEDS BY RX/DR IN RCRD: CPT | Mod: CPTII,S$GLB,, | Performed by: INTERNAL MEDICINE

## 2023-01-19 PROCEDURE — 3066F NEPHROPATHY DOC TX: CPT | Mod: CPTII,S$GLB,, | Performed by: INTERNAL MEDICINE

## 2023-01-19 PROCEDURE — 3044F HG A1C LEVEL LT 7.0%: CPT | Mod: CPTII,S$GLB,, | Performed by: INTERNAL MEDICINE

## 2023-01-19 PROCEDURE — 99204 PR OFFICE/OUTPT VISIT, NEW, LEVL IV, 45-59 MIN: ICD-10-PCS | Mod: 25,S$GLB,, | Performed by: INTERNAL MEDICINE

## 2023-01-19 PROCEDURE — 3008F BODY MASS INDEX DOCD: CPT | Mod: CPTII,S$GLB,, | Performed by: INTERNAL MEDICINE

## 2023-01-19 PROCEDURE — 99204 OFFICE O/P NEW MOD 45 MIN: CPT | Mod: 25,S$GLB,, | Performed by: INTERNAL MEDICINE

## 2023-01-19 PROCEDURE — 71046 X-RAY EXAM CHEST 2 VIEWS: CPT | Mod: TC,FY

## 2023-01-19 PROCEDURE — 3075F PR MOST RECENT SYSTOLIC BLOOD PRESS GE 130-139MM HG: ICD-10-PCS | Mod: CPTII,S$GLB,, | Performed by: INTERNAL MEDICINE

## 2023-01-19 PROCEDURE — 3061F PR NEG MICROALBUMINURIA RESULT DOCUMENTED/REVIEW: ICD-10-PCS | Mod: CPTII,S$GLB,, | Performed by: INTERNAL MEDICINE

## 2023-01-19 PROCEDURE — 3008F PR BODY MASS INDEX (BMI) DOCUMENTED: ICD-10-PCS | Mod: CPTII,S$GLB,, | Performed by: INTERNAL MEDICINE

## 2023-01-19 PROCEDURE — 71046 XR CHEST PA AND LATERAL: ICD-10-PCS | Mod: 26,,, | Performed by: RADIOLOGY

## 2023-01-19 PROCEDURE — 3078F DIAST BP <80 MM HG: CPT | Mod: CPTII,S$GLB,, | Performed by: INTERNAL MEDICINE

## 2023-01-19 PROCEDURE — 3061F NEG MICROALBUMINURIA REV: CPT | Mod: CPTII,S$GLB,, | Performed by: INTERNAL MEDICINE

## 2023-01-19 PROCEDURE — 1159F PR MEDICATION LIST DOCUMENTED IN MEDICAL RECORD: ICD-10-PCS | Mod: CPTII,S$GLB,, | Performed by: INTERNAL MEDICINE

## 2023-01-19 PROCEDURE — 4010F PR ACE/ARB THEARPY RXD/TAKEN: ICD-10-PCS | Mod: CPTII,S$GLB,, | Performed by: INTERNAL MEDICINE

## 2023-01-19 PROCEDURE — 3075F SYST BP GE 130 - 139MM HG: CPT | Mod: CPTII,S$GLB,, | Performed by: INTERNAL MEDICINE

## 2023-01-19 PROCEDURE — 94060 EVALUATION OF WHEEZING: CPT | Mod: S$GLB,,, | Performed by: INTERNAL MEDICINE

## 2023-01-19 PROCEDURE — 1159F MED LIST DOCD IN RCRD: CPT | Mod: CPTII,S$GLB,, | Performed by: INTERNAL MEDICINE

## 2023-01-19 PROCEDURE — 71046 X-RAY EXAM CHEST 2 VIEWS: CPT | Mod: 26,,, | Performed by: RADIOLOGY

## 2023-01-19 PROCEDURE — 1160F PR REVIEW ALL MEDS BY PRESCRIBER/CLIN PHARMACIST DOCUMENTED: ICD-10-PCS | Mod: CPTII,S$GLB,, | Performed by: INTERNAL MEDICINE

## 2023-01-19 RX ORDER — LOSARTAN POTASSIUM 50 MG/1
TABLET ORAL
COMMUNITY
Start: 2022-04-19 | End: 2023-08-29 | Stop reason: ALTCHOICE

## 2023-01-19 RX ORDER — CELECOXIB 100 MG/1
CAPSULE ORAL
COMMUNITY
Start: 2022-09-12 | End: 2023-04-20 | Stop reason: ALTCHOICE

## 2023-01-19 RX ORDER — ACETAMINOPHEN AND CODEINE PHOSPHATE 300; 30 MG/1; MG/1
1 TABLET ORAL 2 TIMES DAILY PRN
COMMUNITY
Start: 2023-01-18 | End: 2023-04-20 | Stop reason: ALTCHOICE

## 2023-01-19 RX ORDER — TIRZEPATIDE 2.5 MG/.5ML
INJECTION, SOLUTION SUBCUTANEOUS
COMMUNITY
Start: 2022-12-23 | End: 2023-04-20 | Stop reason: ALTCHOICE

## 2023-01-19 RX ORDER — GUAIFENESIN/DEXTROMETHORPHAN 100-10MG/5
SYRUP ORAL
COMMUNITY
Start: 2022-11-30 | End: 2023-08-29 | Stop reason: ALTCHOICE

## 2023-01-19 RX ORDER — OMEPRAZOLE 40 MG/1
CAPSULE, DELAYED RELEASE ORAL
COMMUNITY
Start: 2022-04-22 | End: 2023-08-29 | Stop reason: SDUPTHER

## 2023-01-19 RX ORDER — BUDESONIDE, GLYCOPYRROLATE, AND FORMOTEROL FUMARATE 160; 9; 4.8 UG/1; UG/1; UG/1
2 AEROSOL, METERED RESPIRATORY (INHALATION) 2 TIMES DAILY
Qty: 32.1 G | Refills: 4 | Status: SHIPPED | OUTPATIENT
Start: 2023-01-19 | End: 2023-04-20 | Stop reason: ALTCHOICE

## 2023-01-19 RX ORDER — ALPRAZOLAM 2 MG/1
2 TABLET ORAL 2 TIMES DAILY
COMMUNITY

## 2023-01-19 RX ORDER — METHOCARBAMOL 500 MG/1
TABLET, FILM COATED ORAL
COMMUNITY
End: 2023-04-20 | Stop reason: ALTCHOICE

## 2023-01-19 RX ORDER — PANTOPRAZOLE SODIUM 40 MG/1
1 TABLET, DELAYED RELEASE ORAL DAILY
COMMUNITY
Start: 2022-04-20 | End: 2023-04-20 | Stop reason: ALTCHOICE

## 2023-01-19 RX ORDER — DICLOFENAC SODIUM 10 MG/G
GEL TOPICAL
COMMUNITY
Start: 2022-07-18 | End: 2023-02-13

## 2023-01-19 NOTE — PROGRESS NOTES
Subjective:       Patient ID: Susan Courtney is a 62 y.o. female.    Chief Complaint: Asthma and Cough    HPI:   Susan Courtney is a 62 y.o. female who presents for evaluation of her asthma.     She was diagnosed as a child  Never required hospitalization.   Typically receives prednisone just once a year.     Currently on Symbicort bid and singulair  Albuterol rescue inhaler- that she typically takes twice a day.     Asthma aggravated by weather, laughing, cigarettes.  +seasonal allergies.     Cat at home.   No carpet in the house  Uses mattress and pillow covers.    Retired   Never smoker  Sister with asthma.    Review of Systems   Constitutional:  Negative for fever, chills and activity change.   HENT:  Positive for postnasal drip and congestion. Negative for rhinorrhea and sinus pressure.    Respiratory:  Positive for cough. Negative for hemoptysis, shortness of breath and dyspnea on extertion.    Cardiovascular:  Negative for chest pain and leg swelling.   Genitourinary:  Negative for difficulty urinating.   Endocrine:  Negative for cold intolerance and heat intolerance.    Musculoskeletal:  Negative for joint swelling and myalgias.   Gastrointestinal:  Negative for nausea, vomiting and abdominal pain.   Neurological:  Negative for headaches.   Hematological:  Negative for adenopathy. No excessive bruising.   Psychiatric/Behavioral:  Negative for confusion and sleep disturbance.        Social History     Tobacco Use    Smoking status: Never    Smokeless tobacco: Never   Substance Use Topics    Alcohol use: No       Review of patient's allergies indicates:   Allergen Reactions    Morphine Itching     Not sure what she can take for pain.    Morphine sulfate      Itchy (skin)^    Tramadol Itching     Past Medical History:   Diagnosis Date    Abdominal hernia     Anxiety     Asthma     Diabetes mellitus     Hypertension     Migraine     Migraine headache     Obesity     Sleep apnea      Past Surgical  History:   Procedure Laterality Date    BUNIONECTOMY Right     CHOLECYSTECTOMY      EXCISION OF LESION Left 12/01/2021    Procedure: EXCISION, LESION, LEFT PALM;  Surgeon: Amy Sharp MD;  Location: [facility] OR;  Service: Orthopedics;  Laterality: Left;    FOOT SURGERY Right     HYSTERECTOMY      total, fibroids    INCISIONAL HERNIA REPAIR      TONSILLECTOMY Bilateral 03/06/2020    Procedure: TONSILLECTOMY;  Surgeon: Brant Pelaez MD;  Location: [facility];  Service: ENT;  Laterality: Bilateral;     Current Outpatient Medications on File Prior to Visit   Medication Sig    acetaminophen-codeine 300-30mg (TYLENOL #3) 300-30 mg Tab Take 1 tablet by mouth 2 (two) times daily as needed.    ALPRAZolam (XANAX) 2 MG Tab TAKE 1 TABLET BY MOUTH 2 TIMES A DAY DAILY    amLODIPine (NORVASC) 5 MG tablet Take 1 tablet (5 mg total) by mouth Daily.    amoxicillin-clavulanate 875-125mg (AUGMENTIN) 875-125 mg per tablet Take 1 tablet by mouth 2 (two) times daily.    atorvastatin (LIPITOR) 40 MG tablet Take 1 tablet (40 mg total) by mouth Daily.    azelastine (ASTELIN) 137 mcg (0.1 %) nasal spray 2 sprays (274 mcg total) by Nasal route 2 (two) times daily.    budesonide-formoterol 160-4.5 mcg (SYMBICORT) 160-4.5 mcg/actuation HFAA Inhale 2 puffs into the lungs every 12 (twelve) hours. Controller    celecoxib (CELEBREX) 100 MG capsule Celecoxib 100 MG Oral Capsule QTY: 0 capsule Days: 0 Refills: 0  Written: 09/12/22 Patient Instructions: 1 tab BID for pain PRN    citalopram (CELEXA) 20 MG tablet Take 20 mg by mouth.    dextromethorphan-guaiFENesin  mg/5 ml (ROBITUSSIN-DM)  mg/5 mL liquid Dextromethorphan-guaiFENesin  MG/5ML Oral Syrup QTY: 240 mL Days: 30 Refills: 2  Written: 11/30/22 Patient Instructions: take 10ml by mouth every 6 hours as needed for cough    diclofenac sodium (VOLTAREN) 1 % Gel Diclofenac Sodium 1% External Gel QTY: 1 gram Days: 30 Refills: 6  Written: 07/18/22 Patient Instructions:  apply to affected area up to four times a day as needed for pain    famotidine (PEPCID) 40 MG tablet Take 1 tablet (40 mg total) by mouth 2 (two) times daily as needed for Heartburn.    fluticasone (FLONASE) 50 mcg/actuation nasal spray 1 spray (50 mcg total) by Each Nare route 2 (two) times daily as needed.    ibuprofen (ADVIL,MOTRIN) 800 MG tablet Take 1 tablet (800 mg total) by mouth 3 (three) times daily.    JARDIANCE 10 mg tablet Take 1 tablet (10 mg total) by mouth once daily.    losartan (COZAAR) 50 MG tablet TAKE 1 TABLET BY MOUTH ONCE DAILY FOR ELEVATED BLOOD PRESSURE    metFORMIN (FORTAMET) 500 mg 24hr tablet Take 2 tablets (1,000 mg total) by mouth 2 (two) times daily with meals.    methocarbamoL (ROBAXIN) 500 MG Tab PLEASE SEE ATTACHED FOR DETAILED DIRECTIONS    metoprolol succinate (TOPROL-XL) 50 MG 24 hr tablet Take 2 tablets (100 mg total) by mouth once daily.    montelukast (SINGULAIR) 10 mg tablet Take 1 tablet (10 mg total) by mouth every evening.    MOUNJARO 2.5 mg/0.5 mL PnIj SMARTSI.5 Milligram(s) SUB-Q Once a Week    naratriptan (AMERGE) 1 MG Tab Take 1 tablet (1 mg total) by mouth 2 (two) times daily as needed (headache). Take 1mg po; may repeat x1 after 4 hours as needed for migraine headache    omeprazole (PRILOSEC) 40 MG capsule TAKE 1 CAPSULE ONCE PER DAY IN THE AM 30-60 MIN BEFORE MEAL FOR GERD    pantoprazole (PROTONIX) 40 MG tablet Take 1 tablet by mouth once daily.    promethazine-dextromethorphan (PROMETHAZINE-DM) 6.25-15 mg/5 mL Syrp Take 5 mLs by mouth every 6 (six) hours as needed (cough).    sucralfate (CARAFATE) 1 gram tablet Take 1 g by mouth 4 (four) times daily.    TRULICITY 0.75 mg/0.5 mL pen injector Inject 0.75 mg into the skin every 7 days.    valsartan (DIOVAN) 320 MG tablet Take 1 tablet (320 mg total) by mouth once daily.    VENTOLIN HFA 90 mcg/actuation inhaler INHALE 2 PUFFS BY MOUTH EVERY 4 TO 6 HOURS AS NEEDED FOR COUGHING AND WHEEZING    zolpidem (AMBIEN) 10 mg  "Tab Take 10 mg by mouth every evening.    [] diphth,pertus,acell,,tetanus (BOOSTRIX) 2.5-8-5 Lf-mcg-Lf/0.5mL Susp Inject 0.5 mLs into the muscle once. for 1 dose     No current facility-administered medications on file prior to visit.       Objective:      Vitals:    23 0934   BP: 132/78   BP Location: Left arm   Patient Position: Sitting   BP Method: Medium (Manual)   Pulse: (!) 113   SpO2: (!) 94%   Weight: 83.5 kg (184 lb 1.4 oz)   Height: 5' 5" (1.651 m)     Physical Exam   Constitutional: She is oriented to person, place, and time. She appears well-developed and well-nourished.   HENT:   Head: Normocephalic.   Mouth/Throat: Mallampati Score: I.   Neck: No tracheal deviation present.   Cardiovascular: Normal rate and regular rhythm.   No murmur heard.  Pulmonary/Chest: Normal expansion, effort normal and breath sounds normal. She has no wheezes. She has no rales.   Abdominal: Soft. Bowel sounds are normal. She exhibits no distension. There is no abdominal tenderness.   Musculoskeletal:         General: No edema. Normal range of motion.      Cervical back: Normal range of motion and neck supple.   Neurological: She is alert and oriented to person, place, and time.   Skin: Skin is warm and dry.   Vitals reviewed.  Personal Diagnostic Review  PFTs: 23: No obstruction, Mild restriction, mildly reduced DLCO.  No flowsheet data found.      X-Ray Knee 3 View Right  Narrative: EXAMINATION:  XR KNEE 3 VIEW RIGHT    CLINICAL HISTORY:  Pain, unspecified    TECHNIQUE:  AP, lateral, and Merchant views of the right knee were performed.    COMPARISON:  None    FINDINGS:  No evidence of acute displaced fracture, dislocation, or osseous destructive process.  Joint spaces are preserved.  No significant suprapatellar joint effusion.  Impression: No acute osseous abnormality identified.    Electronically signed by: Sary Stringer MD  Date:    10/06/2022  Time:    18:57      Assessment:     Orders Placed This " Encounter   Procedures    X-Ray Chest PA And Lateral     Standing Status:   Future     Standing Expiration Date:   1/19/2024     Order Specific Question:   May the Radiologist modify the order per protocol to meet the clinical needs of the patient?     Answer:   Yes    IGE     Standing Status:   Future     Number of Occurrences:   1     Standing Expiration Date:   3/19/2024    CBC auto differential     Standing Status:   Future     Number of Occurrences:   1     Standing Expiration Date:   3/19/2024     1. Cough, unspecified type    2. Moderate persistent asthma, unspecified whether complicated    3. Severe persistent asthma without complication        Plan:         Problem List Items Addressed This Visit          Pulmonary    Severe persistent asthma without complication    Current Assessment & Plan     Given her frequent use of a rescue inhaler, I believe she would benefit from escalation of her inhaler therapy.    Starting Breztri. Patient instructed to rinse/gargle after each use.  Continue albuterol hfa rescue prn  Continue singulair.  Check igE and CBC  Chest Xray           Relevant Medications    budesonide-glycopyr-formoterol (BREZTRI AEROSPHERE) 160-9-4.8 mcg/actuation HFAA    Cough - Primary    Current Assessment & Plan     Asthma vs upper airway cough syndrome.  Agree that Astelin and Flonase will likely be helpful.  Patient seems to have significant atopy.  If her condition does not improve on current therapy, will refer to allergy.           43 minutes of total time spent on the encounter, which includes face to face time and non-face to face time preparing to see the patient (eg, review of tests), Obtaining and/or reviewing separately obtained history, Documenting clinical information in the electronic or other health record, Independently interpreting results (not separately reported) and communicating results to the patient/family/caregiver, or Care coordination (not separately reported).

## 2023-01-19 NOTE — ASSESSMENT & PLAN NOTE
Given her frequent use of a rescue inhaler, I believe she would benefit from escalation of her inhaler therapy.    · Starting Breztri. Patient instructed to rinse/gargle after each use.  · Continue albuterol hfa rescue prn  · Continue singulair.  · Check igE and CBC  · Chest Xray

## 2023-01-19 NOTE — PATIENT INSTRUCTIONS
Start Breztri 2 puffs twice a day  Continue Singulair  Continue albuterol as needed  Continue Astelin and flonase nasal sprays

## 2023-01-19 NOTE — ASSESSMENT & PLAN NOTE
Asthma vs upper airway cough syndrome.  Agree that Astelin and Flonase will likely be helpful.  Patient seems to have significant atopy.  If her condition does not improve on current therapy, will refer to allergy.

## 2023-01-20 DIAGNOSIS — J45.50 SEVERE PERSISTENT ASTHMA WITHOUT COMPLICATION: Primary | ICD-10-CM

## 2023-01-27 ENCOUNTER — OFFICE VISIT (OUTPATIENT)
Dept: CARDIOLOGY | Facility: CLINIC | Age: 63
End: 2023-01-27
Payer: MEDICARE

## 2023-01-27 VITALS
HEIGHT: 65 IN | BODY MASS INDEX: 32.15 KG/M2 | DIASTOLIC BLOOD PRESSURE: 69 MMHG | WEIGHT: 193 LBS | HEART RATE: 73 BPM | SYSTOLIC BLOOD PRESSURE: 125 MMHG

## 2023-01-27 DIAGNOSIS — E66.9 OBESITY (BMI 30-39.9): ICD-10-CM

## 2023-01-27 DIAGNOSIS — J45.50 SEVERE PERSISTENT ASTHMA WITHOUT COMPLICATION: ICD-10-CM

## 2023-01-27 DIAGNOSIS — R00.2 PALPITATIONS: ICD-10-CM

## 2023-01-27 DIAGNOSIS — I10 ESSENTIAL HYPERTENSION: Chronic | ICD-10-CM

## 2023-01-27 DIAGNOSIS — E11.00 TYPE 2 DIABETES MELLITUS WITH HYPEROSMOLARITY WITHOUT COMA, WITHOUT LONG-TERM CURRENT USE OF INSULIN: Chronic | ICD-10-CM

## 2023-01-27 DIAGNOSIS — G47.33 OSA (OBSTRUCTIVE SLEEP APNEA): Primary | ICD-10-CM

## 2023-01-27 PROCEDURE — 4010F ACE/ARB THERAPY RXD/TAKEN: CPT | Mod: CPTII,S$GLB,, | Performed by: INTERNAL MEDICINE

## 2023-01-27 PROCEDURE — 99214 PR OFFICE/OUTPT VISIT, EST, LEVL IV, 30-39 MIN: ICD-10-PCS | Mod: S$GLB,,, | Performed by: INTERNAL MEDICINE

## 2023-01-27 PROCEDURE — 3074F SYST BP LT 130 MM HG: CPT | Mod: CPTII,S$GLB,, | Performed by: INTERNAL MEDICINE

## 2023-01-27 PROCEDURE — 3066F PR DOCUMENTATION OF TREATMENT FOR NEPHROPATHY: ICD-10-PCS | Mod: CPTII,S$GLB,, | Performed by: INTERNAL MEDICINE

## 2023-01-27 PROCEDURE — 3074F PR MOST RECENT SYSTOLIC BLOOD PRESSURE < 130 MM HG: ICD-10-PCS | Mod: CPTII,S$GLB,, | Performed by: INTERNAL MEDICINE

## 2023-01-27 PROCEDURE — 3061F PR NEG MICROALBUMINURIA RESULT DOCUMENTED/REVIEW: ICD-10-PCS | Mod: CPTII,S$GLB,, | Performed by: INTERNAL MEDICINE

## 2023-01-27 PROCEDURE — 99214 OFFICE O/P EST MOD 30 MIN: CPT | Mod: S$GLB,,, | Performed by: INTERNAL MEDICINE

## 2023-01-27 PROCEDURE — 3044F HG A1C LEVEL LT 7.0%: CPT | Mod: CPTII,S$GLB,, | Performed by: INTERNAL MEDICINE

## 2023-01-27 PROCEDURE — 3078F DIAST BP <80 MM HG: CPT | Mod: CPTII,S$GLB,, | Performed by: INTERNAL MEDICINE

## 2023-01-27 PROCEDURE — 3008F PR BODY MASS INDEX (BMI) DOCUMENTED: ICD-10-PCS | Mod: CPTII,S$GLB,, | Performed by: INTERNAL MEDICINE

## 2023-01-27 PROCEDURE — 4010F PR ACE/ARB THEARPY RXD/TAKEN: ICD-10-PCS | Mod: CPTII,S$GLB,, | Performed by: INTERNAL MEDICINE

## 2023-01-27 PROCEDURE — 3066F NEPHROPATHY DOC TX: CPT | Mod: CPTII,S$GLB,, | Performed by: INTERNAL MEDICINE

## 2023-01-27 PROCEDURE — 3061F NEG MICROALBUMINURIA REV: CPT | Mod: CPTII,S$GLB,, | Performed by: INTERNAL MEDICINE

## 2023-01-27 PROCEDURE — 3078F PR MOST RECENT DIASTOLIC BLOOD PRESSURE < 80 MM HG: ICD-10-PCS | Mod: CPTII,S$GLB,, | Performed by: INTERNAL MEDICINE

## 2023-01-27 PROCEDURE — 3044F PR MOST RECENT HEMOGLOBIN A1C LEVEL <7.0%: ICD-10-PCS | Mod: CPTII,S$GLB,, | Performed by: INTERNAL MEDICINE

## 2023-01-27 PROCEDURE — 3008F BODY MASS INDEX DOCD: CPT | Mod: CPTII,S$GLB,, | Performed by: INTERNAL MEDICINE

## 2023-01-27 NOTE — PROGRESS NOTES
"Subjective:   Patient ID:  Susan Courtney is a 62 y.o. female is a new patient who presents for evaluation of Palpitations  DM, HTN, JOSE, Asthma, Migraine    HPI:   Palpitations fleeting pulse lasts a few seconds and when she takes Ibubrofen. She thinks it may be reflux.   She has been taking cholesterol lowering medication  Non smoker  No f/h of heart disease. Mother had breast cancer.  Palpitations happen every now and then    Echo 2021  The left ventricle is normal in size with normal systolic function.  The estimated ejection fraction is 65%.  Grade I left ventricular diastolic dysfunction.  Normal right ventricular size with normal right ventricular systolic function.  The aortic valve is moderately sclerotic.  The estimated PA systolic pressure is 25 mmHg.  Normal central venous pressure (3 mmHg).  Patient Active Problem List   Diagnosis    Classical migraine    Hallux valgus (acquired)    JOSE (obstructive sleep apnea)    Headache    Chronic tonsillitis    Essential hypertension    Type 2 diabetes mellitus, without long-term current use of insulin    Hypoxia    Anxiety    Severe persistent asthma without complication    Gastroesophageal reflux disease    Subcutaneous mass of left hand    Sleep stage dysfunction    Neck stiffness    Posture imbalance    Cervicogenic headache    Cough     /69   Pulse 73   Ht 5' 5" (1.651 m)   Wt 87.5 kg (193 lb)   BMI 32.12 kg/m²   Body mass index is 32.12 kg/m².  CrCl cannot be calculated (Patient's most recent lab result is older than the maximum 7 days allowed.).    Lab Results   Component Value Date     01/18/2023    K 3.8 01/18/2023     01/18/2023    CO2 26 01/18/2023    BUN 24 (H) 01/18/2023    CREATININE 0.9 01/18/2023     (H) 01/18/2023    HGBA1C 6.9 (H) 01/18/2023    MG 1.5 (L) 04/01/2018    AST 13 01/18/2023    ALT 14 01/18/2023    ALBUMIN 3.6 01/18/2023    PROT 8.0 01/18/2023    BILITOT 0.9 01/18/2023    WBC 8.09 01/19/2023    HGB " 12.6 01/19/2023    HCT 41.2 01/19/2023    MCV 86 01/19/2023     01/19/2023    TSH 0.787 01/18/2023    CHOL 101 (L) 01/18/2023    HDL 36 (L) 01/18/2023    LDLCALC 45.2 (L) 01/18/2023    TRIG 99 01/18/2023       Current Outpatient Medications   Medication Sig    acetaminophen-codeine 300-30mg (TYLENOL #3) 300-30 mg Tab Take 1 tablet by mouth 2 (two) times daily as needed.    ALPRAZolam (XANAX) 2 MG Tab TAKE 1 TABLET BY MOUTH 2 TIMES A DAY DAILY    amLODIPine (NORVASC) 5 MG tablet Take 1 tablet (5 mg total) by mouth Daily.    atorvastatin (LIPITOR) 40 MG tablet Take 1 tablet (40 mg total) by mouth Daily.    azelastine (ASTELIN) 137 mcg (0.1 %) nasal spray 2 sprays (274 mcg total) by Nasal route 2 (two) times daily.    budesonide-glycopyr-formoterol (BREZTRI AEROSPHERE) 160-9-4.8 mcg/actuation HFAA Inhale 2 puffs into the lungs 2 (two) times daily.    celecoxib (CELEBREX) 100 MG capsule Celecoxib 100 MG Oral Capsule QTY: 0 capsule Days: 0 Refills: 0  Written: 09/12/22 Patient Instructions: 1 tab BID for pain PRN    citalopram (CELEXA) 20 MG tablet Take 20 mg by mouth.    dextromethorphan-guaiFENesin  mg/5 ml (ROBITUSSIN-DM)  mg/5 mL liquid Dextromethorphan-guaiFENesin  MG/5ML Oral Syrup QTY: 240 mL Days: 30 Refills: 2  Written: 11/30/22 Patient Instructions: take 10ml by mouth every 6 hours as needed for cough    diclofenac sodium (VOLTAREN) 1 % Gel Diclofenac Sodium 1% External Gel QTY: 1 gram Days: 30 Refills: 6  Written: 07/18/22 Patient Instructions: apply to affected area up to four times a day as needed for pain    famotidine (PEPCID) 40 MG tablet Take 1 tablet (40 mg total) by mouth 2 (two) times daily as needed for Heartburn.    fluticasone (FLONASE) 50 mcg/actuation nasal spray 1 spray (50 mcg total) by Each Nare route 2 (two) times daily as needed.    ibuprofen (ADVIL,MOTRIN) 800 MG tablet Take 1 tablet (800 mg total) by mouth 3 (three) times daily.    JARDIANCE 10 mg tablet Take 1  tablet (10 mg total) by mouth once daily.    losartan (COZAAR) 50 MG tablet TAKE 1 TABLET BY MOUTH ONCE DAILY FOR ELEVATED BLOOD PRESSURE    metFORMIN (FORTAMET) 500 mg 24hr tablet Take 2 tablets (1,000 mg total) by mouth 2 (two) times daily with meals.    methocarbamoL (ROBAXIN) 500 MG Tab PLEASE SEE ATTACHED FOR DETAILED DIRECTIONS    metoprolol succinate (TOPROL-XL) 50 MG 24 hr tablet Take 2 tablets (100 mg total) by mouth once daily.    montelukast (SINGULAIR) 10 mg tablet Take 1 tablet (10 mg total) by mouth every evening.    MOUNJARO 2.5 mg/0.5 mL PnIj SMARTSI.5 Milligram(s) SUB-Q Once a Week    naratriptan (AMERGE) 1 MG Tab Take 1 tablet (1 mg total) by mouth 2 (two) times daily as needed (headache). Take 1mg po; may repeat x1 after 4 hours as needed for migraine headache    omeprazole (PRILOSEC) 40 MG capsule TAKE 1 CAPSULE ONCE PER DAY IN THE AM 30-60 MIN BEFORE MEAL FOR GERD    pantoprazole (PROTONIX) 40 MG tablet Take 1 tablet by mouth once daily.    promethazine-dextromethorphan (PROMETHAZINE-DM) 6.25-15 mg/5 mL Syrp Take 5 mLs by mouth every 6 (six) hours as needed (cough).    sucralfate (CARAFATE) 1 gram tablet Take 1 g by mouth 4 (four) times daily.    TRULICITY 0.75 mg/0.5 mL pen injector Inject 0.75 mg into the skin every 7 days.    valsartan (DIOVAN) 320 MG tablet Take 1 tablet (320 mg total) by mouth once daily.    VENTOLIN HFA 90 mcg/actuation inhaler INHALE 2 PUFFS BY MOUTH EVERY 4 TO 6 HOURS AS NEEDED FOR COUGHING AND WHEEZING    zolpidem (AMBIEN) 10 mg Tab Take 10 mg by mouth every evening.     No current facility-administered medications for this visit.       Review of Systems   Constitutional: Negative for chills, decreased appetite, malaise/fatigue, night sweats, weight gain and weight loss.   Eyes:  Negative for blurred vision, double vision, visual disturbance and visual halos.   Cardiovascular:  Negative for chest pain, claudication, cyanosis, dyspnea on exertion, irregular heartbeat,  leg swelling, near-syncope, orthopnea, palpitations, paroxysmal nocturnal dyspnea and syncope.   Respiratory:  Negative for cough, hemoptysis, snoring, sputum production and wheezing.    Endocrine: Negative for cold intolerance, heat intolerance, polydipsia and polyphagia.   Hematologic/Lymphatic: Negative for adenopathy and bleeding problem. Does not bruise/bleed easily.   Skin:  Negative for flushing, itching, poor wound healing and rash.   Musculoskeletal:  Negative for arthritis, back pain, falls, gout, joint pain, joint swelling, muscle cramps, muscle weakness, myalgias, neck pain and stiffness.   Gastrointestinal:  Negative for bloating, abdominal pain, anorexia, diarrhea, dysphagia, excessive appetite, flatus, hematemesis, jaundice, melena and nausea.   Genitourinary:  Negative for hesitancy and incomplete emptying.   Neurological:  Negative for aphonia, brief paralysis, difficulty with concentration, disturbances in coordination, excessive daytime sleepiness, dizziness, focal weakness, light-headedness, loss of balance and weakness.   Psychiatric/Behavioral:  Negative for altered mental status, depression, hallucinations, hypervigilance, memory loss, substance abuse and suicidal ideas. The patient does not have insomnia and is not nervous/anxious.      Objective:   Physical Exam  Constitutional:       General: She is not in acute distress.     Appearance: She is well-developed. She is not diaphoretic.   HENT:      Head: Normocephalic and atraumatic.      Nose: Nose normal.      Mouth/Throat:      Pharynx: No oropharyngeal exudate.   Eyes:      General: No scleral icterus.        Right eye: No discharge.         Left eye: No discharge.      Conjunctiva/sclera: Conjunctivae normal.      Pupils: Pupils are equal, round, and reactive to light.   Neck:      Thyroid: No thyromegaly.      Vascular: No JVD.      Trachea: No tracheal deviation.   Cardiovascular:      Rate and Rhythm: Normal rate and regular rhythm.       Pulses: Intact distal pulses.      Heart sounds: Normal heart sounds. No murmur heard.    No friction rub. No gallop.   Pulmonary:      Effort: Pulmonary effort is normal. No respiratory distress.      Breath sounds: Normal breath sounds. No stridor. No wheezing or rales.   Chest:      Chest wall: No tenderness.   Abdominal:      General: Bowel sounds are normal. There is no distension.      Palpations: Abdomen is soft. There is no mass.      Tenderness: There is no abdominal tenderness. There is no guarding or rebound.   Musculoskeletal:         General: No tenderness. Normal range of motion.      Cervical back: Normal range of motion and neck supple.   Lymphadenopathy:      Cervical: No cervical adenopathy.   Skin:     General: Skin is warm.      Coloration: Skin is not pale.      Findings: No erythema or rash.   Neurological:      Mental Status: She is alert and oriented to person, place, and time.      Cranial Nerves: No cranial nerve deficit.      Motor: No abnormal muscle tone.      Coordination: Coordination normal.      Deep Tendon Reflexes: Reflexes are normal and symmetric. Reflexes normal.   Psychiatric:         Behavior: Behavior normal.         Thought Content: Thought content normal.         Judgment: Judgment normal.       Assessment:     1. JOSE (obstructive sleep apnea)    2. Palpitations    3. Type 2 diabetes mellitus with hyperosmolarity without coma, without long-term current use of insulin    4. Essential hypertension    5. Severe persistent asthma without complication    6. Obesity (BMI 30-39.9)      Plan:   Susan was seen today for palpitations.    Diagnoses and all orders for this visit:    JOSE (obstructive sleep apnea)    Palpitations  -     Ambulatory referral/consult to Cardiology  -     Holter monitor - 48 hour; Future    Type 2 diabetes mellitus with hyperosmolarity without coma, without long-term current use of insulin    Essential hypertension    Severe persistent asthma without  complication    Obesity (BMI 30-39.9)

## 2023-02-08 ENCOUNTER — HOSPITAL ENCOUNTER (OUTPATIENT)
Dept: CARDIOLOGY | Facility: OTHER | Age: 63
Discharge: HOME | End: 2023-02-08
Attending: INTERNAL MEDICINE
Payer: MEDICARE

## 2023-02-08 DIAGNOSIS — R00.2 PALPITATIONS: ICD-10-CM

## 2023-02-08 PROCEDURE — 93227 HOLTER MONITOR - 48 HOUR (CUPID ONLY): ICD-10-PCS | Mod: ,,, | Performed by: INTERNAL MEDICINE

## 2023-02-08 PROCEDURE — 93225 XTRNL ECG REC<48 HRS REC: CPT

## 2023-02-08 PROCEDURE — 93227 XTRNL ECG REC<48 HR R&I: CPT | Mod: ,,, | Performed by: INTERNAL MEDICINE

## 2023-02-09 ENCOUNTER — OFFICE VISIT (OUTPATIENT)
Dept: NEUROLOGY | Facility: CLINIC | Age: 63
End: 2023-02-09
Payer: MEDICARE

## 2023-02-09 VITALS
SYSTOLIC BLOOD PRESSURE: 151 MMHG | HEART RATE: 77 BPM | HEIGHT: 65 IN | WEIGHT: 158 LBS | BODY MASS INDEX: 26.33 KG/M2 | DIASTOLIC BLOOD PRESSURE: 78 MMHG

## 2023-02-09 DIAGNOSIS — I67.2 INTRACRANIAL ATHEROSCLEROSIS: ICD-10-CM

## 2023-02-09 DIAGNOSIS — M50.30 DDD (DEGENERATIVE DISC DISEASE), CERVICAL: ICD-10-CM

## 2023-02-09 DIAGNOSIS — R55 POSTURAL DIZZINESS WITH PRESYNCOPE: ICD-10-CM

## 2023-02-09 DIAGNOSIS — G43.901 STATUS MIGRAINOSUS: ICD-10-CM

## 2023-02-09 DIAGNOSIS — R55 SYNCOPE AND COLLAPSE: Primary | ICD-10-CM

## 2023-02-09 DIAGNOSIS — E16.2 HYPOGLYCEMIA: ICD-10-CM
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
## 2023-02-09 DIAGNOSIS — R42 POSTURAL DIZZINESS WITH PRESYNCOPE: ICD-10-CM

## 2023-02-09 PROCEDURE — 3008F BODY MASS INDEX DOCD: CPT | Mod: CPTII,S$GLB,, | Performed by: PSYCHIATRY & NEUROLOGY

## 2023-02-09 PROCEDURE — 3078F PR MOST RECENT DIASTOLIC BLOOD PRESSURE < 80 MM HG: ICD-10-PCS | Mod: CPTII,S$GLB,, | Performed by: PSYCHIATRY & NEUROLOGY

## 2023-02-09 PROCEDURE — 3077F SYST BP >= 140 MM HG: CPT | Mod: CPTII,S$GLB,, | Performed by: PSYCHIATRY & NEUROLOGY

## 2023-02-09 PROCEDURE — 3077F PR MOST RECENT SYSTOLIC BLOOD PRESSURE >= 140 MM HG: ICD-10-PCS | Mod: CPTII,S$GLB,, | Performed by: PSYCHIATRY & NEUROLOGY

## 2023-02-09 PROCEDURE — 3061F PR NEG MICROALBUMINURIA RESULT DOCUMENTED/REVIEW: ICD-10-PCS | Mod: CPTII,S$GLB,, | Performed by: PSYCHIATRY & NEUROLOGY

## 2023-02-09 PROCEDURE — 3066F NEPHROPATHY DOC TX: CPT | Mod: CPTII,S$GLB,, | Performed by: PSYCHIATRY & NEUROLOGY

## 2023-02-09 PROCEDURE — 99999 PR PBB SHADOW E&M-EST. PATIENT-LVL III: CPT | Mod: PBBFAC,,, | Performed by: PSYCHIATRY & NEUROLOGY

## 2023-02-09 PROCEDURE — 3008F PR BODY MASS INDEX (BMI) DOCUMENTED: ICD-10-PCS | Mod: CPTII,S$GLB,, | Performed by: PSYCHIATRY & NEUROLOGY

## 2023-02-09 PROCEDURE — 4010F PR ACE/ARB THEARPY RXD/TAKEN: ICD-10-PCS | Mod: CPTII,S$GLB,, | Performed by: PSYCHIATRY & NEUROLOGY

## 2023-02-09 PROCEDURE — 1159F MED LIST DOCD IN RCRD: CPT | Mod: CPTII,S$GLB,, | Performed by: PSYCHIATRY & NEUROLOGY

## 2023-02-09 PROCEDURE — 3078F DIAST BP <80 MM HG: CPT | Mod: CPTII,S$GLB,, | Performed by: PSYCHIATRY & NEUROLOGY

## 2023-02-09 PROCEDURE — 3044F PR MOST RECENT HEMOGLOBIN A1C LEVEL <7.0%: ICD-10-PCS | Mod: CPTII,S$GLB,, | Performed by: PSYCHIATRY & NEUROLOGY

## 2023-02-09 PROCEDURE — 3044F HG A1C LEVEL LT 7.0%: CPT | Mod: CPTII,S$GLB,, | Performed by: PSYCHIATRY & NEUROLOGY

## 2023-02-09 PROCEDURE — 99215 OFFICE O/P EST HI 40 MIN: CPT | Mod: S$GLB,,, | Performed by: PSYCHIATRY & NEUROLOGY

## 2023-02-09 PROCEDURE — 1159F PR MEDICATION LIST DOCUMENTED IN MEDICAL RECORD: ICD-10-PCS | Mod: CPTII,S$GLB,, | Performed by: PSYCHIATRY & NEUROLOGY

## 2023-02-09 PROCEDURE — 99215 PR OFFICE/OUTPT VISIT, EST, LEVL V, 40-54 MIN: ICD-10-PCS | Mod: S$GLB,,, | Performed by: PSYCHIATRY & NEUROLOGY

## 2023-02-09 PROCEDURE — 3066F PR DOCUMENTATION OF TREATMENT FOR NEPHROPATHY: ICD-10-PCS | Mod: CPTII,S$GLB,, | Performed by: PSYCHIATRY & NEUROLOGY

## 2023-02-09 PROCEDURE — 1160F RVW MEDS BY RX/DR IN RCRD: CPT | Mod: CPTII,S$GLB,, | Performed by: PSYCHIATRY & NEUROLOGY

## 2023-02-09 PROCEDURE — 99999 PR PBB SHADOW E&M-EST. PATIENT-LVL III: ICD-10-PCS | Mod: PBBFAC,,, | Performed by: PSYCHIATRY & NEUROLOGY

## 2023-02-09 PROCEDURE — 3061F NEG MICROALBUMINURIA REV: CPT | Mod: CPTII,S$GLB,, | Performed by: PSYCHIATRY & NEUROLOGY

## 2023-02-09 PROCEDURE — 4010F ACE/ARB THERAPY RXD/TAKEN: CPT | Mod: CPTII,S$GLB,, | Performed by: PSYCHIATRY & NEUROLOGY

## 2023-02-09 PROCEDURE — 1160F PR REVIEW ALL MEDS BY PRESCRIBER/CLIN PHARMACIST DOCUMENTED: ICD-10-PCS | Mod: CPTII,S$GLB,, | Performed by: PSYCHIATRY & NEUROLOGY

## 2023-02-09 RX ORDER — METHYLPREDNISOLONE 4 MG/1
TABLET ORAL
Qty: 1 EACH | Refills: 0 | Status: SHIPPED | OUTPATIENT
Start: 2023-02-09 | End: 2023-04-20 | Stop reason: ALTCHOICE
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
## 2023-02-13 LAB
OHS CV EVENT MONITOR DAY: 0
OHS CV HOLTER LENGTH DECIMAL HOURS: 48
OHS CV HOLTER LENGTH HOURS: 48
OHS CV HOLTER LENGTH MINUTES: 0
OHS CV HOLTER SINUS AVERAGE HR: 85
OHS CV HOLTER SINUS MAX HR: 118
OHS CV HOLTER SINUS MIN HR: 66

## 2023-02-20 ENCOUNTER — TELEPHONE (OUTPATIENT)
Dept: NEUROLOGY | Facility: CLINIC | Age: 63
End: 2023-02-20
Payer: MEDICARE

## 2023-02-20 NOTE — TELEPHONE ENCOUNTER
----- Message from Chiquita Kim sent at 2/20/2023 11:37 AM CST -----  Contact: Patient [phone]  Type: Patient Call Back    Who called: Patient     What is the request in detail: Would like to speak to nurse about the steroids she's taking, DVD that she have to bring in for Dr Germain, and MRI. Please call.    Would the patient rather a call back or a response via [facility]? Call back    Best call back number: [phone]

## 2023-02-26 ENCOUNTER — HOSPITAL ENCOUNTER (OUTPATIENT)
Dept: RADIOLOGY | Facility: HOSPITAL | Age: 63
Discharge: HOME | End: 2023-02-26
Attending: PSYCHIATRY & NEUROLOGY
Payer: MEDICARE

## 2023-02-26 DIAGNOSIS — R55 SYNCOPE AND COLLAPSE: ICD-10-CM

## 2023-02-26 PROCEDURE — 70498 CTA HEAD AND NECK (XPD): ICD-10-PCS | Mod: 26,,, | Performed by: RADIOLOGY

## 2023-02-26 PROCEDURE — 70496 CT ANGIOGRAPHY HEAD: CPT | Mod: 26,,, | Performed by: RADIOLOGY

## 2023-02-26 PROCEDURE — 70498 CT ANGIOGRAPHY NECK: CPT | Mod: 26,,, | Performed by: RADIOLOGY

## 2023-02-26 PROCEDURE — 25500020 PHARM REV CODE 255: Performed by: PSYCHIATRY & NEUROLOGY

## 2023-02-26 PROCEDURE — 70496 CTA HEAD AND NECK (XPD): ICD-10-PCS | Mod: 26,,, | Performed by: RADIOLOGY

## 2023-02-26 PROCEDURE — 70496 CT ANGIOGRAPHY HEAD: CPT | Mod: TC

## 2023-02-26 RX ADMIN — IOHEXOL 100 ML: 350 INJECTION, SOLUTION INTRAVENOUS at 10:02

## 2023-02-27 LAB
CREAT SERPL-MCNC: 0.8 MG/DL (ref 0.5–1.4)
SAMPLE: NORMAL

## 2023-03-13 PROBLEM — G47.00 INSOMNIA: Status: ACTIVE | Noted: 2023-03-13

## 2023-03-13 PROBLEM — E78.5 HYPERLIPIDEMIA: Status: ACTIVE | Noted: 2023-03-13

## 2023-03-13 PROBLEM — F41.1 GENERALIZED ANXIETY DISORDER: Status: ACTIVE | Noted: 2023-03-13

## 2023-03-13 PROBLEM — R00.2 PALPITATIONS: Status: ACTIVE | Noted: 2023-03-13

## 2023-03-13 PROBLEM — I10 PRIMARY HYPERTENSION: Status: ACTIVE | Noted: 2020-03-06

## 2023-03-14 ENCOUNTER — PATIENT MESSAGE (OUTPATIENT)
Dept: ADMINISTRATIVE | Facility: HOSPITAL | Age: 63
End: 2023-03-14
Payer: MEDICARE

## 2023-04-05 ENCOUNTER — OFFICE VISIT (OUTPATIENT)
Dept: SLEEP MEDICINE | Facility: CLINIC | Age: 63
End: 2023-04-05
Payer: MEDICARE

## 2023-04-05 VITALS
HEART RATE: 81 BPM | DIASTOLIC BLOOD PRESSURE: 69 MMHG | BODY MASS INDEX: 26.33 KG/M2 | WEIGHT: 158.06 LBS | HEIGHT: 65 IN | SYSTOLIC BLOOD PRESSURE: 114 MMHG

## 2023-04-05 DIAGNOSIS — E11.00 TYPE 2 DIABETES MELLITUS WITH HYPEROSMOLARITY WITHOUT COMA, WITHOUT LONG-TERM CURRENT USE OF INSULIN: Chronic | ICD-10-CM

## 2023-04-05 DIAGNOSIS — I10 PRIMARY HYPERTENSION: ICD-10-CM

## 2023-04-05 DIAGNOSIS — G47.30 SLEEP APNEA, UNSPECIFIED TYPE: ICD-10-CM

## 2023-04-05 DIAGNOSIS — G47.33 OSA (OBSTRUCTIVE SLEEP APNEA): Primary | ICD-10-CM

## 2023-04-05 PROCEDURE — 3008F PR BODY MASS INDEX (BMI) DOCUMENTED: ICD-10-PCS | Mod: CPTII,S$GLB,, | Performed by: NURSE PRACTITIONER

## 2023-04-05 PROCEDURE — 3078F PR MOST RECENT DIASTOLIC BLOOD PRESSURE < 80 MM HG: ICD-10-PCS | Mod: CPTII,S$GLB,, | Performed by: NURSE PRACTITIONER

## 2023-04-05 PROCEDURE — 3044F PR MOST RECENT HEMOGLOBIN A1C LEVEL <7.0%: ICD-10-PCS | Mod: CPTII,S$GLB,, | Performed by: NURSE PRACTITIONER

## 2023-04-05 PROCEDURE — 4010F ACE/ARB THERAPY RXD/TAKEN: CPT | Mod: CPTII,S$GLB,, | Performed by: NURSE PRACTITIONER

## 2023-04-05 PROCEDURE — 3066F PR DOCUMENTATION OF TREATMENT FOR NEPHROPATHY: ICD-10-PCS | Mod: CPTII,S$GLB,, | Performed by: NURSE PRACTITIONER

## 2023-04-05 PROCEDURE — 99999 PR PBB SHADOW E&M-EST. PATIENT-LVL II: CPT | Mod: PBBFAC,,, | Performed by: NURSE PRACTITIONER

## 2023-04-05 PROCEDURE — 3044F HG A1C LEVEL LT 7.0%: CPT | Mod: CPTII,S$GLB,, | Performed by: NURSE PRACTITIONER

## 2023-04-05 PROCEDURE — 3061F NEG MICROALBUMINURIA REV: CPT | Mod: CPTII,S$GLB,, | Performed by: NURSE PRACTITIONER

## 2023-04-05 PROCEDURE — 4010F PR ACE/ARB THEARPY RXD/TAKEN: ICD-10-PCS | Mod: CPTII,S$GLB,, | Performed by: NURSE PRACTITIONER

## 2023-04-05 PROCEDURE — 3066F NEPHROPATHY DOC TX: CPT | Mod: CPTII,S$GLB,, | Performed by: NURSE PRACTITIONER

## 2023-04-05 PROCEDURE — 3061F PR NEG MICROALBUMINURIA RESULT DOCUMENTED/REVIEW: ICD-10-PCS | Mod: CPTII,S$GLB,, | Performed by: NURSE PRACTITIONER

## 2023-04-05 PROCEDURE — 1159F PR MEDICATION LIST DOCUMENTED IN MEDICAL RECORD: ICD-10-PCS | Mod: CPTII,S$GLB,, | Performed by: NURSE PRACTITIONER

## 2023-04-05 PROCEDURE — 99214 PR OFFICE/OUTPT VISIT, EST, LEVL IV, 30-39 MIN: ICD-10-PCS | Mod: S$GLB,,, | Performed by: NURSE PRACTITIONER

## 2023-04-05 PROCEDURE — 3008F BODY MASS INDEX DOCD: CPT | Mod: CPTII,S$GLB,, | Performed by: NURSE PRACTITIONER

## 2023-04-05 PROCEDURE — 3074F SYST BP LT 130 MM HG: CPT | Mod: CPTII,S$GLB,, | Performed by: NURSE PRACTITIONER

## 2023-04-05 PROCEDURE — 99999 PR PBB SHADOW E&M-EST. PATIENT-LVL II: ICD-10-PCS | Mod: PBBFAC,,, | Performed by: NURSE PRACTITIONER

## 2023-04-05 PROCEDURE — 3078F DIAST BP <80 MM HG: CPT | Mod: CPTII,S$GLB,, | Performed by: NURSE PRACTITIONER

## 2023-04-05 PROCEDURE — 99214 OFFICE O/P EST MOD 30 MIN: CPT | Mod: S$GLB,,, | Performed by: NURSE PRACTITIONER

## 2023-04-05 PROCEDURE — 1159F MED LIST DOCD IN RCRD: CPT | Mod: CPTII,S$GLB,, | Performed by: NURSE PRACTITIONER

## 2023-04-05 PROCEDURE — 3074F PR MOST RECENT SYSTOLIC BLOOD PRESSURE < 130 MM HG: ICD-10-PCS | Mod: CPTII,S$GLB,, | Performed by: NURSE PRACTITIONER

## 2023-04-05 RX ORDER — CYCLOBENZAPRINE HCL 5 MG
5 TABLET ORAL 3 TIMES DAILY PRN
COMMUNITY
End: 2023-06-07 | Stop reason: ALTCHOICE

## 2023-04-05 NOTE — PROGRESS NOTES
Cc: Seen 6/2022 JOSE    Had HST 6/2022 revealing AHI 14 but TST <2hr (invalid study) and never repeated study or had PSG 2022 until done at [facility] 2/14/23. Never got results. Hx intolerable to cpap mask use/pap use. +palpitations. + snores. +disrupted sleep when does sleep  Sees psychiatrist  Sees neuro headaches  HgBA1c 6.9 (1/2023)    TRIED(ambien, halcion)  SH: former cook,     PSG 7/25/14 (244#) AHI 12.5/low sat? Not noted, titrated effectively CPAP 14-15cm.   PSG (184#)AHI 11.5/low sat 76%    ASSESSMENT:   JOSE, mild. Was intolerable to CPAP, has ongoing symptoms but has also lost wgt. Invalid HST 6/2022 3/2023: Ready to begin PAP  She has medical comorbidities of hypertension, anxiety, DM2  Insomnia    PLAN:   1. Cpap begin 6-14cm. DME THS.rtc 4.5-6 wks AFTER setup adherence monitoring   2. Discussed etiology of JOSE and potential ramifications of untreated JOSE, including stroke, heart disease, HTN.   See pcp re DM mgt/continue meds

## 2023-04-12 ENCOUNTER — PATIENT MESSAGE (OUTPATIENT)
Dept: ADMINISTRATIVE | Facility: HOSPITAL | Age: 63
End: 2023-04-12
Payer: MEDICARE

## 2023-04-20 ENCOUNTER — CLINICAL SUPPORT (OUTPATIENT)
Dept: INTERNAL MEDICINE | Facility: CLINIC | Age: 63
End: 2023-04-20
Attending: STUDENT IN AN ORGANIZED HEALTH CARE EDUCATION/TRAINING PROGRAM
Payer: MEDICARE

## 2023-04-20 ENCOUNTER — OFFICE VISIT (OUTPATIENT)
Dept: INTERNAL MEDICINE | Facility: CLINIC | Age: 63
End: 2023-04-20
Payer: MEDICARE

## 2023-04-20 VITALS
BODY MASS INDEX: 32.03 KG/M2 | OXYGEN SATURATION: 99 % | WEIGHT: 192.44 LBS | SYSTOLIC BLOOD PRESSURE: 124 MMHG | HEART RATE: 70 BPM | DIASTOLIC BLOOD PRESSURE: 70 MMHG

## 2023-04-20 DIAGNOSIS — G47.33 OSA (OBSTRUCTIVE SLEEP APNEA): ICD-10-CM

## 2023-04-20 DIAGNOSIS — I10 PRIMARY HYPERTENSION: ICD-10-CM

## 2023-04-20 DIAGNOSIS — E55.9 VITAMIN D DEFICIENCY: ICD-10-CM

## 2023-04-20 DIAGNOSIS — E78.2 MIXED HYPERLIPIDEMIA: ICD-10-CM

## 2023-04-20 DIAGNOSIS — J45.40 MODERATE PERSISTENT ASTHMA, UNSPECIFIED WHETHER COMPLICATED: ICD-10-CM

## 2023-04-20 DIAGNOSIS — M54.50 CHRONIC BILATERAL LOW BACK PAIN WITHOUT SCIATICA: ICD-10-CM

## 2023-04-20 DIAGNOSIS — R00.2 PALPITATIONS: ICD-10-CM

## 2023-04-20 DIAGNOSIS — G43.709 CHRONIC MIGRAINE WITHOUT AURA WITHOUT STATUS MIGRAINOSUS, NOT INTRACTABLE: ICD-10-CM

## 2023-04-20 DIAGNOSIS — E11.65 TYPE 2 DIABETES MELLITUS WITH HYPERGLYCEMIA, WITHOUT LONG-TERM CURRENT USE OF INSULIN: Chronic | ICD-10-CM

## 2023-04-20 DIAGNOSIS — R55 SYNCOPE, UNSPECIFIED SYNCOPE TYPE: ICD-10-CM

## 2023-04-20 DIAGNOSIS — D53.9 NUTRITIONAL ANEMIA: ICD-10-CM

## 2023-04-20 DIAGNOSIS — E11.65 TYPE 2 DIABETES MELLITUS WITH HYPERGLYCEMIA, WITHOUT LONG-TERM CURRENT USE OF INSULIN: Primary | Chronic | ICD-10-CM

## 2023-04-20 DIAGNOSIS — Z23 NEED FOR VACCINATION: ICD-10-CM

## 2023-04-20 DIAGNOSIS — G89.29 CHRONIC BILATERAL LOW BACK PAIN WITHOUT SCIATICA: ICD-10-CM

## 2023-04-20 PROCEDURE — 99214 OFFICE O/P EST MOD 30 MIN: CPT | Mod: S$GLB,,, | Performed by: STUDENT IN AN ORGANIZED HEALTH CARE EDUCATION/TRAINING PROGRAM

## 2023-04-20 PROCEDURE — 4010F PR ACE/ARB THEARPY RXD/TAKEN: ICD-10-PCS | Mod: CPTII,S$GLB,, | Performed by: STUDENT IN AN ORGANIZED HEALTH CARE EDUCATION/TRAINING PROGRAM

## 2023-04-20 PROCEDURE — 3078F PR MOST RECENT DIASTOLIC BLOOD PRESSURE < 80 MM HG: ICD-10-PCS | Mod: CPTII,S$GLB,, | Performed by: STUDENT IN AN ORGANIZED HEALTH CARE EDUCATION/TRAINING PROGRAM

## 2023-04-20 PROCEDURE — 3066F PR DOCUMENTATION OF TREATMENT FOR NEPHROPATHY: ICD-10-PCS | Mod: CPTII,S$GLB,, | Performed by: STUDENT IN AN ORGANIZED HEALTH CARE EDUCATION/TRAINING PROGRAM

## 2023-04-20 PROCEDURE — 4010F ACE/ARB THERAPY RXD/TAKEN: CPT | Mod: CPTII,S$GLB,, | Performed by: STUDENT IN AN ORGANIZED HEALTH CARE EDUCATION/TRAINING PROGRAM

## 2023-04-20 PROCEDURE — 92228 IMG RTA DETC/MNTR DS PHY/QHP: CPT | Mod: TC,S$GLB,, | Performed by: STUDENT IN AN ORGANIZED HEALTH CARE EDUCATION/TRAINING PROGRAM

## 2023-04-20 PROCEDURE — 3044F HG A1C LEVEL LT 7.0%: CPT | Mod: CPTII,S$GLB,, | Performed by: STUDENT IN AN ORGANIZED HEALTH CARE EDUCATION/TRAINING PROGRAM

## 2023-04-20 PROCEDURE — 92228 DIABETIC EYE SCREENING PHOTO: ICD-10-PCS | Mod: TC,S$GLB,, | Performed by: STUDENT IN AN ORGANIZED HEALTH CARE EDUCATION/TRAINING PROGRAM

## 2023-04-20 PROCEDURE — 3074F PR MOST RECENT SYSTOLIC BLOOD PRESSURE < 130 MM HG: ICD-10-PCS | Mod: CPTII,S$GLB,, | Performed by: STUDENT IN AN ORGANIZED HEALTH CARE EDUCATION/TRAINING PROGRAM

## 2023-04-20 PROCEDURE — 3061F PR NEG MICROALBUMINURIA RESULT DOCUMENTED/REVIEW: ICD-10-PCS | Mod: CPTII,S$GLB,, | Performed by: STUDENT IN AN ORGANIZED HEALTH CARE EDUCATION/TRAINING PROGRAM

## 2023-04-20 PROCEDURE — 99999 PR PBB SHADOW E&M-EST. PATIENT-LVL IV: CPT | Mod: PBBFAC,,, | Performed by: STUDENT IN AN ORGANIZED HEALTH CARE EDUCATION/TRAINING PROGRAM

## 2023-04-20 PROCEDURE — 1159F PR MEDICATION LIST DOCUMENTED IN MEDICAL RECORD: ICD-10-PCS | Mod: CPTII,S$GLB,, | Performed by: STUDENT IN AN ORGANIZED HEALTH CARE EDUCATION/TRAINING PROGRAM

## 2023-04-20 PROCEDURE — 3078F DIAST BP <80 MM HG: CPT | Mod: CPTII,S$GLB,, | Performed by: STUDENT IN AN ORGANIZED HEALTH CARE EDUCATION/TRAINING PROGRAM

## 2023-04-20 PROCEDURE — 3044F PR MOST RECENT HEMOGLOBIN A1C LEVEL <7.0%: ICD-10-PCS | Mod: CPTII,S$GLB,, | Performed by: STUDENT IN AN ORGANIZED HEALTH CARE EDUCATION/TRAINING PROGRAM

## 2023-04-20 PROCEDURE — 3008F BODY MASS INDEX DOCD: CPT | Mod: CPTII,S$GLB,, | Performed by: STUDENT IN AN ORGANIZED HEALTH CARE EDUCATION/TRAINING PROGRAM

## 2023-04-20 PROCEDURE — 3066F NEPHROPATHY DOC TX: CPT | Mod: CPTII,S$GLB,, | Performed by: STUDENT IN AN ORGANIZED HEALTH CARE EDUCATION/TRAINING PROGRAM

## 2023-04-20 PROCEDURE — 92228 DIABETIC EYE SCREENING PHOTO: ICD-10-PCS | Mod: 26,S$GLB,, | Performed by: OPTOMETRIST

## 2023-04-20 PROCEDURE — 3074F SYST BP LT 130 MM HG: CPT | Mod: CPTII,S$GLB,, | Performed by: STUDENT IN AN ORGANIZED HEALTH CARE EDUCATION/TRAINING PROGRAM

## 2023-04-20 PROCEDURE — 99214 PR OFFICE/OUTPT VISIT, EST, LEVL IV, 30-39 MIN: ICD-10-PCS | Mod: S$GLB,,, | Performed by: STUDENT IN AN ORGANIZED HEALTH CARE EDUCATION/TRAINING PROGRAM

## 2023-04-20 PROCEDURE — 3061F NEG MICROALBUMINURIA REV: CPT | Mod: CPTII,S$GLB,, | Performed by: STUDENT IN AN ORGANIZED HEALTH CARE EDUCATION/TRAINING PROGRAM

## 2023-04-20 PROCEDURE — 92228 IMG RTA DETC/MNTR DS PHY/QHP: CPT | Mod: 26,S$GLB,, | Performed by: OPTOMETRIST

## 2023-04-20 PROCEDURE — 99999 PR PBB SHADOW E&M-EST. PATIENT-LVL IV: ICD-10-PCS | Mod: PBBFAC,,, | Performed by: STUDENT IN AN ORGANIZED HEALTH CARE EDUCATION/TRAINING PROGRAM

## 2023-04-20 PROCEDURE — 3008F PR BODY MASS INDEX (BMI) DOCUMENTED: ICD-10-PCS | Mod: CPTII,S$GLB,, | Performed by: STUDENT IN AN ORGANIZED HEALTH CARE EDUCATION/TRAINING PROGRAM

## 2023-04-20 PROCEDURE — 1159F MED LIST DOCD IN RCRD: CPT | Mod: CPTII,S$GLB,, | Performed by: STUDENT IN AN ORGANIZED HEALTH CARE EDUCATION/TRAINING PROGRAM

## 2023-04-20 RX ORDER — EMPAGLIFLOZIN 10 MG/1
10 TABLET, FILM COATED ORAL DAILY
Qty: 90 TABLET | Refills: 1 | Status: SHIPPED | OUTPATIENT
Start: 2023-04-20 | End: 2023-11-28 | Stop reason: SDUPTHER

## 2023-04-20 RX ORDER — TIZANIDINE 4 MG/1
4 TABLET ORAL DAILY PRN
COMMUNITY
Start: 2023-03-15 | End: 2023-06-07 | Stop reason: ALTCHOICE

## 2023-04-20 RX ORDER — MONTELUKAST SODIUM 10 MG/1
10 TABLET ORAL NIGHTLY
Qty: 90 TABLET | Refills: 1 | Status: SHIPPED | OUTPATIENT
Start: 2023-04-20

## 2023-04-20 RX ORDER — DICLOFENAC SODIUM 10 MG/G
GEL TOPICAL
COMMUNITY
Start: 2023-03-26 | End: 2023-04-20 | Stop reason: ALTCHOICE
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
## 2023-04-20 NOTE — PROGRESS NOTES
Susan Courtney is a 63 y.o. female here for a diabetic eye screening with non-dilated fundus photos per Dr. Letty Kumar    Patient cooperative?: Yes  Small pupils?: Yes  Last eye exam: Wears glasses and haven't had an eye exam in several years    For exam results, see Encounter Report.

## 2023-04-21 PROBLEM — E78.2 MIXED HYPERLIPIDEMIA: Status: ACTIVE | Noted: 2023-03-13

## 2023-05-24 ENCOUNTER — TELEPHONE (OUTPATIENT)
Dept: SLEEP MEDICINE | Facility: CLINIC | Age: 63
End: 2023-05-24
Payer: MEDICARE

## 2023-05-30 ENCOUNTER — TELEPHONE (OUTPATIENT)
Dept: INTERNAL MEDICINE | Facility: CLINIC | Age: 63
End: 2023-05-30
Payer: MEDICARE

## 2023-05-30 NOTE — TELEPHONE ENCOUNTER
----- Message from Jimi Ward MA sent at 5/30/2023 12:01 PM CDT -----  Regarding: FW: rx for itch    ----- Message -----  From: Delores Lugo  Sent: 5/30/2023   9:30 AM CDT  To: [facility] Staff  Subject: rx for itch                                      Name of Who is Calling:YUNG VILLEGAS [[id number]]          What is the request in detail: pt is having vaginal itching she thinks due to the diabetes . She is asking if you can call her in something to :  She said it is itching real bad   [id number] - [place], [place] - [address]   Phone:  [phone]  Fax:  [phone]            Can the clinic reply by [facility]:          What Number to Call Back if not in [facility]:[phone]

## 2023-05-31 ENCOUNTER — E-VISIT (OUTPATIENT)
Dept: INTERNAL MEDICINE | Facility: CLINIC | Age: 63
End: 2023-05-31
Payer: MEDICARE

## 2023-05-31 DIAGNOSIS — N89.8 VAGINAL ITCHING: Primary | ICD-10-CM

## 2023-05-31 PROCEDURE — 99441 PR PHYSICIAN TELEPHONE EVALUATION 5-10 MIN: ICD-10-PCS | Mod: 95,,, | Performed by: STUDENT IN AN ORGANIZED HEALTH CARE EDUCATION/TRAINING PROGRAM

## 2023-05-31 PROCEDURE — 99441 PR PHYSICIAN TELEPHONE EVALUATION 5-10 MIN: CPT | Mod: 95,,, | Performed by: STUDENT IN AN ORGANIZED HEALTH CARE EDUCATION/TRAINING PROGRAM

## 2023-05-31 RX ORDER — FLUCONAZOLE 150 MG/1
150 TABLET ORAL DAILY
Qty: 1 TABLET | Refills: 0 | Status: SHIPPED | OUTPATIENT
Start: 2023-05-31 | End: 2023-06-01

## 2023-05-31 NOTE — PROGRESS NOTES
Patient ID: Susan Courtney is a 63 y.o. female.    Chief Complaint: Vaginal Itching    The patient initiated a request through [employer] on 5/31/2023 for evaluation and management with a chief complaint of Vaginal Itching     I evaluated the questionnaire submission on 05/31/2023.    Oh[facility] Peq Evisit Vaginal Discharge    5/31/2023  8:47 AM CDT - Filed by Patient   Do you agree to participate in an E-Visit? Yes   If you have any of the following symptoms,  please do not complete an E-Visit,  schedule an appointment with your provider: I acknowledge   What is the main issue that you would like for your doctor to address today? Vaginal itch   Are you able to take your vital signs? No   Which of the following are you experiencing? Vaginal Pain;  Vaginal Itching    Are you having pain while passing urine? No, I have no pain while urinating.   Which of the following applies to your vaginal discharge? I have no discharge.    Which of the following are you experiencing? None of the above   Do you have any sores on your genitals? Yes    Have you taken antibiotics recently? I am not sure    Do you use any of the following? None of the above   Which of the following applies to your menstrual period? I do not have menstrual periods   Have you had similar symptoms in the past? No, I have never had these symptoms.   Have you had a fever? No   During the last 2 months, have you had sexual contact with a specific person for the first time? No   Provide any information you feel is important to your history not asked above    Please attach any relevant images or files          Recent Labs Obtained:  No visits with results within 7 Day(s) from this visit.   Latest known visit with results is:   Hospital Outpatient Visit on 02/26/2023   Component Date Value Ref Range Status    POC Creatinine 02/26/2023 0.8  0.5 - 1.4 mg/dL Final    Sample 02/26/2023 VENOUS   Final       Encounter Diagnosis   Name Primary?    Vaginal itching Yes         No orders of the defined types were placed in this encounter.     Medications Ordered This Encounter   Medications    fluconazole (DIFLUCAN) 150 MG Tab     Sig: Take 1 tablet (150 mg total) by mouth once daily. for 1 day     Dispense:  1 tablet     Refill:  0        No follow-ups on file.      E-Visit Time Tracking:    Day 1 Time (in minutes): 6     Total Time (in minutes): 6        Letty Kumar MD  05/31/2023

## 2023-06-06 ENCOUNTER — TELEPHONE (OUTPATIENT)
Dept: INTERNAL MEDICINE | Facility: CLINIC | Age: 63
End: 2023-06-06
Payer: MEDICARE

## 2023-06-06 NOTE — LETTER
June 7, 2023      [facility]  [address]  [place] [address]  Phone: [phone]  Fax: [phone]       Patient: Susan Courtney   YOB: 1960      To Whom It May Concern:    Susan Courtney is currently under our care and taking the following medications:    Current Outpatient Medications   Medication Instructions    ALPRAZolam (XANAX) 2 MG Tab TAKE 1 TABLET BY MOUTH 2 TIMES A DAY DAILY    amLODIPine (NORVASC) 5 mg, Oral, Daily    atorvastatin (LIPITOR) 40 mg, Oral, Daily    azelastine (ASTELIN) 137 mcg (0.1 %) nasal spray SPRAY 2 SPRAYS BY NASAL ROUTE 2 TIMES DAILY.    citalopram (CELEXA) 20 mg, Oral    dextromethorphan-guaiFENesin  mg/5 ml (ROBITUSSIN-DM)  mg/5 mL liquid Dextromethorphan-guaiFENesin  MG/5ML Oral Syrup QTY: 240 mL Days: 30 Refills: 2  Written: 11/30/22 Patient Instructions: take 10ml by mouth every 6 hours as needed for cough    famotidine (PEPCID) 40 mg, Oral, 2 times daily PRN    JARDIANCE 10 mg, Oral, Daily    losartan (COZAAR) 50 MG tablet TAKE 1 TABLET BY MOUTH ONCE DAILY FOR ELEVATED BLOOD PRESSURE    metFORMIN (FORTAMET) 1,000 mg, Oral, 2 times daily with meals    montelukast (SINGULAIR) 10 mg, Oral, Nightly    naratriptan (AMERGE) 1 mg, Oral, 2 times daily PRN, Take 1mg po; may repeat x1 after 4 hours as needed for migraine headache    omeprazole (PRILOSEC) 40 MG capsule TAKE 1 CAPSULE ONCE PER DAY IN THE AM 30-60 MIN BEFORE MEAL FOR GERD    sucralfate (CARAFATE) 1 g, Oral, 4 times daily    TRULICITY 0.75 mg, Subcutaneous, Every 7 days    valsartan (DIOVAN) 320 mg, Oral, Daily    VENTOLIN HFA 90 mcg/actuation inhaler INHALE 2 PUFFS BY MOUTH EVERY 4 TO 6 HOURS AS NEEDED FOR COUGHING AND WHEEZING    zolpidem (AMBIEN) 10 mg, Oral, Nightly        If you have any questions or concerns, or if I can be of further assistance, please do not hesitate to contact me.      Sincerely,      Letty Kumar MD        [facility] ENCOUNTER  INTERNAL MEDICINE OFFICE VISIT      CHIEF COMPLAINT:    Kristy Matos is a 58 year old female who presents with C/O Cough (since end of May)    Congested cough x 4 days. She was treated with doxycycline at the end of May for a sinus infection. Symptoms did resolve, however her cough returned on Tuesday. She has some PND, but no congestion or blowing her nose. She is coughing up green, thick mucous. Her ears are ringing and feel plugged. Reports maybe some wheezing and SOB. She tested neg for COVID at home. Her daughter has had similar symptoms. She does take Allerga-D regularly and has a history of allergies. She has also been taking Mucinex DM. Remote history of asthma. Denies fever, stomach upset, diarrhea.      Patient Active Problem List   Diagnosis   • Allergic rhinitis   • RAD (reactive airway disease)   • Colon polyps   • Intermittent asthma   • Cough   • History of recurrent UTIs   • Overweight with body mass index (BMI) 25.0-29.9   • Acute recurrent sinusitis       Past Medical History:   Diagnosis Date   • Colon polyps    • Intermittent asthma    • Recurrent UTI        Family History   Problem Relation Age of Onset   • Cancer Mother         breast   • Dementia/Alzheimers Father        Current Outpatient Medications   Medication Sig   • fexofenadine (Allegra Allergy) 60 MG tablet ALLEGRA ALLERGY 60 MG TABS   • albuterol 108 (90 Base) MCG/ACT inhaler Inhale 2 puffs into the lungs every 4 hours as needed for Shortness of Breath or Wheezing.   • Levocetirizine Dihydrochloride (XYZAL PO)    • MAGNESIUM PO MAGNESIUM CAPS   • Omega-3 Fatty Acids (OMEGA-3 FISH OIL PO) Take 1,000 mg by mouth.   • Cholecalciferol (VITAMIN D3) 5000 units capsule Take 5,000 Units by mouth daily.   • B Complex Vitamins (B COMPLEX 1 PO) Take 1 tablet by mouth daily.   • vitamin E 400 UNIT capsule Take 400 Units by mouth.   • OIL OF OREGANO PO Take 1 capsule by mouth.    • fluticasone (FLONASE) 50 MCG/ACT  nasal spray Spray 1 spray in each nostril 2 times daily.   • azithromycin (Zithromax Z-Jasiel) 250 MG tablet Take 2 tablets by mouth on day one, then 1 tablet days 2-5     No current facility-administered medications for this visit.       ALLERGIES:  No Known Allergies      PAST HISTORIES:  I have reviewed the patient's medications and allergies, past medical, surgical, social and family history, updating these as appropriate.  See Histories section of the electronic medical record for a display of this information.      REVIEW OF SYSTEMS:       Review of systems:  Constitutional: No weight changes or significant fatigue. Denies fever.   HEENT: No sore throat, + ear congestion, no congestion, +PND  Neck: No neck pain   Respiratory: + shortness of breath.+ wheezing, +cough.  Cardiovascular: No chest pain or palpitations.   Gastrointestinal: No abdominal pain, no diarrhea.   Neurologic : No headaches or dizziness or loss of consciousness.      PHYSICAL EXAM:    Vital Signs:    Vitals:    06/10/22 0738   BP: 120/80   BP Location: LUE - Left upper extremity   Patient Position: Sitting   Cuff Size: Regular   Pulse: 88   Resp: 16   Temp: 96.7 °F (35.9 °C)   TempSrc: Temporal   SpO2: 96%   Weight: 79.7 kg (175 lb 12.8 oz)   Height: 5' 6.5\" (1.689 m)   LMP: 03/13/2017        Wt Readings from Last 3 Encounters:   06/10/22 79.7 kg (175 lb 12.8 oz)   05/28/22 81.6 kg (180 lb)   07/28/20 84.4 kg (186 lb 1.6 oz)         General: Well developed. Well nourished. In no apparent distress.    Eyes: PERRL (pupils equal, round, and reactive to light), EOMI (extraocular movements are intact). Conjunctivae pink. Lids within normal limits. Sclerae anicteric.    HENT: Normocephalic. Mucous membranes moist  Atraumatic. Lips, teeth, and gums within normal limits. +PND, TM clear bilaterally  Neck: Supple. Trachea midline.   Respiratory: Normal respiratory effort. Symmetrical chest expansion. No chest wall tenderness. Lungs clear to auscultation  bilaterally. No wheezes. No rhonchi. No rales. No rubs.  Cardiovascular: Regular rate and rhythm. No murmurs, rubs, or gallops. Normal S1 and S2. No S3 or S4.  No peripheral edema.   Abdominal:   No distention and no tenderness in either the upper or lower abdomen.  There is no rebound or guarding.    Neurologic: Alert and oriented x 3. Gait is normal. Normal sensory function. No motor deficits in all 4 extremities.  Integumentary: Warm. Dry. Pink.    Lymphatic: No lymphadenopathy in submental, submandibular or cervical chain.   Psychiatric: Cooperative. Appropriate mood and affect. Normal judgment. Recent and remote memory intact.     LAB RESULTS:  Pertinent labs reviewed.      ASSESSMENT:   1. Bronchitis    2. Cough          PLAN:     Bronchitis  (primary encounter diagnosis)  Plan: azithromycin (Zithromax Z-Jasiel) 250 MG tablet    Cough  Plan: albuterol 108 (90 Base) MCG/ACT inhaler, 2019         NOVEL CORONAVIRUS (SARS-COV-2)     -COVID test   -Zpak   -Albuterol as needed   -Continue Allegra & Mucinex   -Flonase nasal spray 1-2 times daily   -Plenty of fluids   -Follow up if symptoms do not improve or worsen        Orders Placed This Encounter   • 2019 Novel Coronavirus (SARS-CoV-2)   • fexofenadine (Allegra Allergy) 60 MG tablet   • azithromycin (Zithromax Z-Jasiel) 250 MG tablet   • albuterol 108 (90 Base) MCG/ACT inhaler       Return if symptoms worsen or fail to improve.    Pt voiced understanding and agreement with the plan of care.  Provider wore mask throughout entire visit.

## 2023-06-06 NOTE — TELEPHONE ENCOUNTER
"----- Message from Jimi Ward MA sent at 6/5/2023 12:45 PM CDT -----  Regarding: FW: Letter    ----- Message -----  From: Alisson Gonsalez  Sent: 6/5/2023  11:56 AM CDT  To: [facility] Staff  Subject: Letter                                           "Type:  Patient Call Back    Who Called:PT    What is the reqeust in detail:Pt requesting call back in regards to needing a letter showing with all medication she is taking and stating whats going on with her for RTA for Transportation . Please advise    Can the clinic reply by [facility]?no    Best Call Back Number:[phone]      Additional Information:              "

## 2023-06-07 ENCOUNTER — OFFICE VISIT (OUTPATIENT)
Dept: NEUROLOGY | Facility: CLINIC | Age: 63
End: 2023-06-07
Payer: MEDICARE

## 2023-06-07 VITALS
SYSTOLIC BLOOD PRESSURE: 135 MMHG | HEART RATE: 79 BPM | DIASTOLIC BLOOD PRESSURE: 81 MMHG | WEIGHT: 189.63 LBS | HEIGHT: 65 IN | BODY MASS INDEX: 31.59 KG/M2

## 2023-06-07 DIAGNOSIS — S09.90XA: ICD-10-CM

## 2023-06-07 DIAGNOSIS — I47.20 VENTRICULAR TACHYCARDIA: ICD-10-CM

## 2023-06-07 DIAGNOSIS — R55 SYNCOPE AND COLLAPSE: Primary | ICD-10-CM
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
## 2023-06-07 DIAGNOSIS — S13.4XXA WHIPLASH, INITIAL ENCOUNTER: ICD-10-CM
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
## 2023-06-07 PROCEDURE — 99999 PR PBB SHADOW E&M-EST. PATIENT-LVL III: CPT | Mod: PBBFAC,,, | Performed by: PSYCHIATRY & NEUROLOGY

## 2023-06-07 PROCEDURE — 1159F MED LIST DOCD IN RCRD: CPT | Mod: CPTII,S$GLB,, | Performed by: PSYCHIATRY & NEUROLOGY

## 2023-06-07 PROCEDURE — 99999 PR PBB SHADOW E&M-EST. PATIENT-LVL III: ICD-10-PCS | Mod: PBBFAC,,, | Performed by: PSYCHIATRY & NEUROLOGY

## 2023-06-07 PROCEDURE — 1159F PR MEDICATION LIST DOCUMENTED IN MEDICAL RECORD: ICD-10-PCS | Mod: CPTII,S$GLB,, | Performed by: PSYCHIATRY & NEUROLOGY

## 2023-06-07 PROCEDURE — 3008F PR BODY MASS INDEX (BMI) DOCUMENTED: ICD-10-PCS | Mod: CPTII,S$GLB,, | Performed by: PSYCHIATRY & NEUROLOGY

## 2023-06-07 PROCEDURE — 3061F PR NEG MICROALBUMINURIA RESULT DOCUMENTED/REVIEW: ICD-10-PCS | Mod: CPTII,S$GLB,, | Performed by: PSYCHIATRY & NEUROLOGY

## 2023-06-07 PROCEDURE — 3044F HG A1C LEVEL LT 7.0%: CPT | Mod: CPTII,S$GLB,, | Performed by: PSYCHIATRY & NEUROLOGY

## 2023-06-07 PROCEDURE — 3061F NEG MICROALBUMINURIA REV: CPT | Mod: CPTII,S$GLB,, | Performed by: PSYCHIATRY & NEUROLOGY

## 2023-06-07 PROCEDURE — 3079F PR MOST RECENT DIASTOLIC BLOOD PRESSURE 80-89 MM HG: ICD-10-PCS | Mod: CPTII,S$GLB,, | Performed by: PSYCHIATRY & NEUROLOGY

## 2023-06-07 PROCEDURE — 4010F ACE/ARB THERAPY RXD/TAKEN: CPT | Mod: CPTII,S$GLB,, | Performed by: PSYCHIATRY & NEUROLOGY

## 2023-06-07 PROCEDURE — 3075F SYST BP GE 130 - 139MM HG: CPT | Mod: CPTII,S$GLB,, | Performed by: PSYCHIATRY & NEUROLOGY

## 2023-06-07 PROCEDURE — 3079F DIAST BP 80-89 MM HG: CPT | Mod: CPTII,S$GLB,, | Performed by: PSYCHIATRY & NEUROLOGY

## 2023-06-07 PROCEDURE — 99215 PR OFFICE/OUTPT VISIT, EST, LEVL V, 40-54 MIN: ICD-10-PCS | Mod: S$GLB,,, | Performed by: PSYCHIATRY & NEUROLOGY

## 2023-06-07 PROCEDURE — 99215 OFFICE O/P EST HI 40 MIN: CPT | Mod: S$GLB,,, | Performed by: PSYCHIATRY & NEUROLOGY

## 2023-06-07 PROCEDURE — 3066F NEPHROPATHY DOC TX: CPT | Mod: CPTII,S$GLB,, | Performed by: PSYCHIATRY & NEUROLOGY

## 2023-06-07 PROCEDURE — 4010F PR ACE/ARB THEARPY RXD/TAKEN: ICD-10-PCS | Mod: CPTII,S$GLB,, | Performed by: PSYCHIATRY & NEUROLOGY

## 2023-06-07 PROCEDURE — 3075F PR MOST RECENT SYSTOLIC BLOOD PRESS GE 130-139MM HG: ICD-10-PCS | Mod: CPTII,S$GLB,, | Performed by: PSYCHIATRY & NEUROLOGY

## 2023-06-07 PROCEDURE — 3044F PR MOST RECENT HEMOGLOBIN A1C LEVEL <7.0%: ICD-10-PCS | Mod: CPTII,S$GLB,, | Performed by: PSYCHIATRY & NEUROLOGY

## 2023-06-07 PROCEDURE — 3008F BODY MASS INDEX DOCD: CPT | Mod: CPTII,S$GLB,, | Performed by: PSYCHIATRY & NEUROLOGY

## 2023-06-07 PROCEDURE — 3066F PR DOCUMENTATION OF TREATMENT FOR NEPHROPATHY: ICD-10-PCS | Mod: CPTII,S$GLB,, | Performed by: PSYCHIATRY & NEUROLOGY
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
## 2023-06-07 NOTE — Clinical Note
Dr. Kumar,  I'm very concerned by the [place] monitor results. She had over 1000 episodes on the monitor, with ~28 events per hour. I am not smart enough to interpret this. But I am worried that this is the underlying reason she had syncope in September, October, and a contributing factor in April.   Based on her report and imaging, this is not primarily neurologic syncope.   Thanks for hearing me out on my concerns.   Sincerely, Julio

## 2023-06-08 ENCOUNTER — TELEPHONE (OUTPATIENT)
Dept: INTERNAL MEDICINE | Facility: CLINIC | Age: 63
End: 2023-06-08
Payer: MEDICARE

## 2023-06-08 NOTE — TELEPHONE ENCOUNTER
----- Message from Harika Herzog sent at 6/8/2023 12:48 PM CDT -----  Name of Who is Calling: YUNG VILLEGAS [[id number]]              What is the request in detail: Patient requesting a call back to discuss letter for transportation with [facility]. Dr. Joseph sent a message over to Dr. Kumar for cardiology              Can the clinic reply by [facility]: No              What Number to Call Back if not in [facility]: [phone]

## 2023-06-08 NOTE — TELEPHONE ENCOUNTER
----- Message from Delores Lugo sent at 6/8/2023  3:49 PM CDT -----  Regarding: returning call    Type:  Patient Returning Call    Who Called:  YUNG VILLEGAS [[id number]]    Who Left Message for Patient: Jimi    Does the patient know what this is regarding?yes, [facility]    Best Call Back Number:[phone]    Additional Information:

## 2023-06-21 DIAGNOSIS — E11.65 TYPE 2 DIABETES MELLITUS WITH HYPERGLYCEMIA, WITHOUT LONG-TERM CURRENT USE OF INSULIN: Primary | ICD-10-CM

## 2023-06-21 RX ORDER — DULAGLUTIDE 3 MG/.5ML
INJECTION, SOLUTION SUBCUTANEOUS
Qty: 4 PEN | Refills: 0 | Status: SHIPPED | OUTPATIENT
Start: 2023-06-21 | End: 2023-07-06

## 2023-06-21 NOTE — TELEPHONE ENCOUNTER
No care due was identified.  [facility] Embedded Care Due Messages. Reference number: [id number].   6/21/2023 9:10:54 AM CDT

## 2023-06-21 NOTE — TELEPHONE ENCOUNTER
Refill Routing Note   Medication(s) are not appropriate for processing by [facility] for the following reason(s):      No active prescription written by PCP    [facility] action(s):  Defer None identified          Appointments  past 12m or future 3m with PCP    Date Provider   Last Visit   4/20/2023 Letty Kumar MD   Next Visit   7/27/2023 Letty Kumar MD   ED visits in past 90 days: 0        Note composed:10:06 AM 06/21/2023

## 2023-06-22 ENCOUNTER — TELEPHONE (OUTPATIENT)
Dept: INTERNAL MEDICINE | Facility: CLINIC | Age: 63
End: 2023-06-22
Payer: MEDICARE

## 2023-06-22 DIAGNOSIS — E11.65 TYPE 2 DIABETES MELLITUS WITH HYPERGLYCEMIA, WITHOUT LONG-TERM CURRENT USE OF INSULIN: ICD-10-CM

## 2023-06-22 NOTE — TELEPHONE ENCOUNTER
----- Message from Aidee Lux sent at 6/21/2023  3:39 PM CDT -----   Name of Who is Calling:     What is the request in detail: patient request call back in reference to referral to gastro/ patient went to [facility] on  yesterday / patient state for acid reflux   Please contact to further discuss and advise      Can the clinic reply by [facility]:     What Number to Call Back if not in [facility]:  [phone]

## 2023-06-27 ENCOUNTER — OFFICE VISIT (OUTPATIENT)
Dept: CARDIOLOGY | Facility: CLINIC | Age: 63
End: 2023-06-27
Payer: MEDICARE

## 2023-06-27 VITALS
BODY MASS INDEX: 31.97 KG/M2 | OXYGEN SATURATION: 98 % | DIASTOLIC BLOOD PRESSURE: 68 MMHG | SYSTOLIC BLOOD PRESSURE: 130 MMHG | WEIGHT: 192.13 LBS | HEART RATE: 86 BPM

## 2023-06-27 DIAGNOSIS — R07.2 PRECORDIAL PAIN: Primary | ICD-10-CM

## 2023-06-27 PROCEDURE — 1159F MED LIST DOCD IN RCRD: CPT | Mod: CPTII,S$GLB,, | Performed by: INTERNAL MEDICINE

## 2023-06-27 PROCEDURE — 3008F PR BODY MASS INDEX (BMI) DOCUMENTED: ICD-10-PCS | Mod: CPTII,S$GLB,, | Performed by: INTERNAL MEDICINE

## 2023-06-27 PROCEDURE — 3075F PR MOST RECENT SYSTOLIC BLOOD PRESS GE 130-139MM HG: ICD-10-PCS | Mod: CPTII,S$GLB,, | Performed by: INTERNAL MEDICINE

## 2023-06-27 PROCEDURE — 4010F PR ACE/ARB THEARPY RXD/TAKEN: ICD-10-PCS | Mod: CPTII,S$GLB,, | Performed by: INTERNAL MEDICINE

## 2023-06-27 PROCEDURE — 3044F HG A1C LEVEL LT 7.0%: CPT | Mod: CPTII,S$GLB,, | Performed by: INTERNAL MEDICINE

## 2023-06-27 PROCEDURE — 3078F DIAST BP <80 MM HG: CPT | Mod: CPTII,S$GLB,, | Performed by: INTERNAL MEDICINE

## 2023-06-27 PROCEDURE — 99215 PR OFFICE/OUTPT VISIT, EST, LEVL V, 40-54 MIN: ICD-10-PCS | Mod: S$GLB,,, | Performed by: INTERNAL MEDICINE

## 2023-06-27 PROCEDURE — 1160F RVW MEDS BY RX/DR IN RCRD: CPT | Mod: CPTII,S$GLB,, | Performed by: INTERNAL MEDICINE

## 2023-06-27 PROCEDURE — 3078F PR MOST RECENT DIASTOLIC BLOOD PRESSURE < 80 MM HG: ICD-10-PCS | Mod: CPTII,S$GLB,, | Performed by: INTERNAL MEDICINE

## 2023-06-27 PROCEDURE — 3066F NEPHROPATHY DOC TX: CPT | Mod: CPTII,S$GLB,, | Performed by: INTERNAL MEDICINE

## 2023-06-27 PROCEDURE — 3066F PR DOCUMENTATION OF TREATMENT FOR NEPHROPATHY: ICD-10-PCS | Mod: CPTII,S$GLB,, | Performed by: INTERNAL MEDICINE

## 2023-06-27 PROCEDURE — 3044F PR MOST RECENT HEMOGLOBIN A1C LEVEL <7.0%: ICD-10-PCS | Mod: CPTII,S$GLB,, | Performed by: INTERNAL MEDICINE

## 2023-06-27 PROCEDURE — 1159F PR MEDICATION LIST DOCUMENTED IN MEDICAL RECORD: ICD-10-PCS | Mod: CPTII,S$GLB,, | Performed by: INTERNAL MEDICINE

## 2023-06-27 PROCEDURE — 1160F PR REVIEW ALL MEDS BY PRESCRIBER/CLIN PHARMACIST DOCUMENTED: ICD-10-PCS | Mod: CPTII,S$GLB,, | Performed by: INTERNAL MEDICINE

## 2023-06-27 PROCEDURE — 3061F PR NEG MICROALBUMINURIA RESULT DOCUMENTED/REVIEW: ICD-10-PCS | Mod: CPTII,S$GLB,, | Performed by: INTERNAL MEDICINE

## 2023-06-27 PROCEDURE — 4010F ACE/ARB THERAPY RXD/TAKEN: CPT | Mod: CPTII,S$GLB,, | Performed by: INTERNAL MEDICINE

## 2023-06-27 PROCEDURE — 3008F BODY MASS INDEX DOCD: CPT | Mod: CPTII,S$GLB,, | Performed by: INTERNAL MEDICINE

## 2023-06-27 PROCEDURE — 3061F NEG MICROALBUMINURIA REV: CPT | Mod: CPTII,S$GLB,, | Performed by: INTERNAL MEDICINE

## 2023-06-27 PROCEDURE — 99999 PR PBB SHADOW E&M-EST. PATIENT-LVL III: ICD-10-PCS | Mod: PBBFAC,,, | Performed by: INTERNAL MEDICINE

## 2023-06-27 PROCEDURE — 3075F SYST BP GE 130 - 139MM HG: CPT | Mod: CPTII,S$GLB,, | Performed by: INTERNAL MEDICINE

## 2023-06-27 PROCEDURE — 99215 OFFICE O/P EST HI 40 MIN: CPT | Mod: S$GLB,,, | Performed by: INTERNAL MEDICINE

## 2023-06-27 PROCEDURE — 99999 PR PBB SHADOW E&M-EST. PATIENT-LVL III: CPT | Mod: PBBFAC,,, | Performed by: INTERNAL MEDICINE

## 2023-06-27 NOTE — PROGRESS NOTES
[facility]    Chief Complaint  Chief Complaint   Patient presents with    Chest Pain       HPI:    Sent here for follow-up after emergency department visit at [facility] for chest discomfort.  Cardiac workup was unrevealing.  Chest pain was atypical.  Empiric trial of acid suppression for reflux has resolved the discomfort.  She is scheduled to follow up with GI.  She is also concerned about imbalance leading to falls.  No loss of consciousness.  Saw Dr. García.  Holter monitor showed minimal ectopy and no significant arrhythmias.    Medications  Current Outpatient Medications   Medication Sig Dispense Refill    amLODIPine (NORVASC) 5 MG tablet Take 1 tablet (5 mg total) by mouth Daily. 90 tablet 1    TRULICITY 0.75 mg/0.5 mL pen injector Inject 0.75 mg into the skin every 7 days.      ALPRAZolam (XANAX) 2 MG Tab TAKE 1 TABLET BY MOUTH 2 TIMES A DAY DAILY      atorvastatin (LIPITOR) 40 MG tablet Take 1 tablet (40 mg total) by mouth Daily. 90 tablet 1    azelastine (ASTELIN) 137 mcg (0.1 %) nasal spray SPRAY 2 SPRAYS BY NASAL ROUTE 2 TIMES DAILY. 30 mL 3    citalopram (CELEXA) 20 MG tablet Take 20 mg by mouth.      dextromethorphan-guaiFENesin  mg/5 ml (ROBITUSSIN-DM)  mg/5 mL liquid Dextromethorphan-guaiFENesin  MG/5ML Oral Syrup QTY: 240 mL Days: 30 Refills: 2  Written: 11/30/22 Patient Instructions: take 10ml by mouth every 6 hours as needed for cough      dulaglutide (TRULICITY) 3 mg/0.5 mL pen injector INJECT 3MG ONCE A WEEK WHILE 4.5 MG ON BACKORDER 4 pen 0    famotidine (PEPCID) 40 MG tablet Take 1 tablet (40 mg total) by mouth 2 (two) times daily as needed for Heartburn. 180 tablet 1    JARDIANCE 10 mg tablet Take 1 tablet (10 mg total) by mouth once daily. 90 tablet 1    losartan (COZAAR) 50 MG tablet TAKE 1 TABLET BY MOUTH ONCE DAILY FOR ELEVATED BLOOD PRESSURE      metFORMIN (FORTAMET) 500 mg 24hr tablet Take 2 tablets (1,000 mg total) by mouth 2 (two) times daily with  meals. 90 tablet 1    montelukast (SINGULAIR) 10 mg tablet Take 1 tablet (10 mg total) by mouth every evening. 90 tablet 1    naratriptan (AMERGE) 1 MG Tab Take 1 tablet (1 mg total) by mouth 2 (two) times daily as needed (headache). Take 1mg po; may repeat x1 after 4 hours as needed for migraine headache 12 tablet 12    omeprazole (PRILOSEC) 40 MG capsule TAKE 1 CAPSULE ONCE PER DAY IN THE AM 30-60 MIN BEFORE MEAL FOR GERD      sucralfate (CARAFATE) 1 gram tablet Take 1 g by mouth 4 (four) times daily.      valsartan (DIOVAN) 320 MG tablet Take 1 tablet (320 mg total) by mouth once daily. 90 tablet 1    VENTOLIN HFA 90 mcg/actuation inhaler INHALE 2 PUFFS BY MOUTH EVERY 4 TO 6 HOURS AS NEEDED FOR COUGHING AND WHEEZING  2    zolpidem (AMBIEN) 10 mg Tab Take 10 mg by mouth every evening.       No current facility-administered medications for this visit.        History  Past Medical History:   Diagnosis Date    Abdominal hernia     Anxiety     Asthma     Diabetes mellitus     Hypertension     Migraine     Migraine headache     Obesity     Sleep apnea      Past Surgical History:   Procedure Laterality Date    BUNIONECTOMY Right     CHOLECYSTECTOMY      EXCISION OF LESION Left 12/01/2021    Procedure: EXCISION, LESION, LEFT PALM;  Surgeon: Amy Sharp MD;  Location: [facility] OR;  Service: Orthopedics;  Laterality: Left;    FOOT SURGERY Right     HYSTERECTOMY      total, fibroids    INCISIONAL HERNIA REPAIR      TONSILLECTOMY Bilateral 03/06/2020    Procedure: TONSILLECTOMY;  Surgeon: Brant Pelaez MD;  Location: [facility];  Service: ENT;  Laterality: Bilateral;     Social History     Socioeconomic History    Marital status:    Tobacco Use    Smoking status: Never    Smokeless tobacco: Never   Substance and Sexual Activity    Alcohol use: No    Drug use: No    Sexual activity: Not Currently     Family History   Problem Relation Age of Onset    Psoriasis Neg Hx     Melanoma Neg Hx     Lupus Neg Hx          Allergies  Review of patient's allergies indicates:   Allergen Reactions    Morphine Itching     Not sure what she can take for pain.    Morphine sulfate      Itchy (skin)^    Tramadol Itching       Review of Systems   Review of Systems   Constitutional: Negative for malaise/fatigue, weight gain and weight loss.   Eyes:  Negative for visual disturbance.   Cardiovascular:  Negative for chest pain, claudication, cyanosis, dyspnea on exertion, irregular heartbeat, leg swelling, near-syncope, orthopnea, palpitations, paroxysmal nocturnal dyspnea and syncope.   Respiratory:  Negative for cough, hemoptysis, shortness of breath, sleep disturbances due to breathing and wheezing.    Hematologic/Lymphatic: Negative for bleeding problem. Does not bruise/bleed easily.   Skin:  Negative for poor wound healing.   Musculoskeletal:  Positive for falls. Negative for muscle cramps and myalgias.   Gastrointestinal:  Negative for abdominal pain, anorexia, diarrhea, heartburn, hematemesis, hematochezia, melena, nausea and vomiting.   Genitourinary:  Negative for hematuria and nocturia.   Neurological:  Positive for dizziness. Negative for excessive daytime sleepiness, focal weakness, light-headedness and weakness.     Physical Exam  Vitals:    06/27/23 1559   BP: 130/68   Pulse: 86     Wt Readings from Last 1 Encounters:   06/27/23 87.1 kg (192 lb 1.6 oz)     Physical Exam  Vitals and nursing note reviewed.   Constitutional:       General: She is not in acute distress.     Appearance: She is obese. She is not toxic-appearing or diaphoretic.   HENT:      Head: Normocephalic and atraumatic.      Mouth/Throat:      Lips: Pink.      Mouth: Mucous membranes are moist.   Eyes:      General: No scleral icterus.     Conjunctiva/sclera: Conjunctivae normal.   Neck:      Thyroid: No thyromegaly.      Vascular: No carotid bruit, hepatojugular reflux or JVD.      Trachea: Trachea normal.   Cardiovascular:      Rate and Rhythm: Normal rate and  regular rhythm. No extrasystoles are present.     Chest Wall: PMI is not displaced.      Pulses:           Carotid pulses are 2+ on the right side and 2+ on the left side.       Radial pulses are 2+ on the right side and 2+ on the left side.        Dorsalis pedis pulses are 2+ on the right side and 2+ on the left side.        Posterior tibial pulses are 2+ on the right side and 2+ on the left side.      Heart sounds: S1 normal and S2 normal. No murmur heard.    No friction rub. No S3 or S4 sounds.   Pulmonary:      Effort: Pulmonary effort is normal. No accessory muscle usage or respiratory distress.      Breath sounds: Normal breath sounds and air entry. No decreased breath sounds, wheezing, rhonchi or rales.   Abdominal:      General: Bowel sounds are normal. There is no distension or abdominal bruit.      Palpations: Abdomen is soft. There is no hepatomegaly, splenomegaly or pulsatile mass.      Tenderness: There is no abdominal tenderness.   Musculoskeletal:         General: No tenderness or deformity.      Right lower leg: No edema.      Left lower leg: No edema.   Skin:     General: Skin is warm and dry.      Capillary Refill: Capillary refill takes less than 2 seconds.      Coloration: Skin is not cyanotic or pale.      Nails: There is no clubbing.   Neurological:      General: No focal deficit present.      Mental Status: She is alert and oriented to person, place, and time.   Psychiatric:         Behavior: Behavior is cooperative.       Labs  Hospital Outpatient Visit on 02/26/2023   Component Date Value Ref Range Status    POC Creatinine 02/26/2023 0.8  0.5 - 1.4 mg/dL Final    Sample 02/26/2023 VENOUS   Final   Hospital Outpatient Visit on 02/08/2023   Component Date Value Ref Range Status    Holter length hours 02/08/2023 48   Final    holter length minutes 02/08/2023 0   Final    holter length dec hours 02/08/2023 48.00   Final    Sinus min HR 02/08/2023 66   Final    Sinus max hr 02/08/2023 118   Final     Sinus avg hr 02/08/2023 85   Final    Event Monitor Day 02/08/2023 0   Final   Lab Visit on 01/19/2023   Component Date Value Ref Range Status    IgE 01/19/2023 1338 (H)  0 - 100 IU/mL Final    WBC 01/19/2023 8.09  3.90 - 12.70 K/uL Final    RBC 01/19/2023 4.81  4.00 - 5.40 M/uL Final    Hemoglobin 01/19/2023 12.6  12.0 - 16.0 g/dL Final    Hematocrit 01/19/2023 41.2  37.0 - 48.5 % Final    MCV 01/19/2023 86  82 - 98 fL Final    MCH 01/19/2023 26.2 (L)  27.0 - 31.0 pg Final    MCHC 01/19/2023 30.6 (L)  32.0 - 36.0 g/dL Final    RDW 01/19/2023 13.6  11.5 - 14.5 % Final    Platelets 01/19/2023 283  150 - 450 K/uL Final    MPV 01/19/2023 9.4  9.2 - 12.9 fL Final    Immature Granulocytes 01/19/2023 0.4  0.0 - 0.5 % Final    Gran # (ANC) 01/19/2023 3.7  1.8 - 7.7 K/uL Final    Immature Grans (Abs) 01/19/2023 0.03  0.00 - 0.04 K/uL Final    Comment: Mild elevation in immature granulocytes is non specific and   can be seen in a variety of conditions including stress response,   acute inflammation, trauma and pregnancy. Correlation with other   laboratory and clinical findings is essential.      Lymph # 01/19/2023 3.4  1.0 - 4.8 K/uL Final    Mono # 01/19/2023 0.6  0.3 - 1.0 K/uL Final    Eos # 01/19/2023 0.3  0.0 - 0.5 K/uL Final    Baso # 01/19/2023 0.02  0.00 - 0.20 K/uL Final    nRBC 01/19/2023 0  0 /100 WBC Final    Gran % 01/19/2023 45.7  38.0 - 73.0 % Final    Lymph % 01/19/2023 42.4  18.0 - 48.0 % Final    Mono % 01/19/2023 7.5  4.0 - 15.0 % Final    Eosinophil % 01/19/2023 3.8  0.0 - 8.0 % Final    Basophil % 01/19/2023 0.2  0.0 - 1.9 % Final    Differential Method 01/19/2023 Automated   Final   Lab Visit on 01/18/2023   Component Date Value Ref Range Status    WBC 01/18/2023 7.21  3.90 - 12.70 K/uL Final    RBC 01/18/2023 4.80  4.00 - 5.40 M/uL Final    Hemoglobin 01/18/2023 12.9  12.0 - 16.0 g/dL Final    Hematocrit 01/18/2023 40.3  37.0 - 48.5 % Final    MCV 01/18/2023 84  82 - 98 fL Final    MCH 01/18/2023  26.9 (L)  27.0 - 31.0 pg Final    MCHC 01/18/2023 32.0  32.0 - 36.0 g/dL Final    RDW 01/18/2023 13.9  11.5 - 14.5 % Final    Platelets 01/18/2023 286  150 - 450 K/uL Final    MPV 01/18/2023 9.1 (L)  9.2 - 12.9 fL Final    Immature Granulocytes 01/18/2023 0.3  0.0 - 0.5 % Final    Gran # (ANC) 01/18/2023 3.9  1.8 - 7.7 K/uL Final    Immature Grans (Abs) 01/18/2023 0.02  0.00 - 0.04 K/uL Final    Comment: Mild elevation in immature granulocytes is non specific and   can be seen in a variety of conditions including stress response,   acute inflammation, trauma and pregnancy. Correlation with other   laboratory and clinical findings is essential.      Lymph # 01/18/2023 2.5  1.0 - 4.8 K/uL Final    Mono # 01/18/2023 0.6  0.3 - 1.0 K/uL Final    Eos # 01/18/2023 0.2  0.0 - 0.5 K/uL Final    Baso # 01/18/2023 0.02  0.00 - 0.20 K/uL Final    nRBC 01/18/2023 0  0 /100 WBC Final    Gran % 01/18/2023 53.6  38.0 - 73.0 % Final    Lymph % 01/18/2023 34.7  18.0 - 48.0 % Final    Mono % 01/18/2023 8.0  4.0 - 15.0 % Final    Eosinophil % 01/18/2023 3.1  0.0 - 8.0 % Final    Basophil % 01/18/2023 0.3  0.0 - 1.9 % Final    Differential Method 01/18/2023 Automated   Final    Sodium 01/18/2023 140  136 - 145 mmol/L Final    Potassium 01/18/2023 3.8  3.5 - 5.1 mmol/L Final    Chloride 01/18/2023 106  95 - 110 mmol/L Final    CO2 01/18/2023 26  23 - 29 mmol/L Final    Glucose 01/18/2023 135 (H)  70 - 110 mg/dL Final    BUN 01/18/2023 24 (H)  8 - 23 mg/dL Final    Creatinine 01/18/2023 0.9  0.5 - 1.4 mg/dL Final    Calcium 01/18/2023 9.7  8.7 - 10.5 mg/dL Final    Total Protein 01/18/2023 8.0  6.0 - 8.4 g/dL Final    Albumin 01/18/2023 3.6  3.5 - 5.2 g/dL Final    Total Bilirubin 01/18/2023 0.9  0.1 - 1.0 mg/dL Final    Comment: For infants and newborns, interpretation of results should be based  on gestational age, weight and in agreement with clinical  observations.    Premature Infant recommended reference ranges:  Up to 24  hours.............<8.0 mg/dL  Up to 48 hours............<12.0 mg/dL  3-5 days..................<15.0 mg/dL  6-29 days.................<15.0 mg/dL      Alkaline Phosphatase 01/18/2023 114  55 - 135 U/L Final    AST 01/18/2023 13  10 - 40 U/L Final    ALT 01/18/2023 14  10 - 44 U/L Final    Anion Gap 01/18/2023 8  8 - 16 mmol/L Final    eGFR 01/18/2023 >60  >60 mL/min/1.73 m^2 Final    TSH 01/18/2023 0.787  0.400 - 4.000 uIU/mL Final    Cholesterol 01/18/2023 101 (L)  120 - 199 mg/dL Final    Comment: The National Cholesterol Education Program (NCEP) has set the  following guidelines (reference ranges) for Cholesterol:  Optimal.....................<200 mg/dL  Borderline High.............200-239 mg/dL  High........................> or = 240 mg/dL      Triglycerides 01/18/2023 99  30 - 150 mg/dL Final    Comment: The National Cholesterol Education Program (NCEP) has set the  following guidelines (reference values) for triglycerides:  Normal......................<150 mg/dL  Borderline High.............150-199 mg/dL  High........................200-499 mg/dL      HDL 01/18/2023 36 (L)  40 - 75 mg/dL Final    Comment: The National Cholesterol Education Program (NCEP) has set the  following guidelines (reference values) for HDL Cholesterol:  Low...............<40 mg/dL  Optimal...........>60 mg/dL      LDL Cholesterol 01/18/2023 45.2 (L)  63.0 - 159.0 mg/dL Final    Comment: The National Cholesterol Education Program (NCEP) has set the  following guidelines (reference values) for LDL Cholesterol:  Optimal.......................<130 mg/dL  Borderline High...............130-159 mg/dL  High..........................160-189 mg/dL  Very High.....................>190 mg/dL      HDL/Cholesterol Ratio 01/18/2023 35.6  20.0 - 50.0 % Final    Total Cholesterol/HDL Ratio 01/18/2023 2.8  2.0 - 5.0 Final    Non-HDL Cholesterol 01/18/2023 65  mg/dL Final    Comment: Risk category and Non-HDL cholesterol goals:  Coronary heart disease  (CHD)or equivalent (10-year risk of CHD >20%):  Non-HDL cholesterol goal     <130 mg/dL  Two or more CHD risk factors and 10-year risk of CHD <= 20%:  Non-HDL cholesterol goal     <160 mg/dL  0 to 1 CHD risk factor:  Non-HDL cholesterol goal     <190 mg/dL      Hemoglobin A1C 01/18/2023 6.9 (H)  4.0 - 5.6 % Final    Comment: ADA Screening Guidelines:  5.7-6.4%  Consistent with prediabetes  >or=6.5%  Consistent with diabetes    High levels of fetal hemoglobin interfere with the HbA1C  assay. Heterozygous hemoglobin variants (HbS, HgC, etc)do  not significantly interfere with this assay.   However, presence of multiple variants may affect accuracy.      Estimated Avg Glucose 01/18/2023 151 (H)  68 - 131 mg/dL Final   Lab Visit on 01/18/2023   Component Date Value Ref Range Status    Microalbumin, Urine 01/18/2023 9.0  ug/mL Final    Creatinine, Urine 01/18/2023 159.8  15.0 - 325.0 mg/dL Final    Microalb/Creat Ratio 01/18/2023 5.6  0.0 - 30.0 ug/mg Final       Imaging  X-Ray Chest 1 View    Result Date: 6/11/2023  HISTORY: : CHEST PAIN EXAM: [facility] XR CHEST 1 VW PORTABLE. PRIOR STUDY: None. FINDINGS: The cardiac mediastinal silhouette and pulmonary vasculature are normal. The lungs and pleural spaces are clear.    No acute cardiopulmonary abnormality. Electronically Signed By: Reyes Hoffman 6/11/2023 8:10 AM CDT    CT Abdomen Pelvis With Contrast    Result Date: 6/20/2023  TECHNIQUE:  Axial images of the abdomen and pelvis were obtained following intravenous contrast administration. Contrast information: 100 cc Omnipaque 350 IV Coronal and sagittal reconstructions were provided. Total DLP was 383 mGy-cm. Radiation dose lowering technique, automated exposure control, was utilized for this exam. INDICATION:  Epigastric pain. COMPARISON: None. FINDINGS: The bilateral lung bases are clear. The heart is normal in size. The liver is homogeneous in attenuation. The portal vein is patent. The gallbladder is absent. The spleen,  pancreas, and adrenal glands are normal. The bilateral kidneys are normal. There is no hydronephrosis or nephrolithiasis. The stomach and small bowel are decompressed. There is no bowel obstruction. The appendix is normal. The colon is normal. The uterus is surgically absent. The urinary bladder is normal. There is no pelvic or retroperitoneal lymphadenopathy. The aorta is nonaneurysmal. There is no lytic or blastic osseous       1.   No acute abnormality of the abdomen and pelvis. Electronically Signed By: Reyes Desai MD 6/20/2023 3:39 PM CDT      Assessment  1. Precordial pain  Noncardiac.  Has improved with acid suppression.      Plan and Discussion    Holter monitor shows very rare ectopy which is not clinically significant.  Structurally normal heart by echocardiography.  Chest pain has resolved with treatment for a GI etiology.  No further cardiac workup is warranted at this point.    The ASCVD Risk score (Buckland DK, et al., 2019) failed to calculate for the following reasons:    The valid total cholesterol range is 130 to 320 mg/dL     Follow Up  Follow up if symptoms worsen or fail to improve.      John Bruce MD

## 2023-07-06 DIAGNOSIS — E11.65 TYPE 2 DIABETES MELLITUS WITH HYPERGLYCEMIA, WITHOUT LONG-TERM CURRENT USE OF INSULIN: ICD-10-CM

## 2023-07-06 RX ORDER — DULAGLUTIDE 3 MG/.5ML
INJECTION, SOLUTION SUBCUTANEOUS
Qty: 4 PEN | Refills: 0 | Status: SHIPPED | OUTPATIENT
Start: 2023-07-06 | End: 2023-07-21 | Stop reason: SDUPTHER

## 2023-07-06 NOTE — TELEPHONE ENCOUNTER
Care Due:                  Date            Visit Type   Department     Provider  --------------------------------------------------------------------------------                                EP -                              PRIMARY      [facility] INTERNAL  Last Visit: 04-      CARE ([facility])   ALISON Kumar                               -                              PRIMARY      [facility] INTERNAL  Next Visit: 07-      CARE ([facility])   ALISON Kumar                                                            Last  Test          Frequency    Reason                     Performed    Due Date  --------------------------------------------------------------------------------    HBA1C.......  6 months...  freddie ESTES,    01- 07-                             metFORMIN................    [employer] Embedded Care Due Messages. Reference number: [id number].   7/06/2023 12:36:00 AM CDT

## 2023-07-06 NOTE — TELEPHONE ENCOUNTER
Refill Routing Note   Refill Routing Note   Medication(s) are not appropriate for processing by [facility] for the following reason(s):      New or recently adjusted medication    [facility] action(s):  Defer None identified     Medication Therapy Plan: FLOS  Medication reconciliation completed: No     Appointments  past 12m or future 3m with PCP    Date Provider   Last Visit   4/20/2023 Letty Kumar MD   Next Visit   7/27/2023 Letty Kumar MD   ED visits in past 90 days: 0        Note composed:10:29 AM 07/06/2023

## 2023-07-20 ENCOUNTER — LAB VISIT (OUTPATIENT)
Dept: LAB | Facility: OTHER | Age: 63
End: 2023-07-20
Attending: STUDENT IN AN ORGANIZED HEALTH CARE EDUCATION/TRAINING PROGRAM
Payer: MEDICARE

## 2023-07-20 DIAGNOSIS — E55.9 VITAMIN D DEFICIENCY: ICD-10-CM

## 2023-07-20 DIAGNOSIS — E11.65 TYPE 2 DIABETES MELLITUS WITH HYPERGLYCEMIA, WITHOUT LONG-TERM CURRENT USE OF INSULIN: Chronic | ICD-10-CM

## 2023-07-20 DIAGNOSIS — D53.9 NUTRITIONAL ANEMIA: ICD-10-CM

## 2023-07-20 DIAGNOSIS — R55 SYNCOPE, UNSPECIFIED SYNCOPE TYPE: ICD-10-CM

## 2023-07-20 LAB
25(OH)D3+25(OH)D2 SERPL-MCNC: 24 NG/ML (ref 30–96)
ALBUMIN SERPL BCP-MCNC: 3.6 G/DL (ref 3.5–5.2)
ALP SERPL-CCNC: 117 U/L (ref 55–135)
ALT SERPL W/O P-5'-P-CCNC: 11 U/L (ref 10–44)
ANION GAP SERPL CALC-SCNC: 11 MMOL/L (ref 8–16)
AST SERPL-CCNC: 13 U/L (ref 10–40)
BASOPHILS # BLD AUTO: 0.02 K/UL (ref 0–0.2)
BASOPHILS NFR BLD: 0.3 % (ref 0–1.9)
BILIRUB SERPL-MCNC: 0.5 MG/DL (ref 0.1–1)
BUN SERPL-MCNC: 15 MG/DL (ref 8–23)
CALCIUM SERPL-MCNC: 9.8 MG/DL (ref 8.7–10.5)
CHLORIDE SERPL-SCNC: 104 MMOL/L (ref 95–110)
CO2 SERPL-SCNC: 26 MMOL/L (ref 23–29)
CREAT SERPL-MCNC: 0.9 MG/DL (ref 0.5–1.4)
DIFFERENTIAL METHOD: ABNORMAL
EOSINOPHIL # BLD AUTO: 0.2 K/UL (ref 0–0.5)
EOSINOPHIL NFR BLD: 2.2 % (ref 0–8)
ERYTHROCYTE [DISTWIDTH] IN BLOOD BY AUTOMATED COUNT: 13.9 % (ref 11.5–14.5)
EST. GFR  (NO RACE VARIABLE): >60 ML/MIN/1.73 M^2
ESTIMATED AVG GLUCOSE: 148 MG/DL (ref 68–131)
GLUCOSE SERPL-MCNC: 123 MG/DL (ref 70–110)
HBA1C MFR BLD: 6.8 % (ref 4–5.6)
HCT VFR BLD AUTO: 40.6 % (ref 37–48.5)
HGB BLD-MCNC: 12.5 G/DL (ref 12–16)
IMM GRANULOCYTES # BLD AUTO: 0.01 K/UL (ref 0–0.04)
IMM GRANULOCYTES NFR BLD AUTO: 0.1 % (ref 0–0.5)
LYMPHOCYTES # BLD AUTO: 2.7 K/UL (ref 1–4.8)
LYMPHOCYTES NFR BLD: 40.1 % (ref 18–48)
MCH RBC QN AUTO: 25.9 PG (ref 27–31)
MCHC RBC AUTO-ENTMCNC: 30.8 G/DL (ref 32–36)
MCV RBC AUTO: 84 FL (ref 82–98)
MONOCYTES # BLD AUTO: 0.5 K/UL (ref 0.3–1)
MONOCYTES NFR BLD: 7.3 % (ref 4–15)
NEUTROPHILS # BLD AUTO: 3.4 K/UL (ref 1.8–7.7)
NEUTROPHILS NFR BLD: 50 % (ref 38–73)
NRBC BLD-RTO: 0 /100 WBC
PLATELET # BLD AUTO: 342 K/UL (ref 150–450)
PMV BLD AUTO: 8.9 FL (ref 9.2–12.9)
POTASSIUM SERPL-SCNC: 3.4 MMOL/L (ref 3.5–5.1)
PROT SERPL-MCNC: 7.8 G/DL (ref 6–8.4)
RBC # BLD AUTO: 4.82 M/UL (ref 4–5.4)
SODIUM SERPL-SCNC: 141 MMOL/L (ref 136–145)
VIT B12 SERPL-MCNC: 550 PG/ML (ref 210–950)
WBC # BLD AUTO: 6.74 K/UL (ref 3.9–12.7)

## 2023-07-20 PROCEDURE — 83036 HEMOGLOBIN GLYCOSYLATED A1C: CPT | Performed by: STUDENT IN AN ORGANIZED HEALTH CARE EDUCATION/TRAINING PROGRAM

## 2023-07-20 PROCEDURE — 80053 COMPREHEN METABOLIC PANEL: CPT | Performed by: STUDENT IN AN ORGANIZED HEALTH CARE EDUCATION/TRAINING PROGRAM

## 2023-07-20 PROCEDURE — 85025 COMPLETE CBC W/AUTO DIFF WBC: CPT | Performed by: STUDENT IN AN ORGANIZED HEALTH CARE EDUCATION/TRAINING PROGRAM

## 2023-07-20 PROCEDURE — 36415 COLL VENOUS BLD VENIPUNCTURE: CPT | Performed by: STUDENT IN AN ORGANIZED HEALTH CARE EDUCATION/TRAINING PROGRAM

## 2023-07-20 PROCEDURE — 82306 VITAMIN D 25 HYDROXY: CPT | Performed by: STUDENT IN AN ORGANIZED HEALTH CARE EDUCATION/TRAINING PROGRAM

## 2023-07-20 PROCEDURE — 82607 VITAMIN B-12: CPT | Performed by: STUDENT IN AN ORGANIZED HEALTH CARE EDUCATION/TRAINING PROGRAM

## 2023-07-21 DIAGNOSIS — E11.65 TYPE 2 DIABETES MELLITUS WITH HYPERGLYCEMIA, WITHOUT LONG-TERM CURRENT USE OF INSULIN: ICD-10-CM

## 2023-07-21 RX ORDER — DULAGLUTIDE 3 MG/.5ML
INJECTION, SOLUTION SUBCUTANEOUS
Qty: 4 PEN | Refills: 3 | Status: SHIPPED | OUTPATIENT
Start: 2023-07-21 | End: 2023-11-23

## 2023-07-21 NOTE — TELEPHONE ENCOUNTER
No care due was identified.  [facility] Embedded Care Due Messages. Reference number: [id number].   7/21/2023 9:48:31 AM CDT

## 2023-07-21 NOTE — TELEPHONE ENCOUNTER
----- Message from Delores Lugo sent at 7/21/2023  8:49 AM CDT -----  Regarding: refill  Who Called:YUNG VILLEGAS [[id number]]      Refill or New Rx: Refill        RX Name and Strength  dulaglutide (TRULICITY) 3 mg/0.5 mL pen injector    Is this a 30 day or 90 day RX:      Preferred Pharmacy with phone number:  [facility]/pharmacy #[id number] - [place], [place] - [facility] [address]   Phone:  [phone]  Fax:  [phone]            Local or Mail Order:  Local     Would the patient rather a call back or a response via [facility]?      best Call Back Number:[phone]      Additional Information:pt has an appt next week, she is currently out of this rx

## 2023-07-28 ENCOUNTER — OFFICE VISIT (OUTPATIENT)
Dept: INTERNAL MEDICINE | Facility: CLINIC | Age: 63
End: 2023-07-28
Payer: MEDICARE

## 2023-07-28 VITALS
HEART RATE: 86 BPM | HEIGHT: 65 IN | WEIGHT: 181.88 LBS | DIASTOLIC BLOOD PRESSURE: 63 MMHG | SYSTOLIC BLOOD PRESSURE: 136 MMHG | BODY MASS INDEX: 30.3 KG/M2 | OXYGEN SATURATION: 96 %

## 2023-07-28 DIAGNOSIS — J45.50 SEVERE PERSISTENT ASTHMA WITHOUT COMPLICATION: ICD-10-CM

## 2023-07-28 DIAGNOSIS — K21.9 GASTROESOPHAGEAL REFLUX DISEASE, UNSPECIFIED WHETHER ESOPHAGITIS PRESENT: Chronic | ICD-10-CM

## 2023-07-28 DIAGNOSIS — F41.1 GENERALIZED ANXIETY DISORDER: ICD-10-CM

## 2023-07-28 DIAGNOSIS — Z79.899 POLYPHARMACY: Primary | ICD-10-CM

## 2023-07-28 DIAGNOSIS — G47.33 OSA (OBSTRUCTIVE SLEEP APNEA): ICD-10-CM

## 2023-07-28 DIAGNOSIS — E11.00 TYPE 2 DIABETES MELLITUS WITH HYPEROSMOLARITY WITHOUT COMA, WITHOUT LONG-TERM CURRENT USE OF INSULIN: Chronic | ICD-10-CM

## 2023-07-28 PROCEDURE — 3061F PR NEG MICROALBUMINURIA RESULT DOCUMENTED/REVIEW: ICD-10-PCS | Mod: CPTII,S$GLB,, | Performed by: STUDENT IN AN ORGANIZED HEALTH CARE EDUCATION/TRAINING PROGRAM

## 2023-07-28 PROCEDURE — 99215 OFFICE O/P EST HI 40 MIN: CPT | Mod: S$GLB,,, | Performed by: STUDENT IN AN ORGANIZED HEALTH CARE EDUCATION/TRAINING PROGRAM

## 2023-07-28 PROCEDURE — 3061F NEG MICROALBUMINURIA REV: CPT | Mod: CPTII,S$GLB,, | Performed by: STUDENT IN AN ORGANIZED HEALTH CARE EDUCATION/TRAINING PROGRAM

## 2023-07-28 PROCEDURE — 3008F PR BODY MASS INDEX (BMI) DOCUMENTED: ICD-10-PCS | Mod: CPTII,S$GLB,, | Performed by: STUDENT IN AN ORGANIZED HEALTH CARE EDUCATION/TRAINING PROGRAM

## 2023-07-28 PROCEDURE — 3066F PR DOCUMENTATION OF TREATMENT FOR NEPHROPATHY: ICD-10-PCS | Mod: CPTII,S$GLB,, | Performed by: STUDENT IN AN ORGANIZED HEALTH CARE EDUCATION/TRAINING PROGRAM

## 2023-07-28 PROCEDURE — 99999 PR PBB SHADOW E&M-EST. PATIENT-LVL IV: CPT | Mod: PBBFAC,,, | Performed by: STUDENT IN AN ORGANIZED HEALTH CARE EDUCATION/TRAINING PROGRAM

## 2023-07-28 PROCEDURE — 3075F PR MOST RECENT SYSTOLIC BLOOD PRESS GE 130-139MM HG: ICD-10-PCS | Mod: CPTII,S$GLB,, | Performed by: STUDENT IN AN ORGANIZED HEALTH CARE EDUCATION/TRAINING PROGRAM

## 2023-07-28 PROCEDURE — 3008F BODY MASS INDEX DOCD: CPT | Mod: CPTII,S$GLB,, | Performed by: STUDENT IN AN ORGANIZED HEALTH CARE EDUCATION/TRAINING PROGRAM

## 2023-07-28 PROCEDURE — 3078F PR MOST RECENT DIASTOLIC BLOOD PRESSURE < 80 MM HG: ICD-10-PCS | Mod: CPTII,S$GLB,, | Performed by: STUDENT IN AN ORGANIZED HEALTH CARE EDUCATION/TRAINING PROGRAM

## 2023-07-28 PROCEDURE — 1159F MED LIST DOCD IN RCRD: CPT | Mod: CPTII,S$GLB,, | Performed by: STUDENT IN AN ORGANIZED HEALTH CARE EDUCATION/TRAINING PROGRAM

## 2023-07-28 PROCEDURE — 3066F NEPHROPATHY DOC TX: CPT | Mod: CPTII,S$GLB,, | Performed by: STUDENT IN AN ORGANIZED HEALTH CARE EDUCATION/TRAINING PROGRAM

## 2023-07-28 PROCEDURE — 99215 PR OFFICE/OUTPT VISIT, EST, LEVL V, 40-54 MIN: ICD-10-PCS | Mod: S$GLB,,, | Performed by: STUDENT IN AN ORGANIZED HEALTH CARE EDUCATION/TRAINING PROGRAM

## 2023-07-28 PROCEDURE — 3044F PR MOST RECENT HEMOGLOBIN A1C LEVEL <7.0%: ICD-10-PCS | Mod: CPTII,S$GLB,, | Performed by: STUDENT IN AN ORGANIZED HEALTH CARE EDUCATION/TRAINING PROGRAM

## 2023-07-28 PROCEDURE — 99999 PR PBB SHADOW E&M-EST. PATIENT-LVL IV: ICD-10-PCS | Mod: PBBFAC,,, | Performed by: STUDENT IN AN ORGANIZED HEALTH CARE EDUCATION/TRAINING PROGRAM

## 2023-07-28 PROCEDURE — 3044F HG A1C LEVEL LT 7.0%: CPT | Mod: CPTII,S$GLB,, | Performed by: STUDENT IN AN ORGANIZED HEALTH CARE EDUCATION/TRAINING PROGRAM

## 2023-07-28 PROCEDURE — 4010F PR ACE/ARB THEARPY RXD/TAKEN: ICD-10-PCS | Mod: CPTII,S$GLB,, | Performed by: STUDENT IN AN ORGANIZED HEALTH CARE EDUCATION/TRAINING PROGRAM

## 2023-07-28 PROCEDURE — 4010F ACE/ARB THERAPY RXD/TAKEN: CPT | Mod: CPTII,S$GLB,, | Performed by: STUDENT IN AN ORGANIZED HEALTH CARE EDUCATION/TRAINING PROGRAM

## 2023-07-28 PROCEDURE — 3078F DIAST BP <80 MM HG: CPT | Mod: CPTII,S$GLB,, | Performed by: STUDENT IN AN ORGANIZED HEALTH CARE EDUCATION/TRAINING PROGRAM

## 2023-07-28 PROCEDURE — 1159F PR MEDICATION LIST DOCUMENTED IN MEDICAL RECORD: ICD-10-PCS | Mod: CPTII,S$GLB,, | Performed by: STUDENT IN AN ORGANIZED HEALTH CARE EDUCATION/TRAINING PROGRAM

## 2023-07-28 PROCEDURE — 3075F SYST BP GE 130 - 139MM HG: CPT | Mod: CPTII,S$GLB,, | Performed by: STUDENT IN AN ORGANIZED HEALTH CARE EDUCATION/TRAINING PROGRAM

## 2023-07-28 NOTE — PATIENT INSTRUCTIONS
Overall your labs look ok, just a few concerns. Your blood counts and vitamin B12 were fine. Your blood sugar is looking good, your A1c is at goal, keep up the great work!      Your potassium was a bit low on the metabolic panel. I recommend increasing your intake of potassium containing foods such as beans, potatoes, spinach, avocado, yogurt, raisins, squash, and bananas.      Your vitamin D is low, I recommend starting a daily vitamin D3 (cholecalciferol) 2,000 IU supplement. This can be obtained without a prescription at most pharmacies or grocery stores. We can recheck your vitamin D at a later time.      I look forward to seeing you again, hope you have a great weekend!      Sincerely,  Dr. Kumar          High potassium foods    Bananas, oranges, cantaloupe, honeydew, apricots, grapefruit (some dried fruits, such as prunes, raisins, and dates, are also high in potassium)  Cooked spinach  Cooked broccoli  Potatoes  Sweet potatoes  Mushrooms  Peas  Cucumbers  Zucchini  Pumpkins  Leafy greens    Juice from potassium-rich fruit is also a good choice:  Orange juice  Tomato juice  Prune juice  Apricot juice  Grapefruit juice    Certain dairy products, such as milk and yogurt, are high in potassium     Some fish contain potassium:  Tuna  Halibut  Cod  [employer]  [place]    Beans or legumes that are high in potassium include:  Lima beans  Delgado beans  Kidney beans  Soybeans  Lentils    Other foods that are rich in potassium include:  Salt substitutes (read labels to check potassium levels)  Molasses  Nuts  Meat and poultry  Brown and wild rice  Bran cereal  Whole-wheat bread and pasta

## 2023-07-28 NOTE — PROGRESS NOTES
[facility]    Subjective:       Patient ID: Susan Courtney is a 63 y.o. female.    Chief Complaint: Hypertension (F/u)      History was obtained from the patient and supplemented through chart review.  This patient is normally followed by a colleague of [facility], who is unavailable today.  The patient is new to me.    HPI:    Patient is a 63 y.o. female who presents for med rec, clearance for cataracts, concern about diabetes, HTN, review of labs    Pmhx: HTN, HLD, T2DM, asthma, GERD, JOSE, migraines, anxiety, insomnia, chronic back pain, chronic NSAID use    T2DM -   Trulicity 3 mg (stated went up to 4.5, but not active in [facility] list)  She states she IS taking Jardiance, which is different than prior documentation  Not taking metformin,   Was having GI side effects with metformin   Controlled and improving    HTN -   Controlled  Taking valsartan 320, amlodipine.  No CP/HA/SOB/Vision changes   Past: metoprolol for palpitations, not currently taking/nor prescribed  Denies current palpitations  Followed with Dr. García JAN2023, Teo June 2023, f/u PRN  Holter Monitor after syncopal episode without significant arrhythmias    HLD /DM  Cont atorvastatin     Sleep apnea -   Followed with TERESITA Winter  Using CPAP     Syncope -   Thought to be 2/2 sedating medications.  Extensive med rec today, threw away all muscle relaxers, gabapentin (brain fog), ibuprofen, amitriptyline (pt does not wish to continue)    Migraines -   Following with Dr. Germain for neurology , most rcently JUNE2023     Going to [facility] for physical therapy for back pain and balance difficulties   Stopped tizanidine for muscle pain due to sedative AE     Asthma -   Stable and doing well  Using Symbicort; Singulair nightly.  Currently following with Dr. Reis for pulmonology.     Extensive med purge performed in office today, including writing the indications and directions on the bottles.              Medical  History  Past Medical History:   Diagnosis Date    Abdominal hernia     Anxiety     Asthma     Diabetes mellitus     Hypertension     Migraine     Migraine headache     Obesity     Sleep apnea        Review of Systems   Constitutional:  Negative for fever.   HENT:  Negative for trouble swallowing.    Respiratory:  Negative for shortness of breath.    Cardiovascular:  Negative for chest pain.   Gastrointestinal:  Positive for abdominal pain (occasional, not current). Negative for constipation, diarrhea, nausea and vomiting.   Musculoskeletal:  Positive for back pain. Negative for gait problem.   Neurological:  Negative for dizziness and seizures.   Psychiatric/Behavioral:  Negative for hallucinations. The patient is nervous/anxious.        Surgical hx, family hx, social hx   Have been reviewed      Current Outpatient Medications:     ALPRAZolam (XANAX) 2 MG Tab, TAKE 1 TABLET BY MOUTH 2 TIMES A DAY DAILY, Disp: , Rfl:     amLODIPine (NORVASC) 5 MG tablet, Take 1 tablet (5 mg total) by mouth Daily., Disp: 90 tablet, Rfl: 1    atorvastatin (LIPITOR) 40 MG tablet, Take 1 tablet (40 mg total) by mouth Daily., Disp: 90 tablet, Rfl: 1    azelastine (ASTELIN) 137 mcg (0.1 %) nasal spray, SPRAY 2 SPRAYS BY NASAL ROUTE 2 TIMES DAILY., Disp: 30 mL, Rfl: 3    citalopram (CELEXA) 20 MG tablet, Take 20 mg by mouth., Disp: , Rfl:     dextromethorphan-guaiFENesin  mg/5 ml (ROBITUSSIN-DM)  mg/5 mL liquid, Dextromethorphan-guaiFENesin  MG/5ML Oral Syrup QTY: 240 mL Days: 30 Refills: 2  Written: 11/30/22 Patient Instructions: take 10ml by mouth every 6 hours as needed for cough, Disp: , Rfl:     dulaglutide (TRULICITY) 3 mg/0.5 mL pen injector, INJECT 3MG ONCE A WEEK, Disp: 4 pen , Rfl: 3    famotidine (PEPCID) 40 MG tablet, Take 1 tablet (40 mg total) by mouth 2 (two) times daily as needed for Heartburn., Disp: 180 tablet, Rfl: 1    JARDIANCE 10 mg tablet, Take 1 tablet (10 mg total) by mouth once daily., Disp: 90  "tablet, Rfl: 1    losartan (COZAAR) 50 MG tablet, TAKE 1 TABLET BY MOUTH ONCE DAILY FOR ELEVATED BLOOD PRESSURE, Disp: , Rfl:     metFORMIN (FORTAMET) 500 mg 24hr tablet, Take 2 tablets (1,000 mg total) by mouth 2 (two) times daily with meals., Disp: 90 tablet, Rfl: 1    montelukast (SINGULAIR) 10 mg tablet, Take 1 tablet (10 mg total) by mouth every evening., Disp: 90 tablet, Rfl: 1    naratriptan (AMERGE) 1 MG Tab, Take 1 tablet (1 mg total) by mouth 2 (two) times daily as needed (headache). Take 1mg po; may repeat x1 after 4 hours as needed for migraine headache, Disp: 12 tablet, Rfl: 12    omeprazole (PRILOSEC) 40 MG capsule, TAKE 1 CAPSULE ONCE PER DAY IN THE AM 30-60 MIN BEFORE MEAL FOR GERD, Disp: , Rfl:     sucralfate (CARAFATE) 1 gram tablet, Take 1 g by mouth 4 (four) times daily., Disp: , Rfl:     valsartan (DIOVAN) 320 MG tablet, Take 1 tablet (320 mg total) by mouth once daily., Disp: 90 tablet, Rfl: 1    VENTOLIN HFA 90 mcg/actuation inhaler, INHALE 2 PUFFS BY MOUTH EVERY 4 TO 6 HOURS AS NEEDED FOR COUGHING AND WHEEZING, Disp: , Rfl: 2    zolpidem (AMBIEN) 10 mg Tab, Take 10 mg by mouth every evening., Disp: , Rfl:     Objective:        Body mass index is 30.27 kg/m².  Vitals:    07/28/23 0837   BP: 136/63   Pulse: 86   SpO2: 96%   Weight: 82.5 kg (181 lb 14.1 oz)   Height: 5' 5" (1.651 m)     Physical Exam  Vitals and nursing note reviewed.   Constitutional:       General: She is not in acute distress.     Appearance: Normal appearance. She is not ill-appearing.   HENT:      Head: Normocephalic and atraumatic.   Eyes:      General: No scleral icterus.  Cardiovascular:      Rate and Rhythm: Normal rate and regular rhythm.      Heart sounds: Normal heart sounds.   Pulmonary:      Effort: Pulmonary effort is normal.   Musculoskeletal:         General: No deformity.      Cervical back: Normal range of motion.   Skin:     General: Skin is warm and dry.   Neurological:      Mental Status: She is alert and " oriented to person, place, and time.   Psychiatric:         Behavior: Behavior normal.         Lab Results   Component Value Date    WBC 6.74 07/20/2023    HGB 12.5 07/20/2023    HCT 40.6 07/20/2023     07/20/2023    CHOL 101 (L) 01/18/2023    TRIG 99 01/18/2023    HDL 36 (L) 01/18/2023    ALT 11 07/20/2023    AST 13 07/20/2023     07/20/2023    K 3.4 (L) 07/20/2023     07/20/2023    CREATININE 0.9 07/20/2023    BUN 15 07/20/2023    CO2 26 07/20/2023    TSH 0.787 01/18/2023    HGBA1C 6.8 (H) 07/20/2023       The ASCVD Risk score (Risa DUMONT, et al., 2019) failed to calculate for the following reasons:    The valid total cholesterol range is 130 to 320 mg/dL    (Imaging have been independently reviewed)    Reviewed head imaging, holter    Assessment:         1. Polypharmacy    2. Severe persistent asthma without complication    3. Generalized anxiety disorder    4. Type 2 diabetes mellitus with hyperosmolarity without coma, without long-term current use of insulin    5. Gastroesophageal reflux disease, unspecified whether esophagitis present    6. JOSE (obstructive sleep apnea)          Plan:     Susan was seen today for hypertension.    Diagnoses and all orders for this visit:    Polypharmacy    Severe persistent asthma without complication    Generalized anxiety disorder    Type 2 diabetes mellitus with hyperosmolarity without coma, without long-term current use of insulin    Gastroesophageal reflux disease, unspecified whether esophagitis present    JOSE (obstructive sleep apnea)            Form signed for clearance for cataract surgery    Follow up in about 2 months (around 9/28/2023) for with pcp.        52 min were used in chart review, evaluation and counseling of patient, documentation and review of results on same day of service     All medications were reviewed including potential side effects and risks/benefits.  Pt was counseled to call back if anything worsens or if questions  arise.    Hernan Nicholson MD  Family Medicine  [facility]  [address] [facility] [address] [place] [address], [place] [address]  Phone [phone]  Fax [phone]

## 2023-08-29 ENCOUNTER — OFFICE VISIT (OUTPATIENT)
Dept: INTERNAL MEDICINE | Facility: CLINIC | Age: 63
End: 2023-08-29
Payer: MEDICARE

## 2023-08-29 VITALS
HEART RATE: 86 BPM | WEIGHT: 180.44 LBS | OXYGEN SATURATION: 99 % | DIASTOLIC BLOOD PRESSURE: 70 MMHG | BODY MASS INDEX: 30.03 KG/M2 | SYSTOLIC BLOOD PRESSURE: 124 MMHG

## 2023-08-29 DIAGNOSIS — E78.2 MIXED HYPERLIPIDEMIA: ICD-10-CM

## 2023-08-29 DIAGNOSIS — G47.33 OSA (OBSTRUCTIVE SLEEP APNEA): ICD-10-CM

## 2023-08-29 DIAGNOSIS — J45.50 SEVERE PERSISTENT ASTHMA WITHOUT COMPLICATION: ICD-10-CM

## 2023-08-29 DIAGNOSIS — K21.9 GASTROESOPHAGEAL REFLUX DISEASE, UNSPECIFIED WHETHER ESOPHAGITIS PRESENT: ICD-10-CM

## 2023-08-29 DIAGNOSIS — I10 PRIMARY HYPERTENSION: ICD-10-CM

## 2023-08-29 DIAGNOSIS — G43.709 CHRONIC MIGRAINE WITHOUT AURA WITHOUT STATUS MIGRAINOSUS, NOT INTRACTABLE: ICD-10-CM

## 2023-08-29 DIAGNOSIS — G44.86 CERVICOGENIC HEADACHE: ICD-10-CM

## 2023-08-29 DIAGNOSIS — E11.9 TYPE 2 DIABETES MELLITUS WITHOUT COMPLICATION, WITHOUT LONG-TERM CURRENT USE OF INSULIN: Primary | ICD-10-CM

## 2023-08-29 DIAGNOSIS — R55 SYNCOPE, UNSPECIFIED SYNCOPE TYPE: ICD-10-CM

## 2023-08-29 PROCEDURE — 3008F PR BODY MASS INDEX (BMI) DOCUMENTED: ICD-10-PCS | Mod: CPTII,S$GLB,, | Performed by: STUDENT IN AN ORGANIZED HEALTH CARE EDUCATION/TRAINING PROGRAM

## 2023-08-29 PROCEDURE — 3061F NEG MICROALBUMINURIA REV: CPT | Mod: CPTII,S$GLB,, | Performed by: STUDENT IN AN ORGANIZED HEALTH CARE EDUCATION/TRAINING PROGRAM

## 2023-08-29 PROCEDURE — 3066F NEPHROPATHY DOC TX: CPT | Mod: CPTII,S$GLB,, | Performed by: STUDENT IN AN ORGANIZED HEALTH CARE EDUCATION/TRAINING PROGRAM

## 2023-08-29 PROCEDURE — 99999 PR PBB SHADOW E&M-EST. PATIENT-LVL III: CPT | Mod: PBBFAC,,, | Performed by: STUDENT IN AN ORGANIZED HEALTH CARE EDUCATION/TRAINING PROGRAM

## 2023-08-29 PROCEDURE — 4010F PR ACE/ARB THEARPY RXD/TAKEN: ICD-10-PCS | Mod: CPTII,S$GLB,, | Performed by: STUDENT IN AN ORGANIZED HEALTH CARE EDUCATION/TRAINING PROGRAM

## 2023-08-29 PROCEDURE — 4010F ACE/ARB THERAPY RXD/TAKEN: CPT | Mod: CPTII,S$GLB,, | Performed by: STUDENT IN AN ORGANIZED HEALTH CARE EDUCATION/TRAINING PROGRAM

## 2023-08-29 PROCEDURE — 3044F HG A1C LEVEL LT 7.0%: CPT | Mod: CPTII,S$GLB,, | Performed by: STUDENT IN AN ORGANIZED HEALTH CARE EDUCATION/TRAINING PROGRAM

## 2023-08-29 PROCEDURE — 3078F PR MOST RECENT DIASTOLIC BLOOD PRESSURE < 80 MM HG: ICD-10-PCS | Mod: CPTII,S$GLB,, | Performed by: STUDENT IN AN ORGANIZED HEALTH CARE EDUCATION/TRAINING PROGRAM

## 2023-08-29 PROCEDURE — 99214 OFFICE O/P EST MOD 30 MIN: CPT | Mod: S$GLB,,, | Performed by: STUDENT IN AN ORGANIZED HEALTH CARE EDUCATION/TRAINING PROGRAM

## 2023-08-29 PROCEDURE — 99214 PR OFFICE/OUTPT VISIT, EST, LEVL IV, 30-39 MIN: ICD-10-PCS | Mod: S$GLB,,, | Performed by: STUDENT IN AN ORGANIZED HEALTH CARE EDUCATION/TRAINING PROGRAM

## 2023-08-29 PROCEDURE — 99999 PR PBB SHADOW E&M-EST. PATIENT-LVL III: ICD-10-PCS | Mod: PBBFAC,,, | Performed by: STUDENT IN AN ORGANIZED HEALTH CARE EDUCATION/TRAINING PROGRAM

## 2023-08-29 PROCEDURE — 3074F SYST BP LT 130 MM HG: CPT | Mod: CPTII,S$GLB,, | Performed by: STUDENT IN AN ORGANIZED HEALTH CARE EDUCATION/TRAINING PROGRAM

## 2023-08-29 PROCEDURE — 3044F PR MOST RECENT HEMOGLOBIN A1C LEVEL <7.0%: ICD-10-PCS | Mod: CPTII,S$GLB,, | Performed by: STUDENT IN AN ORGANIZED HEALTH CARE EDUCATION/TRAINING PROGRAM

## 2023-08-29 PROCEDURE — 3066F PR DOCUMENTATION OF TREATMENT FOR NEPHROPATHY: ICD-10-PCS | Mod: CPTII,S$GLB,, | Performed by: STUDENT IN AN ORGANIZED HEALTH CARE EDUCATION/TRAINING PROGRAM

## 2023-08-29 PROCEDURE — 3008F BODY MASS INDEX DOCD: CPT | Mod: CPTII,S$GLB,, | Performed by: STUDENT IN AN ORGANIZED HEALTH CARE EDUCATION/TRAINING PROGRAM

## 2023-08-29 PROCEDURE — 3078F DIAST BP <80 MM HG: CPT | Mod: CPTII,S$GLB,, | Performed by: STUDENT IN AN ORGANIZED HEALTH CARE EDUCATION/TRAINING PROGRAM

## 2023-08-29 PROCEDURE — 3061F PR NEG MICROALBUMINURIA RESULT DOCUMENTED/REVIEW: ICD-10-PCS | Mod: CPTII,S$GLB,, | Performed by: STUDENT IN AN ORGANIZED HEALTH CARE EDUCATION/TRAINING PROGRAM

## 2023-08-29 PROCEDURE — 3074F PR MOST RECENT SYSTOLIC BLOOD PRESSURE < 130 MM HG: ICD-10-PCS | Mod: CPTII,S$GLB,, | Performed by: STUDENT IN AN ORGANIZED HEALTH CARE EDUCATION/TRAINING PROGRAM

## 2023-08-29 RX ORDER — OMEPRAZOLE 40 MG/1
40 CAPSULE, DELAYED RELEASE ORAL DAILY
Qty: 90 CAPSULE | Refills: 1 | Status: SHIPPED | OUTPATIENT
Start: 2023-08-29 | End: 2023-10-26

## 2023-08-29 RX ORDER — FLUTICASONE PROPIONATE AND SALMETEROL 250; 50 UG/1; UG/1
1 POWDER RESPIRATORY (INHALATION) 2 TIMES DAILY
Qty: 60 EACH | Refills: 11 | Status: SHIPPED | OUTPATIENT
Start: 2023-08-29 | End: 2024-01-16 | Stop reason: SDUPTHER
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
## 2023-09-01 LAB — EGD FOLLOW UP EXTERNAL: NORMAL

## 2023-09-06 ENCOUNTER — PATIENT OUTREACH (OUTPATIENT)
Dept: ADMINISTRATIVE | Facility: HOSPITAL | Age: 63
End: 2023-09-06
Payer: MEDICARE

## 2023-09-07 ENCOUNTER — OFFICE VISIT (OUTPATIENT)
Dept: OPTOMETRY | Facility: CLINIC | Age: 63
End: 2023-09-07
Payer: MEDICARE

## 2023-09-07 DIAGNOSIS — Z96.1 PSEUDOPHAKIA, LEFT EYE: ICD-10-CM

## 2023-09-07 DIAGNOSIS — H25.11 NUCLEAR SCLEROTIC CATARACT, RIGHT: ICD-10-CM

## 2023-09-07 DIAGNOSIS — E11.65 TYPE 2 DIABETES MELLITUS WITH HYPERGLYCEMIA, WITHOUT LONG-TERM CURRENT USE OF INSULIN: Primary | Chronic | ICD-10-CM

## 2023-09-07 PROCEDURE — 3066F PR DOCUMENTATION OF TREATMENT FOR NEPHROPATHY: ICD-10-PCS | Mod: CPTII,S$GLB,, | Performed by: OPTOMETRIST

## 2023-09-07 PROCEDURE — 92004 COMPRE OPH EXAM NEW PT 1/>: CPT | Mod: S$GLB,,, | Performed by: OPTOMETRIST

## 2023-09-07 PROCEDURE — 2023F DILAT RTA XM W/O RTNOPTHY: CPT | Mod: CPTII,S$GLB,, | Performed by: OPTOMETRIST

## 2023-09-07 PROCEDURE — 1159F PR MEDICATION LIST DOCUMENTED IN MEDICAL RECORD: ICD-10-PCS | Mod: CPTII,S$GLB,, | Performed by: OPTOMETRIST

## 2023-09-07 PROCEDURE — 3044F PR MOST RECENT HEMOGLOBIN A1C LEVEL <7.0%: ICD-10-PCS | Mod: CPTII,S$GLB,, | Performed by: OPTOMETRIST

## 2023-09-07 PROCEDURE — 4010F PR ACE/ARB THEARPY RXD/TAKEN: ICD-10-PCS | Mod: CPTII,S$GLB,, | Performed by: OPTOMETRIST

## 2023-09-07 PROCEDURE — 99999 PR PBB SHADOW E&M-EST. PATIENT-LVL III: ICD-10-PCS | Mod: PBBFAC,,, | Performed by: OPTOMETRIST

## 2023-09-07 PROCEDURE — 4010F ACE/ARB THERAPY RXD/TAKEN: CPT | Mod: CPTII,S$GLB,, | Performed by: OPTOMETRIST

## 2023-09-07 PROCEDURE — 3061F PR NEG MICROALBUMINURIA RESULT DOCUMENTED/REVIEW: ICD-10-PCS | Mod: CPTII,S$GLB,, | Performed by: OPTOMETRIST

## 2023-09-07 PROCEDURE — 92004 PR EYE EXAM, NEW PATIENT,COMPREHESV: ICD-10-PCS | Mod: S$GLB,,, | Performed by: OPTOMETRIST

## 2023-09-07 PROCEDURE — 3061F NEG MICROALBUMINURIA REV: CPT | Mod: CPTII,S$GLB,, | Performed by: OPTOMETRIST

## 2023-09-07 PROCEDURE — 3044F HG A1C LEVEL LT 7.0%: CPT | Mod: CPTII,S$GLB,, | Performed by: OPTOMETRIST

## 2023-09-07 PROCEDURE — 99999 PR PBB SHADOW E&M-EST. PATIENT-LVL III: CPT | Mod: PBBFAC,,, | Performed by: OPTOMETRIST

## 2023-09-07 PROCEDURE — 2023F PR DILATED RETINAL EXAM W/O EVID OF RETINOPATHY: ICD-10-PCS | Mod: CPTII,S$GLB,, | Performed by: OPTOMETRIST

## 2023-09-07 PROCEDURE — 3066F NEPHROPATHY DOC TX: CPT | Mod: CPTII,S$GLB,, | Performed by: OPTOMETRIST

## 2023-09-07 PROCEDURE — 1159F MED LIST DOCD IN RCRD: CPT | Mod: CPTII,S$GLB,, | Performed by: OPTOMETRIST

## 2023-09-07 NOTE — PROGRESS NOTES
HPI    TYE: 1 month  Chief complaint (CC): Diabetic Eye Exam  Glasses? -  Contacts? -  H/o eye surgery, injections or laser: Cataract Sx OS August 2023  H/o eye injury: -  Known eye conditions? -  Family h/o eye conditions? Glaucoma (Dad)  Eye gtts? -      (-) Flashes (-)  Floaters (-) Mucous   (-)  Tearing (-) Itching (-) Burning   (-) Headaches (-) Eye Pain/discomfort (-) Irritation   (-)  Redness (-) Double vision (-) Blurry vision    Diabetic? +  A1c? Hemoglobin A1C       Date                     Value               Ref Range             Status                07/20/2023               6.8 (H)             4.0 - 5.6 %           Final                 01/18/2023               6.9 (H)             4.0 - 5.6 %           Final                 11/16/2021               7.8 (H)             4.0 - 5.6 %           Final              Last edited by Goldy Lux, OD on 9/7/2023  1:39 PM.            Assessment /Plan     For exam results, see Encounter Report.    Type 2 diabetes mellitus with hyperglycemia, without long-term current use of insulin  -     Ambulatory referral/consult to Optometry  -No retinopathy noted today.  Continued control with primary care physician and annual comprehensive eye exam.    Nuclear sclerotic cataract, right  -Has phaco already scheduled outside [facility], same Doctor who did left eye    Pseudophakia, left eye  -cont'd Drops as prescribed all questions need to be asked to Office that did surgery      RTC as directed Outside [facility], Annual DM2

## 2023-09-30 PROBLEM — E11.65 TYPE 2 DIABETES MELLITUS WITH HYPERGLYCEMIA, WITHOUT LONG-TERM CURRENT USE OF INSULIN: Status: ACTIVE | Noted: 2020-03-06

## 2023-10-07 DIAGNOSIS — E78.5 HYPERLIPIDEMIA, UNSPECIFIED HYPERLIPIDEMIA TYPE: ICD-10-CM

## 2023-10-07 RX ORDER — ATORVASTATIN CALCIUM 40 MG/1
40 TABLET, FILM COATED ORAL
Qty: 90 TABLET | Refills: 1 | Status: SHIPPED | OUTPATIENT
Start: 2023-10-07

## 2023-10-07 NOTE — TELEPHONE ENCOUNTER
No care due was identified.  [facility] Embedded Care Due Messages. Reference number: [id number].   10/07/2023 7:41:30 AM CDT

## 2023-10-07 NOTE — TELEPHONE ENCOUNTER
Refill Decision Note   Susan Courtney  is requesting a refill authorization.  Brief Assessment and Rationale for Refill:  Approve     Medication Therapy Plan:         Comments:     Note composed:1:42 PM 10/07/2023

## 2023-10-12 DIAGNOSIS — G43.709 CHRONIC MIGRAINE WITHOUT AURA WITHOUT STATUS MIGRAINOSUS, NOT INTRACTABLE: ICD-10-CM

## 2023-10-17 RX ORDER — NARATRIPTAN 1 MG/1
TABLET ORAL
Qty: 12 TABLET | Refills: 12 | Status: SHIPPED | OUTPATIENT
Start: 2023-10-17

## 2023-10-19 RX ORDER — PANTOPRAZOLE SODIUM 40 MG/1
40 TABLET, DELAYED RELEASE ORAL
Qty: 30 TABLET | OUTPATIENT
Start: 2023-10-19

## 2023-10-19 NOTE — TELEPHONE ENCOUNTER
Provider Staff:  Action required for this patient     Please see care gap opportunities below in Care Due Message.    Thanks!  [facility]     Appointments      Date Provider   Last Visit   8/29/2023 Letty Kumar MD   Next Visit   10/25/2023 Letty Kumar MD     Refill Decision Note   Susan Courtney  is requesting a refill authorization.  Brief Assessment and Rationale for Refill:  Quick Discontinue     Medication Therapy Plan:  Med d/c on 04/20/23 by PCP; C      Comments:     Note composed:3:22 PM 10/19/2023

## 2023-10-19 NOTE — TELEPHONE ENCOUNTER
Care Due:                  Date            Visit Type   Department     Provider  --------------------------------------------------------------------------------                                             [facility] INTERNAL  Last Visit: 08-      PRE-OP       MEDICINE       Letty Kumar                              EP -                              PRIMARY      [facility] INTERNAL  Next Visit: 10-      CARE ([facility])   MEDICINE       Letty Kumar                                                            Last  Test          Frequency    Reason                     Performed    Due Date  --------------------------------------------------------------------------------    HBA1C.......  6 months...  JARDIKURT, dulaglutide...  07- 01-    Lipid Panel.  12 months..  atorvastatin.............  01- 01-    [facility] Embedded Care Due Messages. Reference number: [id number].   10/19/2023 12:35:47 PM CDT

## 2023-10-23 ENCOUNTER — TELEPHONE (OUTPATIENT)
Dept: INTERNAL MEDICINE | Facility: CLINIC | Age: 63
End: 2023-10-23
Payer: MEDICARE

## 2023-10-23 NOTE — TELEPHONE ENCOUNTER
Will discuss other options at follow up appt in 2 days, if pain worsens significantly or any other concerning signs/symptoms recommend emergent evaluation in UC or ED, thank you!

## 2023-10-23 NOTE — TELEPHONE ENCOUNTER
"----- Message from Alisson Wallace sent at 10/20/2023  2:13 PM CDT -----  Regarding: Prescription  "Type:  Patient Call Back    Who Called:Pt    What is the reqeust in detail:Pt says she is having acid reflux pain and the omeprazole (PRILOSEC) 40 MG capsule isn't working. Pt would like to know if doctor can call something else in. Please advise    Can the clinic reply by [facility]?no    Best Call Back Number:[phone]      Additional Information:Pantoprazole 40mg  pt took this medication before and it helped [id number] [place] [facility] [address]            "

## 2023-10-26 ENCOUNTER — OFFICE VISIT (OUTPATIENT)
Dept: INTERNAL MEDICINE | Facility: CLINIC | Age: 63
End: 2023-10-26
Payer: MEDICARE

## 2023-10-26 VITALS
HEIGHT: 65 IN | WEIGHT: 181.44 LBS | SYSTOLIC BLOOD PRESSURE: 143 MMHG | HEART RATE: 85 BPM | RESPIRATION RATE: 12 BRPM | DIASTOLIC BLOOD PRESSURE: 65 MMHG | BODY MASS INDEX: 30.23 KG/M2

## 2023-10-26 DIAGNOSIS — K29.60 OTHER SPECIFIED GASTRITIS, PRESENCE OF BLEEDING UNSPECIFIED, UNSPECIFIED CHRONICITY: Primary | ICD-10-CM

## 2023-10-26 PROCEDURE — 3008F BODY MASS INDEX DOCD: CPT | Mod: CPTII,S$GLB,, | Performed by: STUDENT IN AN ORGANIZED HEALTH CARE EDUCATION/TRAINING PROGRAM

## 2023-10-26 PROCEDURE — 3077F PR MOST RECENT SYSTOLIC BLOOD PRESSURE >= 140 MM HG: ICD-10-PCS | Mod: CPTII,S$GLB,, | Performed by: STUDENT IN AN ORGANIZED HEALTH CARE EDUCATION/TRAINING PROGRAM

## 2023-10-26 PROCEDURE — 99213 PR OFFICE/OUTPT VISIT, EST, LEVL III, 20-29 MIN: ICD-10-PCS | Mod: S$GLB,,, | Performed by: STUDENT IN AN ORGANIZED HEALTH CARE EDUCATION/TRAINING PROGRAM

## 2023-10-26 PROCEDURE — 4010F ACE/ARB THERAPY RXD/TAKEN: CPT | Mod: CPTII,S$GLB,, | Performed by: STUDENT IN AN ORGANIZED HEALTH CARE EDUCATION/TRAINING PROGRAM

## 2023-10-26 PROCEDURE — 4010F PR ACE/ARB THEARPY RXD/TAKEN: ICD-10-PCS | Mod: CPTII,S$GLB,, | Performed by: STUDENT IN AN ORGANIZED HEALTH CARE EDUCATION/TRAINING PROGRAM

## 2023-10-26 PROCEDURE — 3078F DIAST BP <80 MM HG: CPT | Mod: CPTII,S$GLB,, | Performed by: STUDENT IN AN ORGANIZED HEALTH CARE EDUCATION/TRAINING PROGRAM

## 2023-10-26 PROCEDURE — 3066F PR DOCUMENTATION OF TREATMENT FOR NEPHROPATHY: ICD-10-PCS | Mod: CPTII,S$GLB,, | Performed by: STUDENT IN AN ORGANIZED HEALTH CARE EDUCATION/TRAINING PROGRAM

## 2023-10-26 PROCEDURE — 3061F NEG MICROALBUMINURIA REV: CPT | Mod: CPTII,S$GLB,, | Performed by: STUDENT IN AN ORGANIZED HEALTH CARE EDUCATION/TRAINING PROGRAM

## 2023-10-26 PROCEDURE — 99999 PR PBB SHADOW E&M-EST. PATIENT-LVL III: ICD-10-PCS | Mod: PBBFAC,,, | Performed by: STUDENT IN AN ORGANIZED HEALTH CARE EDUCATION/TRAINING PROGRAM

## 2023-10-26 PROCEDURE — 1160F RVW MEDS BY RX/DR IN RCRD: CPT | Mod: CPTII,S$GLB,, | Performed by: STUDENT IN AN ORGANIZED HEALTH CARE EDUCATION/TRAINING PROGRAM

## 2023-10-26 PROCEDURE — 3061F PR NEG MICROALBUMINURIA RESULT DOCUMENTED/REVIEW: ICD-10-PCS | Mod: CPTII,S$GLB,, | Performed by: STUDENT IN AN ORGANIZED HEALTH CARE EDUCATION/TRAINING PROGRAM

## 2023-10-26 PROCEDURE — 3044F PR MOST RECENT HEMOGLOBIN A1C LEVEL <7.0%: ICD-10-PCS | Mod: CPTII,S$GLB,, | Performed by: STUDENT IN AN ORGANIZED HEALTH CARE EDUCATION/TRAINING PROGRAM

## 2023-10-26 PROCEDURE — 99999 PR PBB SHADOW E&M-EST. PATIENT-LVL III: CPT | Mod: PBBFAC,,, | Performed by: STUDENT IN AN ORGANIZED HEALTH CARE EDUCATION/TRAINING PROGRAM

## 2023-10-26 PROCEDURE — 1159F PR MEDICATION LIST DOCUMENTED IN MEDICAL RECORD: ICD-10-PCS | Mod: CPTII,S$GLB,, | Performed by: STUDENT IN AN ORGANIZED HEALTH CARE EDUCATION/TRAINING PROGRAM

## 2023-10-26 PROCEDURE — 3078F PR MOST RECENT DIASTOLIC BLOOD PRESSURE < 80 MM HG: ICD-10-PCS | Mod: CPTII,S$GLB,, | Performed by: STUDENT IN AN ORGANIZED HEALTH CARE EDUCATION/TRAINING PROGRAM

## 2023-10-26 PROCEDURE — 3077F SYST BP >= 140 MM HG: CPT | Mod: CPTII,S$GLB,, | Performed by: STUDENT IN AN ORGANIZED HEALTH CARE EDUCATION/TRAINING PROGRAM

## 2023-10-26 PROCEDURE — 3044F HG A1C LEVEL LT 7.0%: CPT | Mod: CPTII,S$GLB,, | Performed by: STUDENT IN AN ORGANIZED HEALTH CARE EDUCATION/TRAINING PROGRAM

## 2023-10-26 PROCEDURE — 3066F NEPHROPATHY DOC TX: CPT | Mod: CPTII,S$GLB,, | Performed by: STUDENT IN AN ORGANIZED HEALTH CARE EDUCATION/TRAINING PROGRAM

## 2023-10-26 PROCEDURE — 3008F PR BODY MASS INDEX (BMI) DOCUMENTED: ICD-10-PCS | Mod: CPTII,S$GLB,, | Performed by: STUDENT IN AN ORGANIZED HEALTH CARE EDUCATION/TRAINING PROGRAM

## 2023-10-26 PROCEDURE — 99213 OFFICE O/P EST LOW 20 MIN: CPT | Mod: S$GLB,,, | Performed by: STUDENT IN AN ORGANIZED HEALTH CARE EDUCATION/TRAINING PROGRAM

## 2023-10-26 PROCEDURE — 1160F PR REVIEW ALL MEDS BY PRESCRIBER/CLIN PHARMACIST DOCUMENTED: ICD-10-PCS | Mod: CPTII,S$GLB,, | Performed by: STUDENT IN AN ORGANIZED HEALTH CARE EDUCATION/TRAINING PROGRAM

## 2023-10-26 PROCEDURE — 1159F MED LIST DOCD IN RCRD: CPT | Mod: CPTII,S$GLB,, | Performed by: STUDENT IN AN ORGANIZED HEALTH CARE EDUCATION/TRAINING PROGRAM

## 2023-10-26 RX ORDER — PEPPERMINT OIL 90 MG
CAPSULE, DELAYED, AND EXTENDED RELEASE ORAL
Status: CANCELLED | OUTPATIENT
Start: 2023-10-26

## 2023-10-26 RX ORDER — PANTOPRAZOLE SODIUM 40 MG/1
40 TABLET, DELAYED RELEASE ORAL DAILY
Qty: 30 TABLET | Refills: 2 | Status: SHIPPED | OUTPATIENT
Start: 2023-10-26 | End: 2024-02-07 | Stop reason: SDUPTHER

## 2023-10-26 NOTE — PROGRESS NOTES
Subjective:       Patient ID: Susan Courtney is a 63 y.o. female.    Chief Complaint: Abdominal Pain    Abdominal Pain  This is a chronic problem. The current episode started more than 1 year ago. The onset quality is gradual. The problem occurs constantly. The problem has been unchanged. The pain is located in the periumbilical region. The pain is at a severity of 10/10. The pain is severe. The quality of the pain is sharp. The abdominal pain does not radiate. Associated symptoms include anorexia and weight loss (intentional). Pertinent negatives include no belching, constipation, diarrhea, fever, hematochezia, melena, nausea or vomiting. Associated symptoms comments: Low appetite due to pain. The pain is aggravated by eating. The pain is relieved by Liquids. She has tried proton pump inhibitors and H2 blockers (pepto bismol, mylanta) for the symptoms. Prior diagnostic workup includes GI consult and upper endoscopy. Her past medical history is significant for GERD.     This is a chronic issues. Of note, recently [facility] in early September. She has a nodule in middle third of esophagus, which previously identified as leiomyoma. She has gastritis and there was white discoloration in 2nd part of duodenum and duodenal bulb. She has not had f/u since. She reports prilosec is not helping. She was taking BID and it is not helping. States protonix previously prescribed from [facility] was more helpful in controlling her symptoms. She no longers take carafate.     Was to try FDGard  Tests to Keep You Healthy    Mammogram: Met on 12/14/2022  Eye Exam: Met on 9/7/2023  Colon Cancer Screening: DUE  Last Blood Pressure <= 139/89 (10/26/2023): Yes  Last HbA1c < 8 (07/20/2023): Yes      Social History     Social History Narrative    Not on file       Family History   Problem Relation Age of Onset    Psoriasis Neg Hx     Melanoma Neg Hx     Lupus Neg Hx        Current Outpatient Medications:     ALPRAZolam (XANAX)  2 MG Tab, Take 2 mg by mouth 2 (two) times a day., Disp: , Rfl:     amLODIPine (NORVASC) 5 MG tablet, Take 1 tablet (5 mg total) by mouth Daily., Disp: 90 tablet, Rfl: 1    atorvastatin (LIPITOR) 40 MG tablet, TAKE 1 TABLET BY MOUTH EVERY DAY, Disp: 90 tablet, Rfl: 1    azelastine (ASTELIN) 137 mcg (0.1 %) nasal spray, SPRAY 2 SPRAYS BY NASAL ROUTE 2 TIMES DAILY., Disp: 30 mL, Rfl: 3    citalopram (CELEXA) 20 MG tablet, Take 20 mg by mouth., Disp: , Rfl:     dulaglutide (TRULICITY) 3 mg/0.5 mL pen injector, INJECT 3MG ONCE A WEEK, Disp: 4 pen , Rfl: 3    fluticasone-salmeterol diskus inhaler 250-50 mcg, Inhale 1 puff into the lungs 2 (two) times daily. Controller, Disp: 60 each, Rfl: 11    JARDIANCE 10 mg tablet, Take 1 tablet (10 mg total) by mouth once daily., Disp: 90 tablet, Rfl: 1    montelukast (SINGULAIR) 10 mg tablet, Take 1 tablet (10 mg total) by mouth every evening., Disp: 90 tablet, Rfl: 1    naratriptan (AMERGE) 1 MG Tab, TAKE 1 TABLET BY MOUTH 2 TIMES DAILY AS NEEDED (HEADACHE). MAY REPEAT X1 AFTER 4 HOURS, Disp: 12 tablet, Rfl: 12    valsartan (DIOVAN) 320 MG tablet, Take 1 tablet (320 mg total) by mouth once daily., Disp: 90 tablet, Rfl: 1    VENTOLIN HFA 90 mcg/actuation inhaler, INHALE 2 PUFFS BY MOUTH EVERY 4 TO 6 HOURS AS NEEDED FOR COUGHING AND WHEEZING, Disp: , Rfl: 2    zolpidem (AMBIEN) 10 mg Tab, Take 20 mg by mouth every evening., Disp: , Rfl:     pantoprazole (PROTONIX) 40 MG tablet, Take 1 tablet (40 mg total) by mouth once daily., Disp: 30 tablet, Rfl: 2    Review of Systems   Constitutional:  Positive for weight loss (intentional). Negative for fever.   HENT:  Negative for trouble swallowing.         Denies dysphagia     Gastrointestinal:  Positive for abdominal pain and anorexia. Negative for constipation, diarrhea, hematochezia, melena, nausea and vomiting.       Objective:   BP (!) 143/65 (BP Location: Left arm, Patient Position: Sitting, BP Method: Large (Automatic))   Pulse 85    "Resp 12   Ht 5' 5" (1.651 m)   Wt 82.3 kg (181 lb 7 oz)   BMI 30.19 kg/m²      Physical Exam  Vitals and nursing note reviewed.   Constitutional:       General: She is not in acute distress.     Appearance: Normal appearance. She is not ill-appearing, toxic-appearing or diaphoretic.   Eyes:      General:         Right eye: No discharge.         Left eye: No discharge.      Conjunctiva/sclera: Conjunctivae normal.   Cardiovascular:      Rate and Rhythm: Normal rate and regular rhythm.   Pulmonary:      Effort: Pulmonary effort is normal. No respiratory distress.      Breath sounds: Normal breath sounds. No wheezing.   Abdominal:      Palpations: Abdomen is soft.      Tenderness: There is no abdominal tenderness. There is no guarding.   Neurological:      Mental Status: She is alert.   Psychiatric:         Behavior: Behavior normal.         Assessment & Plan   1. Other specified gastritis, presence of bleeding unspecified, unspecified chronicity  -Advised close f/u with [facility] for f/u of EGD results.   -Will Rx  protonix as she reports better symptom control with her abd pain  - pantoprazole (PROTONIX) 40 MG tablet; Take 1 tablet (40 mg total) by mouth once daily.  Dispense: 30 tablet; Refill: 2    Follow up if symptoms worsen or fail to improve.    Disclaimer:  This note may have been prepared using voice recognition software, it may have not been extensively proofed, as such there could be errors within the text such as sound alike errors.    "

## 2023-10-30 ENCOUNTER — TELEPHONE (OUTPATIENT)
Dept: INTERNAL MEDICINE | Facility: CLINIC | Age: 63
End: 2023-10-30
Payer: MEDICARE

## 2023-10-30 DIAGNOSIS — J45.40 MODERATE PERSISTENT ASTHMA, UNSPECIFIED WHETHER COMPLICATED: Primary | ICD-10-CM

## 2023-10-30 NOTE — TELEPHONE ENCOUNTER
"----- Message from Alisson Gonsalez sent at 10/30/2023  2:18 PM CDT -----  Regarding: Order  "Type:  Patient Call Back    Who Called:CELY([facility])    What is the reqeust in detail:Requesting clinical notes, order and medical assist form for pt to receive Humidifier. Please advise    Can the clinic reply by [facility]?no    Best Call Back Number:[phone]      Additional Information:Fax [phone]            "

## 2023-11-06 ENCOUNTER — PATIENT MESSAGE (OUTPATIENT)
Dept: ADMINISTRATIVE | Facility: HOSPITAL | Age: 63
End: 2023-11-06
Payer: MEDICARE

## 2023-11-23 DIAGNOSIS — E11.65 TYPE 2 DIABETES MELLITUS WITH HYPERGLYCEMIA, WITHOUT LONG-TERM CURRENT USE OF INSULIN: ICD-10-CM

## 2023-11-23 RX ORDER — DULAGLUTIDE 3 MG/.5ML
INJECTION, SOLUTION SUBCUTANEOUS
Qty: 12 PEN | Refills: 0 | Status: SHIPPED | OUTPATIENT
Start: 2023-11-23 | End: 2023-11-28 | Stop reason: SDUPTHER

## 2023-11-23 NOTE — TELEPHONE ENCOUNTER
Refill Decision Note   Susan Courtney  is requesting a refill authorization.  Brief Assessment and Rationale for Refill:  Approve     Medication Therapy Plan:         Comments:     Note composed:8:38 AM 11/23/2023

## 2023-11-23 NOTE — TELEPHONE ENCOUNTER
No care due was identified.  [facility] Embedded Care Due Messages. Reference number: [id number].   11/23/2023 12:37:45 AM CST

## 2023-11-24 ENCOUNTER — TELEPHONE (OUTPATIENT)
Dept: INTERNAL MEDICINE | Facility: CLINIC | Age: 63
End: 2023-11-24
Payer: MEDICARE

## 2023-11-24 NOTE — TELEPHONE ENCOUNTER
----- Message from Natali Alba sent at 11/24/2023 11:35 AM CST -----  Regarding: medication request  Name of Who is Calling:            What is the request in detail: Patient is requesting a call back to get some cough medication along with some prednisone to open her airway. She stated her asthma has flared up. She also need that liquid albuterol for her machine. She only have the inhaler and it's not working.            Can the clinic reply by [facility]: No           What Number to Call Back [facility] not [facility]: [phone]

## 2023-11-24 NOTE — TELEPHONE ENCOUNTER
----- Message from Natali Alba sent at 11/24/2023 11:35 AM CST -----  Regarding: medication request  Name of Who is Calling:            What is the request in detail: Patient is requesting a call back to get some cough medication along with some prednisone to open her airway. She stated her asthma has flared up. She also need that liquid albuterol for her machine. She only have the inhaler and it's not working.            Can the clinic reply by [facility]: No           What Number to Call Back [facility] not [facility]: [phone]

## 2023-11-28 ENCOUNTER — OFFICE VISIT (OUTPATIENT)
Dept: INTERNAL MEDICINE | Facility: CLINIC | Age: 63
End: 2023-11-28
Payer: MEDICARE

## 2023-11-28 VITALS
WEIGHT: 176.56 LBS | OXYGEN SATURATION: 98 % | DIASTOLIC BLOOD PRESSURE: 60 MMHG | BODY MASS INDEX: 29.39 KG/M2 | HEART RATE: 74 BPM | SYSTOLIC BLOOD PRESSURE: 122 MMHG

## 2023-11-28 DIAGNOSIS — Z00.00 HEALTH MAINTENANCE EXAMINATION: Primary | ICD-10-CM

## 2023-11-28 DIAGNOSIS — R05.1 ACUTE COUGH: ICD-10-CM

## 2023-11-28 DIAGNOSIS — E78.2 MIXED HYPERLIPIDEMIA: ICD-10-CM

## 2023-11-28 DIAGNOSIS — F41.1 GENERALIZED ANXIETY DISORDER: ICD-10-CM

## 2023-11-28 DIAGNOSIS — I10 PRIMARY HYPERTENSION: ICD-10-CM

## 2023-11-28 DIAGNOSIS — J45.40 MODERATE PERSISTENT ASTHMA, UNSPECIFIED WHETHER COMPLICATED: ICD-10-CM

## 2023-11-28 DIAGNOSIS — Z12.31 SCREENING MAMMOGRAM FOR BREAST CANCER: ICD-10-CM

## 2023-11-28 DIAGNOSIS — E11.65 TYPE 2 DIABETES MELLITUS WITH HYPERGLYCEMIA, WITHOUT LONG-TERM CURRENT USE OF INSULIN: ICD-10-CM

## 2023-11-28 DIAGNOSIS — Z23 NEED FOR VACCINATION: ICD-10-CM

## 2023-11-28 DIAGNOSIS — R71.8 MICROCYTOSIS: ICD-10-CM

## 2023-11-28 DIAGNOSIS — E55.9 VITAMIN D DEFICIENCY: ICD-10-CM

## 2023-11-28 DIAGNOSIS — G47.33 OSA (OBSTRUCTIVE SLEEP APNEA): ICD-10-CM

## 2023-11-28 DIAGNOSIS — R10.9 ABDOMINAL PAIN, UNSPECIFIED ABDOMINAL LOCATION: ICD-10-CM

## 2023-11-28 LAB — SARS-COV-2 RNA RESP QL NAA+PROBE: NOT DETECTED

## 2023-11-28 PROCEDURE — 3044F PR MOST RECENT HEMOGLOBIN A1C LEVEL <7.0%: ICD-10-PCS | Mod: CPTII,S$GLB,, | Performed by: STUDENT IN AN ORGANIZED HEALTH CARE EDUCATION/TRAINING PROGRAM

## 2023-11-28 PROCEDURE — 99214 PR OFFICE/OUTPT VISIT, EST, LEVL IV, 30-39 MIN: ICD-10-PCS | Mod: S$GLB,,, | Performed by: STUDENT IN AN ORGANIZED HEALTH CARE EDUCATION/TRAINING PROGRAM

## 2023-11-28 PROCEDURE — 1159F MED LIST DOCD IN RCRD: CPT | Mod: CPTII,S$GLB,, | Performed by: STUDENT IN AN ORGANIZED HEALTH CARE EDUCATION/TRAINING PROGRAM

## 2023-11-28 PROCEDURE — 3008F PR BODY MASS INDEX (BMI) DOCUMENTED: ICD-10-PCS | Mod: CPTII,S$GLB,, | Performed by: STUDENT IN AN ORGANIZED HEALTH CARE EDUCATION/TRAINING PROGRAM

## 2023-11-28 PROCEDURE — 87635 SARS-COV-2 COVID-19 AMP PRB: CPT | Performed by: STUDENT IN AN ORGANIZED HEALTH CARE EDUCATION/TRAINING PROGRAM

## 2023-11-28 PROCEDURE — 3061F PR NEG MICROALBUMINURIA RESULT DOCUMENTED/REVIEW: ICD-10-PCS | Mod: CPTII,S$GLB,, | Performed by: STUDENT IN AN ORGANIZED HEALTH CARE EDUCATION/TRAINING PROGRAM

## 2023-11-28 PROCEDURE — 3044F HG A1C LEVEL LT 7.0%: CPT | Mod: CPTII,S$GLB,, | Performed by: STUDENT IN AN ORGANIZED HEALTH CARE EDUCATION/TRAINING PROGRAM

## 2023-11-28 PROCEDURE — 3066F NEPHROPATHY DOC TX: CPT | Mod: CPTII,S$GLB,, | Performed by: STUDENT IN AN ORGANIZED HEALTH CARE EDUCATION/TRAINING PROGRAM

## 2023-11-28 PROCEDURE — 4010F ACE/ARB THERAPY RXD/TAKEN: CPT | Mod: CPTII,S$GLB,, | Performed by: STUDENT IN AN ORGANIZED HEALTH CARE EDUCATION/TRAINING PROGRAM

## 2023-11-28 PROCEDURE — 3066F PR DOCUMENTATION OF TREATMENT FOR NEPHROPATHY: ICD-10-PCS | Mod: CPTII,S$GLB,, | Performed by: STUDENT IN AN ORGANIZED HEALTH CARE EDUCATION/TRAINING PROGRAM

## 2023-11-28 PROCEDURE — 1159F PR MEDICATION LIST DOCUMENTED IN MEDICAL RECORD: ICD-10-PCS | Mod: CPTII,S$GLB,, | Performed by: STUDENT IN AN ORGANIZED HEALTH CARE EDUCATION/TRAINING PROGRAM

## 2023-11-28 PROCEDURE — 3074F PR MOST RECENT SYSTOLIC BLOOD PRESSURE < 130 MM HG: ICD-10-PCS | Mod: CPTII,S$GLB,, | Performed by: STUDENT IN AN ORGANIZED HEALTH CARE EDUCATION/TRAINING PROGRAM

## 2023-11-28 PROCEDURE — 4010F PR ACE/ARB THEARPY RXD/TAKEN: ICD-10-PCS | Mod: CPTII,S$GLB,, | Performed by: STUDENT IN AN ORGANIZED HEALTH CARE EDUCATION/TRAINING PROGRAM

## 2023-11-28 PROCEDURE — 3074F SYST BP LT 130 MM HG: CPT | Mod: CPTII,S$GLB,, | Performed by: STUDENT IN AN ORGANIZED HEALTH CARE EDUCATION/TRAINING PROGRAM

## 2023-11-28 PROCEDURE — 3061F NEG MICROALBUMINURIA REV: CPT | Mod: CPTII,S$GLB,, | Performed by: STUDENT IN AN ORGANIZED HEALTH CARE EDUCATION/TRAINING PROGRAM

## 2023-11-28 PROCEDURE — 3078F DIAST BP <80 MM HG: CPT | Mod: CPTII,S$GLB,, | Performed by: STUDENT IN AN ORGANIZED HEALTH CARE EDUCATION/TRAINING PROGRAM

## 2023-11-28 PROCEDURE — 3078F PR MOST RECENT DIASTOLIC BLOOD PRESSURE < 80 MM HG: ICD-10-PCS | Mod: CPTII,S$GLB,, | Performed by: STUDENT IN AN ORGANIZED HEALTH CARE EDUCATION/TRAINING PROGRAM

## 2023-11-28 PROCEDURE — 99999 PR PBB SHADOW E&M-EST. PATIENT-LVL IV: ICD-10-PCS | Mod: PBBFAC,,, | Performed by: STUDENT IN AN ORGANIZED HEALTH CARE EDUCATION/TRAINING PROGRAM

## 2023-11-28 PROCEDURE — 99999 PR PBB SHADOW E&M-EST. PATIENT-LVL IV: CPT | Mod: PBBFAC,,, | Performed by: STUDENT IN AN ORGANIZED HEALTH CARE EDUCATION/TRAINING PROGRAM

## 2023-11-28 PROCEDURE — 3008F BODY MASS INDEX DOCD: CPT | Mod: CPTII,S$GLB,, | Performed by: STUDENT IN AN ORGANIZED HEALTH CARE EDUCATION/TRAINING PROGRAM

## 2023-11-28 PROCEDURE — 99214 OFFICE O/P EST MOD 30 MIN: CPT | Mod: S$GLB,,, | Performed by: STUDENT IN AN ORGANIZED HEALTH CARE EDUCATION/TRAINING PROGRAM

## 2023-11-28 RX ORDER — DULAGLUTIDE 3 MG/.5ML
INJECTION, SOLUTION SUBCUTANEOUS
Qty: 12 PEN | Refills: 3 | Status: SHIPPED | OUTPATIENT
Start: 2023-11-28

## 2023-11-28 RX ORDER — PROMETHAZINE HYDROCHLORIDE AND DEXTROMETHORPHAN HYDROBROMIDE 6.25; 15 MG/5ML; MG/5ML
5 SYRUP ORAL EVERY 6 HOURS PRN
Qty: 120 ML | Refills: 0 | Status: SHIPPED | OUTPATIENT
Start: 2023-11-28

## 2023-11-28 RX ORDER — AZELASTINE 1 MG/ML
2 SPRAY, METERED NASAL 2 TIMES DAILY
Qty: 30 ML | Refills: 3 | Status: SHIPPED | OUTPATIENT
Start: 2023-11-28

## 2023-11-28 RX ORDER — BUDESONIDE AND FORMOTEROL FUMARATE DIHYDRATE 160; 4.5 UG/1; UG/1
2 AEROSOL RESPIRATORY (INHALATION) EVERY 12 HOURS
Status: CANCELLED | OUTPATIENT
Start: 2023-11-28

## 2023-11-28 RX ORDER — EMPAGLIFLOZIN 10 MG/1
10 TABLET, FILM COATED ORAL DAILY
Qty: 90 TABLET | Refills: 3 | Status: SHIPPED | OUTPATIENT
Start: 2023-11-28

## 2023-11-28 RX ORDER — PROMETHAZINE HYDROCHLORIDE AND DEXTROMETHORPHAN HYDROBROMIDE 6.25; 15 MG/5ML; MG/5ML
SYRUP ORAL
COMMUNITY
End: 2023-11-28 | Stop reason: SDUPTHER

## 2023-11-28 RX ORDER — BUDESONIDE AND FORMOTEROL FUMARATE DIHYDRATE 160; 4.5 UG/1; UG/1
2 AEROSOL RESPIRATORY (INHALATION) EVERY 12 HOURS
COMMUNITY
Start: 2023-01-10

## 2023-11-28 RX ORDER — PREDNISONE 20 MG/1
20 TABLET ORAL DAILY
Qty: 5 TABLET | Refills: 0 | Status: SHIPPED | OUTPATIENT
Start: 2023-11-28 | End: 2023-12-03

## 2023-11-28 RX ORDER — ALBUTEROL SULFATE 90 UG/1
2 AEROSOL, METERED RESPIRATORY (INHALATION) EVERY 6 HOURS PRN
Qty: 18 G | Refills: 11 | Status: SHIPPED | OUTPATIENT
Start: 2023-11-28 | End: 2024-01-16 | Stop reason: SDUPTHER

## 2023-11-28 NOTE — PROGRESS NOTES
Subjective:       Patient ID: Susan Courtney is a 63 y.o. female.    Chief Complaint: Health maintenance examination [Z00.00]    Patient is established with me, here today for the following:     HTN, HLD, T2DM, asthma, GERD, JOSE, migraines, anxiety, insomnia      Health maintenance examination -   Colonoscopy performed 2022 with [facility]. Unsure screening interval.  Denies family history of colorectal cancer.   Mammogram BI-RADS 1 in DEC2022. Due for repeat.  Denies history of prior abnormal mammogram.  Family history of breast cancers, mother.  Denies family history of ovarian cancers.  Hysterectomy due to fibroids.   History of prior abnormal pap smears, underwent LEEP.  Denies family history of osteoporosis.  Denies significant family history of cardiac disease.  UTD on COVID19 primary/booster, shingles, Tdap, PCV13, PPSV23 vaccinations.  Due for COVID19, influenza, RSV vaccinations.  Never smoker.  Denies current alcohol use.   Denies drug use.  Completed HIV and hepatitis C screening    Endorses rhinorrhea, sinus congestion, productive cough, SOB, wheezing starting last Wednesday.  Denies fever, nausea, vomiting.   Cough productive of mucus.  Has tried Mucinex, promethazine-DM, cough drops with some improvement.  Currently using albuterol nebulizer once daily, albuterol mdi PRN without significant improvement.  Endorses grandson was sick with similar illness recently.  Grandson was diagnosed with RSV.     Asthma -   Recently using albuterol more frequently  for illness.  Asthma flares are typically triggered by allergies, illness, or seasonal change.  Using Wixela for daily controller.   Previously tried Bretzi and Trelegy, didn't feel working well.   Never wakes up at night with symptoms.   Last PFT's were [id number].  Not recently following with Dr. Reis for pulmonology.     Worsening of abdominal pain recently  Following with [facility], has appt for follow up tomorrow.  Had EGD SEP2023 noted with  gastritis   Escalated therapy back to pantoprazole for GERD with improved effect    T2DM -   Currently taking:  - Trulicity 3 mg  - Jardiance 10 mg  Discontinued metformin due to GI side effects    Not checking glucose routinely  Seeing Dr. Saba for podiatry  UTD on eye exam, last completed SEP2023.  Due for foot exam.  Lab Results       Component                Value               Date                       HGBA1C                   6.8 (H)             07/20/2023                 HGBA1C                   6.9 (H)             01/18/2023                 HGBA1C                   7.8 (H)             11/16/2021            Lab Results       Component                Value               Date                       MICALBCREAT              5.6                 01/18/2023                    HTN -   Currently prescribed valsartan, amlodipine.  Patient endorses taking medication as directed.  Denies side effects or concerns while taking medication.  Due for micro albumin creatinine ratio.   Lab Results       Component                Value               Date                       MICALBCREAT              5.6                 01/18/2023            BP Readings from Last 5 Encounters:  10/26/23 : (!) 143/65  08/29/23 : 124/70  07/28/23 : 136/63  06/27/23 : 130/68  06/07/23 : 135/81     HLD -   Endorses taking atorvastatin as directed  Denies side effects or concerns while taking medication  Lab Results       Component                Value               Date                       CHOL                     101 (L)             01/18/2023            Lab Results       Component                Value               Date                       TRIG                     99                  01/18/2023            Lab Results       Component                Value               Date                       LDLCALC                  45.2 (L)            01/18/2023            Lab Results       Component                Value               Date                        HDL                      36 (L)              01/18/2023            Due for lipid recheck.        Sleep apnea -   Followed with NP Moy  Has not been using CPAP routinely    Was following with Dr. Goldy Henderson for psychiatry  Endorses no longer taking her insurance, needs to establish with new provider  Taking Zolpidem 10-20 mg nightly for insomnia  Taking alprazolam 1 mg 2-4 times daily PRN anxiety      Vitamin D deficiency -  Not currently taking vitamin D supplementation.  Lab Results       Component                Value               Date                       [id number]             24 (L)              07/20/2023                  Review of Systems   Constitutional:  Negative for chills and fever.   HENT:  Positive for congestion, postnasal drip, rhinorrhea, sinus pressure and sore throat.    Respiratory:  Positive for cough. Negative for shortness of breath.    Cardiovascular:  Negative for chest pain and palpitations.   Gastrointestinal:  Negative for abdominal pain, diarrhea, nausea and vomiting.         Current Outpatient Medications   Medication Instructions    ALPRAZolam (XANAX) 2 mg, Oral, 2 times daily    amLODIPine (NORVASC) 5 mg, Oral, Daily    atorvastatin (LIPITOR) 40 mg, Oral    azelastine (ASTELIN) 137 mcg (0.1 %) nasal spray SPRAY 2 SPRAYS BY NASAL ROUTE 2 TIMES DAILY.    citalopram (CELEXA) 20 mg, Oral    dulaglutide (TRULICITY) 3 mg/0.5 mL pen injector INJECT 3MG UNDER THE SKIN ONCE WEEKLY    fluticasone-salmeterol diskus inhaler 250-50 mcg 1 puff, Inhalation, 2 times daily, Controller    JARDIANCE 10 mg, Oral, Daily    montelukast (SINGULAIR) 10 mg, Oral, Nightly    naratriptan (AMERGE) 1 MG Tab TAKE 1 TABLET BY MOUTH 2 TIMES DAILY AS NEEDED (HEADACHE). MAY REPEAT X1 AFTER 4 HOURS    pantoprazole (PROTONIX) 40 mg, Oral, Daily    valsartan (DIOVAN) 320 mg, Oral, Daily    VENTOLIN HFA 90 mcg/actuation inhaler INHALE 2 PUFFS BY MOUTH EVERY 4 TO 6 HOURS AS NEEDED FOR COUGHING AND WHEEZING     zolpidem (AMBIEN) 20 mg, Oral, Nightly     Objective:      Vitals:    11/28/23 0928   BP: 122/60   Pulse: 74   SpO2: 98%   Weight: 80.1 kg (176 lb 9.4 oz)     Body mass index is 29.39 kg/m².    Physical Exam  Vitals reviewed.   Constitutional:       General: She is not in acute distress.     Appearance: Normal appearance. She is not ill-appearing or diaphoretic.   HENT:      Head: Normocephalic and atraumatic.      Right Ear: Tympanic membrane, ear canal and external ear normal. There is no impacted cerumen.      Left Ear: Tympanic membrane, ear canal and external ear normal. There is no impacted cerumen.      Nose: Congestion and rhinorrhea present. Rhinorrhea is clear.      Right Turbinates: Swollen. Not pale.      Left Turbinates: Swollen. Not pale.      Mouth/Throat:      Mouth: Mucous membranes are moist.      Pharynx: Oropharynx is clear. Posterior oropharyngeal erythema present. No pharyngeal swelling, oropharyngeal exudate or uvula swelling.   Eyes:      General: No scleral icterus.        Right eye: No discharge.         Left eye: No discharge.      Conjunctiva/sclera: Conjunctivae normal.   Neck:      Thyroid: No thyromegaly or thyroid tenderness.      Trachea: Trachea normal.   Cardiovascular:      Rate and Rhythm: Normal rate and regular rhythm.      Heart sounds: Normal heart sounds. No murmur heard.     No friction rub. No gallop.   Pulmonary:      Effort: Pulmonary effort is normal. No respiratory distress.      Breath sounds: Normal breath sounds. No stridor. No wheezing, rhonchi or rales.   Abdominal:      General: There is no distension.      Palpations: Abdomen is soft.      Tenderness: There is no abdominal tenderness. There is no guarding or rebound.   Musculoskeletal:         General: No swelling or deformity.      Cervical back: Neck supple.   Lymphadenopathy:      Head:      Right side of head: No submandibular or posterior auricular adenopathy.      Left side of head: No submandibular or  posterior auricular adenopathy.      Cervical: No cervical adenopathy.      Right cervical: No superficial, deep or posterior cervical adenopathy.     Left cervical: No superficial, deep or posterior cervical adenopathy.      Upper Body:      Right upper body: No supraclavicular adenopathy.      Left upper body: No supraclavicular adenopathy.   Skin:     General: Skin is warm and dry.   Neurological:      General: No focal deficit present.      Mental Status: She is alert. Mental status is at baseline.      Gait: Gait normal.   Psychiatric:         Mood and Affect: Mood normal.         Behavior: Behavior normal.         Assessment:       1. Health maintenance examination    2. Acute cough    3. Moderate persistent asthma, unspecified whether complicated    4. Abdominal pain, unspecified abdominal location    5. Type 2 diabetes mellitus with hyperglycemia, without long-term current use of insulin    6. Primary hypertension    7. Mixed hyperlipidemia    8. JOSE (obstructive sleep apnea)    9. Generalized anxiety disorder    10. Vitamin D deficiency    11. Microcytosis    12. Need for vaccination    13. Screening mammogram for breast cancer        Plan:       Acute cough  -     azelastine (ASTELIN) 137 mcg (0.1 %) nasal spray; 2 sprays (274 mcg total) by Nasal route 2 (two) times daily.  -     promethazine-dextromethorphan (PROMETHAZINE-DM) 6.25-15 mg/5 mL Syrp; Take 5 mLs by mouth every 6 (six) hours as needed (cough).  -     predniSONE (DELTASONE) 20 MG tablet; Take 1 tablet (20 mg total) by mouth once daily. for 5 days  -     POCT Influenza A/B Molecular  -     COVID-19 Routine Screening    Moderate persistent asthma, unspecified whether complicated  Treat for asthma flare   Continue current medications.  RTC in 6 months for follow up.  -     VENTOLIN HFA 90 mcg/actuation inhaler; Inhale 2 puffs into the lungs every 6 (six) hours as needed for Wheezing. Rescue    Abdominal pain, unspecified abdominal  location  Continue medication, evaluation, and management per GI.    Type 2 diabetes mellitus with hyperglycemia, without long-term current use of insulin  Continue current medications.  RTC in 6 months for follow up.  -     dulaglutide (TRULICITY) 3 mg/0.5 mL pen injector; INJECT 3MG UNDER THE SKIN ONCE WEEKLY  -     VENTOLIN HFA 90 mcg/actuation inhaler; Inhale 2 puffs into the lungs every 6 (six) hours as needed for Wheezing. Rescue  -     JARDIANCE 10 mg tablet; Take 1 tablet (10 mg total) by mouth once daily.  -     Comprehensive Metabolic Panel; Future  -     Hemoglobin A1C; Future  -     CBC Auto Differential; Future  -     Microalbumin/Creatinine Ratio, Urine; Future    Primary hypertension  Continue current medications.  RTC in 6 months for follow up.  -     Comprehensive Metabolic Panel; Future  -     TSH; Future  -     CBC Auto Differential; Future  -     Microalbumin/Creatinine Ratio, Urine; Future    Mixed hyperlipidemia  Continue current medications.  RTC in 6 months for follow up.  -     Lipid Panel; Future    JOSE (obstructive sleep apnea)  -     Ambulatory referral/consult to Sleep Disorders; Future    Generalized anxiety disorder  -     Ambulatory referral/consult to Psychiatry; Future    Vitamin D deficiency  Start vitamin D supplement    Microcytosis  -     Iron and TIBC; Future  -     Ferritin; Future    Health maintenance examination  Reviewed and discussed age appropriate screenings and immunizations.  -     Comprehensive Metabolic Panel; Future  -     TSH; Future  -     Lipid Panel; Future  -     Hemoglobin A1C; Future  -     CBC Auto Differential; Future  -     Iron and TIBC; Future  -     Ferritin; Future    Need for vaccination  Discussed recommended vaccinations and how to obtain.    Screening mammogram for breast cancer  -     Mammo Digital Screening Bilat w/ Florencio; Future      Letty Kumar MD  11/28/2023

## 2023-12-15 ENCOUNTER — TELEPHONE (OUTPATIENT)
Dept: INTERNAL MEDICINE | Facility: CLINIC | Age: 63
End: 2023-12-15
Payer: MEDICARE

## 2023-12-15 NOTE — TELEPHONE ENCOUNTER
Pcp is out of office today   Covering provider needs to know if she only requesting letter to state that she was off on day of appt or was she off for additional days? When is return to work date?

## 2023-12-15 NOTE — TELEPHONE ENCOUNTER
----- Message from Bethany Barros sent at 12/15/2023  8:17 AM CST -----  Regarding: Return Back To Work Sleep  Name of Who is Calling:  Patient          What is the request in detail:  Patient would like to know can she come pick  up a return back to work from her appointment 11/28/2023. Patient stated she would need the paper today            Can the clinic reply by [facility]: No           [phone]  What Number to Call Back [facility] not [facility]:

## 2023-12-15 NOTE — LETTER
December 15, 2023      [facility]  [address]  [facility] [address]  Phone: [phone]  Fax: [phone]       Patient: Susan Courtney   YOB: 1960  Date of Visit: 12/15/2023    To Whom It May Concern:    German Courtney  was at [facility] on 11/28/23. She may return to work/school on 11/29/23 with no restrictions. If you have any questions or concerns, or if I can be of further assistance, please do not hesitate to contact me.    Sincerely,    Dr.McMahill IGNACIO

## 2023-12-18 ENCOUNTER — TELEPHONE (OUTPATIENT)
Dept: ORTHOPEDICS | Facility: CLINIC | Age: 63
End: 2023-12-18
Payer: MEDICARE

## 2023-12-19 ENCOUNTER — PATIENT OUTREACH (OUTPATIENT)
Dept: ADMINISTRATIVE | Facility: HOSPITAL | Age: 63
End: 2023-12-19
Payer: MEDICARE

## 2023-12-19 ENCOUNTER — HOSPITAL ENCOUNTER (OUTPATIENT)
Dept: RADIOLOGY | Facility: OTHER | Age: 63
Discharge: HOME | End: 2023-12-19
Attending: STUDENT IN AN ORGANIZED HEALTH CARE EDUCATION/TRAINING PROGRAM
Payer: MEDICARE

## 2023-12-19 DIAGNOSIS — Z12.31 SCREENING MAMMOGRAM FOR BREAST CANCER: ICD-10-CM

## 2023-12-19 PROCEDURE — 77063 MAMMO DIGITAL SCREENING BILAT WITH TOMO: ICD-10-PCS | Mod: 26,,, | Performed by: RADIOLOGY

## 2023-12-19 PROCEDURE — 77063 BREAST TOMOSYNTHESIS BI: CPT | Mod: 26,,, | Performed by: RADIOLOGY

## 2023-12-19 PROCEDURE — 77067 SCR MAMMO BI INCL CAD: CPT | Mod: TC

## 2023-12-19 PROCEDURE — 77067 SCR MAMMO BI INCL CAD: CPT | Mod: 26,,, | Performed by: RADIOLOGY

## 2023-12-19 PROCEDURE — 77067 MAMMO DIGITAL SCREENING BILAT WITH TOMO: ICD-10-PCS | Mod: 26,,, | Performed by: RADIOLOGY

## 2023-12-19 NOTE — LETTER
AUTHORIZATION FOR RELEASE OF   CONFIDENTIAL INFORMATION    Dear [facility] [place],    We are seeing Susan Courtney, date of birth 1960, in the clinic at [facility]. Letty Kumar MD is the patient's PCP. Susan Courtney has an outstanding lab/procedure at the time we reviewed her chart. In order to help keep her health information updated, she has authorized us to request the following medical record(s):                                         ( x )  COLONOSCOPY         Please fax records to [facility], McMahill, Lauren, MD, [phone]     If you have any questions, please contact at [phone].           Patient Name: Susan Courtney  : 1960  Patient Phone #: [phone]

## 2024-01-10 ENCOUNTER — LAB VISIT (OUTPATIENT)
Dept: LAB | Facility: OTHER | Age: 64
End: 2024-01-10
Attending: STUDENT IN AN ORGANIZED HEALTH CARE EDUCATION/TRAINING PROGRAM
Payer: MEDICARE

## 2024-01-10 DIAGNOSIS — R71.8 MICROCYTOSIS: ICD-10-CM

## 2024-01-10 DIAGNOSIS — I10 PRIMARY HYPERTENSION: ICD-10-CM

## 2024-01-10 DIAGNOSIS — E11.65 TYPE 2 DIABETES MELLITUS WITH HYPERGLYCEMIA, WITHOUT LONG-TERM CURRENT USE OF INSULIN: ICD-10-CM

## 2024-01-10 DIAGNOSIS — E78.2 MIXED HYPERLIPIDEMIA: ICD-10-CM

## 2024-01-10 DIAGNOSIS — Z00.00 HEALTH MAINTENANCE EXAMINATION: ICD-10-CM

## 2024-01-10 LAB
ALBUMIN SERPL BCP-MCNC: 3.9 G/DL (ref 3.5–5.2)
ALBUMIN/CREAT UR: 12.1 UG/MG (ref 0–30)
ALP SERPL-CCNC: 102 U/L (ref 55–135)
ALT SERPL W/O P-5'-P-CCNC: 12 U/L (ref 10–44)
ANION GAP SERPL CALC-SCNC: 11 MMOL/L (ref 8–16)
AST SERPL-CCNC: 13 U/L (ref 10–40)
BASOPHILS # BLD AUTO: 0.04 K/UL (ref 0–0.2)
BASOPHILS NFR BLD: 0.6 % (ref 0–1.9)
BILIRUB SERPL-MCNC: 0.5 MG/DL (ref 0.1–1)
BUN SERPL-MCNC: 12 MG/DL (ref 8–23)
CALCIUM SERPL-MCNC: 9.7 MG/DL (ref 8.7–10.5)
CHLORIDE SERPL-SCNC: 103 MMOL/L (ref 95–110)
CHOLEST SERPL-MCNC: 95 MG/DL (ref 120–199)
CHOLEST/HDLC SERPL: 2.3 {RATIO} (ref 2–5)
CO2 SERPL-SCNC: 27 MMOL/L (ref 23–29)
CREAT SERPL-MCNC: 0.9 MG/DL (ref 0.5–1.4)
CREAT UR-MCNC: 346.3 MG/DL (ref 15–325)
DIFFERENTIAL METHOD BLD: ABNORMAL
EOSINOPHIL # BLD AUTO: 0.3 K/UL (ref 0–0.5)
EOSINOPHIL NFR BLD: 3.5 % (ref 0–8)
ERYTHROCYTE [DISTWIDTH] IN BLOOD BY AUTOMATED COUNT: 14.6 % (ref 11.5–14.5)
EST. GFR  (NO RACE VARIABLE): >60 ML/MIN/1.73 M^2
ESTIMATED AVG GLUCOSE: 137 MG/DL (ref 68–131)
FERRITIN SERPL-MCNC: 65 NG/ML (ref 20–300)
GLUCOSE SERPL-MCNC: 172 MG/DL (ref 70–110)
HBA1C MFR BLD: 6.4 % (ref 4–5.6)
HCT VFR BLD AUTO: 45.9 % (ref 37–48.5)
HDLC SERPL-MCNC: 41 MG/DL (ref 40–75)
HDLC SERPL: 43.2 % (ref 20–50)
HGB BLD-MCNC: 14.2 G/DL (ref 12–16)
IMM GRANULOCYTES # BLD AUTO: 0.01 K/UL (ref 0–0.04)
IMM GRANULOCYTES NFR BLD AUTO: 0.1 % (ref 0–0.5)
IRON SERPL-MCNC: 72 UG/DL (ref 30–160)
LDLC SERPL CALC-MCNC: 35.4 MG/DL (ref 63–159)
LYMPHOCYTES # BLD AUTO: 2.7 K/UL (ref 1–4.8)
LYMPHOCYTES NFR BLD: 38.2 % (ref 18–48)
MCH RBC QN AUTO: 25.8 PG (ref 27–31)
MCHC RBC AUTO-ENTMCNC: 30.9 G/DL (ref 32–36)
MCV RBC AUTO: 84 FL (ref 82–98)
MICROALBUMIN UR DL<=1MG/L-MCNC: 42 UG/ML
MONOCYTES # BLD AUTO: 0.6 K/UL (ref 0.3–1)
MONOCYTES NFR BLD: 8.3 % (ref 4–15)
NEUTROPHILS # BLD AUTO: 3.5 K/UL (ref 1.8–7.7)
NEUTROPHILS NFR BLD: 49.3 % (ref 38–73)
NONHDLC SERPL-MCNC: 54 MG/DL
NRBC BLD-RTO: 0 /100 WBC
PLATELET # BLD AUTO: 270 K/UL (ref 150–450)
PMV BLD AUTO: 8.8 FL (ref 9.2–12.9)
POTASSIUM SERPL-SCNC: 3.6 MMOL/L (ref 3.5–5.1)
PROT SERPL-MCNC: 8 G/DL (ref 6–8.4)
RBC # BLD AUTO: 5.5 M/UL (ref 4–5.4)
SATURATED IRON: 19 % (ref 20–50)
SODIUM SERPL-SCNC: 141 MMOL/L (ref 136–145)
TOTAL IRON BINDING CAPACITY: 370 UG/DL (ref 250–450)
TRANSFERRIN SERPL-MCNC: 250 MG/DL (ref 200–375)
TRIGL SERPL-MCNC: 93 MG/DL (ref 30–150)
TSH SERPL DL<=0.005 MIU/L-ACNC: 0.86 UIU/ML (ref 0.4–4)
WBC # BLD AUTO: 7.1 K/UL (ref 3.9–12.7)

## 2024-01-10 PROCEDURE — 36415 COLL VENOUS BLD VENIPUNCTURE: CPT | Performed by: STUDENT IN AN ORGANIZED HEALTH CARE EDUCATION/TRAINING PROGRAM

## 2024-01-10 PROCEDURE — 80061 LIPID PANEL: CPT | Performed by: STUDENT IN AN ORGANIZED HEALTH CARE EDUCATION/TRAINING PROGRAM

## 2024-01-10 PROCEDURE — 83540 ASSAY OF IRON: CPT | Performed by: STUDENT IN AN ORGANIZED HEALTH CARE EDUCATION/TRAINING PROGRAM

## 2024-01-10 PROCEDURE — 85025 COMPLETE CBC W/AUTO DIFF WBC: CPT | Performed by: STUDENT IN AN ORGANIZED HEALTH CARE EDUCATION/TRAINING PROGRAM

## 2024-01-10 PROCEDURE — 84443 ASSAY THYROID STIM HORMONE: CPT | Performed by: STUDENT IN AN ORGANIZED HEALTH CARE EDUCATION/TRAINING PROGRAM

## 2024-01-10 PROCEDURE — 82728 ASSAY OF FERRITIN: CPT | Performed by: STUDENT IN AN ORGANIZED HEALTH CARE EDUCATION/TRAINING PROGRAM

## 2024-01-10 PROCEDURE — 80053 COMPREHEN METABOLIC PANEL: CPT | Performed by: STUDENT IN AN ORGANIZED HEALTH CARE EDUCATION/TRAINING PROGRAM

## 2024-01-10 PROCEDURE — 82043 UR ALBUMIN QUANTITATIVE: CPT | Performed by: STUDENT IN AN ORGANIZED HEALTH CARE EDUCATION/TRAINING PROGRAM

## 2024-01-10 PROCEDURE — 83036 HEMOGLOBIN GLYCOSYLATED A1C: CPT | Performed by: STUDENT IN AN ORGANIZED HEALTH CARE EDUCATION/TRAINING PROGRAM

## 2024-01-16 ENCOUNTER — OFFICE VISIT (OUTPATIENT)
Dept: INTERNAL MEDICINE | Facility: CLINIC | Age: 64
End: 2024-01-16
Payer: MEDICARE

## 2024-01-16 VITALS
SYSTOLIC BLOOD PRESSURE: 126 MMHG | DIASTOLIC BLOOD PRESSURE: 74 MMHG | BODY MASS INDEX: 29.46 KG/M2 | OXYGEN SATURATION: 98 % | WEIGHT: 177 LBS | HEART RATE: 70 BPM

## 2024-01-16 DIAGNOSIS — E78.2 MIXED HYPERLIPIDEMIA: ICD-10-CM

## 2024-01-16 DIAGNOSIS — E11.65 TYPE 2 DIABETES MELLITUS WITH HYPERGLYCEMIA, WITHOUT LONG-TERM CURRENT USE OF INSULIN: ICD-10-CM

## 2024-01-16 DIAGNOSIS — I10 PRIMARY HYPERTENSION: ICD-10-CM

## 2024-01-16 DIAGNOSIS — Z23 NEED FOR VACCINATION: ICD-10-CM

## 2024-01-16 DIAGNOSIS — Z00.00 HEALTH MAINTENANCE EXAMINATION: ICD-10-CM

## 2024-01-16 DIAGNOSIS — G47.33 OSA (OBSTRUCTIVE SLEEP APNEA): ICD-10-CM

## 2024-01-16 DIAGNOSIS — J45.50 SEVERE PERSISTENT ASTHMA WITHOUT COMPLICATION: Primary | ICD-10-CM

## 2024-01-16 PROCEDURE — 99999 PR PBB SHADOW E&M-EST. PATIENT-LVL IV: CPT | Mod: PBBFAC,,, | Performed by: STUDENT IN AN ORGANIZED HEALTH CARE EDUCATION/TRAINING PROGRAM

## 2024-01-16 PROCEDURE — 99214 OFFICE O/P EST MOD 30 MIN: CPT | Mod: S$GLB,,, | Performed by: STUDENT IN AN ORGANIZED HEALTH CARE EDUCATION/TRAINING PROGRAM

## 2024-01-16 RX ORDER — ALBUTEROL SULFATE 90 UG/1
2 AEROSOL, METERED RESPIRATORY (INHALATION) EVERY 6 HOURS PRN
Qty: 18 G | Refills: 11 | Status: SHIPPED | OUTPATIENT
Start: 2024-01-16

## 2024-01-16 RX ORDER — GUAIFENESIN 600 MG/1
1200 TABLET, EXTENDED RELEASE ORAL 2 TIMES DAILY PRN
Qty: 40 TABLET | Refills: 0 | Status: SHIPPED | OUTPATIENT
Start: 2024-01-16

## 2024-01-16 RX ORDER — FLUTICASONE PROPIONATE AND SALMETEROL 250; 50 UG/1; UG/1
1 POWDER RESPIRATORY (INHALATION) 2 TIMES DAILY
Qty: 60 EACH | Refills: 11 | Status: SHIPPED | OUTPATIENT
Start: 2024-01-16 | End: 2024-06-03 | Stop reason: SDUPTHER

## 2024-01-16 RX ORDER — IPRATROPIUM BROMIDE AND ALBUTEROL SULFATE 2.5; .5 MG/3ML; MG/3ML
3 SOLUTION RESPIRATORY (INHALATION) EVERY 6 HOURS PRN
Qty: 75 ML | Refills: 3 | Status: SHIPPED | OUTPATIENT
Start: 2024-01-16 | End: 2025-01-15

## 2024-01-16 NOTE — PROGRESS NOTES
Subjective:       Patient ID: Susan Courtney is a 63 y.o. female.    Chief Complaint: Severe persistent asthma without complication [J45.50]    Patient is established with me, here today for the following:     HTN, HLD, T2DM, asthma, GERD, JOSE, migraines, anxiety, insomnia      Health maintenance examination -   Colonoscopy performed 2022 with [facility]. Unsure screening interval.  Denies family history of colorectal cancer.   Mammogram BI-RADS 1 in DEC2023.   Denies history of prior abnormal mammogram.  Family history of breast cancers, mother.  Denies family history of ovarian cancers.  Hysterectomy due to fibroids.   History of prior abnormal pap smears, underwent LEEP.  Denies family history of osteoporosis.  Denies significant family history of cardiac disease.  UTD on COVID19 primary/booster, shingles, Tdap, PCV13, PPSV23 vaccinations.  Due for COVID, influenza, RSV vaccinations.  Never smoker.  Denies current alcohol use.   Denies drug use.  Completed HIV and hepatitis C screening     Asthma -   Using albuterol PRN  Symptoms improved with steroids after recent flare in NOV2023  Asthma flares are typically triggered by allergies, illness, or seasonal change.  Using Wixela for daily controller.   Previously tried Bretzi and Trelegy, didn't feel worked well.   Last PFT's were FEB2022.  Previously following with Dr. Reis for pulmonology, hasn't seen recently     T2DM -   Currently taking:  - Trulicity 3 mg  - Jardiance 10 mg  Discontinued metformin due to GI side effects    Not checking glucose routinely  UTD on eye exam, last completed SEP2023.  Due for foot exam.   Seeing Dr. Turk for podiatry, due for follow up  Lab Results       Component                Value               Date                       HGBA1C                   6.4 (H)             01/10/2024                 HGBA1C                   6.8 (H)             07/20/2023                 HGBA1C                   6.9 (H)             01/18/2023             Lab Results       Component                Value               Date                       MICALBCREAT              12.1                01/10/2024                          HTN -   Currently prescribed valsartan, amlodipine.  Patient endorses taking medication as directed.  Denies side effects or concerns while taking medication.  Lab Results       Component                Value               Date                       MICALBCREAT              12.1                01/10/2024            BP Readings from Last 5 Encounters:  11/28/23 : 122/60  10/26/23 : (!) 143/65  08/29/23 : 124/70  07/28/23 : 136/63  06/27/23 : 130/68    JOSE -   Was using, but endorses started with nose sores so stopped using  Plans to follow up with sleep medicine     HLD -   Endorses taking atorvastatin as directed  Denies side effects or concerns while taking medication  Lab Results       Component                Value               Date                       CHOL                     95 (L)              01/10/2024            Lab Results       Component                Value               Date                       TRIG                     93                  01/10/2024            Lab Results       Component                Value               Date                       LDLCALC                  35.4 (L)            01/10/2024            Lab Results       Component                Value               Date                       HDL                      41                  01/10/2024                         Review of Systems   Constitutional:  Negative for activity change, fatigue and fever.   Respiratory:  Negative for cough and shortness of breath.    Cardiovascular:  Negative for chest pain and palpitations.   Gastrointestinal:  Negative for abdominal pain, constipation, diarrhea, nausea and vomiting.   Neurological:  Negative for syncope and headaches.         Current Outpatient Medications   Medication Instructions    albuterol (VENTOLIN HFA)  90 mcg/actuation inhaler 2 puffs, Inhalation, Every 6 hours PRN, Rescue    albuterol-ipratropium (DUO-NEB) 2.5 mg-0.5 mg/3 mL nebulizer solution 3 mLs, Nebulization, Every 6 hours PRN, Rescue    ALPRAZolam (XANAX) 2 mg, Oral, 2 times daily    amLODIPine (NORVASC) 5 mg, Oral, Daily    atorvastatin (LIPITOR) 40 mg, Oral    azelastine (ASTELIN) 274 mcg, Nasal, 2 times daily    budesonide-formoterol 160-4.5 mcg (SYMBICORT) 160-4.5 mcg/actuation HFAA 2 puffs, Inhalation, Every 12 hours    citalopram (CELEXA) 20 mg, Oral    dulaglutide (TRULICITY) 3 mg/0.5 mL pen injector INJECT 3MG UNDER THE SKIN ONCE WEEKLY    fluticasone-salmeterol diskus inhaler 250-50 mcg 1 puff, Inhalation, 2 times daily, Controller    guaiFENesin (MUCINEX) 1,200 mg, Oral, 2 times daily PRN    JARDIANCE 10 mg, Oral, Daily    montelukast (SINGULAIR) 10 mg, Oral, Nightly    naratriptan (AMERGE) 1 MG Tab TAKE 1 TABLET BY MOUTH 2 TIMES DAILY AS NEEDED (HEADACHE). MAY REPEAT X1 AFTER 4 HOURS    pantoprazole (PROTONIX) 40 mg, Oral, Daily    promethazine-dextromethorphan (PROMETHAZINE-DM) 6.25-15 mg/5 mL Syrp 5 mLs, Oral, Every 6 hours PRN    valsartan (DIOVAN) 320 mg, Oral, Daily    zolpidem (AMBIEN) 20 mg, Oral, Nightly     Objective:      Vitals:    01/16/24 1105   BP: 126/74   Pulse: 70   SpO2: 98%   Weight: 80.3 kg (177 lb 0.5 oz)     Body mass index is 29.46 kg/m².    Physical Exam  Vitals reviewed.   Constitutional:       General: She is not in acute distress.     Appearance: She is not ill-appearing or diaphoretic.   Cardiovascular:      Rate and Rhythm: Normal rate and regular rhythm.      Heart sounds: Normal heart sounds. No murmur heard.     No friction rub. No gallop.   Pulmonary:      Effort: Pulmonary effort is normal. No respiratory distress.      Breath sounds: Normal breath sounds. No stridor. No wheezing, rhonchi or rales.   Skin:     General: Skin is warm and dry.      Comments: Color WNL   Neurological:      Mental Status: She is  alert. Mental status is at baseline.   Psychiatric:         Mood and Affect: Mood normal.         Behavior: Behavior normal.         Assessment:       1. Severe persistent asthma without complication    2. Type 2 diabetes mellitus with hyperglycemia, without long-term current use of insulin    3. Primary hypertension    4. Mixed hyperlipidemia    5. JOSE (obstructive sleep apnea)    6. Health maintenance examination    7. Need for vaccination        Plan:       Severe persistent asthma without complication  Continue current medications.  RTC in 6 months for follow up.  -     albuterol (VENTOLIN HFA) 90 mcg/actuation inhaler; Inhale 2 puffs into the lungs every 6 (six) hours as needed for Wheezing. Rescue  -     guaiFENesin (MUCINEX) 600 mg 12 hr tablet; Take 2 tablets (1,200 mg total) by mouth 2 (two) times daily as needed for Congestion.  -     fluticasone-salmeterol diskus inhaler 250-50 mcg; Inhale 1 puff into the lungs 2 (two) times daily. Controller  -     albuterol-ipratropium (DUO-NEB) 2.5 mg-0.5 mg/3 mL nebulizer solution; Take 3 mLs by nebulization every 6 (six) hours as needed for Wheezing or Shortness of Breath. Rescue    Type 2 diabetes mellitus with hyperglycemia, without long-term current use of insulin  Continue current medications.  RTC in 6 months for follow up.  -     Comprehensive Metabolic Panel; Future  -     Hemoglobin A1C; Future  -     CBC Auto Differential; Future  -     Ambulatory referral/consult to Podiatry; Future    Primary hypertension  Continue current medications.  RTC in 6 months for follow up.    Mixed hyperlipidemia  Continue current medications.  RTC in 6 months for follow up.    JOSE (obstructive sleep apnea)  -     Ambulatory referral/consult to Sleep Disorders; Future    Health maintenance examination  Reviewed and discussed age appropriate screenings and immunizations.    Need for vaccination  Discussed recommended vaccinations and how to obtain.      Letty Kumar,  MD  1/16/2024

## 2024-01-17 ENCOUNTER — TELEPHONE (OUTPATIENT)
Dept: ORTHOPEDICS | Facility: CLINIC | Age: 64
End: 2024-01-17
Payer: MEDICARE

## 2024-01-19 ENCOUNTER — TELEPHONE (OUTPATIENT)
Dept: ORTHOPEDICS | Facility: CLINIC | Age: 64
End: 2024-01-19
Payer: MEDICARE

## 2024-01-26 ENCOUNTER — PATIENT OUTREACH (OUTPATIENT)
Dept: ADMINISTRATIVE | Facility: HOSPITAL | Age: 64
End: 2024-01-26
Payer: MEDICARE

## 2024-02-03 DIAGNOSIS — I10 PRIMARY HYPERTENSION: ICD-10-CM

## 2024-02-03 NOTE — TELEPHONE ENCOUNTER
No care due was identified.  Health [facility] Embedded Care Due Messages. Reference number: [id number].   2/03/2024 7:05:44 AM CST

## 2024-02-04 RX ORDER — AMLODIPINE BESYLATE 5 MG/1
5 TABLET ORAL DAILY
Qty: 90 TABLET | Refills: 3 | Status: SHIPPED | OUTPATIENT
Start: 2024-02-04

## 2024-02-04 NOTE — TELEPHONE ENCOUNTER
Refill Decision Note   Susan Courtney  is requesting a refill authorization.  Brief Assessment and Rationale for Refill:  Approve     Medication Therapy Plan:         Comments:     Note composed:4:16 AM 02/04/2024

## 2024-02-07 DIAGNOSIS — K29.60 OTHER SPECIFIED GASTRITIS, PRESENCE OF BLEEDING UNSPECIFIED, UNSPECIFIED CHRONICITY: ICD-10-CM

## 2024-02-07 RX ORDER — PANTOPRAZOLE SODIUM 40 MG/1
40 TABLET, DELAYED RELEASE ORAL DAILY
Qty: 90 TABLET | Refills: 1 | Status: SHIPPED | OUTPATIENT
Start: 2024-02-07

## 2024-02-07 NOTE — TELEPHONE ENCOUNTER
----- Message from Roseann Boyer sent at 2/7/2024 12:10 PM CST -----  Regarding: Refil Request  Who Called:YUNG VLILEGAS [[id number]]       New Prescription or Refill : Refill      RX Name and Strength:  pantoprazole (PROTONIX) 40 MG tablet       30 day or 90 day RX:       Local or Mail Order : Local          Preferred Pharmacy:[facility]/pharmacy #[id number] - [facility] [address]      Would the patient rather a call back or a response via [employer]? no

## 2024-02-07 NOTE — TELEPHONE ENCOUNTER
No care due was identified.  Health [facility] Embedded Care Due Messages. Reference number: [id number].   2/07/2024 12:30:09 PM CST   povidone iodine (if allergic to chlorhexidine)

## 2024-02-22 ENCOUNTER — OFFICE VISIT (OUTPATIENT)
Dept: SLEEP MEDICINE | Facility: CLINIC | Age: 64
End: 2024-02-22
Payer: MEDICARE

## 2024-02-22 DIAGNOSIS — G47.33 OSA (OBSTRUCTIVE SLEEP APNEA): ICD-10-CM

## 2024-02-22 PROCEDURE — 99499 UNLISTED E&M SERVICE: CPT | Mod: S$GLB,,, | Performed by: INTERNAL MEDICINE

## 2024-02-23 ENCOUNTER — TELEPHONE (OUTPATIENT)
Dept: INTERNAL MEDICINE | Facility: CLINIC | Age: 64
End: 2024-02-23
Payer: MEDICARE

## 2024-03-01 ENCOUNTER — TELEPHONE (OUTPATIENT)
Dept: INTERNAL MEDICINE | Facility: CLINIC | Age: 64
End: 2024-03-01
Payer: MEDICARE

## 2024-03-13 NOTE — TELEPHONE ENCOUNTER
Spoke with pt and she stated she tested positive for covid on 1/5/22.  Pt canceled her appt on 1/12/22 and will call back to reschedule after 1/18/22.   45

## 2024-04-24 ENCOUNTER — OFFICE VISIT (OUTPATIENT)
Dept: PSYCHIATRY | Facility: CLINIC | Age: 64
End: 2024-04-24
Payer: MEDICARE

## 2024-04-24 VITALS
BODY MASS INDEX: 28.89 KG/M2 | HEART RATE: 81 BPM | SYSTOLIC BLOOD PRESSURE: 147 MMHG | DIASTOLIC BLOOD PRESSURE: 65 MMHG | WEIGHT: 173.63 LBS

## 2024-04-24 DIAGNOSIS — F41.1 GENERALIZED ANXIETY DISORDER: ICD-10-CM

## 2024-04-24 DIAGNOSIS — G47.00 INSOMNIA, UNSPECIFIED TYPE: Primary | ICD-10-CM

## 2024-04-24 PROCEDURE — 1160F RVW MEDS BY RX/DR IN RCRD: CPT | Mod: CPTII,S$GLB,,

## 2024-04-24 PROCEDURE — 4010F ACE/ARB THERAPY RXD/TAKEN: CPT | Mod: CPTII,S$GLB,,

## 2024-04-24 PROCEDURE — 3044F HG A1C LEVEL LT 7.0%: CPT | Mod: CPTII,S$GLB,,

## 2024-04-24 PROCEDURE — 99417 PROLNG OP E/M EACH 15 MIN: CPT | Mod: S$GLB,,,

## 2024-04-24 PROCEDURE — 3008F BODY MASS INDEX DOCD: CPT | Mod: CPTII,S$GLB,,

## 2024-04-24 PROCEDURE — 3066F NEPHROPATHY DOC TX: CPT | Mod: CPTII,S$GLB,,

## 2024-04-24 PROCEDURE — 99999 PR PBB SHADOW E&M-EST. PATIENT-LVL III: CPT | Mod: PBBFAC,,,

## 2024-04-24 PROCEDURE — 3072F LOW RISK FOR RETINOPATHY: CPT | Mod: CPTII,S$GLB,,

## 2024-04-24 PROCEDURE — 3061F NEG MICROALBUMINURIA REV: CPT | Mod: CPTII,S$GLB,,

## 2024-04-24 PROCEDURE — 3077F SYST BP >= 140 MM HG: CPT | Mod: CPTII,S$GLB,,

## 2024-04-24 PROCEDURE — 99205 OFFICE O/P NEW HI 60 MIN: CPT | Mod: S$GLB,,,

## 2024-04-24 PROCEDURE — 90833 PSYTX W PT W E/M 30 MIN: CPT | Mod: S$GLB,,,

## 2024-04-24 PROCEDURE — 1159F MED LIST DOCD IN RCRD: CPT | Mod: CPTII,S$GLB,,

## 2024-04-24 PROCEDURE — 3078F DIAST BP <80 MM HG: CPT | Mod: CPTII,S$GLB,,

## 2024-04-24 RX ORDER — ALPRAZOLAM 1 MG/1
1 TABLET ORAL 4 TIMES DAILY
COMMUNITY
Start: 2024-04-17

## 2024-04-24 RX ORDER — ZOLPIDEM TARTRATE 10 MG/1
20 TABLET ORAL NIGHTLY
Qty: 60 TABLET | Refills: 3 | Status: SHIPPED | OUTPATIENT
Start: 2024-04-24 | End: 2024-08-22

## 2024-04-24 NOTE — PROGRESS NOTES
Outpatient Psychiatry Initial Visit (MD/NP)    4/24/2024    Susan Courtney, a 64 y.o. female, presenting for initial evaluation visit. Met with patient.    Reason for Encounter: self-referral. Patient complains of anxiety.    History of Present Illness: Anxiety  Patient is here for evaluation of anxiety.  She has the following anxiety symptoms: difficulty concentrating, insomnia, racing thoughts. Onset of symptoms was approximately several years ago.  Symptoms have been unchanged since that time. She denies current suicidal and homicidal ideation. Family history significant for anxiety and substance abuse.Possible organic causes contributing are: medications, endocrine/metabolic, neuro. Risk factors: negative life event recent losses in family  Previous treatment includes medication Celexa, Valium, and Xanax.   She complains of the following medication side effects: none.    Patient with past medical history of   Past Medical History:   Diagnosis Date    Abdominal hernia     Anxiety     Asthma     Diabetes mellitus     Hypertension     Migraine     Migraine headache     Obesity     Sleep apnea        Presents for outpatient psychiatry for medication management and re-establishment of care. Patient previous psychiatrist no longer takes her insurance, she has been taking xanax and ambien for several years due to anxiety and insomnia. Patient has taken valium and celexa in the past but these were not effective for her. Patient has struggled with anxiety since her  was murdered while having an affair with her. She has tried to forgive those who did it but states it has been difficult. Patient is retired, states she likes to spend her time helping her 2 daughters, grandchildren, and volunteering. She is currently volunteering for [employer]. Patient has had episodes of fainting spells in the past due to her diabetes, states she also had at one point taken a muscle relaxant while she was also taking xanax and  became very sick, she verbalizes that she understands those medications along with opiates are dangerous when combined. Patient states that her current primary support system is her daughters and grandchildren and she seems them frequently. Recently she has lost some family to drug use and endorses some grief from that, as well as worry that her grandson is smoking marijuana and 'you don't know what they put in that when you buy it from the street.' She denies any depression at this time. She is amenable to attempting other medications but states she has been on these for several years and know that they work well for her. Discussed with patient safety of these medications, risks of morning somnolence and long term use. She verbalizes some interest in buspar but at this time would like to continue her current regimen as she feels it has significantly helped with her quality of life. At times she will sleep but other times she will not sleep. Referral for therapy discussed. At this time she denies any AH/VH/SI/HI.             CURRENT PSYCHOTROPIC REGIMEN:  Xanax 1mg 2x PRN  Ambien 10mg 2x PRN      Compliance: yes   reviewed without concern yes, patient has taken these medications for several years  Side effects: no  Patient's overall opinion of current regimen: Working very well  Life event tracker/ stressors/ relationships: recent losses in family due to drug use.         PSYCHIATRIC ROS:  Depressed mood: no   Sleep Disturbance: yes   interest/pleasure/anhedonia: no  Negative-self talk /guilty/hopelessness: no  energy/anergy: no  concentration: no  Appetite disturbance: could eat more  Self-injurious /risky behavior: no  Psychomotor disturbance: no  Suicidal Ideation:  no  Chronic daily anxiety/panic: controlled at this time  Denies: severe panic associated with chest pain, palpitations, hyperventilation, sense of doom, diaphoresis.   Obsessions/Recurrent thoughts:  denies  Compulsions/ Recurrent behaviors:   denies       RISK PARAMETERS:  Patient reports no suicidal ideation  Patient reports no homicidal ideation  Patient reports no self-injurious behavior  Patient reports no violent behavior       SOCIAL HISTORY:  Lives in own home   Work - volunteers   Relationship - no  Children - 2 daughters, 8 grandchildren   Buddhism       PAST PSYCHIATRIC HISTORY:  Family Psychiatric History:  Denies family history of sister, possible bipolar , schizophrenia, psychiatric hospitalizations , suicide attempts of completions.  Previous Psychiatric Care: no  Previous Psychiatric Hospitalizations: no;   Previous Suicide Attempts/ Non-suicidal self injury: no  Previous Medication Trials: no        LEGAL, VIOLENCE:  History of Violence: no  Legal history: no  DWI / DUI: no  Access to Gun: no        SUBSTANCE ABUSE HISTORY:  Denies       NEUROLOGIC HISTORY:  Seizures: no  Head trauma: no        Review Of Systems:     GENERAL:  No weight gain or loss  SKIN:  No rashes or lacerations  HEAD:  No headaches  EYES:  No exophthalmos, jaundice or blindness  EARS:  No dizziness, tinnitus or hearing loss  NOSE:  No changes in smell  MOUTH & THROAT:  No dyskinetic movements or obvious goiter  CHEST:  No shortness of breath, hyperventilation or cough  CARDIOVASCULAR:  No tachycardia or chest pain  ABDOMEN:  No nausea, vomiting, pain, constipation or diarrhea  URINARY:  No frequency, dysuria or sexual dysfunction  ENDOCRINE:  No polydipsia, polyuria  MUSCULOSKELETAL:  No pain or stiffness of the joints  NEUROLOGIC:  No weakness, sensory changes, seizures, confusion, memory loss, tremor or other abnormal movements    Current Evaluation:     Nutritional Screening: Considering the patient's height and weight, medications, medical history and preferences, should a referral be made to the dietitian? no    Constitutional  Vitals:  Most recent vital signs, dated less than 90 days prior to this appointment, were reviewed.    Vitals:    04/24/24 1317   BP:  (!) 147/65   Pulse: 81          General:  unremarkable, age appropriate     Musculoskeletal  Muscle Strength/Tone:  not examined   Gait & Station:  non-ataxic     Psychiatric  Speech:  no latency; no press   Mood & Affect:  steady, euthymic  congruent and appropriate   Thought Process:  normal and logical   Associations:  intact   Thought Content:  normal, no suicidality, no homicidality, delusions, or paranoia   Insight:  intact   Judgement: behavior is adequate to circumstances   Orientation:  grossly intact   Memory: intact for content of interview   Language: grossly intact   Attention Span & Concentration:  able to focus   Fund of Knowledge:  intact and appropriate to age and level of education       Relevant Elements of Neurological Exam: normal gait    Functioning in Relationships:  Spouse/partner: n/a,   Peers: has some peer relationships  Employers: n/a, retired    Laboratory Data  No visits with results within 1 Month(s) from this visit.   Latest known visit with results is:   Patient Outreach on 01/26/2024   Component Date Value Ref Range Status    CRC Recommendation External 03/31/2022 Repeat colonoscopy in 5 years   Final         Medications  Outpatient Encounter Medications as of 4/24/2024   Medication Sig Dispense Refill    albuterol (VENTOLIN HFA) 90 mcg/actuation inhaler Inhale 2 puffs into the lungs every 6 (six) hours as needed for Wheezing. Rescue 18 g 11    albuterol-ipratropium (DUO-NEB) 2.5 mg-0.5 mg/3 mL nebulizer solution Take 3 mLs by nebulization every 6 (six) hours as needed for Wheezing or Shortness of Breath. Rescue 75 mL 3    ALPRAZolam (XANAX) 2 MG Tab Take 2 mg by mouth 2 (two) times a day.      amLODIPine (NORVASC) 5 MG tablet TAKE 1 TABLET (5 MG TOTAL) BY MOUTH DAILY. 90 tablet 3    atorvastatin (LIPITOR) 40 MG tablet TAKE 1 TABLET BY MOUTH EVERY DAY 90 tablet 1    azelastine (ASTELIN) 137 mcg (0.1 %) nasal spray 2 sprays (274 mcg total) by Nasal route 2 (two) times daily.  30 mL 3    budesonide-formoterol 160-4.5 mcg (SYMBICORT) 160-4.5 mcg/actuation HFAA Inhale 2 puffs into the lungs every 12 (twelve) hours.      citalopram (CELEXA) 20 MG tablet Take 20 mg by mouth.      dulaglutide (TRULICITY) 3 mg/0.5 mL pen injector INJECT 3MG UNDER THE SKIN ONCE WEEKLY 12 Pen 3    fluticasone-salmeterol diskus inhaler 250-50 mcg Inhale 1 puff into the lungs 2 (two) times daily. Controller 60 each 11    guaiFENesin (MUCINEX) 600 mg 12 hr tablet Take 2 tablets (1,200 mg total) by mouth 2 (two) times daily as needed for Congestion. 40 tablet 0    JARDIANCE 10 mg tablet Take 1 tablet (10 mg total) by mouth once daily. 90 tablet 3    montelukast (SINGULAIR) 10 mg tablet Take 1 tablet (10 mg total) by mouth every evening. 90 tablet 1    naratriptan (AMERGE) 1 MG Tab TAKE 1 TABLET BY MOUTH 2 TIMES DAILY AS NEEDED (HEADACHE). MAY REPEAT X1 AFTER 4 HOURS 12 tablet 12    pantoprazole (PROTONIX) 40 MG tablet Take 1 tablet (40 mg total) by mouth once daily. 90 tablet 1    promethazine-dextromethorphan (PROMETHAZINE-DM) 6.25-15 mg/5 mL Syrp Take 5 mLs by mouth every 6 (six) hours as needed (cough). 120 mL 0    valsartan (DIOVAN) 320 MG tablet TAKE 1 TABLET BY MOUTH ONCE DAILY. 90 tablet 3    zolpidem (AMBIEN) 10 mg Tab Take 20 mg by mouth every evening.       No facility-administered encounter medications on file as of 4/24/2024.           Assessment - Diagnosis - Goals:     Impression:   1. Insomnia, unspecified type        2. Generalized anxiety disorder                  Strengths and Liabilities: Strength: Patient accepts guidance/feedback, Strength: Patient is expressive/articulate., Strength: Patient is intelligent., Strength: Patient is motivated for change., Strength: Patient is physically healthy., Strength: Patient has positive support network., Strength: Patient has reasonable judgment., Liability: Patient lacks coping skills.    Treatment Goals:  Specify outcomes written in observable, behavioral  terms:   Anxiety: acquiring relapse prevention skills, eliminating all anxiety symptoms (SCL-90-R scores in normal range), reducing negative automatic thoughts, reducing physical symptoms of anxiety, and reducing time spent worrying (<30 minutes/day)  Sleep: Avoid caffeine sources, improve sleep hygiene, use medications only PRN      Treatment Plan/Recommendations:   Medication Management: Continue current medications. The risks and benefits of medication were discussed with the patient. At this time patient feels current regimen is effective, may be interested in Buspar in future  Referral for further treatment to psychologist for psychotherapy  The treatment plan and follow up plan were reviewed with the patient.      Labs: reviewed most recent. The treatment plan and follow up plan were reviewed with the patient. Discussed with patient informed consent, risks vs. benefits, alternative treatments, side effect profile and the inherent unpredictability of individual responses to these treatments. The patient expresses understanding of the above and displays the capacity to agree with this current plan and had no other questions. Encouraged Patient to keep future appointments. Take medications as prescribed and abstain from substance abuse. In the event of an emergency patient was advised to go to the emergency room.    Return to Clinic: 3 months, as scheduled      PSYCHOTHERAPY ADD-ON +[id number]   (16-37*) minutes    Time: 16 minutes  Participants: Met with patient    Therapeutic Intervention Type: CBT/behavior modifying psychotherapy/ supportive psychotherapy/Insight oriented  Why chosen therapy is appropriate versus another modality: relevant to diagnosis, patient responds to this modality, evidence based practice    Target symptoms: anxiety , adjustment, grief  Primary focus: Passing of , anxiety management  Psychotherapeutic techniques: Practice effective CBT skills, deep breathing, meditation, and  mindfulness to manage current symptoms    Outcome monitoring methods: self-report, observation    Patient's response to intervention:  The patient's response to intervention is accepting.    Progress toward goals:  The patient's progress toward goals is good.  Total time: I spent a total of 97 minutes on the day of the visit.  This includes face to face time and non-face to face time preparing to see the patient (eg, review of tests), obtaining and/or reviewing separately obtained history, documenting clinical information in the electronic or other health record, independently interpreting results and communicating results to the patient/family/caregiver, or care coordinator.    Consulting clinician was informed of the encounter and consult note.

## 2024-05-17 ENCOUNTER — TELEPHONE (OUTPATIENT)
Dept: INTERNAL MEDICINE | Facility: CLINIC | Age: 64
End: 2024-05-17
Payer: MEDICARE

## 2024-05-21 ENCOUNTER — OFFICE VISIT (OUTPATIENT)
Dept: INTERNAL MEDICINE | Facility: CLINIC | Age: 64
End: 2024-05-21
Payer: MEDICARE

## 2024-05-21 VITALS
SYSTOLIC BLOOD PRESSURE: 136 MMHG | BODY MASS INDEX: 29.94 KG/M2 | WEIGHT: 179.88 LBS | OXYGEN SATURATION: 98 % | DIASTOLIC BLOOD PRESSURE: 76 MMHG | HEART RATE: 83 BPM

## 2024-05-21 DIAGNOSIS — I10 PRIMARY HYPERTENSION: ICD-10-CM

## 2024-05-21 DIAGNOSIS — J45.50 SEVERE PERSISTENT ASTHMA WITHOUT COMPLICATION: Primary | ICD-10-CM

## 2024-05-21 DIAGNOSIS — E78.2 MIXED HYPERLIPIDEMIA: ICD-10-CM

## 2024-05-21 DIAGNOSIS — K59.09 CHRONIC CONSTIPATION: ICD-10-CM

## 2024-05-21 DIAGNOSIS — G47.00 INSOMNIA, UNSPECIFIED TYPE: ICD-10-CM

## 2024-05-21 DIAGNOSIS — Z00.00 HEALTH MAINTENANCE EXAMINATION: ICD-10-CM

## 2024-05-21 DIAGNOSIS — G47.33 OSA (OBSTRUCTIVE SLEEP APNEA): ICD-10-CM

## 2024-05-21 DIAGNOSIS — E55.9 VITAMIN D DEFICIENCY: ICD-10-CM

## 2024-05-21 DIAGNOSIS — E11.9 TYPE 2 DIABETES MELLITUS WITHOUT COMPLICATION, WITHOUT LONG-TERM CURRENT USE OF INSULIN: ICD-10-CM

## 2024-05-21 DIAGNOSIS — F41.1 GENERALIZED ANXIETY DISORDER: ICD-10-CM

## 2024-05-21 PROCEDURE — 3008F BODY MASS INDEX DOCD: CPT | Mod: CPTII,S$GLB,, | Performed by: STUDENT IN AN ORGANIZED HEALTH CARE EDUCATION/TRAINING PROGRAM

## 2024-05-21 PROCEDURE — 99999 PR PBB SHADOW E&M-EST. PATIENT-LVL IV: CPT | Mod: PBBFAC,,, | Performed by: STUDENT IN AN ORGANIZED HEALTH CARE EDUCATION/TRAINING PROGRAM

## 2024-05-21 PROCEDURE — 3066F NEPHROPATHY DOC TX: CPT | Mod: CPTII,S$GLB,, | Performed by: STUDENT IN AN ORGANIZED HEALTH CARE EDUCATION/TRAINING PROGRAM

## 2024-05-21 PROCEDURE — 4010F ACE/ARB THERAPY RXD/TAKEN: CPT | Mod: CPTII,S$GLB,, | Performed by: STUDENT IN AN ORGANIZED HEALTH CARE EDUCATION/TRAINING PROGRAM

## 2024-05-21 PROCEDURE — 3078F DIAST BP <80 MM HG: CPT | Mod: CPTII,S$GLB,, | Performed by: STUDENT IN AN ORGANIZED HEALTH CARE EDUCATION/TRAINING PROGRAM

## 2024-05-21 PROCEDURE — G2211 COMPLEX E/M VISIT ADD ON: HCPCS | Mod: S$GLB,,, | Performed by: STUDENT IN AN ORGANIZED HEALTH CARE EDUCATION/TRAINING PROGRAM

## 2024-05-21 PROCEDURE — 3072F LOW RISK FOR RETINOPATHY: CPT | Mod: CPTII,S$GLB,, | Performed by: STUDENT IN AN ORGANIZED HEALTH CARE EDUCATION/TRAINING PROGRAM

## 2024-05-21 PROCEDURE — 99214 OFFICE O/P EST MOD 30 MIN: CPT | Mod: S$GLB,,, | Performed by: STUDENT IN AN ORGANIZED HEALTH CARE EDUCATION/TRAINING PROGRAM

## 2024-05-21 PROCEDURE — 3061F NEG MICROALBUMINURIA REV: CPT | Mod: CPTII,S$GLB,, | Performed by: STUDENT IN AN ORGANIZED HEALTH CARE EDUCATION/TRAINING PROGRAM

## 2024-05-21 PROCEDURE — 1159F MED LIST DOCD IN RCRD: CPT | Mod: CPTII,S$GLB,, | Performed by: STUDENT IN AN ORGANIZED HEALTH CARE EDUCATION/TRAINING PROGRAM

## 2024-05-21 PROCEDURE — 3075F SYST BP GE 130 - 139MM HG: CPT | Mod: CPTII,S$GLB,, | Performed by: STUDENT IN AN ORGANIZED HEALTH CARE EDUCATION/TRAINING PROGRAM

## 2024-05-21 PROCEDURE — 3044F HG A1C LEVEL LT 7.0%: CPT | Mod: CPTII,S$GLB,, | Performed by: STUDENT IN AN ORGANIZED HEALTH CARE EDUCATION/TRAINING PROGRAM

## 2024-05-21 RX ORDER — POLYETHYLENE GLYCOL 3350 17 G/17G
17 POWDER, FOR SOLUTION ORAL DAILY PRN
Qty: 100 EACH | Refills: 3 | Status: SHIPPED | OUTPATIENT
Start: 2024-05-21

## 2024-05-21 RX ORDER — ERGOCALCIFEROL 1.25 MG/1
50000 CAPSULE ORAL
Qty: 12 CAPSULE | Refills: 3 | Status: SHIPPED | OUTPATIENT
Start: 2024-05-21

## 2024-05-21 RX ORDER — LANCETS
EACH MISCELLANEOUS
Qty: 200 EACH | Refills: 3 | Status: SHIPPED | OUTPATIENT
Start: 2024-05-21

## 2024-05-21 RX ORDER — INSULIN PUMP SYRINGE, 3 ML
EACH MISCELLANEOUS
Qty: 1 EACH | Refills: 0 | Status: SHIPPED | OUTPATIENT
Start: 2024-05-21 | End: 2025-05-21

## 2024-05-21 NOTE — PROGRESS NOTES
Subjective:       Patient ID: Susan Courtney is a 64 y.o. female.    Chief Complaint: Severe persistent asthma without complication [J45.50]    Patient is established with me, here today for the following:     HTN, HLD, T2DM, asthma, GERD, JOSE, migraines, anxiety, insomnia      Health maintenance examination -   Colonoscopy performed MAR2022 with Dr. Ware. Recommended repeat in 5 years.  Denies family history of colorectal cancer.   Mammogram BI-RADS 1 in DEC2023.   Denies history of prior abnormal mammogram.  Family history of breast cancers, mother.  Denies family history of ovarian cancers.  Hysterectomy due to fibroids.   History of prior abnormal pap smears, underwent LEEP.  Denies family history of osteoporosis.  Denies significant family history of cardiac disease.  UTD on COVID primary/booster, shingles, Tdap, PCV13, PPSV23, RSV vaccinations.  Due for COVID vaccinations.  Never smoker.  Denies current alcohol use.   Denies drug use.  Completed HIV and hepatitis C screening     Asthma -    Using albuterol PRN  Asthma flares are typically triggered by allergies, illness, or seasonal change.  Using Wixela for daily controller.   Previously tried Bretzi and Trelegy, didn't feel worked well.   Last PFT's were FEB2022.  Previously following with Dr. Reis for pulmonology     T2DM -   Currently taking:  - Trulicity 3 mg  - Jardiance 10 mg  Discontinued metformin due to GI side effects    Not checking glucose routinely  Concerned going low blood sugar, feels light headed when not eating  Was at [employer] working when felt light headed  Feels better after eating  Does not have glucometer available to check glucose  UTD on eye exam, last completed SEP2023.  Due for foot exam.   Seeing Dr. Turk for podiatry, due for follow up  Lab Results       Component                Value               Date                       HGBA1C                   6.4 (H)             01/10/2024                 HGBA1C                    6.8 (H)             07/20/2023                 HGBA1C                   6.9 (H)             01/18/2023            Lab Results       Component                Value               Date                       MICALBCREAT              12.1                01/10/2024                               HTN -   Currently prescribed valsartan, amlodipine.  Patient endorses taking medication as directed.  Denies side effects or concerns while taking medication.  Lab Results       Component                Value               Date                       MICALBCREAT              12.1                01/10/2024            BP Readings from Last 5 Encounters:  04/24/24 : (!) 147/65  01/16/24 : 126/74  11/28/23 : 122/60  10/26/23 : (!) 143/65  08/29/23 : 124/70     JOSE -   Not currently using CPAP  Missed appt with sleep medicine     HLD -   Endorses taking atorvastatin as directed  Denies side effects or concerns while taking medication  Lab Results       Component                Value               Date                       CHOL                     95 (L)              01/10/2024            Lab Results       Component                Value               Date                       TRIG                     93                  01/10/2024            Lab Results       Component                Value               Date                       LDLCALC                  35.4 (L)            01/10/2024            Lab Results       Component                Value               Date                       HDL                      41                  01/10/2024                   Following with TERESITA Zayas routinely for psychiatry.  Taking Ambien for insomnia with good effect  Not currently taking citalopram for anxiety    Vitamin D deficiency -  Not currently taking vitamin D supplementation.  Lab Results       Component                Value               Date                       [id number]             24 (L)              07/20/2023              Taking  polyethylene glycol PRN for chronic constipation with good effect        Review of Systems   Constitutional:  Negative for activity change, fatigue and fever.   Respiratory:  Negative for cough and shortness of breath.    Cardiovascular:  Negative for chest pain and palpitations.   Gastrointestinal:  Positive for constipation. Negative for abdominal pain, diarrhea, nausea and vomiting.   Neurological:  Positive for light-headedness. Negative for syncope and headaches.         Current Outpatient Medications   Medication Instructions    albuterol (VENTOLIN HFA) 90 mcg/actuation inhaler 2 puffs, Inhalation, Every 6 hours PRN, Rescue    albuterol-ipratropium (DUO-NEB) 2.5 mg-0.5 mg/3 mL nebulizer solution 3 mLs, Nebulization, Every 6 hours PRN, Rescue    ALPRAZolam (XANAX) 1 mg, Oral, 4 times daily    amLODIPine (NORVASC) 5 mg, Oral, Daily    atorvastatin (LIPITOR) 40 mg, Oral    azelastine (ASTELIN) 274 mcg, Nasal, 2 times daily    blood sugar diagnostic Strp Use as instructed to check blood sugar 2-3 times daily and whenever feeling ill.    blood-glucose meter kit Use as instructed to check blood sugar 2-3 times daily and whenever feeling ill.    budesonide-formoterol 160-4.5 mcg (SYMBICORT) 160-4.5 mcg/actuation HFAA 2 puffs, Inhalation, Every 12 hours    citalopram (CELEXA) 20 mg, Oral    dulaglutide (TRULICITY) 3 mg/0.5 mL pen injector INJECT 3MG UNDER THE SKIN ONCE WEEKLY    ergocalciferol (ERGOCALCIFEROL) 50,000 Units, Oral, Every 7 days    fluticasone-salmeterol diskus inhaler 250-50 mcg 1 puff, Inhalation, 2 times daily, Controller    guaiFENesin (MUCINEX) 1,200 mg, Oral, 2 times daily PRN    JARDIANCE 10 mg, Oral, Daily    lancets (ACCU-CHEK SOFTCLIX LANCETS) Misc Use as instructed to check blood sugar 2-3 times daily and whenever feeling ill.    montelukast (SINGULAIR) 10 mg, Oral, Nightly    naratriptan (AMERGE) 1 MG Tab TAKE 1 TABLET BY MOUTH 2 TIMES DAILY AS NEEDED (HEADACHE). MAY REPEAT X1 AFTER 4 HOURS     pantoprazole (PROTONIX) 40 mg, Oral, Daily    polyethylene glycol (GLYCOLAX) 17 g, Oral, Daily PRN    promethazine-dextromethorphan (PROMETHAZINE-DM) 6.25-15 mg/5 mL Syrp 5 mLs, Oral, Every 6 hours PRN    valsartan (DIOVAN) 320 mg, Oral    zolpidem (AMBIEN) 20 mg, Oral, Nightly     Objective:      Vitals:    05/21/24 0810   BP: 136/76   Pulse: 83   SpO2: 98%   Weight: 81.6 kg (179 lb 14.3 oz)   PainSc: 0-No pain     Body mass index is 29.94 kg/m².    Physical Exam  Vitals reviewed.   Constitutional:       General: She is not in acute distress.     Appearance: She is not ill-appearing or diaphoretic.   Cardiovascular:      Rate and Rhythm: Normal rate and regular rhythm.      Heart sounds: Normal heart sounds. No murmur heard.     No friction rub. No gallop.   Pulmonary:      Effort: Pulmonary effort is normal. No respiratory distress.      Breath sounds: Normal breath sounds. No stridor. No wheezing, rhonchi or rales.   Skin:     General: Skin is warm and dry.      Comments: Color WNL   Neurological:      Mental Status: She is alert. Mental status is at baseline.   Psychiatric:         Mood and Affect: Mood normal.         Behavior: Behavior normal.       Assessment:       1. Severe persistent asthma without complication    2. Type 2 diabetes mellitus without complication, without long-term current use of insulin    3. Primary hypertension    4. JOSE (obstructive sleep apnea)    5. Mixed hyperlipidemia    6. Insomnia, unspecified type    7. Generalized anxiety disorder    8. Vitamin D deficiency    9. Chronic constipation    10. Health maintenance examination        Plan:       Severe persistent asthma without complication  Continue current medications.  RTC in 6 months for follow up.    Type 2 diabetes mellitus without complication, without long-term current use of insulin  Start checking glucose 1-2 times daily and whenever feeling ill  Hold Jardiance for now  Continue Trulicity  RTC in 6 months for follow  up.  -     Comprehensive Metabolic Panel; Future  -     Hemoglobin A1C; Future  -     CBC Auto Differential; Future  -     blood-glucose meter kit; Use as instructed to check blood sugar 2-3 times daily and whenever feeling ill.  -     blood sugar diagnostic Strp; Use as instructed to check blood sugar 2-3 times daily and whenever feeling ill.  -     lancets (ACCU-CHEK SOFTCLIX LANCETS) Misc; Use as instructed to check blood sugar 2-3 times daily and whenever feeling ill.    Primary hypertension  Continue current medications.  RTC in 6 months for follow up.    JOSE (obstructive sleep apnea)  Continue evaluation and management per sleep medicine.    Mixed hyperlipidemia  Continue current medications.  RTC in 6 months for follow up.    Insomnia, unspecified type  Generalized anxiety disorder  Continue medication, evaluation, and management per psychiatrist.    Vitamin D deficiency  Start vitamin D  RTC in 6 months for follow up.  -     Vitamin D; Future  -     ergocalciferol (ERGOCALCIFEROL) 50,000 unit Cap; Take 1 capsule (50,000 Units total) by mouth every 7 days.    Chronic constipation  Continue current medications.  RTC in 6 months for follow up.  -     polyethylene glycol (GLYCOLAX) 17 gram PwPk; Take 17 g by mouth daily as needed for Constipation.    Health maintenance examination  Reviewed and discussed age appropriate screenings and immunizations.  -     Comprehensive Metabolic Panel; Future  -     Hemoglobin A1C; Future  -     Vitamin D; Future  -     CBC Auto Differential; Future      Letty Kumar MD  5/21/2024

## 2024-05-21 NOTE — LETTER
May 21, 2024      [facility]  [address]  [facility] [address]  Phone: [phone]  Fax: [phone]       Patient: Susan Courtney   YOB: 1960  Date of Visit: 05/21/2024    To Whom It May Concern:    Susan Courtney  was at [facility] on 05/21/2024. Please allow accommodations while at work for patient to check blood glucose and eat as necessary to prevent hypoglycemic episodes. She will also need to be in well ventilated, air conditioned areas to prevent asthma flares. Please allow accommodations to utilize emergency asthma inhalers whenever needed. If you have any questions or concerns, or if I can be of further assistance, please do not hesitate to contact me.      Sincerely,        Letty Kumar MD

## 2024-05-22 ENCOUNTER — TELEPHONE (OUTPATIENT)
Dept: INTERNAL MEDICINE | Facility: CLINIC | Age: 64
End: 2024-05-22
Payer: MEDICARE

## 2024-05-22 DIAGNOSIS — K59.09 CHRONIC CONSTIPATION: Primary | ICD-10-CM

## 2024-05-22 RX ORDER — DOCUSATE SODIUM 100 MG/1
100 CAPSULE, LIQUID FILLED ORAL NIGHTLY
Qty: 90 CAPSULE | Refills: 3 | Status: SHIPPED | OUTPATIENT
Start: 2024-05-22

## 2024-05-22 NOTE — TELEPHONE ENCOUNTER
----- Message from Denise Singh sent at 5/21/2024  4:09 PM CDT -----  Regarding: polyethylene glycol (GLYCOLAX) 17 gram PwPk  Type:  Patient Returning Call      Name of who is calling:patient        What is request in detail:Patient is requesting a call back in regards to polyethylene glycol (GLYCOLAX) 17 gram PwPk, it is not covered by her insurance and cost to much. Wants to know if some else can be prescribed.        Can clinic reply by [facility]:no        What number to call back if not [facility][phone]

## 2024-05-22 NOTE — TELEPHONE ENCOUNTER
----- Message from Denise Singh sent at 5/21/2024  4:09 PM CDT -----  Regarding: polyethylene glycol (GLYCOLAX) 17 gram PwPk  Type:  Patient Returning Call      Name of who is calling:patient        What is request in detail:Patient is requesting a call back in regards to polyethylene glycol (GLYCOLAX) 17 gram PwPk, it is not covered by her insurance and cost to much. Wants to know if some else can be prescribed.        Can clinic reply by [facility]:no        What number to call back if not in [facility]:[phone]

## 2024-05-29 ENCOUNTER — TELEPHONE (OUTPATIENT)
Dept: INTERNAL MEDICINE | Facility: CLINIC | Age: 64
End: 2024-05-29
Payer: MEDICARE

## 2024-05-29 DIAGNOSIS — J45.50 SEVERE PERSISTENT ASTHMA WITHOUT COMPLICATION: Primary | ICD-10-CM

## 2024-05-29 DIAGNOSIS — R05.1 ACUTE COUGH: ICD-10-CM

## 2024-05-29 RX ORDER — PREDNISONE 20 MG/1
20 TABLET ORAL DAILY
Qty: 5 TABLET | Refills: 0 | Status: SHIPPED | OUTPATIENT
Start: 2024-05-29 | End: 2024-06-03

## 2024-05-29 RX ORDER — PROMETHAZINE HYDROCHLORIDE AND DEXTROMETHORPHAN HYDROBROMIDE 6.25; 15 MG/5ML; MG/5ML
5 SYRUP ORAL EVERY 6 HOURS PRN
Qty: 120 ML | Refills: 0 | Status: SHIPPED | OUTPATIENT
Start: 2024-05-29

## 2024-05-29 NOTE — TELEPHONE ENCOUNTER
----- Message from Shonda Nice sent at 5/29/2024  9:59 AM CDT -----  Regarding: P/t advice  Type: Patient Call Back    Who called:    What is the request in detail: p/t is calling b/c she has been experiencing congestion, cough, green mucus and tightness in the chest, she is asking for a rx of predazone for her asthma due to the weather changing and cough medicine. She also would like antibacterial medicine for itching in the vaginal area. Please call to assist.     Can the clinic reply by [facility]?  Yes     Would the patient rather a call back or a response via [facility]?  Call back     Best call back number:  [phone]    Additional Information:

## 2024-05-29 NOTE — TELEPHONE ENCOUNTER
Please let patient know prednisone sent to pharmacy due to concern for asthma flare. I would also like her to come in ASAP for evaluation to ensure no concern for infection with the fevers. Any concerning signs/symptoms such as worsening SOB, etc, go to ED. Thank you!

## 2024-05-29 NOTE — TELEPHONE ENCOUNTER
Talked to Patient and relayed message. Have an appointment coming up on June 3rd. She also is asking for cough medicine.

## 2024-06-03 ENCOUNTER — OFFICE VISIT (OUTPATIENT)
Dept: INTERNAL MEDICINE | Facility: CLINIC | Age: 64
End: 2024-06-03
Payer: MEDICARE

## 2024-06-03 ENCOUNTER — HOSPITAL ENCOUNTER (OUTPATIENT)
Dept: RADIOLOGY | Facility: OTHER | Age: 64
Discharge: HOME | End: 2024-06-03
Attending: STUDENT IN AN ORGANIZED HEALTH CARE EDUCATION/TRAINING PROGRAM
Payer: MEDICARE

## 2024-06-03 ENCOUNTER — PATIENT MESSAGE (OUTPATIENT)
Dept: PSYCHIATRY | Facility: CLINIC | Age: 64
End: 2024-06-03
Payer: MEDICARE

## 2024-06-03 VITALS
BODY MASS INDEX: 30.49 KG/M2 | WEIGHT: 183.19 LBS | OXYGEN SATURATION: 99 % | DIASTOLIC BLOOD PRESSURE: 80 MMHG | HEART RATE: 83 BPM | SYSTOLIC BLOOD PRESSURE: 140 MMHG

## 2024-06-03 DIAGNOSIS — J45.51 SEVERE PERSISTENT ASTHMA WITH ACUTE EXACERBATION: ICD-10-CM

## 2024-06-03 DIAGNOSIS — E11.9 TYPE 2 DIABETES MELLITUS WITHOUT COMPLICATION, WITHOUT LONG-TERM CURRENT USE OF INSULIN: ICD-10-CM

## 2024-06-03 DIAGNOSIS — J45.51 SEVERE PERSISTENT ASTHMA WITH ACUTE EXACERBATION: Primary | ICD-10-CM

## 2024-06-03 PROCEDURE — 3066F NEPHROPATHY DOC TX: CPT | Mod: CPTII,S$GLB,, | Performed by: STUDENT IN AN ORGANIZED HEALTH CARE EDUCATION/TRAINING PROGRAM

## 2024-06-03 PROCEDURE — 3044F HG A1C LEVEL LT 7.0%: CPT | Mod: CPTII,S$GLB,, | Performed by: STUDENT IN AN ORGANIZED HEALTH CARE EDUCATION/TRAINING PROGRAM

## 2024-06-03 PROCEDURE — 4010F ACE/ARB THERAPY RXD/TAKEN: CPT | Mod: CPTII,S$GLB,, | Performed by: STUDENT IN AN ORGANIZED HEALTH CARE EDUCATION/TRAINING PROGRAM

## 2024-06-03 PROCEDURE — 99999 PR PBB SHADOW E&M-EST. PATIENT-LVL III: CPT | Mod: PBBFAC,,, | Performed by: STUDENT IN AN ORGANIZED HEALTH CARE EDUCATION/TRAINING PROGRAM

## 2024-06-03 PROCEDURE — 71046 X-RAY EXAM CHEST 2 VIEWS: CPT | Mod: 26,,, | Performed by: RADIOLOGY

## 2024-06-03 PROCEDURE — 3061F NEG MICROALBUMINURIA REV: CPT | Mod: CPTII,S$GLB,, | Performed by: STUDENT IN AN ORGANIZED HEALTH CARE EDUCATION/TRAINING PROGRAM

## 2024-06-03 PROCEDURE — 3072F LOW RISK FOR RETINOPATHY: CPT | Mod: CPTII,S$GLB,, | Performed by: STUDENT IN AN ORGANIZED HEALTH CARE EDUCATION/TRAINING PROGRAM

## 2024-06-03 PROCEDURE — 99214 OFFICE O/P EST MOD 30 MIN: CPT | Mod: S$GLB,,, | Performed by: STUDENT IN AN ORGANIZED HEALTH CARE EDUCATION/TRAINING PROGRAM

## 2024-06-03 PROCEDURE — 3008F BODY MASS INDEX DOCD: CPT | Mod: CPTII,S$GLB,, | Performed by: STUDENT IN AN ORGANIZED HEALTH CARE EDUCATION/TRAINING PROGRAM

## 2024-06-03 PROCEDURE — 3079F DIAST BP 80-89 MM HG: CPT | Mod: CPTII,S$GLB,, | Performed by: STUDENT IN AN ORGANIZED HEALTH CARE EDUCATION/TRAINING PROGRAM

## 2024-06-03 PROCEDURE — 3077F SYST BP >= 140 MM HG: CPT | Mod: CPTII,S$GLB,, | Performed by: STUDENT IN AN ORGANIZED HEALTH CARE EDUCATION/TRAINING PROGRAM

## 2024-06-03 PROCEDURE — 71046 X-RAY EXAM CHEST 2 VIEWS: CPT | Mod: TC,FY

## 2024-06-03 RX ORDER — FLASH GLUCOSE SENSOR
KIT MISCELLANEOUS
Qty: 6 KIT | Refills: 3 | Status: SHIPPED | OUTPATIENT
Start: 2024-06-03

## 2024-06-03 RX ORDER — IPRATROPIUM BROMIDE AND ALBUTEROL SULFATE 2.5; .5 MG/3ML; MG/3ML
3 SOLUTION RESPIRATORY (INHALATION)
Status: SHIPPED | OUTPATIENT
Start: 2024-06-03

## 2024-06-03 RX ORDER — FLUTICASONE PROPIONATE AND SALMETEROL 250; 50 UG/1; UG/1
1 POWDER RESPIRATORY (INHALATION) 2 TIMES DAILY
Qty: 60 EACH | Refills: 11 | Status: SHIPPED | OUTPATIENT
Start: 2024-06-03 | End: 2025-06-03

## 2024-06-03 RX ORDER — FLASH GLUCOSE SCANNING READER
EACH MISCELLANEOUS
Qty: 1 EACH | Refills: 0 | Status: SHIPPED | OUTPATIENT
Start: 2024-06-03

## 2024-06-03 RX ORDER — MONTELUKAST SODIUM 10 MG/1
10 TABLET ORAL NIGHTLY
Qty: 90 TABLET | Refills: 3 | Status: SHIPPED | OUTPATIENT
Start: 2024-06-03

## 2024-06-03 NOTE — PROGRESS NOTES
"TWO PATIENT IDENTIFIERS VERIFIED.  ALLERGIES VERIFIED.    ALBUTEROL-IPRATROPIUM 2.5 mg- 0.5 mg/3 ml NEB SOLN  neb treatment administered by JAMAL Storm LPN per Dr. SCOTT. Pt's SPO2 is 98% on RA at beginning of treatment. At end of treatment pt's SPO2 is 99% on RA. Pt states "I FEEL CLEARER".   "

## 2024-06-03 NOTE — PROGRESS NOTES
Subjective:       Patient ID: Susan Courtney is a 64 y.o. female.    Chief Complaint: Severe persistent asthma with acute exacerbation [J45.51]    Patient is established with me, here today for the following:    Endorses SOB, cough, and wheezing starting early last week.  Denies fever, chills, nausea, vomiting, and diarrhea.   Started prednisone 2 days ago for SOB.  Used nebulized DuoNeb twice so far.   Using albuterol PRN  Asthma flares are typically triggered by allergies, illness, or seasonal change.  Using Wixela for daily controller.   Previously tried Bretzi and Trelegy, didn't feel worked well.   Has not been taking Singulair, unsure why she stopped  Last PFT's were [id number].  Previously following with Dr. Reis for pulmonology    T2DM -   Currently taking:  - Trulicity 3 mg  Holding Jardiance currently, patient concerned for hypoglycemia  Discontinued metformin due to GI side effects    Checking blood glucose routinely, interested in CGM for monitoring   UTD on eye exam, last completed SEP2023.  Due for foot exam.   Seeing Dr. Turk for podiatry, due for follow up  Lab Results       Component                Value               Date                       HGBA1C                   6.4 (H)             01/10/2024                 HGBA1C                   6.8 (H)             07/20/2023                 HGBA1C                   6.9 (H)             01/18/2023            Lab Results       Component                Value               Date                       MICALBCREAT              12.1                01/10/2024            Review of Systems   Constitutional:  Negative for chills and fever.   HENT:  Positive for congestion, rhinorrhea and sinus pressure.    Respiratory:  Positive for cough, chest tightness, shortness of breath and wheezing.    Cardiovascular:  Negative for chest pain.         Current Outpatient Medications   Medication Instructions    albuterol (VENTOLIN HFA) 90 mcg/actuation inhaler 2 puffs,  Inhalation, Every 6 hours PRN, Rescue    albuterol-ipratropium (DUO-NEB) 2.5 mg-0.5 mg/3 mL nebulizer solution 3 mLs, Nebulization, Every 6 hours PRN, Rescue    ALPRAZolam (XANAX) 1 mg, Oral, 4 times daily    amLODIPine (NORVASC) 5 mg, Oral, Daily    atorvastatin (LIPITOR) 40 mg, Oral    azelastine (ASTELIN) 274 mcg, Nasal, 2 times daily    blood sugar diagnostic Strp Use as instructed to check blood sugar 2-3 times daily and whenever feeling ill.    blood-glucose meter kit Use as instructed to check blood sugar 2-3 times daily and whenever feeling ill.    citalopram (CELEXA) 20 mg, Oral    docusate sodium (COLACE) 100 mg, Oral, Nightly    dulaglutide (TRULICITY) 3 mg/0.5 mL pen injector INJECT 3MG UNDER THE SKIN ONCE WEEKLY    ergocalciferol (ERGOCALCIFEROL) 50,000 Units, Oral, Every 7 days    flash glucose scanning reader (BuzzoekSTYLE KAIT 14 DAY READER) Misc Use as directed for continuous glucose monitoring.    flash glucose sensor (FREESTYLE KAIT 14 DAY SENSOR) Kit Use as directed for continuous glucose monitoring.    fluticasone-salmeterol diskus inhaler 250-50 mcg 1 puff, Inhalation, 2 times daily, Controller    guaiFENesin (MUCINEX) 1,200 mg, Oral, 2 times daily PRN    JARDIANCE 10 mg, Oral, Daily    lancets (ACCU-CHEK SOFTCLIX LANCETS) Misc Use as instructed to check blood sugar 2-3 times daily and whenever feeling ill.    montelukast (SINGULAIR) 10 mg, Oral, Nightly    naratriptan (AMERGE) 1 MG Tab TAKE 1 TABLET BY MOUTH 2 TIMES DAILY AS NEEDED (HEADACHE). MAY REPEAT X1 AFTER 4 HOURS    pantoprazole (PROTONIX) 40 mg, Oral, Daily    polyethylene glycol (GLYCOLAX) 17 g, Oral, Daily PRN    predniSONE (DELTASONE) 20 mg, Oral, Daily    promethazine-dextromethorphan (PROMETHAZINE-DM) 6.25-15 mg/5 mL Syrp 5 mLs, Oral, Every 6 hours PRN    valsartan (DIOVAN) 320 mg, Oral    zolpidem (AMBIEN) 20 mg, Oral, Nightly     Objective:      Vitals:    06/03/24 0834   BP: (!) 140/80   Pulse: 83   SpO2: 99%   Weight: 83.1 kg  (183 lb 3.2 oz)   PainSc: 0-No pain     Body mass index is 30.49 kg/m².    Physical Exam  Vitals reviewed.   Constitutional:       General: She is not in acute distress.     Appearance: Normal appearance. She is not ill-appearing or diaphoretic.   HENT:      Head: Normocephalic and atraumatic.      Right Ear: Tympanic membrane, ear canal and external ear normal. There is no impacted cerumen.      Left Ear: Tympanic membrane, ear canal and external ear normal. There is no impacted cerumen.      Nose: Congestion present. No rhinorrhea.      Right Nostril: No foreign body, epistaxis, septal hematoma or occlusion.      Left Nostril: No foreign body, epistaxis, septal hematoma or occlusion.      Right Turbinates: Swollen. Not pale.      Left Turbinates: Swollen. Not pale.      Mouth/Throat:      Mouth: Mucous membranes are moist.      Pharynx: Oropharynx is clear. No pharyngeal swelling, oropharyngeal exudate, posterior oropharyngeal erythema or uvula swelling.   Eyes:      General: No scleral icterus.        Right eye: No discharge.         Left eye: No discharge.      Conjunctiva/sclera: Conjunctivae normal.   Neck:      Thyroid: No thyromegaly or thyroid tenderness.      Trachea: Trachea normal.   Cardiovascular:      Rate and Rhythm: Normal rate and regular rhythm.      Heart sounds: Normal heart sounds. No murmur heard.     No friction rub. No gallop.   Pulmonary:      Effort: Pulmonary effort is normal. Tachypnea present.      Breath sounds: Normal breath sounds. No stridor. No wheezing, rhonchi or rales.   Musculoskeletal:      Cervical back: Neck supple.   Lymphadenopathy:      Head:      Right side of head: No submandibular or posterior auricular adenopathy.      Left side of head: No submandibular or posterior auricular adenopathy.      Cervical: No cervical adenopathy.      Right cervical: No superficial, deep or posterior cervical adenopathy.     Left cervical: No superficial, deep or posterior cervical  adenopathy.      Upper Body:      Right upper body: No supraclavicular adenopathy.      Left upper body: No supraclavicular adenopathy.   Skin:     General: Skin is warm and dry.   Neurological:      Mental Status: She is alert. Mental status is at baseline.   Psychiatric:         Mood and Affect: Mood normal.         Behavior: Behavior normal.         Assessment:       1. Severe persistent asthma with acute exacerbation    2. Type 2 diabetes mellitus without complication, without long-term current use of insulin        Plan:       Severe persistent asthma with acute exacerbation  For chest congestion try Mucinex 1,200 mg twice daily (must be well hydrated for the medication to work).   Try nasal saline rinses using only sterile or distilled water 1-2 times daily.   Continue promethazine-DM as needed for cough to pharmacy.   Start azelastine twice daily for sinus congestion.    Continue prednisone.   Restart taking montelukast nightly.   Use home nebulized treatments twice daily for next 5 days and as needed for shortness of breath or wheezing.  Return to clinic in 3-5 days if symptoms worsening or unimproved.  -     montelukast (SINGULAIR) 10 mg tablet; Take 1 tablet (10 mg total) by mouth every evening.  -     fluticasone-salmeterol diskus inhaler 250-50 mcg; Inhale 1 puff into the lungs 2 (two) times daily. Controller  -     X-Ray Chest PA And Lateral; Future  -     albuterol-ipratropium 2.5 mg-0.5 mg/3 mL nebulizer solution 3 mL    Type 2 diabetes mellitus without complication, without long-term current use of insulin  Continue current medications.  RTC in 6 months for follow up.  -     flash glucose sensor (FREESTYLE KAIT 14 DAY SENSOR) Kit; Use as directed for continuous glucose monitoring.  -     flash glucose scanning reader (FREESTYLE KAIT 14 DAY READER) Misc; Use as directed for continuous glucose monitoring.      Letty Kumar MD  6/3/2024

## 2024-06-03 NOTE — PATIENT INSTRUCTIONS
For chest congestion try Mucinex 1,200 mg twice daily (must be well hydrated for the medication to work).   Try nasal saline rinses using only sterile or distilled water 1-2 times daily.   Continue promethazine-DM as needed for cough to pharmacy.   Start azelastine twice daily for sinus congestion.    Continue prednisone.   Restart taking montelukast nightly.   Use home nebulized treatments twice daily for next 5 days and as needed for shortness of breath or wheezing.  Return to clinic in 3-5 days if symptoms worsening or unimproved.

## 2024-07-05 DIAGNOSIS — K29.60 OTHER SPECIFIED GASTRITIS, PRESENCE OF BLEEDING UNSPECIFIED, UNSPECIFIED CHRONICITY: ICD-10-CM

## 2024-07-05 RX ORDER — PANTOPRAZOLE SODIUM 40 MG/1
40 TABLET, DELAYED RELEASE ORAL
Qty: 90 TABLET | Refills: 3 | Status: SHIPPED | OUTPATIENT
Start: 2024-07-05

## 2024-07-05 NOTE — TELEPHONE ENCOUNTER
No care due was identified.  [facility] Embedded Care Due Messages. Reference number: [id number].   7/05/2024 9:25:30 AM CDT

## 2024-07-06 NOTE — TELEPHONE ENCOUNTER
Refill Decision Note   Susan Courtney  is requesting a refill authorization.  Brief Assessment and Rationale for Refill:  Approve     Medication Therapy Plan:        Comments:     Note composed:10:58 PM 07/05/2024

## 2024-07-12 ENCOUNTER — PATIENT MESSAGE (OUTPATIENT)
Dept: PSYCHIATRY | Facility: CLINIC | Age: 64
End: 2024-07-12
Payer: MEDICARE

## 2024-07-15 RX ORDER — ALPRAZOLAM 1 MG/1
1 TABLET ORAL 4 TIMES DAILY PRN
Qty: 120 TABLET | Refills: 2 | Status: SHIPPED | OUTPATIENT
Start: 2024-07-15

## 2024-07-16 ENCOUNTER — LAB VISIT (OUTPATIENT)
Dept: LAB | Facility: OTHER | Age: 64
End: 2024-07-16
Attending: STUDENT IN AN ORGANIZED HEALTH CARE EDUCATION/TRAINING PROGRAM
Payer: MEDICARE

## 2024-07-16 DIAGNOSIS — E11.65 TYPE 2 DIABETES MELLITUS WITH HYPERGLYCEMIA, WITHOUT LONG-TERM CURRENT USE OF INSULIN: ICD-10-CM

## 2024-07-16 DIAGNOSIS — E55.9 VITAMIN D DEFICIENCY: ICD-10-CM

## 2024-07-16 DIAGNOSIS — Z00.00 HEALTH MAINTENANCE EXAMINATION: ICD-10-CM

## 2024-07-16 LAB
25(OH)D3+25(OH)D2 SERPL-MCNC: 42 NG/ML (ref 30–96)
ALBUMIN SERPL BCP-MCNC: 3.3 G/DL (ref 3.5–5.2)
ALP SERPL-CCNC: 92 U/L (ref 55–135)
ALT SERPL W/O P-5'-P-CCNC: 15 U/L (ref 10–44)
ANION GAP SERPL CALC-SCNC: 9 MMOL/L (ref 8–16)
AST SERPL-CCNC: 9 U/L (ref 10–40)
BASOPHILS # BLD AUTO: 0.02 K/UL (ref 0–0.2)
BASOPHILS NFR BLD: 0.4 % (ref 0–1.9)
BILIRUB SERPL-MCNC: 0.2 MG/DL (ref 0.1–1)
BUN SERPL-MCNC: 15 MG/DL (ref 8–23)
CALCIUM SERPL-MCNC: 8.9 MG/DL (ref 8.7–10.5)
CHLORIDE SERPL-SCNC: 108 MMOL/L (ref 95–110)
CO2 SERPL-SCNC: 27 MMOL/L (ref 23–29)
CREAT SERPL-MCNC: 0.9 MG/DL (ref 0.5–1.4)
DIFFERENTIAL METHOD BLD: ABNORMAL
EOSINOPHIL # BLD AUTO: 0.1 K/UL (ref 0–0.5)
EOSINOPHIL NFR BLD: 2.5 % (ref 0–8)
ERYTHROCYTE [DISTWIDTH] IN BLOOD BY AUTOMATED COUNT: 14 % (ref 11.5–14.5)
EST. GFR  (NO RACE VARIABLE): >60 ML/MIN/1.73 M^2
ESTIMATED AVG GLUCOSE: 137 MG/DL (ref 68–131)
GLUCOSE SERPL-MCNC: 195 MG/DL (ref 70–110)
HBA1C MFR BLD: 6.4 % (ref 4–5.6)
HCT VFR BLD AUTO: 36.9 % (ref 37–48.5)
HGB BLD-MCNC: 11.4 G/DL (ref 12–16)
IMM GRANULOCYTES # BLD AUTO: 0.01 K/UL (ref 0–0.04)
IMM GRANULOCYTES NFR BLD AUTO: 0.2 % (ref 0–0.5)
LYMPHOCYTES # BLD AUTO: 2.4 K/UL (ref 1–4.8)
LYMPHOCYTES NFR BLD: 44.8 % (ref 18–48)
MCH RBC QN AUTO: 26.6 PG (ref 27–31)
MCHC RBC AUTO-ENTMCNC: 30.9 G/DL (ref 32–36)
MCV RBC AUTO: 86 FL (ref 82–98)
MONOCYTES # BLD AUTO: 0.5 K/UL (ref 0.3–1)
MONOCYTES NFR BLD: 8.9 % (ref 4–15)
NEUTROPHILS # BLD AUTO: 2.3 K/UL (ref 1.8–7.7)
NEUTROPHILS NFR BLD: 43.2 % (ref 38–73)
NRBC BLD-RTO: 0 /100 WBC
PLATELET # BLD AUTO: 234 K/UL (ref 150–450)
PMV BLD AUTO: 8.9 FL (ref 9.2–12.9)
POTASSIUM SERPL-SCNC: 3.3 MMOL/L (ref 3.5–5.1)
PROT SERPL-MCNC: 6.7 G/DL (ref 6–8.4)
RBC # BLD AUTO: 4.29 M/UL (ref 4–5.4)
SODIUM SERPL-SCNC: 144 MMOL/L (ref 136–145)
WBC # BLD AUTO: 5.27 K/UL (ref 3.9–12.7)

## 2024-07-16 PROCEDURE — 82306 VITAMIN D 25 HYDROXY: CPT | Performed by: STUDENT IN AN ORGANIZED HEALTH CARE EDUCATION/TRAINING PROGRAM

## 2024-07-16 PROCEDURE — 80053 COMPREHEN METABOLIC PANEL: CPT | Performed by: STUDENT IN AN ORGANIZED HEALTH CARE EDUCATION/TRAINING PROGRAM

## 2024-07-16 PROCEDURE — 83036 HEMOGLOBIN GLYCOSYLATED A1C: CPT | Performed by: STUDENT IN AN ORGANIZED HEALTH CARE EDUCATION/TRAINING PROGRAM

## 2024-07-16 PROCEDURE — 85025 COMPLETE CBC W/AUTO DIFF WBC: CPT | Performed by: STUDENT IN AN ORGANIZED HEALTH CARE EDUCATION/TRAINING PROGRAM

## 2024-07-16 PROCEDURE — 36415 COLL VENOUS BLD VENIPUNCTURE: CPT | Performed by: STUDENT IN AN ORGANIZED HEALTH CARE EDUCATION/TRAINING PROGRAM

## 2024-07-24 ENCOUNTER — OFFICE VISIT (OUTPATIENT)
Dept: PSYCHIATRY | Facility: CLINIC | Age: 64
End: 2024-07-24
Payer: MEDICARE

## 2024-07-24 VITALS
SYSTOLIC BLOOD PRESSURE: 145 MMHG | HEART RATE: 83 BPM | BODY MASS INDEX: 29.06 KG/M2 | WEIGHT: 174.63 LBS | DIASTOLIC BLOOD PRESSURE: 67 MMHG

## 2024-07-24 DIAGNOSIS — G47.00 INSOMNIA, UNSPECIFIED TYPE: ICD-10-CM

## 2024-07-24 DIAGNOSIS — F41.1 GENERALIZED ANXIETY DISORDER WITH PANIC ATTACKS: Primary | ICD-10-CM

## 2024-07-24 DIAGNOSIS — F41.0 GENERALIZED ANXIETY DISORDER WITH PANIC ATTACKS: Primary | ICD-10-CM

## 2024-07-24 PROCEDURE — 3061F NEG MICROALBUMINURIA REV: CPT | Mod: CPTII,S$GLB,,

## 2024-07-24 PROCEDURE — 99214 OFFICE O/P EST MOD 30 MIN: CPT | Mod: S$GLB,,,

## 2024-07-24 PROCEDURE — 3044F HG A1C LEVEL LT 7.0%: CPT | Mod: CPTII,S$GLB,,

## 2024-07-24 PROCEDURE — 3077F SYST BP >= 140 MM HG: CPT | Mod: CPTII,S$GLB,,

## 2024-07-24 PROCEDURE — 3066F NEPHROPATHY DOC TX: CPT | Mod: CPTII,S$GLB,,

## 2024-07-24 PROCEDURE — 3072F LOW RISK FOR RETINOPATHY: CPT | Mod: CPTII,S$GLB,,

## 2024-07-24 PROCEDURE — 3078F DIAST BP <80 MM HG: CPT | Mod: CPTII,S$GLB,,

## 2024-07-24 PROCEDURE — 99999 PR PBB SHADOW E&M-EST. PATIENT-LVL I: CPT | Mod: PBBFAC,,,

## 2024-07-24 PROCEDURE — 3008F BODY MASS INDEX DOCD: CPT | Mod: CPTII,S$GLB,,

## 2024-07-24 PROCEDURE — 4010F ACE/ARB THERAPY RXD/TAKEN: CPT | Mod: CPTII,S$GLB,,

## 2024-07-24 RX ORDER — DULOXETIN HYDROCHLORIDE 20 MG/1
20 CAPSULE, DELAYED RELEASE ORAL DAILY
Qty: 30 CAPSULE | Refills: 11 | Status: SHIPPED | OUTPATIENT
Start: 2024-07-24 | End: 2025-07-24

## 2024-07-24 RX ORDER — ALPRAZOLAM 1 MG/1
1 TABLET ORAL 4 TIMES DAILY PRN
Qty: 120 TABLET | Refills: 2 | Status: SHIPPED | OUTPATIENT
Start: 2024-07-24

## 2024-07-24 RX ORDER — ZOLPIDEM TARTRATE 10 MG/1
20 TABLET ORAL NIGHTLY
Qty: 60 TABLET | Refills: 3 | Status: SHIPPED | OUTPATIENT
Start: 2024-07-24 | End: 2024-11-21

## 2024-07-24 NOTE — PROGRESS NOTES
Outpatient Psychiatry Follow-Up Visit (MD/NP)    7/24/2024    Clinical Status of Patient:  Outpatient (Ambulatory)    Chief Complaint:  Susan Courtney is a 64 y.o. female who presents today for follow-up of depression and anxiety.  Met with patient, grandson      Interval History and Content of Current Session:  Interim Events/Subjective Report/Content of Current Session:     Patient presents for follow up. She is having bad issues with headaches, migraines of late, giving her some worsening anxiety. Patient feels the xanax is not particularly effective at times, discussed with patient the action of alprazolam and the body building up resistance to overuse. She states she had had high medication tolerance in the past and is not overusing the medication but taking it as needed. Her biggest issue at present is her worsening headaches, she has been prescribed triptans but found little relief. She feels the ambien is helpful for her and takes it PRN. Discussed with patient potential side effects and adverse effects of benzodiazepines, including but not limited to drowsiness, dizziness, risk of falls, and abuse potential. Counseled patient on avoiding alcohol while using this medication due to risk of respiratory depression. Patient instructed to not operate any heavy machinery and use caution with driving while on this medication. Discussed with patient other options for depression and anxiety, will attempt trial of duloxetine. At this time she denies any AH/VH/SI/HI.      Psychotherapy:  Target symptoms: depression, anxiety   Why chosen therapy is appropriate versus another modality: relevant to diagnosis  Outcome monitoring methods: self-report, observation  Therapeutic intervention type: insight oriented psychotherapy  Topics discussed/themes: difficulty managing affect in interpersonal relationships, building skills sets for symptom management  The patient's response to the intervention is accepting. The  patient's progress toward treatment goals is fair.   Duration of intervention: 05 minutes.    Review of Systems   PSYCHIATRIC: Pertinant items are noted in the narrative.    Past Medical, Family and Social History: The patient's past medical, family and social history have been reviewed and updated as appropriate within the electronic medical record - see encounter notes.    Compliance: yes    Side effects: None    Risk Parameters:  Patient reports no suicidal ideation  Patient reports no homicidal ideation  Patient reports no self-injurious behavior  Patient reports no violent behavior    Exam (detailed: at least 9 elements; comprehensive: all 15 elements)   Constitutional  Vitals:  Most recent vital signs, dated less than 90 days prior to this appointment, were reviewed.   Vitals:    07/24/24 1014   BP: (!) 145/67   Pulse: 83   Weight: 79.2 kg (174 lb 9.7 oz)        General:  unremarkable, age appropriate     Musculoskeletal  Muscle Strength/Tone:  no tremor, no tic   Gait & Station:  non-ataxic     Psychiatric  Speech:  no latency; no press   Mood & Affect:  anxious  congruent and appropriate   Thought Process:  normal and logical   Associations:  intact   Thought Content:  normal, no suicidality, no homicidality, delusions, or paranoia   Insight:  intact   Judgement: behavior is adequate to circumstances   Orientation:  grossly intact   Memory: intact for content of interview   Language: grossly intact   Attention Span & Concentration:  able to focus   Fund of Knowledge:  intact and appropriate to age and level of education     Assessment and Diagnosis   Status/Progress: Based on the examination today, the patient's problem(s) is/are adequately but not ideally controlled.  New problems have not been presented today.   Co-morbidities, Diagnostic uncertainty, and Lack of compliance are complicating management of the primary condition.  There are no active rule-out diagnoses for this patient at this time.      General Impression:   1. Generalized anxiety disorder with panic attacks        2. Insomnia, unspecified type              Intervention/Counseling/Treatment Plan   Medication Management: Continue current medications. The risks and benefits of medication were discussed with the patient. Begin duloxetine 20mg, assess efficacy, titrate as needed.   Encourage therapy  Continue to discuss weaning opportunities and alternative therapies with patient.    Labs: reviewed most recent. The treatment plan and follow up plan were reviewed with the patient. Discussed with patient informed consent, risks vs. benefits, alternative treatments, side effect profile and the inherent unpredictability of individual responses to these treatments. The patient expresses understanding of the above and displays the capacity to agree with this current plan and had no other questions. Encouraged Patient to keep future appointments. Take medications as prescribed and abstain from substance abuse. In the event of an emergency patient was advised to go to the emergency room.    Return to Clinic: 3 months, as scheduled, as needed      I spent a total of 30 minutes on the day of the visit.  This includes face to face time and non-face to face time preparing to see the patient (eg, review of tests), obtaining and/or reviewing separately obtained history, documenting clinical information in the electronic or other health record, independently interpreting results and communicating results to the patient/family/caregiver, or care coordinator.

## 2024-08-07 ENCOUNTER — OFFICE VISIT (OUTPATIENT)
Dept: SLEEP MEDICINE | Facility: CLINIC | Age: 64
End: 2024-08-07
Payer: MEDICARE

## 2024-08-07 VITALS
HEIGHT: 65 IN | SYSTOLIC BLOOD PRESSURE: 114 MMHG | BODY MASS INDEX: 29.42 KG/M2 | WEIGHT: 176.56 LBS | HEART RATE: 81 BPM | DIASTOLIC BLOOD PRESSURE: 72 MMHG

## 2024-08-07 DIAGNOSIS — G47.33 OSA (OBSTRUCTIVE SLEEP APNEA): ICD-10-CM

## 2024-08-07 PROCEDURE — 3066F NEPHROPATHY DOC TX: CPT | Mod: CPTII,S$GLB,, | Performed by: NURSE PRACTITIONER

## 2024-08-07 PROCEDURE — 4010F ACE/ARB THERAPY RXD/TAKEN: CPT | Mod: CPTII,S$GLB,, | Performed by: NURSE PRACTITIONER

## 2024-08-07 PROCEDURE — 1159F MED LIST DOCD IN RCRD: CPT | Mod: CPTII,S$GLB,, | Performed by: NURSE PRACTITIONER

## 2024-08-07 PROCEDURE — 3074F SYST BP LT 130 MM HG: CPT | Mod: CPTII,S$GLB,, | Performed by: NURSE PRACTITIONER

## 2024-08-07 PROCEDURE — 99213 OFFICE O/P EST LOW 20 MIN: CPT | Mod: S$GLB,,, | Performed by: NURSE PRACTITIONER

## 2024-08-07 PROCEDURE — 3008F BODY MASS INDEX DOCD: CPT | Mod: CPTII,S$GLB,, | Performed by: NURSE PRACTITIONER

## 2024-08-07 PROCEDURE — 3044F HG A1C LEVEL LT 7.0%: CPT | Mod: CPTII,S$GLB,, | Performed by: NURSE PRACTITIONER

## 2024-08-07 PROCEDURE — 99999 PR PBB SHADOW E&M-EST. PATIENT-LVL IV: CPT | Mod: PBBFAC,,, | Performed by: NURSE PRACTITIONER

## 2024-08-07 PROCEDURE — 3078F DIAST BP <80 MM HG: CPT | Mod: CPTII,S$GLB,, | Performed by: NURSE PRACTITIONER

## 2024-08-07 PROCEDURE — 3072F LOW RISK FOR RETINOPATHY: CPT | Mod: CPTII,S$GLB,, | Performed by: NURSE PRACTITIONER

## 2024-08-07 PROCEDURE — 3061F NEG MICROALBUMINURIA REV: CPT | Mod: CPTII,S$GLB,, | Performed by: NURSE PRACTITIONER

## 2024-08-12 ENCOUNTER — TELEPHONE (OUTPATIENT)
Dept: SLEEP MEDICINE | Facility: OTHER | Age: 64
End: 2024-08-12
Payer: MEDICARE

## 2024-08-15 ENCOUNTER — TELEPHONE (OUTPATIENT)
Dept: SLEEP MEDICINE | Facility: OTHER | Age: 64
End: 2024-08-15
Payer: MEDICARE

## 2024-08-15 ENCOUNTER — HOSPITAL ENCOUNTER (OUTPATIENT)
Dept: SLEEP MEDICINE | Facility: OTHER | Age: 64
Discharge: HOME | End: 2024-08-15
Attending: NURSE PRACTITIONER
Payer: MEDICARE

## 2024-08-15 DIAGNOSIS — G47.33 OSA (OBSTRUCTIVE SLEEP APNEA): ICD-10-CM

## 2024-08-15 PROCEDURE — 95810 POLYSOM 6/> YRS 4/> PARAM: CPT

## 2024-08-16 NOTE — PROGRESS NOTES
A diagnostic study was performed on 64 year old female, Susan Courtney.The following was explained to the pt prior to the study: the time to bed and wake time, the set-up process timeframe and the purpose of each sensor, the reason (if sensors fall off) the technician will need to enter the room during the night, the possibility of the tech fitting a PAP mask on pt for treatment in the middle of the night, and how to call out for assistance during the night. A post-study letter was handed to the pt in the morning.

## 2024-08-17 NOTE — PROCEDURES
"[facility]    Polysomnography Interpretation Report    Patient Name:  Susan Courtney  MRN#:  [id number]  :  1960  Study Date:  8/15/2024  Referring Provider:  Marybeth Winter NP    Indications for Polysomnography:  The patient is a 64 year old Female who is 5' 5" and weighs 176.0 lbs.  Her BMI equals 29.3.  [place] was - and Neck Circumference was -.  A full night polysomnogram was performed to evaluate for -.    Polysomnogram Data  A full night polysomnogram recorded the standard physiologic parameters including EEG, EOG, EMG, EKG, nasal and oral airflow.  Respiratory parameters of chest and abdominal movements were recorded with (RIP) Respiratory Inductance Plethsmography.  Oxygen saturation was recorded by pulse oximetry.    Sleep Architecture  The total recording time of the polysomnogram was 452.9 minutes.  The total sleep time was 401.0 minutes.  The patient spent 6.0% of total sleep time in Stage N1, 61.8% in Stage N2, 0.0% in Stages N3, and 32.2% in REM.  Sleep latency was 11.2 minutes.  REM latency was 98.0 minutes.  Sleep Efficiency was 88.5%.  Wake after sleep onset was 40.5 minutes.    Respiratory Events  The polysomnogram revealed a presence of 8 obstructive, - central, and - mixed apneas resulting in a Total Apnea index of 1.2 events per hour.  There were 51 hypopneas resulting in a Total Hypopnea index of 7.6 events per hour.  The combined Apnea/Hypopnea index was 8.8 events per hour.  There were a total of 12 RERA events resulting in a Respiratory Disturbance Index (RDI) of 10.6 events per hour.     Mean oxygen saturation was 91.3%.  The lowest oxygen saturation during sleep was 81.0%.  Time spent ?88% oxygen saturation was 21.2 minutes (4.7%).    End Tidal CO2 during sleep ranged from - to - mmHg. End Tidal CO2 was greater than 50 mmHg for - minutes and greater than 55 mmHg for - minutes.  Transcutaneous CO2 during sleep ranged from - to - mmHg. Transcutaneous CO2 was " "greater than 50 mmHg for - minutes and greater than 55 mmHg for - minutes.    Limb Activity  There were - limb movements recorded.  Of this total, - were classified as PLMs.  Of the PLMs, - were associated with arousals.  The Limb Movement index was - per hour while the PLM index was - per hour and PLM with arousals index was - per hour.    Cardiac:  single lead EKG revealed normal sinus rhythm     Oxygenation:  Hypoxemia was only observed in relation to obstructive events.    Impression:  -obstructive sleep apnea   -Sleep-related hypoxemia (G47.36) likely secondary to JOSE      Recommendations:  -treatment for the JOSE seen in this study is recommended  -the patient has follow up with Sleep Medicine          Bradley Diggs MD    (This Sleep Study was interpreted by a Board Certified Sleep Specialist who conducted an epoch-by-epoch review of the entire raw data recording.)  (The indication for this sleep study was reviewed and deemed appropriate by [facility] Practice Parameters or other reasons by a Board Certified Sleep Specialist.)        [facility]    Diagnostic PSG Report    Patient Name: Susan Courtney Study Date: 8/15/2024   YOB: 1960 MRN #: [id number]   Age: 64 year WADE #: [id number]   Sex: Female Referring Provider: Marybeth Winter NP   Height: 5' 5" Recording Tech: Nancy Sanders RRT SDS   Weight: 176.0 lbs Scoring Tech: Martin Garcia RRT RPSGT   BMI: 29.3 Interpreting Physician: -   ESS: - Neck Circumference: -     Study Overview    Lights Off: 09:30:47 PM  Count Index   Lights On: 05:03:39 AM Awakenings: 28 4.2   Time in Bed: 452.9 min. Arousals: 63 9.4   Total Sleep Time: 401.0 min. Apneas & Hypopneas: 59 8.8    Sleep Efficiency: 88.5% Limb Movements: - -   Sleep Latency: 11.2 min. Snores: - -   Wake After Sleep Onset: 40.5 min. Desaturations: 72 10.8    REM Latency from Sleep Onset: 98.0 min. Minimum SpO2 TST: 81.0%        Sleep Architecture   % of Time in Bed  Stages Time " (mins) % Sleep Time   Wake 52.5    Stage N1 24.0 6.0%   Stage N2 248.0 61.8%   Stage N3 0.0 0.0%   .0 32.2%         Arousal Summary     NREM REM Sleep Index   Respiratory Arousals 6 7 13 1.9   PLM Arousals - - - -   Isolated Limb Movement Arousals - - - -   Spontaneous Arousals 22 28 50 7.5   Total 28 35 63 9.4       Limb Movement Summary     Count Index   Isolated Limb Movements - -   Periodic Limb Movements (PLMs) - -   Total Limb Movements - -         Respiratory Summary     By Sleep Stage By Body Position Total    NREM REM Supine Non-Supine    Time (min) 272.0 129.0 90.0 311.0 401.0           Obstructive Apnea - 8 - 8 8   Mixed Apnea - - - - -   Central Apnea - - - - -   Central Apnea Index - - - - -   Total Apneas - 8 - 8 8   Total Apnea Index - 3.7 - 1.5 1.2           Hypopnea 8 43 - 51 51   Hypopnea Index 1.8 20.0 - 9.8 7.6           Apnea & Hypopnea 8 51 - 59 59   Apnea & Hypopnea Index 1.8 23.7 - 11.4 8.8           RERAs 2 10 - 12 12   RERA Index 0.4 4.7 - 2.3 1.8           RDI 2.2 28.4 - 13.7 10.6     Scoring Criteria: Hypopneas scored at 4% desaturation criteria.    Respiratory Event Durations     Apnea Hypopnea    NREM REM NREM REM   Average (seconds) - 13.4 16.5 21.9   Maximum (seconds) - 23.5 22.2 55.0       Oxygen Saturation Summary     Wake NREM REM TST TIB   Average SpO2 92.2% 91.2% 91.2% 91.2% 91.3%   Minimum SpO2 82.0% 87.0% 81.0% 81.0% 81.0%   Maximum SpO2 99.0% 96.0% 98.0% 98.0% 99.0%       Oxygen Saturation Distribution    Range (%) Time in range (min) Time in range (%)   90.0 - 100.0 291.3 64.8%   80.0 - 90.0 143.8 32.0%   70.0 - 80.0 - -   60.0 - 70.0 - -   50.0 - 60.0 - -   0.0 - 50.0 - -   Time Spent ?88% SpO2    Range (%) Time in range (min) Time in range (%)   0.0 - 88.0 21.2 4.7%          Count Index   Desaturations 72 10.8      Cardiac Summary     Wake NREM REM Sleep Total   Average Pulse Rate (BPM) 73.4 76.6 73.1 75.5 75.2   Minimum Pulse Rate (BPM) 37.0 43.0 34.0 34.0 34.0    Maximum Pulse Rate (BPM) 92.0 88.0 88.0 88.0 92.0     Pulse Rate Distribution    Range (bpm) Time in range (min) Time in range (%)   0.0 - 40.0 0.3 0.1%   40.0 - 60.0 0.4 0.1%   60.0 - 80.0 357.9 79.6%   80.0 - 100.0 91.3 20.3%   100.0 - 120.0 - -   120.0 - 140.0 - -   140.0 - 200.0 - -     EtCO2 Summary    Stage Min (mmHg) Average (mmHg) Max (mmHg)   Wake - - -   NREM(1+2+3) - - -   REM - - -     Range (mmHg) Time in range (min) Time in range (%)   20.0 - 40.0 - -   40.0 - 50.0 - -   50.0 - 55.0 - -   55.0 - 100.0 - -   Excluded data <20.0 & >65.0 453.5 100.0%     TcCO2 Summary    Stage Min (mmHg) Average (mmHg) Max (mmHg)   Wake - - -   NREM(1+2+3) - - -   REM - - -     Range (mmHg) Time in range (min) Time in range (%)   20.0 - 40.0 - -   40.0 - 50.0 - -   50.0 - 55.0 - -   55.0 - 100.0 - -   Excluded data <20.0 & >65.0 453.5 100.0%     Comments    -

## 2024-08-18 PROCEDURE — 95810 POLYSOM 6/> YRS 4/> PARAM: CPT | Mod: 26,,, | Performed by: INTERNAL MEDICINE

## 2024-08-20 ENCOUNTER — PATIENT MESSAGE (OUTPATIENT)
Dept: SLEEP MEDICINE | Facility: CLINIC | Age: 64
End: 2024-08-20
Payer: MEDICARE

## 2024-08-20 DIAGNOSIS — G47.33 OSA (OBSTRUCTIVE SLEEP APNEA): Primary | ICD-10-CM

## 2024-09-06 ENCOUNTER — OFFICE VISIT (OUTPATIENT)
Dept: NEUROLOGY | Facility: CLINIC | Age: 64
End: 2024-09-06
Payer: MEDICARE

## 2024-09-06 VITALS
BODY MASS INDEX: 30.28 KG/M2 | SYSTOLIC BLOOD PRESSURE: 142 MMHG | WEIGHT: 181.75 LBS | HEART RATE: 73 BPM | DIASTOLIC BLOOD PRESSURE: 74 MMHG | HEIGHT: 65 IN

## 2024-09-06 DIAGNOSIS — Z76.0 MEDICATION REFILL: ICD-10-CM

## 2024-09-06 DIAGNOSIS — G43.709 CHRONIC MIGRAINE WITHOUT AURA WITHOUT STATUS MIGRAINOSUS, NOT INTRACTABLE: Primary | ICD-10-CM

## 2024-09-06 DIAGNOSIS — I67.1 CEREBRAL ANEURYSM, NONRUPTURED: ICD-10-CM

## 2024-09-06 PROBLEM — K57.30 DIVERTICULAR DISEASE OF COLON: Status: ACTIVE | Noted: 2022-04-20

## 2024-09-06 PROBLEM — K21.00 GASTROESOPHAGEAL REFLUX DISEASE WITH ESOPHAGITIS: Status: ACTIVE | Noted: 2018-07-23

## 2024-09-06 PROBLEM — I47.20 VENTRICULAR TACHYCARDIA, UNSPECIFIED: Status: ACTIVE | Noted: 2024-09-06

## 2024-09-06 PROBLEM — E11.42 POLYNEUROPATHY DUE TO TYPE 2 DIABETES MELLITUS: Status: ACTIVE | Noted: 2018-12-07

## 2024-09-06 PROCEDURE — 99999 PR PBB SHADOW E&M-EST. PATIENT-LVL IV: CPT | Mod: PBBFAC,,, | Performed by: STUDENT IN AN ORGANIZED HEALTH CARE EDUCATION/TRAINING PROGRAM

## 2024-09-06 RX ORDER — NARATRIPTAN 1 MG/1
TABLET ORAL
Qty: 12 TABLET | Refills: 11 | Status: SHIPPED | OUTPATIENT
Start: 2024-09-06
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
## 2024-09-10 ENCOUNTER — PATIENT OUTREACH (OUTPATIENT)
Dept: ADMINISTRATIVE | Facility: HOSPITAL | Age: 64
End: 2024-09-10
Payer: MEDICARE

## 2024-09-19 ENCOUNTER — HOSPITAL ENCOUNTER (OUTPATIENT)
Dept: RADIOLOGY | Facility: OTHER | Age: 64
Discharge: HOME | End: 2024-09-19
Attending: STUDENT IN AN ORGANIZED HEALTH CARE EDUCATION/TRAINING PROGRAM
Payer: MEDICARE

## 2024-09-19 DIAGNOSIS — I67.1 CEREBRAL ANEURYSM, NONRUPTURED: ICD-10-CM

## 2024-09-19 PROCEDURE — 70544 MR ANGIOGRAPHY HEAD W/O DYE: CPT | Mod: 26,,, | Performed by: RADIOLOGY

## 2024-09-19 PROCEDURE — 70544 MR ANGIOGRAPHY HEAD W/O DYE: CPT | Mod: TC

## 2024-11-21 ENCOUNTER — OFFICE VISIT (OUTPATIENT)
Dept: INTERNAL MEDICINE | Facility: CLINIC | Age: 64
End: 2024-11-21
Payer: MEDICARE

## 2024-11-21 VITALS
HEART RATE: 71 BPM | BODY MASS INDEX: 28.83 KG/M2 | HEIGHT: 65 IN | DIASTOLIC BLOOD PRESSURE: 68 MMHG | WEIGHT: 173.06 LBS | OXYGEN SATURATION: 99 % | SYSTOLIC BLOOD PRESSURE: 122 MMHG

## 2024-11-21 DIAGNOSIS — E78.2 MIXED HYPERLIPIDEMIA: ICD-10-CM

## 2024-11-21 DIAGNOSIS — Z12.31 SCREENING MAMMOGRAM FOR BREAST CANCER: ICD-10-CM

## 2024-11-21 DIAGNOSIS — R71.8 MICROCYTOSIS: ICD-10-CM

## 2024-11-21 DIAGNOSIS — R05.1 ACUTE COUGH: ICD-10-CM

## 2024-11-21 DIAGNOSIS — E55.9 VITAMIN D DEFICIENCY: ICD-10-CM

## 2024-11-21 DIAGNOSIS — K59.09 CHRONIC CONSTIPATION: ICD-10-CM

## 2024-11-21 DIAGNOSIS — G47.33 OSA (OBSTRUCTIVE SLEEP APNEA): ICD-10-CM

## 2024-11-21 DIAGNOSIS — G47.00 INSOMNIA, UNSPECIFIED TYPE: ICD-10-CM

## 2024-11-21 DIAGNOSIS — Z78.0 ASYMPTOMATIC MENOPAUSAL STATE: ICD-10-CM

## 2024-11-21 DIAGNOSIS — L65.9 HAIR LOSS: ICD-10-CM

## 2024-11-21 DIAGNOSIS — I10 PRIMARY HYPERTENSION: ICD-10-CM

## 2024-11-21 DIAGNOSIS — E11.65 TYPE 2 DIABETES MELLITUS WITH HYPERGLYCEMIA, WITHOUT LONG-TERM CURRENT USE OF INSULIN: ICD-10-CM

## 2024-11-21 DIAGNOSIS — Z23 NEED FOR VACCINATION: ICD-10-CM

## 2024-11-21 DIAGNOSIS — J45.40 MODERATE PERSISTENT ASTHMA, UNSPECIFIED WHETHER COMPLICATED: ICD-10-CM

## 2024-11-21 DIAGNOSIS — F41.1 GENERALIZED ANXIETY DISORDER: ICD-10-CM

## 2024-11-21 DIAGNOSIS — Z00.00 HEALTH MAINTENANCE EXAMINATION: Primary | ICD-10-CM

## 2024-11-21 PROCEDURE — 99999 PR PBB SHADOW E&M-EST. PATIENT-LVL V: CPT | Mod: PBBFAC,,, | Performed by: STUDENT IN AN ORGANIZED HEALTH CARE EDUCATION/TRAINING PROGRAM

## 2024-11-21 RX ORDER — PREDNISONE 20 MG/1
20 TABLET ORAL DAILY
Qty: 5 TABLET | Refills: 0 | Status: SHIPPED | OUTPATIENT
Start: 2024-11-21 | End: 2024-11-26

## 2024-11-21 RX ORDER — POLYETHYLENE GLYCOL 3350 17 G/17G
17 POWDER, FOR SOLUTION ORAL DAILY PRN
Qty: 100 EACH | Refills: 3 | Status: SHIPPED | OUTPATIENT
Start: 2024-11-21 | End: 2024-11-21

## 2024-11-21 RX ORDER — PROMETHAZINE HYDROCHLORIDE AND DEXTROMETHORPHAN HYDROBROMIDE 6.25; 15 MG/5ML; MG/5ML
5 SYRUP ORAL EVERY 6 HOURS PRN
Qty: 120 ML | Refills: 0 | Status: SHIPPED | OUTPATIENT
Start: 2024-11-21

## 2024-11-21 RX ORDER — POLYETHYLENE GLYCOL 3350 17 G/17G
POWDER, FOR SOLUTION ORAL
Qty: 510 G | Refills: 3 | Status: SHIPPED | OUTPATIENT
Start: 2024-11-21

## 2024-11-21 NOTE — PROGRESS NOTES
Subjective:       Patient ID: Susan Courtney is a 64 y.o. female.    Chief Complaint: Health maintenance examination [Z00.00]    Patient is established with me, here today for the following:    HTN, HLD, T2DM, asthma, GERD, JOSE, migraines, anxiety, insomnia     History of Present Illness      RECENT VIRAL ILLNESS:  She reports a recent viral illness with severe GI symptoms, including frequent urgency to use the bathroom. She also developed a cough and sneezing, which she initially attributed to asthma. The onset of symptoms coincided with a change in weather.    RESPIRATORY ISSUES:  She experiences coughing and sneezing related to recent weather changes. She manages her asthma with albuterol as needed, which she keeps with her at all times. She uses a purple disc inhaler daily for asthma control and a nasal spray for sinus symptoms. She denies any current shortage of asthma medications.    GASTROINTESTINAL ISSUES:  She uses Linzess for bowel movements, which works well but causes sudden, uncontrollable bowel movements, necessitating caution in its use. She does not take Linzess daily due to its potent effects. She notes having no gallbladder, which affects her digestion.    SLEEP APNEA:  She reports receiving her sleep apnea machine and is sleeping better with the new device. She uses a strap to keep her mouth closed during sleep, which she finds beneficial. She opts not to use the water feature of the machine as it causes nasal discomfort.    CARDIOVASCULAR:  She is currently taking amlodipine for blood pressure management after discontinuing another blood pressure medication due to hair loss. She reports her recent blood pressure readings have been good.    HAIR LOSS:  She expresses concern about hair loss, noting a potential connection with her medication. She denies itching or burning of the scalp. She wears a cap to cover her hair loss and has sought assistance from her daughter, who works with hair, to  address the issue.    PAST MEDICAL HISTORY:  She reports a history of hysterectomy performed in her 30s due to a large tumor measuring 14 cm, which was initially mistaken for pregnancy. The procedure was prompted by the passage of clots.      ROS:  ENT: -nasal congestion  Respiratory: +cough  Gastrointestinal: +change in bowel habits  Skin: -itching  Allergic: +frequent sneezing          Health maintenance examination -   Colonoscopy performed MAR2022 with Dr. Ware. Recommended repeat in 5 years.  Denies family history of colorectal cancer.   Mammogram BI-RADS 1 in DEC2023.   Denies history of prior abnormal mammogram.  Family history of breast cancers, mother.  Denies family history of ovarian cancers.  Hysterectomy due to fibroids.   History of prior abnormal pap smears, underwent LEEP.  Denies family history of osteoporosis.  Denies significant family history of cardiac disease.  UTD on COVID primary/booster, shingles, Tdap, PCV13, PPSV23, RSV vaccinations.  Due for COVID, influenza vaccinations.  Never smoker.  Denies current alcohol use.   Denies drug use.  Completed HIV and hepatitis C screening     Asthma -    Using albuterol PRN  Asthma flares are typically triggered by allergies, illness, or seasonal change.  Using Wixela for daily controller.   Previously tried Bretzi and Trelegy, didn't feel worked well.   Last PFT's were FEB2022.  Previously following with Dr. Reis for pulmonology                         HTN -   Currently prescribed amlodipine.  Due for micro albumin creatinine ratio.   Lab Results   Component Value Date    MICALBCREAT 12.1 01/10/2024     BP Readings from Last 5 Encounters:   09/06/24 (!) 142/74   08/07/24 114/72   07/24/24 (!) 145/67   06/03/24 (!) 140/80   05/21/24 136/76     JOSE -   Using CPAP as directed     HLD -   Endorses taking atorvastatin as directed  Lab Results   Component Value Date    CHOL 95 (L) 01/10/2024     Lab Results   Component Value Date    TRIG 93 01/10/2024      Lab Results   Component Value Date    LDLCALC 35.4 (L) 01/10/2024     Lab Results   Component Value Date    HDL 41 01/10/2024   Due for lipid recheck.            Following with TERESITA Zayas routinely for psychiatry.  Taking Ambien for insomnia   Not currently taking citalopram for anxiety     Vitamin D deficiency -  Not currently taking vitamin D supplementation.  Lab Results   Component Value Date    DFXAUGQP60PF 42 07/16/2024    PDTCXVQR36ET 24 (L) 07/20/2023           Taking polyethylene glycol and Linzess PRN for chronic constipation     T2DM -   Currently taking:  - Trulicity 3 mg  Holding Jardiance currently, patient concerned for hypoglycemia  Discontinued metformin due to GI side effects    Due for foot exam.  Due for eye exam.   Lab Results   Component Value Date    HGBA1C 6.4 (H) 07/16/2024    HGBA1C 6.4 (H) 01/10/2024    HGBA1C 6.8 (H) 07/20/2023     Lab Results   Component Value Date    MICALBCREAT 12.1 01/10/2024             Current Outpatient Medications   Medication Instructions    albuterol (VENTOLIN HFA) 90 mcg/actuation inhaler 2 puffs, Inhalation, Every 6 hours PRN, Rescue    albuterol-ipratropium (DUO-NEB) 2.5 mg-0.5 mg/3 mL nebulizer solution 3 mLs, Nebulization, Every 6 hours PRN, Rescue    ALPRAZolam (XANAX) 1 mg, Oral, 4 times daily PRN    amLODIPine (NORVASC) 5 mg, Oral, Daily    atorvastatin (LIPITOR) 40 mg, Oral    azelastine (ASTELIN) 137 mcg (0.1 %) nasal spray SPRAY 2 SPRAYS BY NASAL ROUTE 2 TIMES DAILY.    blood sugar diagnostic Strp Use as instructed to check blood sugar 2-3 times daily and whenever feeling ill.    blood-glucose meter kit Use as instructed to check blood sugar 2-3 times daily and whenever feeling ill.    docusate sodium (COLACE) 100 mg, Oral, Nightly    dulaglutide (TRULICITY) 3 mg/0.5 mL pen injector INJECT 3MG UNDER THE SKIN ONCE WEEKLY    DULoxetine (CYMBALTA) 20 mg, Oral, Daily    ergocalciferol (ERGOCALCIFEROL) 50,000 Units, Oral, Every 7 days    flash glucose  "scanning reader (FREESTYLE KAIT 14 DAY READER) Misc Use as directed for continuous glucose monitoring.    flash glucose sensor (FREESTYLE KAIT 14 DAY SENSOR) Kit Use as directed for continuous glucose monitoring.    fluticasone-salmeterol diskus inhaler 250-50 mcg 1 puff, Inhalation, 2 times daily, Controller    guaiFENesin (MUCINEX) 1,200 mg, Oral, 2 times daily PRN    JARDIANCE 10 mg, Oral, Daily    lancets (ACCU-CHEK SOFTCLIX LANCETS) Misc Use as instructed to check blood sugar 2-3 times daily and whenever feeling ill.    montelukast (SINGULAIR) 10 mg, Oral, Nightly    naratriptan (AMERGE) 1 MG Tab TAKE 1 TABLET BY MOUTH 2 TIMES DAILY AS NEEDED (HEADACHE). MAY REPEAT X1 AFTER 4 HOURS    pantoprazole (PROTONIX) 40 mg, Oral    polyethylene glycol (GLYCOLAX) 17 g, Oral, Daily PRN    promethazine-dextromethorphan (PROMETHAZINE-DM) 6.25-15 mg/5 mL Syrp 5 mLs, Oral, Every 6 hours PRN    valsartan (DIOVAN) 320 mg, Oral    zolpidem (AMBIEN) 20 mg, Oral, Nightly     Objective:      Vitals:    11/21/24 1002   BP: 122/68   Pulse: 71   SpO2: 99%   Weight: 78.5 kg (173 lb 1 oz)   Height: 5' 5" (1.651 m)   PainSc: 10-Worst pain ever   PainLoc: Back     Body mass index is 28.8 kg/m².    Physical Exam  Vitals reviewed.   Constitutional:       General: She is not in acute distress.     Appearance: Normal appearance. She is not ill-appearing or diaphoretic.   HENT:      Head: Normocephalic and atraumatic.      Right Ear: Tympanic membrane, ear canal and external ear normal. There is no impacted cerumen.      Left Ear: Tympanic membrane, ear canal and external ear normal. There is no impacted cerumen.      Nose: Nose normal. No rhinorrhea.      Mouth/Throat:      Mouth: Mucous membranes are moist.      Pharynx: Oropharynx is clear. No oropharyngeal exudate or posterior oropharyngeal erythema.   Eyes:      General: No scleral icterus.        Right eye: No discharge.         Left eye: No discharge.      Conjunctiva/sclera: " Conjunctivae normal.   Neck:      Thyroid: No thyromegaly or thyroid tenderness.      Trachea: Trachea normal.   Cardiovascular:      Rate and Rhythm: Normal rate and regular rhythm.      Heart sounds: Normal heart sounds. No murmur heard.     No friction rub. No gallop.   Pulmonary:      Effort: Pulmonary effort is normal. No respiratory distress.      Breath sounds: Normal breath sounds. No stridor. No wheezing, rhonchi or rales.   Abdominal:      General: There is no distension.      Palpations: Abdomen is soft.      Tenderness: There is no abdominal tenderness. There is no guarding or rebound.   Musculoskeletal:         General: No swelling or deformity.      Cervical back: Neck supple.   Lymphadenopathy:      Head:      Right side of head: No submandibular or posterior auricular adenopathy.      Left side of head: No submandibular or posterior auricular adenopathy.      Cervical: No cervical adenopathy.      Right cervical: No superficial, deep or posterior cervical adenopathy.     Left cervical: No superficial, deep or posterior cervical adenopathy.      Upper Body:      Right upper body: No supraclavicular adenopathy.      Left upper body: No supraclavicular adenopathy.   Skin:     General: Skin is warm and dry.   Neurological:      General: No focal deficit present.      Mental Status: She is alert. Mental status is at baseline.      Gait: Gait normal.   Psychiatric:         Mood and Affect: Mood normal.         Behavior: Behavior normal.         Assessment:       1. Health maintenance examination    2. Moderate persistent asthma, unspecified whether complicated    3. Primary hypertension    4. JOSE (obstructive sleep apnea)    5. Mixed hyperlipidemia    6. Insomnia, unspecified type    7. Generalized anxiety disorder    8. Vitamin D deficiency    9. Chronic constipation    10. Type 2 diabetes mellitus with hyperglycemia, without long-term current use of insulin    11. Need for vaccination    12. Screening  mammogram for breast cancer    13. Acute cough    14. Microcytosis    15. Hair loss    16. Asymptomatic menopausal state        Plan:   Health maintenance examination  -     Ferritin; Future; Expected date: 01/21/2025  -     Iron and TIBC; Future; Expected date: 01/21/2025  -     CBC Auto Differential; Future; Expected date: 01/21/2025  -     Hemoglobin A1C; Future; Expected date: 01/21/2025  -     Lipid Panel; Future; Expected date: 01/21/2025  -     TSH; Future; Expected date: 01/21/2025  -     Comprehensive Metabolic Panel; Future; Expected date: 01/21/2025  -     Zinc; Future; Expected date: 01/21/2025  -     Selenium serum; Future; Expected date: 01/21/2025  -     Ferritin; Future; Expected date: 01/21/2025  -     Iron and TIBC; Future; Expected date: 01/21/2025    Moderate persistent asthma, unspecified whether complicated  -     predniSONE (DELTASONE) 20 MG tablet; Take 1 tablet (20 mg total) by mouth once daily. As needed for asthma flare for 5 days  Dispense: 5 tablet; Refill: 0    Primary hypertension  -     CBC Auto Differential; Future; Expected date: 01/21/2025  -     TSH; Future; Expected date: 01/21/2025  -     Microalbumin/Creatinine Ratio, Urine; Future; Expected date: 01/21/2025  -     Comprehensive Metabolic Panel; Future; Expected date: 01/21/2025    JOSE (obstructive sleep apnea)    Mixed hyperlipidemia  -     Lipid Panel; Future; Expected date: 01/21/2025    Insomnia, unspecified type    Generalized anxiety disorder    Vitamin D deficiency    Chronic constipation  -     linaCLOtide (LINZESS) 72 mcg Cap capsule; Take 1 capsule (72 mcg total) by mouth daily as needed (constipation).  Dispense: 10 capsule; Refill: 2  -     Discontinue: polyethylene glycol (GLYCOLAX) 17 gram PwPk; Take 17 g by mouth daily as needed for Constipation.  Dispense: 100 each; Refill: 3    Type 2 diabetes mellitus with hyperglycemia, without long-term current use of insulin  -     CBC Auto Differential; Future; Expected  date: 01/21/2025  -     Hemoglobin A1C; Future; Expected date: 01/21/2025  -     Microalbumin/Creatinine Ratio, Urine; Future; Expected date: 01/21/2025  -     Comprehensive Metabolic Panel; Future; Expected date: 01/21/2025  -     Ambulatory referral/consult to Optometry; Future; Expected date: 11/28/2024  -     Ambulatory referral/consult to Podiatry; Future; Expected date: 11/28/2024    Need for vaccination    Screening mammogram for breast cancer  -     Mammo Digital Screening Bilat w/ Florencio; Future; Expected date: 12/21/2024    Acute cough  -     promethazine-dextromethorphan (PROMETHAZINE-DM) 6.25-15 mg/5 mL Syrp; Take 5 mLs by mouth every 6 (six) hours as needed (cough).  Dispense: 120 mL; Refill: 0    Microcytosis  -     Ferritin; Future; Expected date: 01/21/2025  -     Iron and TIBC; Future; Expected date: 01/21/2025    Hair loss  -     Zinc; Future; Expected date: 01/21/2025  -     Selenium serum; Future; Expected date: 01/21/2025  -     Ferritin; Future; Expected date: 01/21/2025  -     Iron and TIBC; Future; Expected date: 01/21/2025    Asymptomatic menopausal state  -     DXA Bone Density Axial Skeleton 1 or more sites; Future; Expected date: 11/21/2024      Assessment & Plan    Assessed asthma management, noting adequate albuterol supply and adherence to daily inhaler use  Evaluated constipation management, considering history of gallbladder removal and current laxative use  Reviewed blood pressure control  Considered investigation of hair loss, planning to add vitamin level tests to upcoming bloodwork  Will conduct bone density screening earlier due to early hysterectomy    ASTHMA:  - Continued albuterol inhaler as needed for asthma symptoms.  - Continued daily use of Wixela inhaler for asthma maintenance.    OBSTRUCTIVE SLEEP APNEA:  - Susan to continue using CPAP machine for sleep apnea management.    HYPERTENSION:  - Continued amlodipine 5 mg daily for hypertension management.    CONSTIPATION:  -  Explained risks of overusing laxatives, including uncontrolled bowel movements and dehydration.  - Discussed appropriate use of laxatives, typically when experiencing 3 or more days without a bowel movement.    HYSTERECTOMY:  - Clarified misconceptions about hysterectomy's impact on sexual function, emphasizing that it does not inherently affect intimacy.    VITAMIN DEFICIENCY:  - Vitamin level tests added to upcoming January bloodwork to investigate potential causes of hair loss.    BONE HEALTH:  - Bone density scan scheduled earlier than standard age of 66.    FOLLOW-UP:  - Follow up in January for bloodwork, including newly added vitamin level tests.         Letty Kumar MD  11/21/2024

## 2024-11-21 NOTE — TELEPHONE ENCOUNTER
Care Due:                  Date            Visit Type   Department     Provider  --------------------------------------------------------------------------------                                EP -                              PRIMARY      [facility] INTERNAL  Last Visit: 11-      CARE ([facility])   MEDICINE       Letty Kumar  Next Visit: None Scheduled  None         None Found                                                            Last  Test          Frequency    Reason                     Performed    Due Date  --------------------------------------------------------------------------------    HBA1C.......  6 months...  dulaglutide..............  07- 01-    Lipid Panel.  12 months..  atorvastatin.............  01-   01-    [employer] Embedded Care Due Messages. Reference number: [id number].   11/21/2024 11:42:46 AM CST

## 2024-11-26 ENCOUNTER — HOSPITAL ENCOUNTER (OUTPATIENT)
Dept: RADIOLOGY | Facility: OTHER | Age: 64
Discharge: HOME | End: 2024-11-26
Attending: STUDENT IN AN ORGANIZED HEALTH CARE EDUCATION/TRAINING PROGRAM
Payer: MEDICARE

## 2024-11-26 DIAGNOSIS — Z78.0 ASYMPTOMATIC MENOPAUSAL STATE: ICD-10-CM

## 2024-11-26 PROCEDURE — 77080 DXA BONE DENSITY AXIAL: CPT | Mod: TC

## 2024-11-26 PROCEDURE — 77080 DXA BONE DENSITY AXIAL: CPT | Mod: 26,,, | Performed by: RADIOLOGY

## 2024-12-13 NOTE — TELEPHONE ENCOUNTER
----- Message from T[place]ana Chevalier sent at 12/16/2022  8:14 AM CST -----  Regarding: appt  #EP Pt calling to schedule appt for migraines with Dr. Germain. No avail appts in [facility]. Pls call pt@ [phone]     Bed in lowest position, wheels locked, appropriate side rails in place/Call bell, personal items and telephone in reach/Instruct patient to call for assistance before getting out of bed or chair/Non-slip footwear when patient is out of bed/[place] to call system/Physically safe environment - no spills, clutter or unnecessary equipment/Purposeful Proactive Rounding/Room/bathroom lighting operational, light cord in reach

## 2024-12-23 ENCOUNTER — HOSPITAL ENCOUNTER (OUTPATIENT)
Dept: RADIOLOGY | Facility: OTHER | Age: 64
Discharge: HOME | End: 2024-12-23
Attending: STUDENT IN AN ORGANIZED HEALTH CARE EDUCATION/TRAINING PROGRAM
Payer: MEDICARE

## 2024-12-23 DIAGNOSIS — Z12.31 SCREENING MAMMOGRAM FOR BREAST CANCER: ICD-10-CM

## 2024-12-26 ENCOUNTER — HOSPITAL ENCOUNTER (OUTPATIENT)
Dept: RADIOLOGY | Facility: OTHER | Age: 64
Discharge: HOME | End: 2024-12-26
Attending: STUDENT IN AN ORGANIZED HEALTH CARE EDUCATION/TRAINING PROGRAM
Payer: MEDICARE

## 2024-12-26 PROCEDURE — 77067 SCR MAMMO BI INCL CAD: CPT | Mod: TC

## 2024-12-30 ENCOUNTER — TELEPHONE (OUTPATIENT)
Dept: ORTHOPEDICS | Facility: CLINIC | Age: 64
End: 2024-12-30
Payer: MEDICARE

## 2024-12-30 DIAGNOSIS — I10 PRIMARY HYPERTENSION: ICD-10-CM

## 2024-12-30 DIAGNOSIS — R05.1 ACUTE COUGH: ICD-10-CM

## 2024-12-30 DIAGNOSIS — E11.65 TYPE 2 DIABETES MELLITUS WITH HYPERGLYCEMIA, WITHOUT LONG-TERM CURRENT USE OF INSULIN: ICD-10-CM

## 2024-12-30 NOTE — TELEPHONE ENCOUNTER
No care due was identified.  [facility] Embedded Care Due Messages. Reference number: [id number].   12/30/2024 3:08:33 PM CST

## 2024-12-31 RX ORDER — AMLODIPINE BESYLATE 5 MG/1
5 TABLET ORAL DAILY
Qty: 90 TABLET | Refills: 3 | Status: SHIPPED | OUTPATIENT
Start: 2024-12-31

## 2024-12-31 RX ORDER — DULAGLUTIDE 3 MG/.5ML
INJECTION, SOLUTION SUBCUTANEOUS
Qty: 12 PEN | Refills: 0 | Status: SHIPPED | OUTPATIENT
Start: 2024-12-31

## 2024-12-31 RX ORDER — AZELASTINE 1 MG/ML
SPRAY, METERED NASAL
Qty: 90 ML | Refills: 3 | Status: SHIPPED | OUTPATIENT
Start: 2024-12-31

## 2024-12-31 NOTE — TELEPHONE ENCOUNTER
Refill Decision Note   Susan Courtney  is requesting a refill authorization.  Brief Assessment and Rationale for Refill:  Approve     Medication Therapy Plan:         Comments:     Note composed:3:29 PM 12/31/2024

## 2025-01-02 ENCOUNTER — OFFICE VISIT (OUTPATIENT)
Dept: PODIATRY | Facility: CLINIC | Age: 65
End: 2025-01-02
Payer: MEDICARE

## 2025-01-02 VITALS
BODY MASS INDEX: 29.38 KG/M2 | SYSTOLIC BLOOD PRESSURE: 165 MMHG | HEIGHT: 65 IN | WEIGHT: 176.38 LBS | HEART RATE: 73 BPM | DIASTOLIC BLOOD PRESSURE: 79 MMHG

## 2025-01-02 DIAGNOSIS — E11.65 TYPE 2 DIABETES MELLITUS WITH HYPERGLYCEMIA, WITHOUT LONG-TERM CURRENT USE OF INSULIN: ICD-10-CM

## 2025-01-02 DIAGNOSIS — L60.2 THICKENED NAILS: Primary | ICD-10-CM

## 2025-01-02 PROCEDURE — 99213 OFFICE O/P EST LOW 20 MIN: CPT | Mod: S$GLB,,, | Performed by: PODIATRIST

## 2025-01-02 PROCEDURE — 1159F MED LIST DOCD IN RCRD: CPT | Mod: CPTII,S$GLB,, | Performed by: PODIATRIST

## 2025-01-02 PROCEDURE — 3008F BODY MASS INDEX DOCD: CPT | Mod: CPTII,S$GLB,, | Performed by: PODIATRIST

## 2025-01-02 PROCEDURE — 3078F DIAST BP <80 MM HG: CPT | Mod: CPTII,S$GLB,, | Performed by: PODIATRIST

## 2025-01-02 PROCEDURE — 3077F SYST BP >= 140 MM HG: CPT | Mod: CPTII,S$GLB,, | Performed by: PODIATRIST

## 2025-01-02 PROCEDURE — 99999 PR PBB SHADOW E&M-EST. PATIENT-LVL IV: CPT | Mod: PBBFAC,,, | Performed by: PODIATRIST

## 2025-01-02 NOTE — PROGRESS NOTES
Subjective:     Patient ID: Susan Courtney is a 64 y.o. female.    Chief Complaint: Diabetes Mellitus (Letty Kumar MD 11/21/2024/) and Nail Care (B/l nail care)    Susan is a 64 y.o. female who presents to the clinic upon referral from Dr. Kumar  for evaluation and treatment of diabetic feet. Susan has a past medical history of Abdominal hernia, Anxiety, Asthma, Diabetes mellitus, Hypertension, Migraine, Migraine headache, Obesity, and Sleep apnea. Patient relates no major problem with feet. Only complaints today consist of yearly comprehensive diabetic  foot examination  .    PCP: Letty Kumar MD    Date Last Seen by PCP:   Chief Complaint   Patient presents with    Diabetes Mellitus     Letty Kumar MD 11/21/2024      Nail Care     B/l nail care         Current shoe gear: Casual shoes    Hemoglobin A1C   Date Value Ref Range Status   07/16/2024 6.4 (H) 4.0 - 5.6 % Final     Comment:     ADA Screening Guidelines:  5.7-6.4%  Consistent with prediabetes  >or=6.5%  Consistent with diabetes    High levels of fetal hemoglobin interfere with the HbA1C  assay. Heterozygous hemoglobin variants (HbS, HgC, etc)do  not significantly interfere with this assay.   However, presence of multiple variants may affect accuracy.     01/10/2024 6.4 (H) 4.0 - 5.6 % Final     Comment:     ADA Screening Guidelines:  5.7-6.4%  Consistent with prediabetes  >or=6.5%  Consistent with diabetes    High levels of fetal hemoglobin interfere with the HbA1C  assay. Heterozygous hemoglobin variants (HbS, HgC, etc)do  not significantly interfere with this assay.   However, presence of multiple variants may affect accuracy.     07/20/2023 6.8 (H) 4.0 - 5.6 % Final     Comment:     ADA Screening Guidelines:  5.7-6.4%  Consistent with prediabetes  >or=6.5%  Consistent with diabetes    High levels of fetal hemoglobin interfere with the HbA1C  assay. Heterozygous hemoglobin variants (HbS, HgC, etc)do  not significantly  interfere with this assay.   However, presence of multiple variants may affect accuracy.           Review of Systems   Constitutional: Negative for chills, decreased appetite and fever.   Cardiovascular:  Negative for leg swelling.   Musculoskeletal:  Negative for arthritis, joint pain, joint swelling and myalgias.   Gastrointestinal:  Negative for nausea and vomiting.   Neurological:  Negative for loss of balance, numbness and paresthesias.          Patient Active Problem List   Diagnosis    Classical migraine    Hallux valgus (acquired)    JOSE (obstructive sleep apnea)    Headache    Chronic tonsillitis    Primary hypertension    Type 2 diabetes mellitus with hyperglycemia, without long-term current use of insulin    Hypoxia    Anxiety    Severe persistent asthma without complication    Gastroesophageal reflux disease    Subcutaneous mass of left hand    Chronic bilateral low back pain without sciatica    Neck stiffness    Posture imbalance    Cervicogenic headache    Cough    Mixed hyperlipidemia    Palpitations    Insomnia    Generalized anxiety disorder    Gastroesophageal reflux disease with esophagitis    Polyneuropathy due to type 2 diabetes mellitus    Ventricular tachycardia, unspecified    Diverticular disease of colon       Current Outpatient Medications on File Prior to Visit   Medication Sig Dispense Refill    albuterol (VENTOLIN HFA) 90 mcg/actuation inhaler Inhale 2 puffs into the lungs every 6 (six) hours as needed for Wheezing. Rescue 18 g 11    albuterol-ipratropium (DUO-NEB) 2.5 mg-0.5 mg/3 mL nebulizer solution Take 3 mLs by nebulization every 6 (six) hours as needed for Wheezing or Shortness of Breath. Rescue 75 mL 3    ALPRAZolam (XANAX) 1 MG tablet Take 1 tablet (1 mg total) by mouth 4 (four) times daily as needed for Anxiety. 120 tablet 2    amLODIPine (NORVASC) 5 MG tablet TAKE 1 TABLET (5 MG TOTAL) BY MOUTH DAILY. 90 tablet 3    atorvastatin (LIPITOR) 40 MG tablet TAKE 1 TABLET BY MOUTH  EVERY DAY 90 tablet 1    azelastine (ASTELIN) 137 mcg (0.1 %) nasal spray SPRAY 2 SPRAYS BY NASAL ROUTE 2 TIMES DAILY. 90 mL 3    blood sugar diagnostic Strp Use as instructed to check blood sugar 2-3 times daily and whenever feeling ill. 200 each 3    blood-glucose meter kit Use as instructed to check blood sugar 2-3 times daily and whenever feeling ill. 1 each 0    docusate sodium (COLACE) 100 MG capsule Take 1 capsule (100 mg total) by mouth every evening. 90 capsule 3    DULoxetine (CYMBALTA) 20 MG capsule Take 1 capsule (20 mg total) by mouth once daily. 30 capsule 11    ergocalciferol (ERGOCALCIFEROL) 50,000 unit Cap Take 1 capsule (50,000 Units total) by mouth every 7 days. 12 capsule 3    fluticasone-salmeterol diskus inhaler 250-50 mcg Inhale 1 puff into the lungs 2 (two) times daily. Controller 60 each 11    lancets (ACCU-CHEK SOFTCLIX LANCETS) Misc Use as instructed to check blood sugar 2-3 times daily and whenever feeling ill. 200 each 3    linaCLOtide (LINZESS) 72 mcg Cap capsule Take 1 capsule (72 mcg total) by mouth daily as needed (constipation). 10 capsule 2    montelukast (SINGULAIR) 10 mg tablet Take 1 tablet (10 mg total) by mouth every evening. 90 tablet 3    naratriptan (AMERGE) 1 MG Tab TAKE 1 TABLET BY MOUTH 2 TIMES DAILY AS NEEDED (HEADACHE). MAY REPEAT X1 AFTER 4 HOURS 12 tablet 11    pantoprazole (PROTONIX) 40 MG tablet TAKE 1 TABLET BY MOUTH EVERY DAY 90 tablet 3    polyethylene glycol (GLYCOLAX) 17 gram/dose powder DISSOLVE 17 GRAMS IN 8 OZ OF FLUID LIQUID DRINK DAILY AS DIRECTED 510 g 3    promethazine-dextromethorphan (PROMETHAZINE-DM) 6.25-15 mg/5 mL Syrp Take 5 mLs by mouth every 6 (six) hours as needed (cough). 120 mL 0    TRULICITY 3 mg/0.5 mL pen injector INJECT 3MG UNDER THE SKIN ONCE WEEKLY 12 pen 0    valsartan (DIOVAN) 320 MG tablet TAKE 1 TABLET BY MOUTH ONCE DAILY. 90 tablet 3    zolpidem (AMBIEN) 10 mg Tab Take 2 tablets (20 mg total) by mouth every evening. 60 tablet 3      Current Facility-Administered Medications on File Prior to Visit   Medication Dose Route Frequency Provider Last Rate Last Admin    albuterol-ipratropium 2.5 mg-0.5 mg/3 mL nebulizer solution 3 mL  3 mL Nebulization 1 time in Clinic/HOD            Review of patient's allergies indicates:   Allergen Reactions    Morphine Itching     Not sure what she can take for pain.    Morphine sulfate      Itchy (skin)^    Tramadol Itching       Past Surgical History:   Procedure Laterality Date    BUNIONECTOMY Right     CHOLECYSTECTOMY      EXCISION OF LESION Left 12/01/2021    Procedure: EXCISION, LESION, LEFT PALM;  Surgeon: Amy Sharp MD;  Location: [facility];  Service: Orthopedics;  Laterality: Left;    FOOT SURGERY Right     HYSTERECTOMY      total, fibroids    INCISIONAL HERNIA REPAIR      TONSILLECTOMY Bilateral 03/06/2020    Procedure: TONSILLECTOMY;  Surgeon: Brant Pelaez MD;  Location: [facility];  Service: ENT;  Laterality: Bilateral;       Family History   Problem Relation Name Age of Onset    Breast cancer Mother      Psoriasis Neg Hx      Melanoma Neg Hx      Lupus Neg Hx         Social History     Socioeconomic History    Marital status:    Tobacco Use    Smoking status: Never    Smokeless tobacco: Never   Substance and Sexual Activity    Alcohol use: No    Drug use: No    Sexual activity: Not Currently     Social Drivers of Health     Financial Resource Strain: High Risk (1/9/2024)    Overall Financial Resource Strain (CARDIA)     Difficulty of Paying Living Expenses: Very hard   Food Insecurity: Patient Declined (1/9/2024)    Hunger Vital Sign     Worried About Running Out of Food in the Last Year: Patient declined     Ran Out of Food in the Last Year: Patient declined   Transportation Needs: No Transportation Needs (1/9/2024)    PRAPARE - Transportation     Lack of Transportation (Medical): No     Lack of Transportation (Non-Medical): No   Physical Activity: Insufficiently Active  "(1/9/2024)    Exercise Vital Sign     Days of Exercise per Week: 2 days     Minutes of Exercise per Session: 30 min   Stress: No Stress Concern Present (1/9/2024)    [employer] [place] [employer] - Occupational Stress Questionnaire     Feeling of Stress : Only a little   Housing Stability: High Risk (1/9/2024)    Housing Stability Vital Sign     Unable to Pay for Housing in the Last Year: Yes     Number of Places Lived in the Last Year: 1     Unstable Housing in the Last Year: No           Objective:     Vitals:    01/02/25 0910   BP: (!) 165/79   Pulse: 73   Weight: 80 kg (176 lb 5.9 oz)   Height: 5' 5" (1.651 m)   PainSc: 0-No pain   PainLoc: Foot        Physical Exam  Vitals and nursing note reviewed.   Constitutional:       General: She is not in acute distress.     Appearance: She is well-developed. She is not toxic-appearing or diaphoretic.      Comments: alert and oriented x 3.    Cardiovascular:      Pulses:           Dorsalis pedis pulses are 2+ on the right side and 2+ on the left side.        Posterior tibial pulses are 2+ on the right side and 2+ on the left side.      Comments:  Capillary refill time is within normal limits. Digital hair present.   Pulmonary:      Effort: No respiratory distress.   Musculoskeletal:         General: No deformity.      Right ankle: No tenderness. No lateral malleolus, medial malleolus, AITF ligament, CF ligament or posterior TF ligament tenderness.      Right Achilles Tendon: No defects. Godoy's test negative.      Left ankle: No tenderness. No lateral malleolus, medial malleolus, AITF ligament, CF ligament or posterior TF ligament tenderness.      Left Achilles Tendon: No defects. Godoy's test negative.      Right foot: No tenderness or bony tenderness.      Left foot: No tenderness or bony tenderness.      Comments: Adequate joint range of motion without pain, limitation, nor crepitation Bilateral feet and ankle joints. Muscle strength is 5/5 in all " groups bilaterally.           Feet:      Right foot:      Protective Sensation: 5 sites tested.  5 sites sensed.      Skin integrity: Skin integrity normal.      Left foot:      Protective Sensation: 5 sites tested.  5 sites sensed.      Skin integrity: Skin integrity normal.   Lymphadenopathy:      Comments: No lymphatic streaking     Skin:     General: Skin is warm and dry.      Coloration: Skin is not pale.      Findings: No erythema or rash.      Nails: There is no clubbing.      Comments: Skin is of normal turgor.   Normal temperature gradient.  Examination of the skin reveals no evidence of significant rashes, open lesions, suspicious appearing nevi or other concerning lesions.       Toenails 1-5 bilaterally thickened and slightly dystrophic with mild subungual debris.   Neurological:      Sensory: No sensory deficit.      Motor: No atrophy.      Comments: Light touch present     Psychiatric:         Attention and Perception: She is attentive.         Mood and Affect: Mood is not anxious. Affect is not inappropriate.         Speech: She is communicative. Speech is not slurred.         Behavior: Behavior is not combative.           Assessment:      Encounter Diagnoses   Name Primary?    Type 2 diabetes mellitus with hyperglycemia, without long-term current use of insulin     Thickened nails Yes     Plan:     Susan was seen today for diabetes mellitus and nail care.    Diagnoses and all orders for this visit:    Thickened nails    Type 2 diabetes mellitus with hyperglycemia, without long-term current use of insulin  -     Ambulatory referral/consult to Podiatry      I counseled the patient on her conditions, their implications and medical management.    - Shoe inspection. Diabetic Foot Education. Patient reminded of the importance of good nutrition and blood sugar control to help prevent podiatric complications of diabetes. Patient instructed on proper foot hygeine. We discussed wearing proper shoe gear, daily  foot inspections, never walking without protective shoe gear, caution putting sharp instruments to feet     - Discussed DM foot care:  Wear comfortable, proper fitting shoes. Wash feet daily. Dry well. After drying, apply moisturizer to feet (no lotion to webspaces). Inspect feet daily for skin breaks, blisters, swelling, or redness. Wear cotton socks (preferably white)  Change socks every day. Do NOT walk barefoot. Do NOT use heating pads or warm/hot water soaks     - Discussed importance of daily moisturizer to the feet such as Cerave,Sween, Or Cetaphil    - Patient is low risk for developing lower extremity issues secondary to diabetes.     - Reviewed current medication(s), and lab(s) specific to foot ailment(s) with patient in detail. All questions answered     - Independent review of patients previous clinical notes    - I recommend continued yearly diabetic foot examinations by Myself or PCP    - Currently  patient has NO pedal manifestations of DM    - Patients with out pedal manifestations of DM, do not qualify for Diabetic Shoes/Inserts nor  nail/callus trimming     - RTC in 1 yr or  PRN       .

## 2025-01-14 DIAGNOSIS — J45.40 MODERATE PERSISTENT ASTHMA, UNSPECIFIED WHETHER COMPLICATED: Primary | ICD-10-CM

## 2025-01-14 DIAGNOSIS — R05.1 ACUTE COUGH: ICD-10-CM

## 2025-01-14 RX ORDER — NEBULIZER AND COMPRESSOR
EACH MISCELLANEOUS
Status: CANCELLED | OUTPATIENT
Start: 2025-01-14

## 2025-01-14 RX ORDER — NEBULIZER AND COMPRESSOR
EACH MISCELLANEOUS
COMMUNITY

## 2025-01-14 NOTE — TELEPHONE ENCOUNTER
----- Message from Stephani sent at 1/14/2025  3:22 PM CST -----  Contact: [phone]  [email or web address]     Patient is calling for Medical Advice regarding: Pt said she needs a call back about getting a cough med that is covered by her ins     How long has patient had these symptoms:    Pharmacy name and phone#:    Patient wants a call back or thru [facility]: call back     Comments:    Please advise patient replies from provider may take up to 48 hours.

## 2025-01-15 ENCOUNTER — LAB VISIT (OUTPATIENT)
Dept: LAB | Facility: OTHER | Age: 65
End: 2025-01-15
Attending: STUDENT IN AN ORGANIZED HEALTH CARE EDUCATION/TRAINING PROGRAM
Payer: MEDICARE

## 2025-01-15 DIAGNOSIS — I10 PRIMARY HYPERTENSION: ICD-10-CM

## 2025-01-15 DIAGNOSIS — E11.65 TYPE 2 DIABETES MELLITUS WITH HYPERGLYCEMIA, WITHOUT LONG-TERM CURRENT USE OF INSULIN: ICD-10-CM

## 2025-01-15 DIAGNOSIS — E78.2 MIXED HYPERLIPIDEMIA: ICD-10-CM

## 2025-01-15 DIAGNOSIS — Z00.00 HEALTH MAINTENANCE EXAMINATION: ICD-10-CM

## 2025-01-15 DIAGNOSIS — R71.8 MICROCYTOSIS: ICD-10-CM

## 2025-01-15 DIAGNOSIS — L65.9 HAIR LOSS: ICD-10-CM

## 2025-01-15 LAB
ALBUMIN SERPL BCP-MCNC: 3.7 G/DL (ref 3.5–5.2)
ALP SERPL-CCNC: 119 U/L (ref 40–150)
ALT SERPL W/O P-5'-P-CCNC: 16 U/L (ref 10–44)
ANION GAP SERPL CALC-SCNC: 8 MMOL/L (ref 8–16)
AST SERPL-CCNC: 12 U/L (ref 10–40)
BASOPHILS # BLD AUTO: 0.02 K/UL (ref 0–0.2)
BASOPHILS NFR BLD: 0.3 % (ref 0–1.9)
BILIRUB SERPL-MCNC: 0.6 MG/DL (ref 0.1–1)
BUN SERPL-MCNC: 11 MG/DL (ref 8–23)
CALCIUM SERPL-MCNC: 10.2 MG/DL (ref 8.7–10.5)
CHLORIDE SERPL-SCNC: 103 MMOL/L (ref 95–110)
CHOLEST SERPL-MCNC: 132 MG/DL (ref 120–199)
CHOLEST/HDLC SERPL: 2.8 {RATIO} (ref 2–5)
CO2 SERPL-SCNC: 33 MMOL/L (ref 23–29)
CREAT SERPL-MCNC: 0.9 MG/DL (ref 0.5–1.4)
DIFFERENTIAL METHOD BLD: ABNORMAL
EOSINOPHIL # BLD AUTO: 0.3 K/UL (ref 0–0.5)
EOSINOPHIL NFR BLD: 3.8 % (ref 0–8)
ERYTHROCYTE [DISTWIDTH] IN BLOOD BY AUTOMATED COUNT: 13.7 % (ref 11.5–14.5)
EST. GFR  (NO RACE VARIABLE): >60 ML/MIN/1.73 M^2
ESTIMATED AVG GLUCOSE: 134 MG/DL (ref 68–131)
FERRITIN SERPL-MCNC: 103 NG/ML (ref 20–300)
FERRITIN SERPL-MCNC: 103 NG/ML (ref 20–300)
GLUCOSE SERPL-MCNC: 144 MG/DL (ref 70–110)
HBA1C MFR BLD: 6.3 % (ref 4–5.6)
HCT VFR BLD AUTO: 41.5 % (ref 37–48.5)
HDLC SERPL-MCNC: 47 MG/DL (ref 40–75)
HDLC SERPL: 35.6 % (ref 20–50)
HGB BLD-MCNC: 13 G/DL (ref 12–16)
IMM GRANULOCYTES # BLD AUTO: 0.03 K/UL (ref 0–0.04)
IMM GRANULOCYTES NFR BLD AUTO: 0.4 % (ref 0–0.5)
IRON SERPL-MCNC: 82 UG/DL (ref 30–160)
IRON SERPL-MCNC: 82 UG/DL (ref 30–160)
LDLC SERPL CALC-MCNC: 61.4 MG/DL (ref 63–159)
LYMPHOCYTES # BLD AUTO: 2.5 K/UL (ref 1–4.8)
LYMPHOCYTES NFR BLD: 32.5 % (ref 18–48)
MCH RBC QN AUTO: 26.4 PG (ref 27–31)
MCHC RBC AUTO-ENTMCNC: 31.3 G/DL (ref 32–36)
MCV RBC AUTO: 84 FL (ref 82–98)
MONOCYTES # BLD AUTO: 0.7 K/UL (ref 0.3–1)
MONOCYTES NFR BLD: 8.4 % (ref 4–15)
NEUTROPHILS # BLD AUTO: 4.3 K/UL (ref 1.8–7.7)
NEUTROPHILS NFR BLD: 54.6 % (ref 38–73)
NONHDLC SERPL-MCNC: 85 MG/DL
NRBC BLD-RTO: 0 /100 WBC
PLATELET # BLD AUTO: 285 K/UL (ref 150–450)
PMV BLD AUTO: 8.8 FL (ref 9.2–12.9)
POTASSIUM SERPL-SCNC: 3.3 MMOL/L (ref 3.5–5.1)
PROT SERPL-MCNC: 7.6 G/DL (ref 6–8.4)
RBC # BLD AUTO: 4.93 M/UL (ref 4–5.4)
SATURATED IRON: 24 % (ref 20–50)
SATURATED IRON: 24 % (ref 20–50)
SODIUM SERPL-SCNC: 144 MMOL/L (ref 136–145)
TOTAL IRON BINDING CAPACITY: 342 UG/DL (ref 250–450)
TOTAL IRON BINDING CAPACITY: 342 UG/DL (ref 250–450)
TRANSFERRIN SERPL-MCNC: 231 MG/DL (ref 200–375)
TRANSFERRIN SERPL-MCNC: 231 MG/DL (ref 200–375)
TRIGL SERPL-MCNC: 118 MG/DL (ref 30–150)
TSH SERPL DL<=0.005 MIU/L-ACNC: 0.82 UIU/ML (ref 0.4–4)
WBC # BLD AUTO: 7.82 K/UL (ref 3.9–12.7)

## 2025-01-15 PROCEDURE — 84255 ASSAY OF SELENIUM: CPT | Performed by: STUDENT IN AN ORGANIZED HEALTH CARE EDUCATION/TRAINING PROGRAM

## 2025-01-15 PROCEDURE — 85025 COMPLETE CBC W/AUTO DIFF WBC: CPT | Performed by: STUDENT IN AN ORGANIZED HEALTH CARE EDUCATION/TRAINING PROGRAM

## 2025-01-15 PROCEDURE — 84630 ASSAY OF ZINC: CPT | Performed by: STUDENT IN AN ORGANIZED HEALTH CARE EDUCATION/TRAINING PROGRAM

## 2025-01-15 PROCEDURE — 80061 LIPID PANEL: CPT | Performed by: STUDENT IN AN ORGANIZED HEALTH CARE EDUCATION/TRAINING PROGRAM

## 2025-01-15 PROCEDURE — 80053 COMPREHEN METABOLIC PANEL: CPT | Performed by: STUDENT IN AN ORGANIZED HEALTH CARE EDUCATION/TRAINING PROGRAM

## 2025-01-15 PROCEDURE — 84443 ASSAY THYROID STIM HORMONE: CPT | Performed by: STUDENT IN AN ORGANIZED HEALTH CARE EDUCATION/TRAINING PROGRAM

## 2025-01-15 PROCEDURE — 82728 ASSAY OF FERRITIN: CPT | Performed by: STUDENT IN AN ORGANIZED HEALTH CARE EDUCATION/TRAINING PROGRAM

## 2025-01-15 PROCEDURE — 84466 ASSAY OF TRANSFERRIN: CPT | Performed by: STUDENT IN AN ORGANIZED HEALTH CARE EDUCATION/TRAINING PROGRAM

## 2025-01-15 PROCEDURE — 83036 HEMOGLOBIN GLYCOSYLATED A1C: CPT | Performed by: STUDENT IN AN ORGANIZED HEALTH CARE EDUCATION/TRAINING PROGRAM

## 2025-01-16 LAB — SELENIUM SERPL-MCNC: 123 MCG/L (ref 110–165)

## 2025-01-16 NOTE — TELEPHONE ENCOUNTER
Order placed for nebulizer, please fax to patient's preferred DME supplier. Promethazine-DM is the generic, she can try [facility] to get the medication for less money, please let patient know, thank you!

## 2025-01-20 DIAGNOSIS — J45.50 SEVERE PERSISTENT ASTHMA WITHOUT COMPLICATION: ICD-10-CM

## 2025-01-20 DIAGNOSIS — J45.40 MODERATE PERSISTENT ASTHMA, UNSPECIFIED WHETHER COMPLICATED: ICD-10-CM

## 2025-01-20 LAB — ZINC SERPL-MCNC: 82 UG/DL (ref 60–130)

## 2025-01-20 NOTE — TELEPHONE ENCOUNTER
No care due was identified.  [facility] Embedded Care Due Messages. Reference number: [id number].   1/20/2025 8:39:55 AM CST

## 2025-01-21 RX ORDER — ALBUTEROL SULFATE 90 UG/1
2 INHALANT RESPIRATORY (INHALATION) EVERY 6 HOURS PRN
Qty: 54 G | Refills: 3 | Status: SHIPPED | OUTPATIENT
Start: 2025-01-21

## 2025-01-21 NOTE — TELEPHONE ENCOUNTER
Refill Routing Note   Medication(s) are not appropriate for processing by [facility] for the following reason(s):        Outside of protocol    [facility] action(s):  Approve  Route               Appointments  past 12m or future 3m with PCP    Date Provider   Last Visit   11/21/2024 Letty Kumar MD   Next Visit   5/21/2025 Letty Kumar MD   ED visits in past 90 days: 0        Note composed:2:12 PM 01/21/2025

## 2025-01-22 RX ORDER — PREDNISONE 20 MG/1
20 TABLET ORAL DAILY
Qty: 5 TABLET | Refills: 0 | OUTPATIENT
Start: 2025-01-22 | End: 2025-01-27

## 2025-01-23 ENCOUNTER — OFFICE VISIT (OUTPATIENT)
Dept: INTERNAL MEDICINE | Facility: CLINIC | Age: 65
End: 2025-01-23
Payer: MEDICARE

## 2025-01-23 DIAGNOSIS — K59.09 CHRONIC CONSTIPATION: ICD-10-CM

## 2025-01-23 DIAGNOSIS — R05.9 COUGH, UNSPECIFIED TYPE: ICD-10-CM

## 2025-01-23 DIAGNOSIS — J45.50 SEVERE PERSISTENT ASTHMA WITHOUT COMPLICATION: Primary | ICD-10-CM

## 2025-01-23 RX ORDER — PREDNISONE 20 MG/1
20 TABLET ORAL DAILY
Qty: 5 TABLET | Refills: 0 | Status: SHIPPED | OUTPATIENT
Start: 2025-01-23

## 2025-01-23 RX ORDER — ALBUTEROL SULFATE 0.63 MG/3ML
0.63 SOLUTION RESPIRATORY (INHALATION) EVERY 6 HOURS PRN
Qty: 75 ML | Refills: 0 | Status: SHIPPED | OUTPATIENT
Start: 2025-01-23 | End: 2026-01-23

## 2025-01-23 NOTE — TELEPHONE ENCOUNTER
Per Dr Nicholson: If pt needs prednisone, needs appt please     Spoke to patient, scheduled virtual for today.

## 2025-01-23 NOTE — PROGRESS NOTES
The patient location is:  Patient Home   The chief complaint leading to consultation is:  Cough and Sore Throat    Subjective:            Susan Courtney is a 64 y.o.  female with history of migraines, JOSE on CPAP, HTN, T2DM, asthma, HLD, MIKIE, GERD who presents for Cough and Sore Throat    History of Present Illness    CHIEF COMPLAINT:  Patient presents today for cough and sore throat.    CURRENT RESPIRATORY SYMPTOMS:  This is a recurrent problem. The current episode started yesterday. The problem occurs every few minutes. The cough is Non-productive. Associated symptoms include rhinorrhea, a sore throat, shortness of breath and wheezing. Pertinent negatives include no chest pain, chills, ear congestion, ear pain, fever, headaches, heartburn, hemoptysis, myalgias, nasal congestion, postnasal drip, rash, sweats or weight loss. The symptoms are aggravated by nothing and lying down. Nothing and lying down aggravates the symptoms. Her past medical history is significant for asthma. There is no history of bronchiectasis, bronchitis, COPD or emphysema.   She presents with cough, sore throat, and wheezing. She denies fever, congestion, or runny nose. She also reports headache and decreased appetite. She reports possible exposure to RSV through contact with grandchildren.    ASTHMA:  She has a history of asthma managed with daily Wixella and albuterol as needed, reporting use of albuterol twice in the last day. She has required multiple courses of prednisone in the past year. She previously used promethazine and nebulizer treatments a couple months ago.    DIABETES:  She has diabetes managed with insulin administered every Thursday at 9 PM. She reports stable blood sugars but does not check glucose levels at home.    HYPERTENSION:  She has hypertension and does not check blood pressure readings at home.    GI CONCERNS:  She uses Linzess for chronic constipation and reports concerns about decreased  effectiveness.    ROS:  General: -fever, -chills, -fatigue, -weight gain, -weight loss, +loss of appetite  Eyes: -vision changes, -redness, -discharge  ENT: -ear pain, -nasal congestion, +sore throat  Cardiovascular: -chest pain, -palpitations, -lower extremity edema  Respiratory: +cough, -shortness of breath, +wheezing  Gastrointestinal: -abdominal pain, -nausea, -vomiting, -diarrhea, -constipation, -blood in stool  Genitourinary: -dysuria, -hematuria, -frequency  Musculoskeletal: -joint pain, -muscle pain  Skin: -rash, -lesion  Neurological: +headache, -dizziness, -numbness, -tingling  Psychiatric: -anxiety, -depression, -sleep difficulty        Seen by PCP 11/21/24, given prednisone 20 x5 days for asthma flare. Experiences coughing/sneezing with weather changes and uses Wixela daily with albuterol PRN.    Prednisone use per chart review indicates 06/24 and 11/24  Last PFTs 02/22  Last saw pulm 1/20/23  No ER or hospitalization for asthma in past year.     Review of Systems   Constitutional:  Negative for chills and fever.   HENT:  Positive for rhinorrhea and sore throat. Negative for ear pain and postnasal drip.    Respiratory:  Positive for cough, shortness of breath and wheezing.    Cardiovascular:  Negative for chest pain.   Musculoskeletal:  Negative for myalgias.   Skin:  Negative for rash.   Neurological:  Negative for headaches.         Past Medical History:   Diagnosis Date    Abdominal hernia     Anxiety     Asthma     Diabetes mellitus     Hypertension     Migraine     Migraine headache     Obesity     Sleep apnea        Past Surgical History:   Procedure Laterality Date    BUNIONECTOMY Right     CHOLECYSTECTOMY      EXCISION OF LESION Left 12/01/2021    Procedure: EXCISION, LESION, LEFT PALM;  Surgeon: Amy Sharp MD;  Location: [facility];  Service: Orthopedics;  Laterality: Left;    FOOT SURGERY Right     HYSTERECTOMY      total, fibroids    INCISIONAL HERNIA REPAIR      TONSILLECTOMY  Bilateral 03/06/2020    Procedure: TONSILLECTOMY;  Surgeon: Brant Pelaez MD;  Location: [facility];  Service: ENT;  Laterality: Bilateral;       Family History   Problem Relation Name Age of Onset    Breast cancer Mother      Psoriasis Neg Hx      Melanoma Neg Hx      Lupus Neg Hx         Social History     Tobacco Use    Smoking status: Never    Smokeless tobacco: Never   Substance Use Topics    Alcohol use: No    Drug use: No       Objective:   There were no vitals taken for this visit.     Physical Exam  Constitutional:       General: She is not in acute distress.     Appearance: Normal appearance. She is not ill-appearing, toxic-appearing or diaphoretic.   HENT:      Head: Normocephalic and atraumatic.   Eyes:      Extraocular Movements: Extraocular movements intact.      Conjunctiva/sclera: Conjunctivae normal.   Pulmonary:      Effort: Pulmonary effort is normal.      Comments: Appears to be breathing comfortably throughout entire interview.   Neurological:      General: No focal deficit present.      Mental Status: She is alert and oriented to person, place, and time. Mental status is at baseline.   Psychiatric:         Mood and Affect: Mood normal.         Behavior: Behavior normal.         Thought Content: Thought content normal.         Judgment: Judgment normal.           Prior labs reviewed  Assessment/Plan:        Susan was seen today for cough and sore throat.    Diagnoses and all orders for this visit:    IMPRESSION:  - Assessed symptoms as likely viral infection, possibly RSV exposure  - Considered patient's history of asthma and recent prednisone use  - Determined antibiotics not necessary at this time  - Evaluated need for nebulizer treatment and cough suppressants  - Noted frequent prednisone use in past 6-7 months, indicating potential need for therapy adjustment  - Considered impact of prednisone on blood pressure, diabetes, and bone health    Severe persistent asthma without  complication  - Assessed the patient's condition as severe asthma based on frequent prednisone use.  - Prescribed prednisone 20 mg, 1 tablet daily for 5 days as needed, and albuterol nebulizer solution (lower strength) to be used every 6 hours as needed.  - Explained potential side effects of albuterol, including shakiness and nervousness, noting the similarity between inhaler and nebulizer solution.  - Continued Wixella (purple disc inhaler) and ordered nebulizer machine (pending verification with [facility] pharmacy).  -     NEBULIZER FOR HOME USE  -     predniSONE (DELTASONE) 20 MG tablet; Take 1 tablet (20 mg total) by mouth once daily.  -     albuterol (ACCUNEB) 0.63 mg/3 mL Nebu; Take 3 mLs (0.63 mg total) by nebulization every 6 (six) hours as needed (wheezing). Rescue    Cough, unspecified type  - Assessed patient's symptoms, suspecting a viral infection.  - Prescribed Mucinex for cough suppression, and refilled promethazine.  - Recommend hot cocoa or tea with honey for sore throat relief.  - Advised monitoring symptoms, discussing when to seek emergency care for severe shortness of breath.  - Suggested follow-up early next week if symptoms persist or worsen, especially to rule out strep throat.  -     dextromethorphan-guaiFENesin  mg (MUCINEX DM)  mg per 12 hr tablet; Take 1 tablet by mouth 2 (two) times daily as needed (cough).    Chronic constipation  - Addressed ongoing constipation with current use of Linzess (3 capsules).  - Recommend increasing fluid intake and fiber consumption, suggesting Benefiber as an OTC option.  - Advised against increasing Linzess dose, recommending to try the current dose for 4-6 weeks before considering an increase.  - Instructed to contact the office if constipation persists after trying increased fluids and fiber.    POLYNEUROPATHY DUE TO TYPE 2 DIABETES MELLITUS:  - Noted that the patient takes insulin weekly for diabetes management and reports stable blood sugar.  -  Discussed the potential impact of prednisone on diabetes management and advised the patient to monitor blood sugar closely while on prednisone treatment.    BLOOD PRESSURE MONITORING:  - Noted patient's inability to measure blood pressure due to faulty home device.  - Acknowledged potential impact of prednisone on blood pressure and advised close monitoring while on prednisone treatment, if possible.          Visit type: Virtual visit with synchronous audio and video    Total time spent with patient: 25 minutes    Each patient to whom he or she provides medical services by telemedicine is:  (1) informed of the relationship between the physician and patient and the respective role of any other health care provider with respect to management of the patient; and (2) notified that he or she may decline to receive medical services by telemedicine and may withdraw from such care at any time.    Alfonso Johnson PA-C    01/23/2025    Medication List with Changes/Refills   New Medications    ALBUTEROL (ACCUNEB) 0.63 MG/3 ML NEBU    Take 3 mLs (0.63 mg total) by nebulization every 6 (six) hours as needed (wheezing). Rescue    DEXTROMETHORPHAN-GUAIFENESIN  MG (MUCINEX DM)  MG PER 12 HR TABLET    Take 1 tablet by mouth 2 (two) times daily as needed (cough).    PREDNISONE (DELTASONE) 20 MG TABLET    Take 1 tablet (20 mg total) by mouth once daily.   Current Medications    ALBUTEROL (PROVENTIL/VENTOLIN HFA) 90 MCG/ACTUATION INHALER    INHALE 2 PUFFS INTO THE LUNGS EVERY 6 (SIX) HOURS AS NEEDED FOR WHEEZING. RESCUE    ALBUTEROL-IPRATROPIUM (DUO-NEB) 2.5 MG-0.5 MG/3 ML NEBULIZER SOLUTION    Take 3 mLs by nebulization every 6 (six) hours as needed for Wheezing or Shortness of Breath. Rescue    ALPRAZOLAM (XANAX) 1 MG TABLET    Take 1 tablet (1 mg total) by mouth 4 (four) times daily as needed for Anxiety.    AMLODIPINE (NORVASC) 5 MG TABLET    TAKE 1 TABLET (5 MG TOTAL) BY MOUTH DAILY.    ATORVASTATIN (LIPITOR) 40 MG  TABLET    TAKE 1 TABLET BY MOUTH EVERY DAY    AZELASTINE (ASTELIN) 137 MCG (0.1 %) NASAL SPRAY    SPRAY 2 SPRAYS BY NASAL ROUTE 2 TIMES DAILY.    BLOOD SUGAR DIAGNOSTIC STRP    Use as instructed to check blood sugar 2-3 times daily and whenever feeling ill.    BLOOD-GLUCOSE METER KIT    Use as instructed to check blood sugar 2-3 times daily and whenever feeling ill.    DOCUSATE SODIUM (COLACE) 100 MG CAPSULE    Take 1 capsule (100 mg total) by mouth every evening.    DULOXETINE (CYMBALTA) 20 MG CAPSULE    Take 1 capsule (20 mg total) by mouth once daily.    ERGOCALCIFEROL (ERGOCALCIFEROL) 50,000 UNIT CAP    Take 1 capsule (50,000 Units total) by mouth every 7 days.    FLUTICASONE-SALMETEROL DISKUS INHALER 250-50 MCG    Inhale 1 puff into the lungs 2 (two) times daily. Controller    LANCETS (ACCU-CHEK SOFTCLIX LANCETS) MISC    Use as instructed to check blood sugar 2-3 times daily and whenever feeling ill.    LINACLOTIDE (LINZESS) 72 MCG CAP CAPSULE    Take 1 capsule (72 mcg total) by mouth daily as needed (constipation).    MONTELUKAST (SINGULAIR) 10 MG TABLET    Take 1 tablet (10 mg total) by mouth every evening.    NARATRIPTAN (AMERGE) 1 MG TAB    TAKE 1 TABLET BY MOUTH 2 TIMES DAILY AS NEEDED (HEADACHE). MAY REPEAT X1 AFTER 4 HOURS    NEBULIZER AND COMPRESSOR HEAVENLY    by Misc.(Non-Drug; Combo Route) route.    PANTOPRAZOLE (PROTONIX) 40 MG TABLET    TAKE 1 TABLET BY MOUTH EVERY DAY    POLYETHYLENE GLYCOL (GLYCOLAX) 17 GRAM/DOSE POWDER    DISSOLVE 17 GRAMS IN 8 OZ OF FLUID LIQUID DRINK DAILY AS DIRECTED    PROMETHAZINE-DEXTROMETHORPHAN (PROMETHAZINE-DM) 6.25-15 MG/5 ML SYRP    Take 5 mLs by mouth every 6 (six) hours as needed (cough).    TRULICITY 3 MG/0.5 ML PEN INJECTOR    INJECT 3MG UNDER THE SKIN ONCE WEEKLY    VALSARTAN (DIOVAN) 320 MG TABLET    TAKE 1 TABLET BY MOUTH ONCE DAILY.    ZOLPIDEM (AMBIEN) 10 MG TAB    Take 2 tablets (20 mg total) by mouth every evening.           Answers submitted by the patient for  this visit:  Cough Questionnaire (Submitted on 1/23/2025)  Chief Complaint: Cough  Chronicity: recurrent  Onset: yesterday  Frequency: every few minutes  Cough characteristics: non-productive  ear congestion: No  heartburn: No  hemoptysis: No  nasal congestion: No  sweats: No  weight loss: No  Aggravated by: nothing, lying down  asthma: Yes  bronchiectasis: No  bronchitis: No  COPD: No  emphysema: No

## 2025-01-28 DIAGNOSIS — F41.0 GENERALIZED ANXIETY DISORDER WITH PANIC ATTACKS: ICD-10-CM

## 2025-01-28 DIAGNOSIS — G47.00 INSOMNIA, UNSPECIFIED TYPE: Primary | ICD-10-CM

## 2025-01-28 DIAGNOSIS — F41.1 GENERALIZED ANXIETY DISORDER WITH PANIC ATTACKS: ICD-10-CM

## 2025-01-28 RX ORDER — ALPRAZOLAM 1 MG/1
1 TABLET ORAL 4 TIMES DAILY PRN
Qty: 120 TABLET | Refills: 2 | Status: SHIPPED | OUTPATIENT
Start: 2025-01-28

## 2025-01-28 RX ORDER — ZOLPIDEM TARTRATE 10 MG/1
20 TABLET ORAL NIGHTLY PRN
Qty: 60 TABLET | Refills: 3 | Status: SHIPPED | OUTPATIENT
Start: 2025-01-28 | End: 2025-05-28

## 2025-01-29 ENCOUNTER — TELEPHONE (OUTPATIENT)
Dept: INTERNAL MEDICINE | Facility: CLINIC | Age: 65
End: 2025-01-29
Payer: MEDICARE

## 2025-01-29 NOTE — TELEPHONE ENCOUNTER
----- Message from Suleman Johnson PA-C sent at 1/29/2025  9:41 AM CST -----  Regarding: RE: SELF  Please have patient come into office today or tomorrow. We need to be able to examine her for further management. Thank you!  ----- Message -----  From: Lia Storm LPN  Sent: 1/29/2025   9:17 AM CST  To: Suleman Johnson PA-C  Subject: FW: SELF                                         Morning,  Any advice for this patient or would you like me to set up a Virtual Visit to go over symptoms later this week? Please let me know when you can.  ----- Message -----  From: Hernan Godoy  Sent: 1/29/2025   9:09 AM CST  To: José MARTINEZ Staff  Subject: SELF                                               Type: Patient Call Back    Who called:SELF    What is the request in detail:PATIENT STATES SHE STILL SICK AND COUGHING UP GREEN MUCUS.     Can the clinic reply by [facility]? No    Would the patient rather a call back or a response via [facility]? Call back    Best call back number:[phone]      Additional Information:    Thank you.

## 2025-01-29 NOTE — TELEPHONE ENCOUNTER
"Self scheduled with JANELLE Clancy NP on 01/30/25 to follow-up for persistent cold symptoms. Patient expressed understanding and gratitude for phone call.  Call ended.      "  ----- Message from Suleman Johnson PA-C sent at 1/29/2025  9:41 AM CST -----  Regarding: RE: SELF  Please have patient come into office today or tomorrow. We need to be able to examine her for further management. Thank you!  ----- Message -----  From: Lia Storm LPN  Sent: 1/29/2025   9:17 AM CST  To: Suleman Johnson PA-C  Subject: FW: SELF                                          Morning,  Any advice for this patient or would you like me to set up a Virtual Visit to go over symptoms later this week? Please let me know when you can.  ----- Message -----  From: Hernan Godoy  Sent: 1/29/2025   9:09 AM CST  To: José MARTINEZ Staff  Subject: SELF                                                 Type: Patient Call Back     Who called:SELF     What is the request in detail:PATIENT STATES SHE STILL SICK AND COUGHING UP GREEN MUCUS.      Can the clinic reply by [facility]? No     Would the patient rather a call back or a response via [facility]? Call back     Best call back number:[phone]        Additional Information:     Thank you.      "  "

## 2025-01-30 ENCOUNTER — OFFICE VISIT (OUTPATIENT)
Dept: INTERNAL MEDICINE | Facility: CLINIC | Age: 65
End: 2025-01-30
Payer: MEDICARE

## 2025-01-30 VITALS
HEART RATE: 106 BPM | SYSTOLIC BLOOD PRESSURE: 140 MMHG | HEIGHT: 65 IN | BODY MASS INDEX: 28.72 KG/M2 | OXYGEN SATURATION: 96 % | WEIGHT: 172.38 LBS | DIASTOLIC BLOOD PRESSURE: 60 MMHG

## 2025-01-30 DIAGNOSIS — R05.1 ACUTE COUGH: ICD-10-CM

## 2025-01-30 DIAGNOSIS — B96.89 ACUTE BACTERIAL RHINOSINUSITIS: Primary | ICD-10-CM

## 2025-01-30 DIAGNOSIS — J45.50 SEVERE PERSISTENT ASTHMA WITHOUT COMPLICATION: ICD-10-CM

## 2025-01-30 DIAGNOSIS — J01.90 ACUTE BACTERIAL RHINOSINUSITIS: Primary | ICD-10-CM

## 2025-01-30 DIAGNOSIS — I10 PRIMARY HYPERTENSION: ICD-10-CM

## 2025-01-30 PROBLEM — E66.01 MORBID (SEVERE) OBESITY DUE TO EXCESS CALORIES: Status: RESOLVED | Noted: 2025-01-30 | Resolved: 2025-01-30

## 2025-01-30 PROBLEM — E66.01 MORBID (SEVERE) OBESITY DUE TO EXCESS CALORIES: Status: ACTIVE | Noted: 2025-01-30

## 2025-01-30 PROCEDURE — 99999 PR PBB SHADOW E&M-EST. PATIENT-LVL IV: CPT | Mod: PBBFAC,,,

## 2025-01-30 PROCEDURE — 3078F DIAST BP <80 MM HG: CPT | Mod: CPTII,S$GLB,,

## 2025-01-30 PROCEDURE — 3061F NEG MICROALBUMINURIA REV: CPT | Mod: CPTII,S$GLB,,

## 2025-01-30 PROCEDURE — 3066F NEPHROPATHY DOC TX: CPT | Mod: CPTII,S$GLB,,

## 2025-01-30 PROCEDURE — 1159F MED LIST DOCD IN RCRD: CPT | Mod: CPTII,S$GLB,,

## 2025-01-30 PROCEDURE — 3008F BODY MASS INDEX DOCD: CPT | Mod: CPTII,S$GLB,,

## 2025-01-30 PROCEDURE — 3077F SYST BP >= 140 MM HG: CPT | Mod: CPTII,S$GLB,,

## 2025-01-30 PROCEDURE — 3044F HG A1C LEVEL LT 7.0%: CPT | Mod: CPTII,S$GLB,,

## 2025-01-30 PROCEDURE — 99214 OFFICE O/P EST MOD 30 MIN: CPT | Mod: S$GLB,,,

## 2025-01-30 RX ORDER — BENZONATATE 200 MG/1
200 CAPSULE ORAL 3 TIMES DAILY PRN
Qty: 21 CAPSULE | Refills: 0 | Status: SHIPPED | OUTPATIENT
Start: 2025-01-30 | End: 2025-02-09

## 2025-01-30 RX ORDER — AMOXICILLIN 875 MG/1
875 TABLET, FILM COATED ORAL EVERY 12 HOURS
Qty: 10 TABLET | Refills: 0 | Status: SHIPPED | OUTPATIENT
Start: 2025-01-30

## 2025-01-30 NOTE — PROGRESS NOTES
CHIEF COMPLAINT     Chief Complaint   Patient presents with    Nasal Congestion     Green mucus.        HPI     Susan Courtney is a 64 y.o. female who presents for xxx today.    PCP is Letty Kumar MD, patient is new to me.   History of Present Illness      Pt reports green mucus for 2 weeks. She was seen by another provider one week ago who treated her for cough and congestion, as well as prescribed prednisone for her asthma exacerbation. She reports that her cough has continued as well as the green mucus.         Past Medical History:  Past Medical History:   Diagnosis Date    Abdominal hernia     Anxiety     Asthma     Diabetes mellitus     Hypertension     Migraine     Migraine headache     Obesity     Sleep apnea        Home Medications:  Prior to Admission medications    Medication Sig Start Date End Date Taking? Authorizing Provider   albuterol (ACCUNEB) 0.63 mg/3 mL Nebu Take 3 mLs (0.63 mg total) by nebulization every 6 (six) hours as needed (wheezing). Rescue 1/23/25 1/23/26 Yes Suleman Johnson PA-C   albuterol (PROVENTIL/VENTOLIN HFA) 90 mcg/actuation inhaler INHALE 2 PUFFS INTO THE LUNGS EVERY 6 (SIX) HOURS AS NEEDED FOR WHEEZING. RESCUE 1/21/25  Yes Letty Kumar MD   ALPRAZolam (XANAX) 1 MG tablet Take 1 tablet (1 mg total) by mouth 4 (four) times daily as needed for Anxiety. 1/28/25  Yes Kasi Zayas, NP   amLODIPine (NORVASC) 5 MG tablet TAKE 1 TABLET (5 MG TOTAL) BY MOUTH DAILY. 12/31/24  Yes Letty Kumar MD   atorvastatin (LIPITOR) 40 MG tablet TAKE 1 TABLET BY MOUTH EVERY DAY 10/7/23  Yes Letty Kumar MD   azelastine (ASTELIN) 137 mcg (0.1 %) nasal spray SPRAY 2 SPRAYS BY NASAL ROUTE 2 TIMES DAILY. 12/31/24  Yes Letty Kumar MD   blood sugar diagnostic Strp Use as instructed to check blood sugar 2-3 times daily and whenever feeling ill. 5/21/24  Yes Letty Kumar MD   blood-glucose meter kit Use as instructed to check blood sugar 2-3 times daily and  whenever feeling ill. 5/21/24 5/21/25 Yes Letty Kumar MD   dextromethorphan-guaiFENesin  mg (MUCINEX DM)  mg per 12 hr tablet Take 1 tablet by mouth 2 (two) times daily as needed (cough). 1/23/25 2/13/25 Yes Suleman Johnson PA-C   docusate sodium (COLACE) 100 MG capsule Take 1 capsule (100 mg total) by mouth every evening. 5/22/24  Yes Letty Kumar MD   DULoxetine (CYMBALTA) 20 MG capsule Take 1 capsule (20 mg total) by mouth once daily. 7/24/24 7/24/25 Yes Kasi Zayas, TERESITA   ergocalciferol (ERGOCALCIFEROL) 50,000 unit Cap Take 1 capsule (50,000 Units total) by mouth every 7 days. 5/21/24  Yes Letty Kumar MD   fluticasone-salmeterol diskus inhaler 250-50 mcg Inhale 1 puff into the lungs 2 (two) times daily. Controller 6/3/24 6/3/25 Yes Letty Kumar MD   lancets (ACCU-CHEK SOFTCLIX LANCETS) Misc Use as instructed to check blood sugar 2-3 times daily and whenever feeling ill. 5/21/24  Yes Letty Kumar MD   linaCLOtide (LINZESS) 72 mcg Cap capsule Take 1 capsule (72 mcg total) by mouth daily as needed (constipation). 11/21/24  Yes Letty Kumar MD   montelukast (SINGULAIR) 10 mg tablet Take 1 tablet (10 mg total) by mouth every evening. 6/3/24  Yes Letty Kumar MD   naratriptan (AMERGE) 1 MG Tab TAKE 1 TABLET BY MOUTH 2 TIMES DAILY AS NEEDED (HEADACHE). MAY REPEAT X1 AFTER 4 HOURS 9/6/24  Yes Belen Villegas MD   nebulizer and compressor Jovana by Misc.(Non-Drug; Combo Route) route.   Yes Provider, Historical   pantoprazole (PROTONIX) 40 MG tablet TAKE 1 TABLET BY MOUTH EVERY DAY 7/5/24  Yes Letty Kumar MD   polyethylene glycol (GLYCOLAX) 17 gram/dose powder DISSOLVE 17 GRAMS IN 8 OZ OF FLUID LIQUID DRINK DAILY AS DIRECTED 11/21/24  Yes Letty Kumar MD   predniSONE (DELTASONE) 20 MG tablet Take 1 tablet (20 mg total) by mouth once daily. 1/23/25  Yes Suleman Johnson PA-C   promethazine-dextromethorphan (PROMETHAZINE-DM) 6.25-15 mg/5 mL Syrp Take 5  "mLs by mouth every 6 (six) hours as needed (cough). 11/21/24  Yes Letty Kumar MD   TRULICITY 3 mg/0.5 mL pen injector INJECT 3MG UNDER THE SKIN ONCE WEEKLY 12/31/24  Yes Letty Kumar MD   valsartan (DIOVAN) 320 MG tablet TAKE 1 TABLET BY MOUTH ONCE DAILY. 2/19/24  Yes Letty Kumra MD   zolpidem (AMBIEN) 10 mg Tab Take 2 tablets (20 mg total) by mouth nightly as needed (As needed). 1/28/25 5/28/25 Yes Kasi Zayas NP   albuterol-ipratropium (DUO-NEB) 2.5 mg-0.5 mg/3 mL nebulizer solution Take 3 mLs by nebulization every 6 (six) hours as needed for Wheezing or Shortness of Breath. Rescue 1/16/24 1/15/25  Letty Kumar MD   amoxicillin (AMOXIL) 875 MG tablet Take 1 tablet (875 mg total) by mouth every 12 (twelve) hours. 1/30/25   Bigg Clancy, NP   benzonatate (TESSALON) 200 MG capsule Take 1 capsule (200 mg total) by mouth 3 (three) times daily as needed for Cough. 1/30/25 2/9/25  Bigg Clancy NP       Review of Systems:  Review of Systems   Constitutional: Negative.    HENT:  Positive for congestion and postnasal drip.    Respiratory:  Positive for cough.        Health Maintainence:   Immunizations:  Health Maintenance         Date Due Completion Date    Influenza Vaccine (1) 09/01/2024 10/1/2021    COVID-19 Vaccine (4 - 2024-25 season) 09/01/2024 1/27/2022    Diabetic Eye Exam 09/07/2024 9/7/2023    Hemoglobin A1c 07/15/2025 1/15/2025    Pneumococcal Vaccines (Age 50+) (3 of 3 - PCV20 or PCV21) 11/10/2025 11/10/2020    Mammogram 12/26/2025 12/26/2024    Low Dose Statin 01/02/2026 1/2/2025    Foot Exam 01/02/2026 1/2/2025    Diabetes Urine Screening 01/15/2026 1/15/2025    Lipid Panel 01/15/2026 1/15/2025    Colorectal Cancer Screening 03/31/2027 3/31/2022    TETANUS VACCINE 04/20/2033 4/20/2023             PHYSICAL EXAM     BP (!) 140/60   Pulse 106   Ht 5' 5" (1.651 m)   Wt 78.2 kg (172 lb 6.4 oz)   SpO2 96%   BMI 28.69 kg/m²     Physical Exam  Vitals reviewed. "   Constitutional:       Appearance: She is well-developed.   HENT:      Head: Normocephalic and atraumatic.   Eyes:      Conjunctiva/sclera: Conjunctivae normal.   Cardiovascular:      Rate and Rhythm: Normal rate.   Pulmonary:      Effort: Pulmonary effort is normal. No respiratory distress.   Skin:     General: Skin is warm and dry.      Findings: No rash.   Neurological:      Mental Status: She is alert and oriented to person, place, and time.      Coordination: Coordination normal.   Psychiatric:         Behavior: Behavior normal.           ASSESSMENT/PLAN   Assessment & Plan      Will prescribe antibiotics bacterial sinus infection. No need for steroids at this time, pt breathing comfortably, no apparent distress, has albuterol inhaler and I waiting for nebulizer machine. On ICS.          Susan was seen today for nasal congestion.    Diagnoses and all orders for this visit:    Acute bacterial rhinosinusitis  -     amoxicillin (AMOXIL) 875 MG tablet; Take 1 tablet (875 mg total) by mouth every 12 (twelve) hours.    Acute cough  -     benzonatate (TESSALON) 200 MG capsule; Take 1 capsule (200 mg total) by mouth 3 (three) times daily as needed for Cough.    Severe persistent asthma without complication    Primary hypertension          Bigg Clancy NP   Department of Internal Medicine - [facility]  10:53 AM

## 2025-02-04 DIAGNOSIS — E11.65 TYPE 2 DIABETES MELLITUS WITH HYPERGLYCEMIA, WITHOUT LONG-TERM CURRENT USE OF INSULIN: ICD-10-CM

## 2025-02-04 RX ORDER — DULAGLUTIDE 3 MG/.5ML
3 INJECTION, SOLUTION SUBCUTANEOUS
Qty: 12 PEN | Refills: 1 | Status: SHIPPED | OUTPATIENT
Start: 2025-02-04 | End: 2025-02-21 | Stop reason: SDUPTHER

## 2025-02-04 NOTE — TELEPHONE ENCOUNTER
Refill Decision Note   Susan Roz  is requesting a refill authorization.  Brief Assessment and Rationale for Refill:  Approve     Medication Therapy Plan:         Comments:     Note composed:4:15 PM 02/04/2025

## 2025-02-04 NOTE — TELEPHONE ENCOUNTER
No care due was identified.  [facility] Embedded Care Due Messages. Reference number: [id number].   2/04/2025 1:22:52 PM CST

## 2025-02-20 ENCOUNTER — OFFICE VISIT (OUTPATIENT)
Dept: OPTOMETRY | Facility: CLINIC | Age: 65
End: 2025-02-20
Payer: MEDICARE

## 2025-02-20 DIAGNOSIS — Z96.1 PSEUDOPHAKIA OF BOTH EYES: ICD-10-CM

## 2025-02-20 DIAGNOSIS — H52.01 HYPERMETROPIA, RIGHT EYE: ICD-10-CM

## 2025-02-20 DIAGNOSIS — E11.65 TYPE 2 DIABETES MELLITUS WITH HYPERGLYCEMIA, WITHOUT LONG-TERM CURRENT USE OF INSULIN: Primary | ICD-10-CM

## 2025-02-20 DIAGNOSIS — Z13.5 GLAUCOMA SCREENING: ICD-10-CM

## 2025-02-20 NOTE — PROGRESS NOTES
HPI    64 y/o female present today for diabetic eye exam   Pt state blurry VA OU distance << near.  dry eye OU   No other complaints at this time   Last edited by Goldy Lux, OD on 2/20/2025  9:42 AM.            Assessment /Plan     For exam results, see Encounter Report.    Type 2 diabetes mellitus with hyperglycemia, without long-term current use of insulin  -     Ambulatory referral/consult to Optometry  -No retinopathy noted today.  Continued control with primary care physician and annual comprehensive eye exam.    Pseudophakia of both eyes  -clear, centered    Glaucoma screening  -Monitor with annual eye exam and IOP check    Hypermetropia, right eye  -Optional  Eyeglass Final Rx       Eyeglass Final Rx         Sphere Cylinder Dist VA Add    Right +0.25 Sphere 20/20 +2.50    Left [facility] Sphere 20/20 +2.50      Type: PAL    Expiration Date: 2/20/2026                      RTC 1 yr

## 2025-02-21 DIAGNOSIS — E11.65 TYPE 2 DIABETES MELLITUS WITH HYPERGLYCEMIA, WITHOUT LONG-TERM CURRENT USE OF INSULIN: ICD-10-CM

## 2025-02-21 NOTE — TELEPHONE ENCOUNTER
No care due was identified.  [facility] Embedded Care Due Messages. Reference number: [id number].   2/21/2025 5:14:50 PM CST

## 2025-02-25 DIAGNOSIS — I10 PRIMARY HYPERTENSION: ICD-10-CM

## 2025-02-25 RX ORDER — DULAGLUTIDE 3 MG/.5ML
3 INJECTION, SOLUTION SUBCUTANEOUS
Qty: 12 PEN | Refills: 1 | Status: SHIPPED | OUTPATIENT
Start: 2025-02-25

## 2025-02-25 NOTE — TELEPHONE ENCOUNTER
No care due was identified.  [facility] Embedded Care Due Messages. Reference number: [id number].   2/25/2025 4:56:35 PM CST

## 2025-02-26 RX ORDER — VALSARTAN 320 MG/1
320 TABLET ORAL
Qty: 90 TABLET | Refills: 3 | Status: SHIPPED | OUTPATIENT
Start: 2025-02-26

## 2025-02-26 NOTE — TELEPHONE ENCOUNTER
Refill Routing Note   Medication(s) are not appropriate for processing by [facility] for the following reason(s):        Required vitals abnormal    [facility] action(s):  Defer               Appointments  past 12m or future 3m with PCP    Date Provider   Last Visit   11/21/2024 Letty Kumar MD   Next Visit   5/21/2025 Letty Kumar MD   ED visits in past 90 days: 0        Note composed:12:17 PM 02/26/2025

## 2025-03-17 ENCOUNTER — OFFICE VISIT (OUTPATIENT)
Dept: INTERNAL MEDICINE | Facility: CLINIC | Age: 65
End: 2025-03-17
Payer: MEDICARE

## 2025-03-17 VITALS
HEIGHT: 65 IN | BODY MASS INDEX: 28.69 KG/M2 | HEART RATE: 74 BPM | SYSTOLIC BLOOD PRESSURE: 142 MMHG | WEIGHT: 172.19 LBS | DIASTOLIC BLOOD PRESSURE: 70 MMHG | OXYGEN SATURATION: 99 %

## 2025-03-17 DIAGNOSIS — E87.6 HYPOKALEMIA: ICD-10-CM

## 2025-03-17 DIAGNOSIS — E11.65 TYPE 2 DIABETES MELLITUS WITH HYPERGLYCEMIA, WITHOUT LONG-TERM CURRENT USE OF INSULIN: ICD-10-CM

## 2025-03-17 DIAGNOSIS — K29.60 OTHER SPECIFIED GASTRITIS, PRESENCE OF BLEEDING UNSPECIFIED, UNSPECIFIED CHRONICITY: ICD-10-CM

## 2025-03-17 DIAGNOSIS — K21.9 GASTROESOPHAGEAL REFLUX DISEASE, UNSPECIFIED WHETHER ESOPHAGITIS PRESENT: Primary | ICD-10-CM

## 2025-03-17 DIAGNOSIS — G43.709 CHRONIC MIGRAINE WITHOUT AURA WITHOUT STATUS MIGRAINOSUS, NOT INTRACTABLE: ICD-10-CM

## 2025-03-17 DIAGNOSIS — B96.89 ACUTE BACTERIAL RHINOSINUSITIS: ICD-10-CM

## 2025-03-17 DIAGNOSIS — I10 PRIMARY HYPERTENSION: ICD-10-CM

## 2025-03-17 DIAGNOSIS — E78.5 HYPERLIPIDEMIA, UNSPECIFIED HYPERLIPIDEMIA TYPE: ICD-10-CM

## 2025-03-17 DIAGNOSIS — J01.90 ACUTE BACTERIAL RHINOSINUSITIS: ICD-10-CM

## 2025-03-17 LAB
BACTERIA #/AREA URNS AUTO: ABNORMAL /HPF
BILIRUB UR QL STRIP: NEGATIVE
CAOX CRY UR QL COMP ASSIST: ABNORMAL
CLARITY UR REFRACT.AUTO: ABNORMAL
COLOR UR AUTO: ABNORMAL
GLUCOSE UR QL STRIP: NEGATIVE
HGB UR QL STRIP: ABNORMAL
KETONES UR QL STRIP: NEGATIVE
LEUKOCYTE ESTERASE UR QL STRIP: ABNORMAL
MICROSCOPIC COMMENT: ABNORMAL
NITRITE UR QL STRIP: NEGATIVE
PH UR STRIP: 6 [PH] (ref 5–8)
PROT UR QL STRIP: NEGATIVE
RBC #/AREA URNS AUTO: 20 /HPF (ref 0–4)
SP GR UR STRIP: 1.02 (ref 1–1.03)
SQUAMOUS #/AREA URNS AUTO: 19 /HPF
URN SPEC COLLECT METH UR: ABNORMAL
WBC #/AREA URNS AUTO: 10 /HPF (ref 0–5)

## 2025-03-17 PROCEDURE — 3044F HG A1C LEVEL LT 7.0%: CPT | Mod: CPTII,S$GLB,, | Performed by: STUDENT IN AN ORGANIZED HEALTH CARE EDUCATION/TRAINING PROGRAM

## 2025-03-17 PROCEDURE — 3066F NEPHROPATHY DOC TX: CPT | Mod: CPTII,S$GLB,, | Performed by: STUDENT IN AN ORGANIZED HEALTH CARE EDUCATION/TRAINING PROGRAM

## 2025-03-17 PROCEDURE — 99215 OFFICE O/P EST HI 40 MIN: CPT | Mod: S$GLB,,, | Performed by: STUDENT IN AN ORGANIZED HEALTH CARE EDUCATION/TRAINING PROGRAM

## 2025-03-17 PROCEDURE — 3061F NEG MICROALBUMINURIA REV: CPT | Mod: CPTII,S$GLB,, | Performed by: STUDENT IN AN ORGANIZED HEALTH CARE EDUCATION/TRAINING PROGRAM

## 2025-03-17 PROCEDURE — 81001 URINALYSIS AUTO W/SCOPE: CPT | Performed by: STUDENT IN AN ORGANIZED HEALTH CARE EDUCATION/TRAINING PROGRAM

## 2025-03-17 PROCEDURE — 99999 PR PBB SHADOW E&M-EST. PATIENT-LVL V: CPT | Mod: PBBFAC,,, | Performed by: STUDENT IN AN ORGANIZED HEALTH CARE EDUCATION/TRAINING PROGRAM

## 2025-03-17 PROCEDURE — 3008F BODY MASS INDEX DOCD: CPT | Mod: CPTII,S$GLB,, | Performed by: STUDENT IN AN ORGANIZED HEALTH CARE EDUCATION/TRAINING PROGRAM

## 2025-03-17 PROCEDURE — G2211 COMPLEX E/M VISIT ADD ON: HCPCS | Mod: S$GLB,,, | Performed by: STUDENT IN AN ORGANIZED HEALTH CARE EDUCATION/TRAINING PROGRAM

## 2025-03-17 PROCEDURE — 4010F ACE/ARB THERAPY RXD/TAKEN: CPT | Mod: CPTII,S$GLB,, | Performed by: STUDENT IN AN ORGANIZED HEALTH CARE EDUCATION/TRAINING PROGRAM

## 2025-03-17 PROCEDURE — 3077F SYST BP >= 140 MM HG: CPT | Mod: CPTII,S$GLB,, | Performed by: STUDENT IN AN ORGANIZED HEALTH CARE EDUCATION/TRAINING PROGRAM

## 2025-03-17 PROCEDURE — 3078F DIAST BP <80 MM HG: CPT | Mod: CPTII,S$GLB,, | Performed by: STUDENT IN AN ORGANIZED HEALTH CARE EDUCATION/TRAINING PROGRAM

## 2025-03-17 RX ORDER — NARATRIPTAN 1 MG/1
TABLET ORAL
Qty: 12 TABLET | Refills: 11 | Status: CANCELLED | OUTPATIENT
Start: 2025-03-17

## 2025-03-17 RX ORDER — PANTOPRAZOLE SODIUM 40 MG/1
40 TABLET, DELAYED RELEASE ORAL DAILY
Qty: 90 TABLET | Refills: 3 | Status: SHIPPED | OUTPATIENT
Start: 2025-03-17

## 2025-03-17 RX ORDER — SUCRALFATE 1 G/10ML
1 SUSPENSION ORAL
Qty: 560 ML | Refills: 0 | Status: SHIPPED | OUTPATIENT
Start: 2025-03-17 | End: 2025-03-31

## 2025-03-17 RX ORDER — ATORVASTATIN CALCIUM 40 MG/1
40 TABLET, FILM COATED ORAL DAILY
Qty: 90 TABLET | Refills: 1 | Status: SHIPPED | OUTPATIENT
Start: 2025-03-17

## 2025-03-17 RX ORDER — NARATRIPTAN 1 MG/1
TABLET ORAL
Qty: 12 TABLET | Refills: 11 | Status: SHIPPED | OUTPATIENT
Start: 2025-03-17

## 2025-03-17 RX ORDER — AMOXICILLIN 875 MG/1
875 TABLET, FILM COATED ORAL EVERY 12 HOURS
Qty: 10 TABLET | Refills: 0 | Status: CANCELLED | OUTPATIENT
Start: 2025-03-17

## 2025-03-17 NOTE — PROGRESS NOTES
Subjective:       Patient ID: Susan Courtney is a 65 y.o. female.    Chief Complaint: Gastroesophageal reflux disease, unspecified whether esophagitis present [K21.9]    {[facility]:41726}    History of Present Illness               HTN, HLD, T2DM, asthma, GERD, JOSE, migraines, anxiety, insomnia      History of Present Illness       RECENT VIRAL ILLNESS:  She reports a recent viral illness with severe GI symptoms, including frequent urgency to use the bathroom. She also developed a cough and sneezing, which she initially attributed to asthma. The onset of symptoms coincided with a change in weather.     RESPIRATORY ISSUES:  She experiences coughing and sneezing related to recent weather changes. She manages her asthma with albuterol as needed, which she keeps with her at all times. She uses a purple disc inhaler daily for asthma control and a nasal spray for sinus symptoms. She denies any current shortage of asthma medications.     GASTROINTESTINAL ISSUES:  She uses Linzess for bowel movements, which works well but causes sudden, uncontrollable bowel movements, necessitating caution in its use. She does not take Linzess daily due to its potent effects. She notes having no gallbladder, which affects her digestion.     SLEEP APNEA:  She reports receiving her sleep apnea machine and is sleeping better with the new device. She uses a strap to keep her mouth closed during sleep, which she finds beneficial. She opts not to use the water feature of the machine as it causes nasal discomfort.     CARDIOVASCULAR:  She is currently taking amlodipine for blood pressure management after discontinuing another blood pressure medication due to hair loss. She reports her recent blood pressure readings have been good.     HAIR LOSS:  She expresses concern about hair loss, noting a potential connection with her medication. She denies itching or burning of the scalp. She wears a cap to cover her hair loss and has sought  assistance from her daughter, who works with hair, to address the issue.     PAST MEDICAL HISTORY:  She reports a history of hysterectomy performed in her 30s due to a large tumor measuring 14 cm, which was initially mistaken for pregnancy. The procedure was prompted by the passage of clots.          Health maintenance examination -   Colonoscopy performed MAR2022 with Dr. Waer. Recommended repeat in 5 years.  Denies family history of colorectal cancer.   Mammogram BI-RADS 1 in DEC2024.   Denies history of prior abnormal mammogram.  Family history of breast cancers, mother.  Denies family history of ovarian cancers.  Hysterectomy due to fibroids.   History of prior abnormal pap smears, underwent LEEP.  Denies family history of osteoporosis.  Denies significant family history of cardiac disease.  UTD on COVID primary/booster, shingles, Tdap, PCV13, PPSV23, RSV vaccinations.  Due for COVID, influenza vaccinations.***  Never smoker.  Denies current alcohol use.   Denies drug use.  Completed HIV and hepatitis C screening     Asthma -    ***  Using albuterol PRN  Asthma flares are typically triggered by allergies, illness, or seasonal change.  Using Wixela for daily controller.   Previously tried Bretzi and Trelegy, didn't feel worked well.   Last PFT's were FEB2022.  Previously following with Dr. Reis for pulmonology                         HTN -   Currently prescribed valsartan, amlodipine.  {lammainselect:80455}  {lamhomebps:99642}  Lab Results   Component Value Date    MICALBCREAT 10.9 01/15/2025     BP Readings from Last 5 Encounters:   03/17/25 (!) 142/70   01/30/25 (!) 140/60   01/02/25 (!) 165/79   11/21/24 122/68   09/06/24 (!) 142/74      JOSE -   ***  Using CPAP as directed     HLD -   Endorses taking atorvastatin as directed  {lammainselect:83277}  Lab Results   Component Value Date    CHOL 132 01/15/2025     Lab Results   Component Value Date    TRIG 118 01/15/2025     Lab Results   Component Value Date     LDLCALC 61.4 (L) 01/15/2025     Lab Results   Component Value Date    HDL 47 01/15/2025          ***  Following with TERESITA Zayas routinely for psychiatry.  Taking Ambien for insomnia   Not currently taking citalopram for anxiety     Vitamin D deficiency -  Not currently taking vitamin D supplementation.  {lamvitaminsupp:96029}  Lab Results   Component Value Date    HIDLGFSC31PT 42 07/16/2024    YBIPAHSN42JM 24 (L) 07/20/2023           Taking polyethylene glycol and Linzess PRN for chronic constipation***     T2DM -   Currently taking:  - Trulicity 3 mg  Holding Jardiance currently, patient concerned for hypoglycemia  Discontinued metformin due to GI side effects    {lammainselect:95795}  {lamdmfootexam:91928}  {lamdmeyeexam:86845}  Lab Results   Component Value Date    HGBA1C 6.3 (H) 01/15/2025    HGBA1C 6.4 (H) 07/16/2024    HGBA1C 6.4 (H) 01/10/2024     Lab Results   Component Value Date    MICALBCREAT 10.9 01/15/2025       Current Outpatient Medications   Medication Instructions    albuterol (ACCUNEB) 0.63 mg, Nebulization, Every 6 hours PRN, Rescue    albuterol (PROVENTIL/VENTOLIN HFA) 90 mcg/actuation inhaler 2 puffs, Inhalation, Every 6 hours PRN, Rescue    albuterol-ipratropium (DUO-NEB) 2.5 mg-0.5 mg/3 mL nebulizer solution 3 mLs, Nebulization, Every 6 hours PRN, Rescue    ALPRAZolam (XANAX) 1 mg, Oral, 4 times daily PRN    amLODIPine (NORVASC) 5 mg, Oral, Daily    amoxicillin (AMOXIL) 875 mg, Oral, Every 12 hours    atorvastatin (LIPITOR) 40 mg, Oral    azelastine (ASTELIN) 137 mcg (0.1 %) nasal spray SPRAY 2 SPRAYS BY NASAL ROUTE 2 TIMES DAILY.    blood sugar diagnostic Strp Use as instructed to check blood sugar 2-3 times daily and whenever feeling ill.    blood-glucose meter kit Use as instructed to check blood sugar 2-3 times daily and whenever feeling ill.    docusate sodium (COLACE) 100 mg, Oral, Nightly    DULoxetine (CYMBALTA) 20 mg, Oral, Daily    ergocalciferol (ERGOCALCIFEROL) 50,000 Units,  "Oral, Every 7 days    fluticasone-salmeterol diskus inhaler 250-50 mcg 1 puff, Inhalation, 2 times daily, Controller    lancets (ACCU-CHEK SOFTCLIX LANCETS) Misc Use as instructed to check blood sugar 2-3 times daily and whenever feeling ill.    linaCLOtide (LINZESS) 72 mcg, Oral, Daily PRN    montelukast (SINGULAIR) 10 mg, Oral, Nightly    naratriptan (AMERGE) 1 MG Tab TAKE 1 TABLET BY MOUTH 2 TIMES DAILY AS NEEDED (HEADACHE). MAY REPEAT X1 AFTER 4 HOURS    nebulizer and compressor Jovana by Misc.(Non-Drug; Combo Route) route.    pantoprazole (PROTONIX) 40 mg, Oral    polyethylene glycol (GLYCOLAX) 17 gram/dose powder DISSOLVE 17 GRAMS IN 8 OZ OF FLUID LIQUID DRINK DAILY AS DIRECTED    predniSONE (DELTASONE) 20 mg, Oral, Daily    promethazine-dextromethorphan (PROMETHAZINE-DM) 6.25-15 mg/5 mL Syrp 5 mLs, Oral, Every 6 hours PRN    TRULICITY 3 mg, Subcutaneous, Every 7 days    valsartan (DIOVAN) 320 mg, Oral    zolpidem (AMBIEN) 20 mg, Oral, Nightly PRN     Objective:      Vitals:    03/17/25 1316   BP: (!) 142/70   Pulse: 74   SpO2: 99%   Weight: 78.1 kg (172 lb 2.9 oz)   Height: 5' 5" (1.651 m)   PainSc: 0-No pain     Body mass index is 28.65 kg/m².    Physical Exam    Assessment:       1. Gastroesophageal reflux disease, unspecified whether esophagitis present    2. Acute bacterial rhinosinusitis    3. Chronic migraine without aura without status migrainosus, not intractable    4. Hyperlipidemia, unspecified hyperlipidemia type        Plan:   Gastroesophageal reflux disease, unspecified whether esophagitis present    Acute bacterial rhinosinusitis    Chronic migraine without aura without status migrainosus, not intractable    Hyperlipidemia, unspecified hyperlipidemia type      ***  Assessment & Plan              {lamendofnote:41109}    Letty Kumar MD  3/17/2025      " mellitus with hyperglycemia, without long-term current use of insulin    8. Primary hypertension        Plan:   Gastroesophageal reflux disease, unspecified whether esophagitis present  -     Ambulatory referral/consult to Gastroenterology; Future; Expected date: 03/24/2025  -     sucralfate (CARAFATE) 100 mg/mL suspension; Take 10 mLs (1 g total) by mouth 4 (four) times daily before meals and nightly. for 14 days  Dispense: 560 mL; Refill: 0  -     H. pylori antigen, stool; Future; Expected date: 03/17/2025  -     CBC Auto Differential; Future; Expected date: 03/17/2025  -     Ambulatory referral/consult to Gastroenterology; Future; Expected date: 03/24/2025    Acute bacterial rhinosinusitis    Chronic migraine without aura without status migrainosus, not intractable  -     naratriptan (AMERGE) 1 MG Tab; TAKE 1 TABLET BY MOUTH 2 TIMES DAILY AS NEEDED (HEADACHE). MAY REPEAT X1 AFTER 4 HOURS  Dispense: 12 tablet; Refill: 11    Hyperlipidemia, unspecified hyperlipidemia type  -     atorvastatin (LIPITOR) 40 MG tablet; Take 1 tablet (40 mg total) by mouth once daily.  Dispense: 90 tablet; Refill: 1    Other specified gastritis, presence of bleeding unspecified, unspecified chronicity  -     pantoprazole (PROTONIX) 40 MG tablet; Take 1 tablet (40 mg total) by mouth once daily.  Dispense: 90 tablet; Refill: 3    Hypokalemia  -     Comprehensive Metabolic Panel; Future; Expected date: 03/17/2025  -     Magnesium; Future; Expected date: 03/17/2025    Type 2 diabetes mellitus with hyperglycemia, without long-term current use of insulin    Primary hypertension  -     Urinalysis, Reflex to Urine Culture Urine, Clean Catch; Future; Expected date: 03/17/2025  -     Comprehensive Metabolic Panel; Future; Expected date: 03/17/2025  -     Aldosterone/Renin Activity Ratio; Future; Expected date: 03/17/2025  -     Magnesium; Future; Expected date: 03/17/2025  -     CBC Auto Differential; Future; Expected date: 03/17/2025    Other  orders  -     Urinalysis Microscopic      Assessment & Plan    IMPRESSION:  - Suspect gastritis based on CT Abdomen findings.  - Consider potential adrenal gland involvement due to low potassium and difficult-to-control HTN; plan to check Renin-Aldosterone Ratio.  - Assess pancreatic cyst noted on CT Abdomen; defer to gastroenterologist's recommendation for further imaging.  - Monitor electrolyte levels, particularly potassium and magnesium, before adjusting antihypertensive medication.    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  - Noted patient's HbA1c increased to 8%.  - Plan to recheck labs including metabolic panel for kidney and liver function.    VENTRICULAR TACHYCARDIA:  - Performed physical exam, noting clear lungs and good heart sounds.  - Plan to check Renin-Aldosterone Ratio to investigate high blood pressure and low potassium.  - Continued Amlodipine and Valsartan 320mg for blood pressure management.  - Will review labs before adjusting blood pressure medication.  - Instructed the patient to report any recurrence of tachycardia symptoms or changes in medication effectiveness.    GASTRITIS:  - Prescribed Carafate to coat and protect stomach.  - Discontinued Bentyl (dicyclomine) unless needed for stomach cramps.  - Prescribed Pantoprazole (Protonix) for gastritis treatment.  - Ordered H. pylori testing to evaluate for potential infection causing symptoms.  - Referred patient to a gastroenterologist for further evaluation and management.  - Educated on foods that may exacerbate gastritis symptoms and advised to avoid taking baking soda or vinegar.  - Advised patient to report any worsening of abdominal pain or difficulty eating and drinking.    GASTROESOPHAGEAL REFLUX DISEASE (GERD):  - Restarted Pantoprazole (Protonix) for reflux management.  - Referred patient to a gastroenterologist for further evaluation and management  of reflux symptoms.  - Instructed patient to report any persistence or worsening of reflux symptoms despite medication.    PANCREATIC CYST:  - Referred patient to [facility].  - Plan to consult with gastroenterologist about further imaging to evaluate the pancreatic cyst.  - Instructed patient to contact the office if unable to get a timely appointment.    ABDOMINAL PAIN:  - Referred patient to a gastroenterologist for further evaluation and management of abdominal pain.  - Advised patient to report any changes in pain intensity or location.    H. PYLORI TESTING:  - Ordered stool sample kit for H. pylori testing to evaluate for infection as a possible cause of persistent indigestion, abdominal pain, and gastritis symptoms.  - Instructed patient on proper collection and submission of the stool sample.    HYPERTENSION:  - Ordered Renin-Aldosterone Ratio blood test to investigate possible causes of hypertension  - Plan to follow up after lab results are available to potentially adjust blood pressure medication.  - Advised patient to monitor blood pressure regularly and report any significant changes.    HYPERLIPIDEMIA:  - Continued cholesterol medication as liver enzymes are normal.  - Instructed patient to adhere to the prescribed cholesterol medication regimen and report any side effects or concerns.    MIGRAINE:  - Noted patient has run out of migraine medication (Naratriptan).  - Plan to refill Naratriptan prescription for migraine management.  - Advised patient to keep a migraine diary to track frequency and potential triggers.         44 minutes were spent in chart review, documentation and review of results, and evaluation, treatment, and counseling of patient on the same day of service.    Letty Kumar MD  3/17/2025

## 2025-03-18 ENCOUNTER — LAB VISIT (OUTPATIENT)
Dept: LAB | Facility: OTHER | Age: 65
End: 2025-03-18
Attending: STUDENT IN AN ORGANIZED HEALTH CARE EDUCATION/TRAINING PROGRAM
Payer: MEDICARE

## 2025-03-18 DIAGNOSIS — I10 PRIMARY HYPERTENSION: ICD-10-CM

## 2025-03-18 DIAGNOSIS — K21.9 GASTROESOPHAGEAL REFLUX DISEASE, UNSPECIFIED WHETHER ESOPHAGITIS PRESENT: ICD-10-CM

## 2025-03-18 DIAGNOSIS — E87.6 HYPOKALEMIA: ICD-10-CM

## 2025-03-18 LAB
ALBUMIN SERPL BCP-MCNC: 3.4 G/DL (ref 3.5–5.2)
ALP SERPL-CCNC: 85 U/L (ref 40–150)
ALT SERPL W/O P-5'-P-CCNC: 12 U/L (ref 10–44)
ANION GAP SERPL CALC-SCNC: 11 MMOL/L (ref 8–16)
AST SERPL-CCNC: 13 U/L (ref 10–40)
BASOPHILS # BLD AUTO: 0.02 K/UL (ref 0–0.2)
BASOPHILS NFR BLD: 0.3 % (ref 0–1.9)
BILIRUB SERPL-MCNC: 0.2 MG/DL (ref 0.1–1)
BUN SERPL-MCNC: 12 MG/DL (ref 8–23)
CALCIUM SERPL-MCNC: 9.2 MG/DL (ref 8.7–10.5)
CHLORIDE SERPL-SCNC: 104 MMOL/L (ref 95–110)
CO2 SERPL-SCNC: 29 MMOL/L (ref 23–29)
CREAT SERPL-MCNC: 0.7 MG/DL (ref 0.5–1.4)
DIFFERENTIAL METHOD BLD: ABNORMAL
EOSINOPHIL # BLD AUTO: 0.2 K/UL (ref 0–0.5)
EOSINOPHIL NFR BLD: 2.7 % (ref 0–8)
ERYTHROCYTE [DISTWIDTH] IN BLOOD BY AUTOMATED COUNT: 13.5 % (ref 11.5–14.5)
EST. GFR  (NO RACE VARIABLE): >60 ML/MIN/1.73 M^2
GLUCOSE SERPL-MCNC: 130 MG/DL (ref 70–110)
HCT VFR BLD AUTO: 36.8 % (ref 37–48.5)
HGB BLD-MCNC: 11.9 G/DL (ref 12–16)
IMM GRANULOCYTES # BLD AUTO: 0.02 K/UL (ref 0–0.04)
IMM GRANULOCYTES NFR BLD AUTO: 0.3 % (ref 0–0.5)
LYMPHOCYTES # BLD AUTO: 2.6 K/UL (ref 1–4.8)
LYMPHOCYTES NFR BLD: 43 % (ref 18–48)
MAGNESIUM SERPL-MCNC: 1.6 MG/DL (ref 1.6–2.6)
MCH RBC QN AUTO: 26.9 PG (ref 27–31)
MCHC RBC AUTO-ENTMCNC: 32.3 G/DL (ref 32–36)
MCV RBC AUTO: 83 FL (ref 82–98)
MONOCYTES # BLD AUTO: 0.5 K/UL (ref 0.3–1)
MONOCYTES NFR BLD: 8.8 % (ref 4–15)
NEUTROPHILS # BLD AUTO: 2.7 K/UL (ref 1.8–7.7)
NEUTROPHILS NFR BLD: 44.9 % (ref 38–73)
NRBC BLD-RTO: 0 /100 WBC
PLATELET # BLD AUTO: 284 K/UL (ref 150–450)
PMV BLD AUTO: 8.9 FL (ref 9.2–12.9)
POTASSIUM SERPL-SCNC: 3 MMOL/L (ref 3.5–5.1)
PROT SERPL-MCNC: 7.2 G/DL (ref 6–8.4)
RBC # BLD AUTO: 4.43 M/UL (ref 4–5.4)
SODIUM SERPL-SCNC: 144 MMOL/L (ref 136–145)
WBC # BLD AUTO: 5.93 K/UL (ref 3.9–12.7)

## 2025-03-18 PROCEDURE — 87338 HPYLORI STOOL AG IA: CPT | Performed by: STUDENT IN AN ORGANIZED HEALTH CARE EDUCATION/TRAINING PROGRAM

## 2025-03-18 PROCEDURE — 82088 ASSAY OF ALDOSTERONE: CPT | Performed by: STUDENT IN AN ORGANIZED HEALTH CARE EDUCATION/TRAINING PROGRAM

## 2025-03-18 PROCEDURE — 80053 COMPREHEN METABOLIC PANEL: CPT | Performed by: STUDENT IN AN ORGANIZED HEALTH CARE EDUCATION/TRAINING PROGRAM

## 2025-03-18 PROCEDURE — 36415 COLL VENOUS BLD VENIPUNCTURE: CPT | Performed by: STUDENT IN AN ORGANIZED HEALTH CARE EDUCATION/TRAINING PROGRAM

## 2025-03-18 PROCEDURE — 85025 COMPLETE CBC W/AUTO DIFF WBC: CPT | Performed by: STUDENT IN AN ORGANIZED HEALTH CARE EDUCATION/TRAINING PROGRAM

## 2025-03-18 PROCEDURE — 83735 ASSAY OF MAGNESIUM: CPT | Performed by: STUDENT IN AN ORGANIZED HEALTH CARE EDUCATION/TRAINING PROGRAM

## 2025-03-19 LAB — H.PYLORI ANTIGEN INTERPRETATION: NEGATIVE

## 2025-03-20 DIAGNOSIS — J45.50 SEVERE PERSISTENT ASTHMA WITHOUT COMPLICATION: ICD-10-CM

## 2025-03-20 RX ORDER — DICYCLOMINE HYDROCHLORIDE 20 MG/1
20 TABLET ORAL 3 TIMES DAILY PRN
COMMUNITY
Start: 2025-03-11 | End: 2025-03-20 | Stop reason: SDUPTHER

## 2025-03-20 RX ORDER — DICYCLOMINE HYDROCHLORIDE 20 MG/1
20 TABLET ORAL 3 TIMES DAILY PRN
Qty: 30 TABLET | Refills: 0 | Status: SHIPPED | OUTPATIENT
Start: 2025-03-20 | End: 2025-03-30

## 2025-03-20 RX ORDER — IPRATROPIUM BROMIDE AND ALBUTEROL SULFATE 2.5; .5 MG/3ML; MG/3ML
3 SOLUTION RESPIRATORY (INHALATION) EVERY 6 HOURS PRN
Qty: 75 ML | Refills: 3 | Status: SHIPPED | OUTPATIENT
Start: 2025-03-20 | End: 2026-03-20

## 2025-03-20 NOTE — TELEPHONE ENCOUNTER
No care due was identified.  [facility] Embedded Care Due Messages. Reference number: [id number].   3/20/2025 10:44:17 AM CDT

## 2025-03-20 NOTE — TELEPHONE ENCOUNTER
----- Message from Charanseemain sent at 3/20/2025 10:03 AM CDT -----  Type: RX REFILL REQUEST  Who Called: Patient   Refill or New Rx: refill   Rx Name and Strength: dicyclomine (BENTYL) 20 mg tablet, albuterol nebulizer solution 2.5 mg  Pt is completely out for this medication listed above . Pt is stating the medication (sucralfate) is causing her to have really bad headaches. Pt is also requesting if the doctor can prescribe her some mucinex as well. Please Advise  Would the patient rather a call back or a response via [facility]? Call   Best Call Back Number: [phone]   Additional Information:  [id number] - [place], [place] - [id number] [place] [id number]: [phone] Fax: [phone]

## 2025-03-21 DIAGNOSIS — K59.09 CHRONIC CONSTIPATION: ICD-10-CM

## 2025-03-21 DIAGNOSIS — J45.50 SEVERE PERSISTENT ASTHMA WITHOUT COMPLICATION: ICD-10-CM

## 2025-03-21 RX ORDER — PREDNISONE 20 MG/1
20 TABLET ORAL DAILY
Qty: 5 TABLET | Refills: 0 | Status: SHIPPED | OUTPATIENT
Start: 2025-03-21

## 2025-03-21 RX ORDER — LINACLOTIDE 72 UG/1
CAPSULE, GELATIN COATED ORAL
Qty: 30 CAPSULE | Refills: 2 | Status: SHIPPED | OUTPATIENT
Start: 2025-03-21

## 2025-03-21 NOTE — TELEPHONE ENCOUNTER
No care due was identified.  [facility] Embedded Care Due Messages. Reference number: [id number].   3/21/2025 11:20:54 AM CDT

## 2025-03-21 NOTE — TELEPHONE ENCOUNTER
.Refill Encounter    PCP Visits: Recent Visits  Date Type Provider Dept   03/17/25 Office Visit Letty Kumar MD [facility]   01/30/25 Office Visit Bigg Clancy, NP [facility]   01/23/25 Office Visit Suleman Johnson PA-C [facility]   11/21/24 Office Visit Letty Kumar MD [facility]   06/03/24 Office Visit Letty Kumar MD [facility]   05/21/24 Office Visit Letty Kumar MD [facility]   Showing recent visits within past 360 days and meeting all other requirements  Future Appointments  No visits were found meeting these conditions.  Showing future appointments within next 720 days and meeting all other requirements      Last 3 Blood Pressure:   BP Readings from Last 3 Encounters:   03/17/25 (!) 142/70   01/30/25 (!) 140/60   01/02/25 (!) 165/79     Preferred Pharmacy:   [id number] - [place], [place] - [address]  [address] [address]  Phone: [phone] Fax: [phone]    Requested RX:  Requested Prescriptions     Pending Prescriptions Disp Refills    predniSONE (DELTASONE) 20 MG tablet 5 tablet 0     Sig: Take 1 tablet (20 mg total) by mouth once daily.      RX Route: Normal

## 2025-03-21 NOTE — TELEPHONE ENCOUNTER
Sent prednisone, but if patient isn't better, needs to be seen urgently. Please ensure she's breathing ok. Thank you!

## 2025-03-22 LAB
ALDOST SERPL-MCNC: <3 NG/DL
ALDOST/RENIN PLAS-RTO: <30 RATIO
RENIN PLAS-CCNC: 0.1 NG/ML/HR

## 2025-03-31 ENCOUNTER — PATIENT MESSAGE (OUTPATIENT)
Dept: ADMINISTRATIVE | Facility: HOSPITAL | Age: 65
End: 2025-03-31
Payer: MEDICARE

## 2025-04-12 DIAGNOSIS — J45.50 SEVERE PERSISTENT ASTHMA WITHOUT COMPLICATION: ICD-10-CM

## 2025-04-12 NOTE — TELEPHONE ENCOUNTER
No care due was identified.  [facility] Embedded Care Due Messages. Reference number: [id number].   4/12/2025 7:15:36 AM CDT

## 2025-04-16 DIAGNOSIS — K29.60 OTHER SPECIFIED GASTRITIS, PRESENCE OF BLEEDING UNSPECIFIED, UNSPECIFIED CHRONICITY: ICD-10-CM

## 2025-04-16 NOTE — TELEPHONE ENCOUNTER
"----- Message from Serena sent at 4/16/2025  8:36 AM CDT -----  Type: RX Refill RequestWho called: Patient Refill or New Rx: RefillRx name and Strength: pantoprazole (PROTONIX) 40 MG tablet ; Patient is also requesting a medication "Dicyclomine 20mg " that is helps with her stomach and does not cause headaches ; please advise Would the patient rather a call back or a response via [facility]? Call Best call back number: [phone] information:[id number] [place], [place] - [id number] [id number]: [phone] Fax: [phone]  "

## 2025-04-16 NOTE — TELEPHONE ENCOUNTER
Care Due:                  Date            Visit Type   Department     Provider  --------------------------------------------------------------------------------                                EP -                              PRIMARY      [facility] INTERNAL  Last Visit: 03-      CARE ([facility])   MEDICINE       Letty Kumar  Next Visit: None Scheduled  None         None Found                                                            Last  Test          Frequency    Reason                     Performed    Due Date  --------------------------------------------------------------------------------    HBA1C.......  6 months...  dulaglutide..............  01-   07-    Vitamin D...  12 months..  ergocalciferol...........  07- 07-    [employer] Embedded Care Due Messages. Reference number: [id number].   4/16/2025 10:33:21 AM CDT

## 2025-04-17 ENCOUNTER — PATIENT MESSAGE (OUTPATIENT)
Dept: PSYCHIATRY | Facility: CLINIC | Age: 65
End: 2025-04-17
Payer: MEDICARE

## 2025-04-17 ENCOUNTER — OFFICE VISIT (OUTPATIENT)
Dept: INTERNAL MEDICINE | Facility: CLINIC | Age: 65
End: 2025-04-17
Payer: MEDICARE

## 2025-04-17 ENCOUNTER — TELEPHONE (OUTPATIENT)
Dept: INTERNAL MEDICINE | Facility: CLINIC | Age: 65
End: 2025-04-17
Payer: MEDICARE

## 2025-04-17 VITALS
HEART RATE: 82 BPM | BODY MASS INDEX: 29.05 KG/M2 | DIASTOLIC BLOOD PRESSURE: 74 MMHG | OXYGEN SATURATION: 97 % | SYSTOLIC BLOOD PRESSURE: 137 MMHG | HEIGHT: 65 IN | WEIGHT: 174.38 LBS

## 2025-04-17 DIAGNOSIS — D64.9 ANEMIA, UNSPECIFIED TYPE: ICD-10-CM

## 2025-04-17 DIAGNOSIS — Z91.81 HISTORY OF RECENT FALL: ICD-10-CM

## 2025-04-17 DIAGNOSIS — E11.42 POLYNEUROPATHY DUE TO TYPE 2 DIABETES MELLITUS: ICD-10-CM

## 2025-04-17 DIAGNOSIS — E11.65 TYPE 2 DIABETES MELLITUS WITH HYPERGLYCEMIA, WITHOUT LONG-TERM CURRENT USE OF INSULIN: ICD-10-CM

## 2025-04-17 DIAGNOSIS — E87.6 LOW BLOOD POTASSIUM: ICD-10-CM

## 2025-04-17 DIAGNOSIS — R31.21 ASYMPTOMATIC MICROSCOPIC HEMATURIA: ICD-10-CM

## 2025-04-17 DIAGNOSIS — R79.89 ABNORMAL ALDOSTERONE TO RENIN RATIO: ICD-10-CM

## 2025-04-17 DIAGNOSIS — G47.33 OSA (OBSTRUCTIVE SLEEP APNEA): ICD-10-CM

## 2025-04-17 DIAGNOSIS — J45.50 SEVERE PERSISTENT ASTHMA WITHOUT COMPLICATION: ICD-10-CM

## 2025-04-17 DIAGNOSIS — Z02.89 ENCOUNTER FOR COMPLETION OF FORM WITH PATIENT: Primary | ICD-10-CM

## 2025-04-17 DIAGNOSIS — G47.00 INSOMNIA, UNSPECIFIED TYPE: ICD-10-CM

## 2025-04-17 DIAGNOSIS — I10 PRIMARY HYPERTENSION: Primary | ICD-10-CM

## 2025-04-17 DIAGNOSIS — I10 PRIMARY HYPERTENSION: ICD-10-CM

## 2025-04-17 PROCEDURE — 99999 PR PBB SHADOW E&M-EST. PATIENT-LVL III: CPT | Mod: PBBFAC,,,

## 2025-04-17 RX ORDER — DICYCLOMINE HYDROCHLORIDE 20 MG/1
20 TABLET ORAL 3 TIMES DAILY PRN
Qty: 30 TABLET | Refills: 0 | Status: SHIPPED | OUTPATIENT
Start: 2025-04-17 | End: 2025-04-27

## 2025-04-17 RX ORDER — SPIRONOLACTONE 50 MG/1
50 TABLET, FILM COATED ORAL DAILY
Qty: 90 TABLET | Refills: 1 | Status: SHIPPED | OUTPATIENT
Start: 2025-04-17

## 2025-04-17 RX ORDER — PANTOPRAZOLE SODIUM 40 MG/1
40 TABLET, DELAYED RELEASE ORAL DAILY
Qty: 90 TABLET | Refills: 3 | Status: SHIPPED | OUTPATIENT
Start: 2025-04-17

## 2025-04-17 RX ORDER — INSULIN PUMP SYRINGE, 3 ML
EACH MISCELLANEOUS
Qty: 1 EACH | Refills: 0 | Status: SHIPPED | OUTPATIENT
Start: 2025-04-17 | End: 2026-04-17

## 2025-04-17 RX ORDER — PREDNISONE 20 MG/1
20 TABLET ORAL DAILY
Qty: 5 TABLET | Refills: 0 | Status: SHIPPED | OUTPATIENT
Start: 2025-04-17

## 2025-04-17 RX ORDER — DICYCLOMINE HYDROCHLORIDE 20 MG/1
20 TABLET ORAL 4 TIMES DAILY PRN
Qty: 90 TABLET | Refills: 1 | Status: SHIPPED | OUTPATIENT
Start: 2025-04-17

## 2025-04-17 RX ORDER — LANCETS
EACH MISCELLANEOUS
Qty: 100 EACH | Refills: 1 | Status: SHIPPED | OUTPATIENT
Start: 2025-04-17

## 2025-04-17 NOTE — PROGRESS NOTES
HPI     Chief Complaint:  Chief Complaint   Patient presents with    Follow-up    Leg Pain       Susan Courtney is a 65 y.o. female with multiple medical diagnoses as listed in the medical history and problem list that presents for   Chief Complaint   Patient presents with    Follow-up    Leg Pain   .   Patient is not known to me with her last appointment in this department on 3/17/2025.      HPI    Fall 4/6 to knees and arm. No head trauma. Recent fall in [employer] and needed injections in back. Requesting form completion for RTA transportation.     DM - compliant with trulicity. No recent lows but not checking glucose at home. Will send in glucometer.     Asthma - controlled  -triggers smoking and seasonally  -albuterol and neb treatments as needed    HTN - controlled  -amlodipine 5mg  -stopped valsartan because was causing hair loss    Insomnia -   -requesting refill of ambien, will have to come from prescribing doctor (Kasi Zayas), will route message    Low energy -   -poor diet    Anemia -  -drop in H/H from 3 months ago  -no blood in stool per pt    Reviewed labs from last month:  -H.Pylori negative  -1+ occult blood in urine, history of kidney stones  -Hypokalemia  -abn renin/ald  -drop in H/H    History of Present Illness    CHIEF COMPLAINT:  Susan presents today for evaluation of a fall that occurred last Sunday.    HISTORY OF PRESENT ILLNESS:  She fell face forward onto concrete after changing from heels to flats and was unable to catch herself. The right knee sustained injury with swelling and purple discoloration. She did not seek immediate medical attention, instead returning home to elevate her head. She denies hitting her head during the fall.    MEDICAL HISTORY:  She has diabetes managed with weekly Trulicity (Thursdays at 9 PM). She experiences hypoglycemic episodes with dizziness and altered mental status when not eating regularly.    She has asthma managed with albuterol as needed, and has  prednisone and nebulizer at home. Asthma is triggered by cigarette smoke. During exacerbations, she experiences green discharge from mouth and nose requiring amoxicillin treatment.    She has a history of back problems requiring injections and uses a back brace. Her last back injection was discontinued due to adverse reactions of frequent urination and itching.    She has acid reflux and modifies sleeping position to left side to minimize symptoms. She reports recent hospitalization for stomach issues.    She was previously prescribed CPAP but declined continued use due to discomfort with nasal symptoms and water issues.    CURRENT MEDICATIONS:  She takes amlodipine for blood pressure management but discontinued Valsartan 320 due to hair loss. She uses Zolpidem for sleep and melatonin gummies.       Other concerns below  Assessment & Plan   Assessment & Plan        IMPRESSION:  - Assessed recent fall, noting knee pain and swelling.  - Evaluated lab results, noting low hemoglobin and potassium levels which may contribute to reported fatigue.  - Considered potential causes for low potassium, including recent hospitalization for hypokalemia.  - Reviewed urine results showing hazy appearance and occult blood, likely due to postmenopausal changes.  - Evaluated diabetes management and BP control.  - Considered sleep apnea as potential contributor to fatigue, noting refusal of CPAP.  - Assessed recent abdominal pain, noting improvement with prescribed medication.    VENTRICULAR TACHYCARDIA:  - Monitored patient's blood pressure, which is well-controlled with amlodipine.  - Auscultation revealed normal heart sounds.  - Noted patient's history of hypokalemia and current low potassium levels.  - Discussed the importance of potassium for cardiac health.  - Scheduled labs and urinalysis for Monday to investigate the cause of symptoms and low potassium levels.  - Instructed patient to continue amlodipine for blood pressure  control.    FALL AND RIGHT KNEE CONTUSION:  - Susan reported falling on concrete last Sunday, impacting her knees and face.  - Examination revealed swelling and tenderness in the right knee, which was ecchymotic and painful.  - Assessed the fall incident and its impact on the patient's mobility.  - Ordered an X-ray of the right knee for further evaluation of the injury and pain.    TYPE 2 DIABETES WITH HYPOGLYCEMIA:  - Susan reported hypoglycemic episodes causing dizziness and loss of consciousness.  - Reviewed recent lab results, noting abnormal findings.  - Ordered a new glucometer and supplies for glucose monitoring.  - Scheduled labs to reassess diabetes management and potential complications.  - Instructed patient to continue taking Trulicity once weekly.  - Included hypoglycemia due to diabetes as a reason for transportation assistance.  - Emphasized the importance of a balanced diet with adequate protein intake for diabetic patients.    ASTHMA WITH EXACERBATION:  - Noted patient's history of asthma exacerbations with green sputum production.  - Confirmed use of albuterol as needed and availability of prednisone for emergencies.  - Acknowledged patient's asthma management plan, including nebulizer treatments and amoxicillin for exacerbations.  - Discussed asthma triggers with the patient.    BACK PAIN:  - Susan reported back pain and history of back injections.  - Acknowledged the impact of back pain on mobility.  - Confirmed patient's use of a back brace for pain management.    GASTROESOPHAGEAL REFLUX DISEASE:  - Susan reported acid reflux symptoms.  - Reviewed previous diagnostic procedures, including CT scan and EGD, for gastroesophageal symptoms.  - Prescribed Bentyl for symptom management.  - Emphasized the importance of sleeping on the left side to reduce acid reflux symptoms.  - Noted upcoming appointment with a gastroenterologist.    OBSTRUCTIVE SLEEP APNEA:  - Inquired about CPAP ma  chine use.  -  Noted patient's discontinuation of CPAP machine due to discomfort.  - Continued Zolpidem (Ambien) as prescribed by psychiatry.    GENERAL HEALTH RECOMMENDATIONS:  - Recommend increasing water intake for proper hydration.  - Educated on potential causes of hazy urine appearance, including dehydration and supplement use.           1. Encounter for completion of form with patient  Completed form for RTA assistance    2. History of recent fall  Xray of right knee Monday  Healing scab noted to left knee, no tenderness.  No skin changes to right knee but palpable mass over patellar tendon noted with tenderness. ROM intact, full weight bearing  -     X-Ray Knee 1 or 2 View Right; Future; Expected date: 04/17/2025    3. Asymptomatic microscopic hematuria  -repeat urine from 1 month ago  -denies current s/s  -     Urinalysis, Reflex to Urine Culture; Future; Expected date: 04/17/2025    4. Anemia, unspecified type  Noted history, denies current blood in stool but given fatigue will recheck labs from 1 month ago  Lab Results   Component Value Date    WBC 5.93 03/18/2025    HGB 11.9 (L) 03/18/2025    HCT 36.8 (L) 03/18/2025    MCV 83 03/18/2025     03/18/2025     UTD on colon screening    -     CBC Auto Differential; Future; Expected date: 04/17/2025  -     Iron and TIBC; Future; Expected date: 04/17/2025  -     Ferritin; Future; Expected date: 04/17/2025  -     Reticulocytes; Future; Expected date: 04/17/2025    5. Insomnia, unspecified type  Message send to prescribing doctor for refill of Ambien    6. Primary hypertension  Continue amlodipine  BP at goal  Stopped valsartan due to hair loss  The current medical regimen is effective;  continue present plan and medications.    7. Type 2 diabetes mellitus with hyperglycemia, without long-term current use of insulin  Lab Results   Component Value Date    HGBA1C 6.3 (H) 01/15/2025     -discussed with patient about routine diabetic care that includes but are not limited to  regular eye exams, skin care, daily foot exam, proper nutrition, regular BG monitoring at home (fasting and mealtime) and medication compliance in a diabetic.  Target morning BS  and meal-time BS <180    Continue Trulicity. The current medical regimen is effective;  continue present plan and medications.    8. JOSE (obstructive sleep apnea)  Did not tolerate CPAP machine, caused dryness and sores    9. Severe persistent asthma without complication  Stable, chronic condition.  Will continue to maximize risk factor reduction and adjust medication as needed. Currently stable. Avoids triggers    10. Low blood potassium  Unclear history but will recheck labs today  Consider f/u with nephrology  Lab Results   Component Value Date    K 3.0 (L) 03/18/2025     PCP prescribed aldactone today for low potassium and elevated renin-aldosterone ratio  -     Basic Metabolic Panel; Future; Expected date: 04/17/2025    11. Polyneuropathy due to type 2 diabetes mellitus  Stable, asymptomatic chronic condition.  Will continue to maximize risk factor reduction and adjust medication as needed    Overview:  Note: Type: Diagnosis; Confidentiality Level: 1;      Other orders  -     blood-glucose meter kit; To check BG two times daily, to use with insurance preferred meter  Dispense: 1 each; Refill: 0  -     lancets Misc; To check BG two times daily, to use with insurance preferred meter  Dispense: 100 each; Refill: 1  -     blood sugar diagnostic Strp; To check BG two times daily, to use with insurance preferred meter  Dispense: 100 strip; Refill: 1  -     dicyclomine (BENTYL) 20 mg tablet; Take 1 tablet (20 mg total) by mouth 4 (four) times daily as needed (stomach pain).  Dispense: 90 tablet; Refill: 1          --------------------------------------------      Health Maintenance:  Health Maintenance         Date Due Completion Date    COVID-19 Vaccine (4 - 2024-25 season) 09/01/2024 1/27/2022    Hemoglobin A1c 07/15/2025 1/15/2025     Pneumococcal Vaccines (Age 50+) (3 of 3 - PCV20 or PCV21) 11/10/2025 11/10/2020    Mammogram 12/26/2025 12/26/2024    Foot Exam 01/02/2026 1/2/2025    Diabetes Urine Screening 01/15/2026 1/15/2025    Lipid Panel 01/15/2026 1/15/2025    Diabetic Eye Exam 02/20/2026 2/20/2025    Low Dose Statin 03/17/2026 3/17/2025    Colorectal Cancer Screening 03/31/2027 3/31/2022    TETANUS VACCINE 04/20/2033 4/20/2023            Discussed the importance of overdue vaccines which were offered during this encounter. Patient declined overdue vaccines at this time and Health maintenance reviewed    Follow Up:  No follow-ups on file.    Exam     Review of Systems:  (as noted above)  Review of Systems    Physical Exam:   Physical Exam  Vitals reviewed.   Constitutional:       General: She is not in acute distress.     Appearance: Normal appearance. She is not toxic-appearing.   HENT:      Head: Normocephalic and atraumatic.   Cardiovascular:      Rate and Rhythm: Normal rate and regular rhythm.      Pulses: Normal pulses.      Heart sounds: Normal heart sounds. No murmur heard.  Pulmonary:      Effort: Pulmonary effort is normal. No respiratory distress.      Breath sounds: Normal breath sounds.   Musculoskeletal:         General: Swelling and tenderness present.      Cervical back: Normal range of motion.      Right knee: No crepitus. Tenderness present over the patellar tendon.      Left knee: No crepitus.      Right lower leg: No edema.      Left lower leg: No edema.        Legs:    Skin:     General: Skin is warm.      Capillary Refill: Capillary refill takes less than 2 seconds.      Findings: No bruising or erythema.   Neurological:      General: No focal deficit present.      Mental Status: She is alert and oriented to person, place, and time.   Psychiatric:         Mood and Affect: Mood normal.       Vitals:    04/17/25 0829   BP: 137/74   BP Location: Left arm   Patient Position: Sitting   Pulse: 82   SpO2: 97%   Weight: 79.1  "kg (174 lb 6.1 oz)   Height: 5' 5" (1.651 m)      Body mass index is 29.02 kg/m².    Lab Results   Component Value Date    WBC 5.93 03/18/2025    HGB 11.9 (L) 03/18/2025    HCT 36.8 (L) 03/18/2025     03/18/2025    CHOL 132 01/15/2025    TRIG 118 01/15/2025    HDL 47 01/15/2025    ALT 12 03/18/2025    AST 13 03/18/2025     03/18/2025    K 3.0 (L) 03/18/2025     03/18/2025    CREATININE 0.7 03/18/2025    BUN 12 03/18/2025    CO2 29 03/18/2025    TSH 0.823 01/15/2025    HGBA1C 6.3 (H) 01/15/2025       The 10-year ASCVD risk score (Risa DUMONT, et al., 2019) is: 16.7%    Values used to calculate the score:      Age: 65 years      Sex: Female      Is Non- : Yes      Diabetic: Yes      Tobacco smoker: No      Systolic Blood Pressure: 137 mmHg      Is BP treated: Yes      HDL Cholesterol: 47 mg/dL      Total Cholesterol: 132 mg/dL    (Imaging have been independently reviewed)    History     Past Medical History:  Past Medical History:   Diagnosis Date    Abdominal hernia     Anxiety     Asthma     Diabetes mellitus     Hypertension     Migraine     Migraine headache     Obesity     Sleep apnea        Past Surgical History:  Past Surgical History:   Procedure Laterality Date    BUNIONECTOMY Right     CHOLECYSTECTOMY      EXCISION OF LESION Left 12/01/2021    Procedure: EXCISION, LESION, LEFT PALM;  Surgeon: Amy Sharp MD;  Location: [facility];  Service: Orthopedics;  Laterality: Left;    FOOT SURGERY Right     HYSTERECTOMY      total, fibroids    INCISIONAL HERNIA REPAIR      TONSILLECTOMY Bilateral 03/06/2020    Procedure: TONSILLECTOMY;  Surgeon: Brant Pelaez MD;  Location: [facility];  Service: ENT;  Laterality: Bilateral;       Social History:  Social History[1]    Family History:  Family History   Problem Relation Name Age of Onset    Breast cancer Mother      Psoriasis Neg Hx      Melanoma Neg Hx      Lupus Neg Hx         Allergies and Medications: (updated " and reviewed)  Review of patient's allergies indicates:   Allergen Reactions    Morphine Itching     Not sure what she can take for pain.    Morphine sulfate      Itchy (skin)^    Tramadol Itching     Current Medications[2]    Patient Care Team:  Letty Kumar MD as PCP - General (Internal Medicine)  [facility] (Gastroenterology)  Omar Pride MD as Consulting Physician (Gastroenterology)  Belen Villegas MD as Consulting Physician (Neurology)         - The patient is given an After Visit Summary that lists all medications with directions, allergies, education, orders placed during this encounter and follow-up instructions.      - I have reviewed the patient's medical information including past medical, family, and social history sections including the medications and allergies.      - We discussed the patient's current medications.     This note was created by combination of typed  and MModal dictation.  Transcription errors may be present.  If there are any questions, please contact me.            This note was generated with the assistance of ambient listening technology. Verbal consent was obtained by the patient and accompanying visitor(s) for the recording of patient appointment to facilitate this note. I attest to having reviewed and edited the generated note for accuracy, though some syntax or spelling errors may persist. Please contact the author of this note for any clarification.    >47 minutes of total time spent on the encounter, which includes face to face time and non-face to face time preparing to see the patient (eg, review of tests), Obtaining and/or reviewing separately obtained history, documenting clinical information in the electronic or other health record, independently interpreting results (not separately reported) and communicating results to the patient/family/caregiver, or Care coordination (not separately reported).           [1]   Social History  Socioeconomic History    Marital status:    Tobacco Use    Smoking status: Never    Smokeless tobacco: Never   Substance and Sexual Activity    Alcohol use: No    Drug use: No    Sexual activity: Not Currently     Social Drivers of Health     Financial Resource Strain: Patient Declined (1/23/2025)    Overall Financial Resource Strain (CARDIA)     Difficulty of Paying Living Expenses: Patient declined   Recent Concern: Financial Resource Strain - High Risk (12/22/2024)    Received from [facility] SDOH Screening     In the past year, have you been unable to get any of the following when you really needed them? choose all that apply.: Clothing     In the past year, have you been unable to get any of the following when you really needed them? choose all that apply.: Medicine or health care   Food Insecurity: Patient Declined (1/23/2025)    Hunger Vital Sign     Worried About Running Out of Food in the Last Year: Patient declined     Ran Out of Food in the Last Year: Patient declined   Recent Concern: Food Insecurity - High Risk (12/22/2024)    Received from [facility] SDOH Screening     In the past 2 months, did you or others you live with eat smaller meals or skip meals because you didn't have money for food?: Yes   Transportation Needs: No Transportation Needs (1/9/2024)    PRAPARE - Transportation     Lack of Transportation (Medical): No     Lack of Transportation (Non-Medical): No   Physical Activity: Insufficiently Active (1/9/2024)    Exercise Vital Sign     Days of Exercise per Week: 2 days     Minutes of Exercise per Session: 30 min   Stress: No Stress Concern Present (1/23/2025)    [employer] - Occupational Stress Questionnaire     Feeling of Stress : Not at all   Housing Stability: Unknown (1/23/2025)    Housing Stability Vital Sign     Unable to Pay for Housing in the Last Year: Patient declined   Recent Concern: Housing  Stability - High Risk (12/22/2024)    Received from [facility] SDOH Screening     In the past year, have you been unable to get any of the following when you really needed them? choose all that apply.: Utilities (electric, gas, and water)   [2]   Current Outpatient Medications   Medication Sig Dispense Refill    albuterol (ACCUNEB) 0.63 mg/3 mL Nebu Take 3 mLs (0.63 mg total) by nebulization every 6 (six) hours as needed (wheezing). Rescue 75 mL 0    albuterol (PROVENTIL/VENTOLIN HFA) 90 mcg/actuation inhaler INHALE 2 PUFFS INTO THE LUNGS EVERY 6 (SIX) HOURS AS NEEDED FOR WHEEZING. RESCUE 54 g 3    albuterol-ipratropium (DUO-NEB) 2.5 mg-0.5 mg/3 mL nebulizer solution Take 3 mLs by nebulization every 6 (six) hours as needed for Wheezing or Shortness of Breath. Rescue 75 mL 3    ALPRAZolam (XANAX) 1 MG tablet Take 1 tablet (1 mg total) by mouth 4 (four) times daily as needed for Anxiety. 120 tablet 2    amLODIPine (NORVASC) 5 MG tablet TAKE 1 TABLET (5 MG TOTAL) BY MOUTH DAILY. 90 tablet 3    atorvastatin (LIPITOR) 40 MG tablet Take 1 tablet (40 mg total) by mouth once daily. 90 tablet 1    azelastine (ASTELIN) 137 mcg (0.1 %) nasal spray SPRAY 2 SPRAYS BY NASAL ROUTE 2 TIMES DAILY. 90 mL 3    blood sugar diagnostic Strp Use as instructed to check blood sugar 2-3 times daily and whenever feeling ill. 200 each 3    blood-glucose meter kit Use as instructed to check blood sugar 2-3 times daily and whenever feeling ill. 1 each 0    docusate sodium (COLACE) 100 MG capsule Take 1 capsule (100 mg total) by mouth every evening. 90 capsule 3    dulaglutide (TRULICITY) 3 mg/0.5 mL pen injector Inject 3 mg into the skin every 7 days. 12 pen 1    DULoxetine (CYMBALTA) 20 MG capsule Take 1 capsule (20 mg total) by mouth once daily. 30 capsule 11    ergocalciferol (ERGOCALCIFEROL) 50,000 unit Cap Take 1 capsule (50,000 Units total) by mouth every 7 days. 12 capsule 3    fluticasone-salmeterol diskus inhaler 250-50 mcg  Inhale 1 puff into the lungs 2 (two) times daily. Controller 60 each 11    lancets (ACCU-CHEK SOFTCLIX LANCETS) Misc Use as instructed to check blood sugar 2-3 times daily and whenever feeling ill. 200 each 3    LINZESS 72 mcg Cap capsule TAKE 1 CAPSULE (72 MCG TOTAL) BY MOUTH DAILY AS NEEDED (CONSTIPATION). 30 capsule 2    montelukast (SINGULAIR) 10 mg tablet Take 1 tablet (10 mg total) by mouth every evening. 90 tablet 3    naratriptan (AMERGE) 1 MG Tab TAKE 1 TABLET BY MOUTH 2 TIMES DAILY AS NEEDED (HEADACHE). MAY REPEAT X1 AFTER 4 HOURS 12 tablet 11    nebulizer and compressor Jovana by Misc.(Non-Drug; Combo Route) route.      polyethylene glycol (GLYCOLAX) 17 gram/dose powder DISSOLVE 17 GRAMS IN 8 OZ OF FLUID LIQUID DRINK DAILY AS DIRECTED 510 g 3    zolpidem (AMBIEN) 10 mg Tab Take 2 tablets (20 mg total) by mouth nightly as needed (As needed). 60 tablet 3    blood sugar diagnostic Strp To check BG two times daily, to use with insurance preferred meter 100 strip 1    blood-glucose meter kit To check BG two times daily, to use with insurance preferred meter 1 each 0    dicyclomine (BENTYL) 20 mg tablet Take 1 tablet (20 mg total) by mouth 3 (three) times daily as needed. 30 tablet 0    dicyclomine (BENTYL) 20 mg tablet Take 1 tablet (20 mg total) by mouth 4 (four) times daily as needed (stomach pain). 90 tablet 1    lancets Misc To check BG two times daily, to use with insurance preferred meter 100 each 1    pantoprazole (PROTONIX) 40 MG tablet Take 1 tablet (40 mg total) by mouth once daily. 90 tablet 3    predniSONE (DELTASONE) 20 MG tablet Take 1 tablet (20 mg total) by mouth once daily. 5 tablet 0    spironolactone (ALDACTONE) 50 MG tablet Take 1 tablet (50 mg total) by mouth once daily. 90 tablet 1     Current Facility-Administered Medications   Medication Dose Route Frequency Provider Last Rate Last Admin    albuterol-ipratropium 2.5 mg-0.5 mg/3 mL nebulizer solution 3 mL  3 mL Nebulization 1 time in  Clinic/HOD

## 2025-04-17 NOTE — TELEPHONE ENCOUNTER
Please let patient know I'm sending a medication to help with her low potassium, hair loss, and blood pressure known as spironolactone. I would also like her to be evaluated by an endocrinologist due to her elevated renin-aldosterone ratio. Referral placed, please assist with scheduling, thank you!

## 2025-04-17 NOTE — Clinical Note
Hello! I'm repeating labs Monday too but thanks for starting aldactone! I didn't discuss this with her at [facility] but after labs Monday I'll make sure to f/u. Any other labs you want to repeat Monday? Thanks!  EM

## 2025-04-17 NOTE — Clinical Note
Pt requesting refill of ambien, also due for f/u appt. Can you assist with med refill and appt? Best, RIA Messina

## 2025-04-21 ENCOUNTER — RESULTS FOLLOW-UP (OUTPATIENT)
Dept: INTERNAL MEDICINE | Facility: CLINIC | Age: 65
End: 2025-04-21

## 2025-04-21 ENCOUNTER — HOSPITAL ENCOUNTER (OUTPATIENT)
Dept: RADIOLOGY | Facility: OTHER | Age: 65
Discharge: HOME | End: 2025-04-21
Payer: MEDICARE

## 2025-04-21 DIAGNOSIS — Z91.81 HISTORY OF RECENT FALL: Primary | ICD-10-CM

## 2025-04-21 DIAGNOSIS — M25.561 ACUTE PAIN OF RIGHT KNEE: ICD-10-CM

## 2025-04-21 DIAGNOSIS — Z91.81 HISTORY OF RECENT FALL: ICD-10-CM

## 2025-04-21 PROCEDURE — 73560 X-RAY EXAM OF KNEE 1 OR 2: CPT | Mod: TC,FY,RT

## 2025-04-21 PROCEDURE — 73560 X-RAY EXAM OF KNEE 1 OR 2: CPT | Mod: 26,RT,, | Performed by: RADIOLOGY

## 2025-04-25 ENCOUNTER — TELEPHONE (OUTPATIENT)
Dept: INTERNAL MEDICINE | Facility: CLINIC | Age: 65
End: 2025-04-25
Payer: MEDICARE

## 2025-05-09 DIAGNOSIS — M25.561 ACUTE PAIN OF RIGHT KNEE: Primary | ICD-10-CM

## 2025-05-15 DIAGNOSIS — M25.561 ACUTE PAIN OF RIGHT KNEE: Primary | ICD-10-CM

## 2025-06-03 DIAGNOSIS — J45.51 SEVERE PERSISTENT ASTHMA WITH ACUTE EXACERBATION: ICD-10-CM

## 2025-06-03 RX ORDER — MONTELUKAST SODIUM 10 MG/1
10 TABLET ORAL NIGHTLY
Qty: 90 TABLET | Refills: 3 | Status: SHIPPED | OUTPATIENT
Start: 2025-06-03

## 2025-06-09 ENCOUNTER — OFFICE VISIT (OUTPATIENT)
Dept: SPORTS MEDICINE | Facility: CLINIC | Age: 65
End: 2025-06-09
Payer: MEDICARE

## 2025-06-09 ENCOUNTER — TELEPHONE (OUTPATIENT)
Dept: HEMATOLOGY/ONCOLOGY | Facility: CLINIC | Age: 65
End: 2025-06-09
Payer: MEDICARE

## 2025-06-09 ENCOUNTER — HOSPITAL ENCOUNTER (OUTPATIENT)
Dept: RADIOLOGY | Facility: OTHER | Age: 65
Discharge: HOME | End: 2025-06-09
Attending: INTERNAL MEDICINE
Payer: MEDICARE

## 2025-06-09 DIAGNOSIS — R10.13 ABDOMINAL PAIN, EPIGASTRIC: ICD-10-CM

## 2025-06-09 DIAGNOSIS — M79.661 PAIN IN RIGHT LOWER LEG: Primary | ICD-10-CM

## 2025-06-09 DIAGNOSIS — S80.01XA CONTUSION OF RIGHT KNEE, INITIAL ENCOUNTER: ICD-10-CM

## 2025-06-09 DIAGNOSIS — M22.41 CHONDROMALACIA PATELLAE OF RIGHT KNEE: ICD-10-CM

## 2025-06-09 PROCEDURE — 1159F MED LIST DOCD IN RCRD: CPT | Mod: CPTII,S$GLB,, | Performed by: PHYSICIAN ASSISTANT

## 2025-06-09 PROCEDURE — 99203 OFFICE O/P NEW LOW 30 MIN: CPT | Mod: S$GLB,,, | Performed by: PHYSICIAN ASSISTANT

## 2025-06-09 PROCEDURE — 99999 PR PBB SHADOW E&M-EST. PATIENT-LVL II: CPT | Mod: PBBFAC,,, | Performed by: PHYSICIAN ASSISTANT

## 2025-06-09 PROCEDURE — 3061F NEG MICROALBUMINURIA REV: CPT | Mod: CPTII,S$GLB,, | Performed by: PHYSICIAN ASSISTANT

## 2025-06-09 PROCEDURE — 74183 MRI ABD W/O CNTR FLWD CNTR: CPT | Mod: 26,,, | Performed by: RADIOLOGY

## 2025-06-09 PROCEDURE — 3066F NEPHROPATHY DOC TX: CPT | Mod: CPTII,S$GLB,, | Performed by: PHYSICIAN ASSISTANT

## 2025-06-09 PROCEDURE — 74183 MRI ABD W/O CNTR FLWD CNTR: CPT | Mod: TC

## 2025-06-09 PROCEDURE — A9585 GADOBUTROL INJECTION: HCPCS | Performed by: INTERNAL MEDICINE

## 2025-06-09 PROCEDURE — 3044F HG A1C LEVEL LT 7.0%: CPT | Mod: CPTII,S$GLB,, | Performed by: PHYSICIAN ASSISTANT

## 2025-06-09 PROCEDURE — 1160F RVW MEDS BY RX/DR IN RCRD: CPT | Mod: CPTII,S$GLB,, | Performed by: PHYSICIAN ASSISTANT

## 2025-06-09 PROCEDURE — 25500020 PHARM REV CODE 255: Performed by: INTERNAL MEDICINE

## 2025-06-09 PROCEDURE — 4010F ACE/ARB THERAPY RXD/TAKEN: CPT | Mod: CPTII,S$GLB,, | Performed by: PHYSICIAN ASSISTANT

## 2025-06-09 RX ORDER — GADOBUTROL 604.72 MG/ML
7 INJECTION INTRAVENOUS
Status: COMPLETED | OUTPATIENT
Start: 2025-06-09 | End: 2025-06-09

## 2025-06-09 RX ADMIN — GADOBUTROL 7 ML: 604.72 INJECTION INTRAVENOUS at 07:06

## 2025-06-09 NOTE — PROGRESS NOTES
NEW PATIENT    HISTORY OF PRESENT ILLNESS   History of Present Illness    CHIEF COMPLAINT:  - Right knee pain following a fall    HPI:  Susan presenting with right leg pain following multiple falls over the past couple of months. The initial fall occurred in April when leaning over a counter and turning around. Subsequent falls occurred during childcare and while walking to [employer], where she fell on concrete, resulting in a contusion and tenderness on the right leg.    Her right leg pain is localized to a specific area, described as extremely tender and sore, particularly when kneeling to pray or getting into bed. Pain is primarily on the outside part of the knee and worsens with pressure. She denies any clicking sensations. Some swelling occurred after the fall, which has since resolved but left a noticeable area of swelling.    She reports no prior knee or leg pain before the April fall. Despite bleeding, she was able to ambulate immediately after the fall. The pain has persisted over the last two months.    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 She expresses frustration regarding the cause of her falls.    She mentions a history of receiving helpful injections for osteoarthritis pain in the past. She has been using a homemade remedy made from avocado for pain relief.    She denies dizziness associated with the falls but expresses concern about balance issues. She also denies weakness, feeling the knee getting stuck, or not wanting to bend or straighten.    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  - Work involved physical activities such as supporting patients  - Employer noticed a change in patient's demeanor at work    SOCIAL HISTORY:  - Susan engages in [employer] practices, including kneeling for prayer               PAST MEDICAL HISTORY    Past Medical History:   Diagnosis Date    Abdominal hernia     Anxiety     Asthma     Diabetes mellitus     Hypertension     Migraine     Migraine headache     Obesity     Sleep apnea        PAST SURGICAL HISTORY     Past Surgical History:   Procedure Laterality Date    BUNIONECTOMY Right     CHOLECYSTECTOMY      EXCISION OF LESION Left 12/01/2021    Procedure: EXCISION, LESION, LEFT PALM;  Surgeon: Amy Sharp MD;  Location: [facility];  Service: Orthopedics;  Laterality: Left;    FOOT SURGERY Right     HYSTERECTOMY      total, fibroids    INCISIONAL HERNIA REPAIR      TONSILLECTOMY Bilateral 03/06/2020    Procedure: TONSILLECTOMY;  Surgeon: Brant Pelaez MD;  Location: [facility];  Service: ENT;  Laterality: Bilateral;       FAMILY HISTORY    Family History   Problem Relation Name Age of Onset    Breast cancer Mother      Psoriasis Neg Hx      Melanoma Neg Hx      Lupus Neg Hx         SOCIAL HISTORY    Social History[1]    MEDICATIONS    Current Medications[2]    ALLERGIES     Review of patient's allergies indicates:   Allergen Reactions    Morphine Itching     Not sure what she can take for pain.    Morphine sulfate      Itchy (skin)^    Tramadol Itching         REVIEW OF SYSTEMS   Constitution: Negative. Negative for chills, fever and night sweats.   HENT: Negative for congestion and headaches.    Eyes: Negative for blurred vision, left vision loss and right vision loss.   Cardiovascular: Negative for chest pain and syncope.   Respiratory: Negative for cough and shortness of breath.    Endocrine: Negative for polydipsia, polyphagia and polyuria.   Hematologic/Lymphatic: Negative for bleeding problem.  Does not bruise/bleed easily.   Skin: Negative for dry skin, itching and rash.   Musculoskeletal: Negative for falls. Positive for right knee pain.  Gastrointestinal: Negative for abdominal pain and bowel incontinence.   Genitourinary: Negative for bladder incontinence and nocturia.   Neurological: Negative for disturbances in coordination, loss of balance and seizures.   Psychiatric/Behavioral: Negative for depression. The patient does not have insomnia.    Allergic/Immunologic: Negative for hives and persistent infections.     PHYSICAL EXAMINATION    Vitals: There were no vitals taken for this visit.    General: The patient appears active and healthy with no apparent physical problems.  No disturbance of mood or affect is demonstrated. Alert and Oriented.    HEENT: Eyes normal, pupils equally round, nose normal.    Resp: Equal and symmetrical chest rises. No wheezing    CV: Regular rate    Neck: Supple; nonpainful range of motion.    Extremities: no cyanosis, clubbing, edema, or diffuse swelling.  Palpable pulses, good capillary refill of the digits.  No coolness, discoloration, edema or obvious varicosities.    Skin: no lesions noted.    Lymphatic: no detected adenopathy in the upper or lower extremities.    Neurologic: normal mental status, normal reflexes, normal gait and balance.  Patient is alert and oriented to person, place and time.  No flaccidity or spasticity is noted.  No motor or sensory deficits are noted.  Light touch is intact    Orthopaedic: KNEE EXAM - RIGHT    Inspection:   Normal skin color and appearance with no scars, no ecchymosis and no effusion.      ROM:   0° - 145°.      Palpation:   There is no tenderness along medial joint line, medial plica, medial collateral ligament, pes bursa, iliotibial band, lateral collateral ligament, popliteal fossa, patellar tendon, or quadriceps tendon.  There is tenderness along the lateral joint line.    Stability: - anterior drawer, - Lachman, - pivot  shift and - posterior drawer.      No instability with varus or valgus stress at 0° or 30°. Negative dial  test at 30° and 90°.    Tests:   - Lors test.  - patellar compression - grind test, + crepitus.  There is no patellar apprehension. - J Sign. - Lesley's. - patellar tilt. . Lateral patella translation  2 quadrants. Medial patella translation 2 quadrants    Motor:   Quadriceps strength is 5/5 and hamstrings strength is 5/5. Hamstrings show no tightness.      Neuro:   Distal neurovascular status is normal    Vascular: Negative Homans and no palpable popliteal cords. 2+ pedal pulse with brisk cap refill    Gait: Normal       IMAGING    X-Ray Tibia Fibula 2 View Right  Narrative: EXAMINATION:  XR TIBIA FIBULA 2 VIEW RIGHT    CLINICAL HISTORY:  Pain in right lower leg    TECHNIQUE:  AP and lateral views of the right tibia and fibula were performed.    COMPARISON:  None.    FINDINGS:  Tibia and fibula are intact.  Show degenerative change can be seen at the knee.  And a bony spur is seen at the insertion of the Achilles tendon.  Impression: See above    Electronically signed by: Bradley Gutierrez MD  Date:    06/09/2025  Time:    09:45    X-Ray Knee 1 or 2 View Right  Order: [id number]   Status: Final result       Next appt: 06/19/2025 at 01:45 PM in Radiology ([facility] XR1)       Dx: History of recent fall    Test Result Released: Yes (seen)       Messages: Seen    1 Result Note       1 Patient Communication       View Follow-Up Encounter  Details    Reading Physician Reading Date Result Priority   Bud Amos III, MD  [phone]  [phone]  4/21/2025 Routine     Narrative & Impression  EXAMINATION:  XR KNEE 1 OR 2 VIEW RIGHT     CLINICAL HISTORY:  History of falling     FINDINGS:  Knee two views right.     No fracture, dislocation, or bone destruction seen.  No acute trauma seen.  There are spurs on the patella.  No OCD or erosion seen.     Impression:     No acute process seen.        Electronically  signed by:Bud Amos MD  Date:                                            04/21/2025  Time:                                           09:29        Exam Ended: 04/21/25 09:18 CDT Last Resulted: 04/21/25 09:29 CDT           IMPRESSION       ICD-10-CM ICD-9-CM   1. Pain in right lower leg  M79.661 729.5   2. Contusion of right knee, initial encounter  S80.01XA 924.11   3. Chondromalacia patellae of right knee  M22.41 717.7       MEDICATIONS PRESCRIBED      None    RECOMMENDATIONS     Assessment & Plan    PLAN SUMMARY:  - Take anti-inflammatory medication as needed for knee pain  - Apply ice to knee  - Ultrasound-guided diagnostic injection procedure explained and recommended, but patient declined at this time  - Continue topical home remedy as needed  - Follow up if knee pain does not improve  - Consider getting an MRI if symptoms persist for evaluation of the lateral meniscus and patellofemoral compartment    MEDICATIONS:  - Take anti-inflammatory medication as needed for knee pain.    PROCEDURES:  - Discussed injection options for the knee.  - Explained ultrasound-guided injection procedure, including use of cold spray for numbing the skin.  - Susan did not agree to injection at this time.    FOLLOW UP:  - Follow up if knee pain does not improve.    PATIENT INSTRUCTIONS:  - Apply ice to knee.             All of their questions were answered.  They will call the clinic with any questions or concerns in the interim.     Should the patient's symptoms worsen, persist, or fail to improve they should return for reevaluation and I would be happy to see them back anytime.                  Luis F Neves PA-C       Please be aware that this note has been generated with the assistance of [facility] voice-to-text.  Please excuse any spelling or grammatical errors.     Thank you for choosing Luis F Neves PA-C for your sports medicine care. It is our goal to provide you with exceptional care that will help keep you healthy,  active, and get you back in the game.     If you felt that you received exemplary care today, please consider leaving feedback for Mr. Edinson PA-C on [facility] at [email or web address]       Please do not hesitate to reach out to us via email, phone, or MyChart with any questions, concerns, or feedback.       This note was generated with the assistance of ambient listening technology. Verbal consent was obtained by the patient and accompanying visitor(s) for the recording of patient appointment to facilitate this note. I attest to having reviewed and edited the generated note for accuracy, though some syntax or spelling errors may persist. Please contact the author of this note for any clarification.           [1]   Social History  Socioeconomic History    Marital status:    Tobacco Use    Smoking status: Never    Smokeless tobacco: Never   Substance and Sexual Activity    Alcohol use: No    Drug use: No    Sexual activity: Not Currently     Social Drivers of Health     Financial Resource Strain: Patient Declined (1/23/2025)    Overall Financial Resource Strain (CARDIA)     Difficulty of Paying Living Expenses: Patient declined   Recent Concern: Financial Resource Strain - High Risk (12/22/2024)    Received from [facility] SDOH Screening     In the past year, have you been unable to get any of the following when you really needed them? choose all that apply.: Clothing     In the past year, have you been unable to get any of the following when you really needed them? choose all that apply.: Medicine or health care   Food Insecurity: Patient Declined (1/23/2025)    Hunger Vital Sign     Worried About Running Out of Food in the Last Year: Patient declined     Ran Out of Food in the Last Year: Patient declined   Recent Concern: Food Insecurity - High Risk (12/22/2024)    Received from [facility] SDOH Screening     In the past 2 months, did you or  others you live with eat smaller meals or skip meals because you didn't have money for food?: Yes   Transportation Needs: No Transportation Needs (1/9/2024)    PRAPARE - Transportation     Lack of Transportation (Medical): No     Lack of Transportation (Non-Medical): No   Physical Activity: Insufficiently Active (1/9/2024)    Exercise Vital Sign     Days of Exercise per Week: 2 days     Minutes of Exercise per Session: 30 min   Stress: No Stress Concern Present (1/23/2025)    [employer] [place] [employer] - Occupational Stress Questionnaire     Feeling of Stress : Not at all   Housing Stability: Unknown (1/23/2025)    Housing Stability Vital Sign     Unable to Pay for Housing in the Last Year: Patient declined   Recent Concern: Housing Stability - High Risk (12/22/2024)    Received from [facility] Screening     In the past year, have you been unable to get any of the following when you really needed them? choose all that apply.: Utilities (electric, gas, and water)   [2]   Current Outpatient Medications:     albuterol (ACCUNEB) 0.63 mg/3 mL Nebu, Take 3 mLs (0.63 mg total) by nebulization every 6 (six) hours as needed (wheezing). Rescue, Disp: 75 mL, Rfl: 0    albuterol (PROVENTIL/VENTOLIN HFA) 90 mcg/actuation inhaler, INHALE 2 PUFFS INTO THE LUNGS EVERY 6 (SIX) HOURS AS NEEDED FOR WHEEZING. RESCUE, Disp: 54 g, Rfl: 3    albuterol-ipratropium (DUO-NEB) 2.5 mg-0.5 mg/3 mL nebulizer solution, Take 3 mLs by nebulization every 6 (six) hours as needed for Wheezing or Shortness of Breath. Rescue, Disp: 75 mL, Rfl: 3    ALPRAZolam (XANAX) 1 MG tablet, Take 1 tablet (1 mg total) by mouth 4 (four) times daily as needed for Anxiety., Disp: 120 tablet, Rfl: 2    amLODIPine (NORVASC) 5 MG tablet, TAKE 1 TABLET (5 MG TOTAL) BY MOUTH DAILY., Disp: 90 tablet, Rfl: 3    atorvastatin (LIPITOR) 40 MG tablet, Take 1 tablet (40 mg total) by mouth once daily., Disp: 90 tablet, Rfl: 1    azelastine (ASTELIN)  137 mcg (0.1 %) nasal spray, SPRAY 2 SPRAYS BY NASAL ROUTE 2 TIMES DAILY., Disp: 90 mL, Rfl: 3    blood sugar diagnostic Strp, Use as instructed to check blood sugar 2-3 times daily and whenever feeling ill., Disp: 200 each, Rfl: 3    blood sugar diagnostic Strp, To check BG two times daily, to use with insurance preferred meter, Disp: 100 strip, Rfl: 1    blood-glucose meter kit, To check BG two times daily, to use with insurance preferred meter, Disp: 1 each, Rfl: 0    dicyclomine (BENTYL) 20 mg tablet, Take 1 tablet (20 mg total) by mouth 4 (four) times daily as needed (stomach pain)., Disp: 90 tablet, Rfl: 1    docusate sodium (COLACE) 100 MG capsule, Take 1 capsule (100 mg total) by mouth every evening., Disp: 90 capsule, Rfl: 3    dulaglutide (TRULICITY) 3 mg/0.5 mL pen injector, Inject 3 mg into the skin every 7 days., Disp: 12 pen , Rfl: 1    DULoxetine (CYMBALTA) 20 MG capsule, Take 1 capsule (20 mg total) by mouth once daily., Disp: 30 capsule, Rfl: 11    ergocalciferol (ERGOCALCIFEROL) 50,000 unit Cap, Take 1 capsule (50,000 Units total) by mouth every 7 days., Disp: 12 capsule, Rfl: 3    fluticasone-salmeterol diskus inhaler 250-50 mcg, Inhale 1 puff into the lungs 2 (two) times daily. Controller, Disp: 60 each, Rfl: 11    lancets (ACCU-CHEK SOFTCLIX LANCETS) Misc, Use as instructed to check blood sugar 2-3 times daily and whenever feeling ill., Disp: 200 each, Rfl: 3    lancets Misc, To check BG two times daily, to use with insurance preferred meter, Disp: 100 each, Rfl: 1    LINZESS 72 mcg Cap capsule, TAKE 1 CAPSULE (72 MCG TOTAL) BY MOUTH DAILY AS NEEDED (CONSTIPATION)., Disp: 30 capsule, Rfl: 2    montelukast (SINGULAIR) 10 mg tablet, TAKE 1 TABLET BY MOUTH EVERY DAY IN THE EVENING, Disp: 90 tablet, Rfl: 3    naratriptan (AMERGE) 1 MG Tab, TAKE 1 TABLET BY MOUTH 2 TIMES DAILY AS NEEDED (HEADACHE). MAY REPEAT X1 AFTER 4 HOURS, Disp: 12 tablet, Rfl: 11    nebulizer and compressor Jovana, by  Misc.(Non-Drug; Combo Route) route., Disp: , Rfl:     pantoprazole (PROTONIX) 40 MG tablet, Take 1 tablet (40 mg total) by mouth once daily., Disp: 90 tablet, Rfl: 3    polyethylene glycol (GLYCOLAX) 17 gram/dose powder, DISSOLVE 17 GRAMS IN 8 OZ OF FLUID LIQUID DRINK DAILY AS DIRECTED, Disp: 510 g, Rfl: 3    predniSONE (DELTASONE) 20 MG tablet, Take 1 tablet (20 mg total) by mouth once daily., Disp: 5 tablet, Rfl: 0    spironolactone (ALDACTONE) 50 MG tablet, Take 1 tablet (50 mg total) by mouth once daily., Disp: 90 tablet, Rfl: 1    zolpidem (AMBIEN) 10 mg Tab, Take 2 tablets (20 mg total) by mouth nightly as needed (As needed)., Disp: 60 tablet, Rfl: 3    Current Facility-Administered Medications:     albuterol-ipratropium 2.5 mg-0.5 mg/3 mL nebulizer solution 3 mL, 3 mL, Nebulization, 1 time in Clinic/HOD,

## 2025-06-09 NOTE — TELEPHONE ENCOUNTER
Copied from CRM #[id number]. Topic: Appointments - Appointment Access  >> Jun 9, 2025  8:18 AM Rowena wrote:  Pt  is asking to speak to someone in the office to change appt time only. Pt is asking to please keep appt on the same date if possible. Pt is asking for a return call first before making changes. Please call. Thanks.              New appt time pt  is requesting to change to: If pt's appt on 6/19 can be for 11am or 11:30am due to schedule conflict         Additional Information:[phone]

## 2025-06-10 ENCOUNTER — TELEPHONE (OUTPATIENT)
Dept: INTERNAL MEDICINE | Facility: CLINIC | Age: 65
End: 2025-06-10
Payer: MEDICARE

## 2025-06-10 ENCOUNTER — TELEPHONE (OUTPATIENT)
Dept: SPORTS MEDICINE | Facility: CLINIC | Age: 65
End: 2025-06-10
Payer: MEDICARE

## 2025-06-10 NOTE — TELEPHONE ENCOUNTER
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

## 2025-06-10 NOTE — TELEPHONE ENCOUNTER
Spoke to th Pt about her questions for her multiple appts on 6/19.  We discussed that she has been seen for her knee by another sports med provider and asked if there was a need for that.  She stated that she was fine with the cancellation.

## 2025-06-13 DIAGNOSIS — J45.51 SEVERE PERSISTENT ASTHMA WITH ACUTE EXACERBATION: ICD-10-CM

## 2025-06-13 NOTE — TELEPHONE ENCOUNTER
No care due was identified.  [facility] Embedded Care Due Messages. Reference number: [id number].   6/13/2025 12:23:23 AM CDT

## 2025-06-13 NOTE — TELEPHONE ENCOUNTER
Call center left all the wrong details     Correct phone number: [phone]  Referral for endocrinology     Called their office and they hung up     Second attempt:Success   Received fax number from [facility]'s call center.    Sent over pt's printed referral and demographics info.

## 2025-06-13 NOTE — TELEPHONE ENCOUNTER
Copied from CRM #[id number]. Topic: General Inquiry - Patient Advice  >> Jun 13, 2025  8:23 AM Gwendolyn wrote:     Consult/Advisory     Name Of Caller:Susan Courtney         Contact Preference:[phone] (home)      Nature of call:Patient is calling to get her referral for oncology sent to [place].Requesting a call

## 2025-06-13 NOTE — TELEPHONE ENCOUNTER
Copied from CRM #[id number]. Topic: General Inquiry - Patient Advice  >> Jun 13, 2025  8:09 AM Med Assistant Lacey wrote:  Type: Patient Call Back    Who called: Patient    What is the request in detail: Pt needs a referral to be sent to Endocrinologist at [facility] phone- [phone]. Pt did not have fax number. Please assist.    Can the clinic reply by [facility]? no    Would the patient rather a call back or a response via [facility]? call    Best call back number: [phone] (M)      Additional Information: PLEASE CALL PT SHE HAS ANOTHER REFERRAL TO BE SENT TO [facility] AS WELL.

## 2025-06-13 NOTE — TELEPHONE ENCOUNTER
Refill Routing Note   Medication(s) are not appropriate for processing by [facility] for the following reason(s):        Due for refill >6 months ago    [facility] action(s):  Defer        Medication Therapy Plan:  on med list      Appointments  past 12m or future 3m with PCP    Date Provider   Last Visit   3/17/2025 Letty Kumar MD   Next Visit   Visit date not found Letty Kumar MD   ED visits in past 90 days: 0        Note composed:9:13 AM 2025

## 2025-06-13 NOTE — TELEPHONE ENCOUNTER
Spoke to pt and let them know that her referral has successfully been sent over.     Pt verbalized understanding.

## 2025-06-13 NOTE — TELEPHONE ENCOUNTER
Copied from CRM #[id number]. Topic: General Inquiry - Patient Advice  >> Jun 13, 2025  8:23 AM Gwendolyn wrote:     Consult/Advisory     Name Of Caller:Susan Courtney         Contact Preference:[phone] (home)      Nature of call:Patient is calling to get her referral for oncology sent to [place].Requesting a call

## 2025-06-13 NOTE — TELEPHONE ENCOUNTER
Copied from CRM #[id number]. Topic: General Inquiry - Patient Advice  >> Jun 13, 2025  8:23 AM Gwendolyn wrote:     Consult/Advisory     Name Of Caller:Susan Courtney         Contact Preference:[phone] (home)      Nature of call:Patient is calling to get her referral for oncology sent to [place].Requesting a call

## 2025-06-16 ENCOUNTER — TELEPHONE (OUTPATIENT)
Dept: INTERNAL MEDICINE | Facility: CLINIC | Age: 65
End: 2025-06-16
Payer: MEDICARE

## 2025-06-16 RX ORDER — FLUTICASONE PROPIONATE AND SALMETEROL 250; 50 UG/1; UG/1
1 POWDER RESPIRATORY (INHALATION) 2 TIMES DAILY
Qty: 180 EACH | Refills: 3 | Status: SHIPPED | OUTPATIENT
Start: 2025-06-16

## 2025-06-16 NOTE — TELEPHONE ENCOUNTER
Janeth Herzog, GELA  AK    6/13/25  2:50 PM  Note  Spoke to pt and let them know that her referral has successfully been sent over.      Pt verbalized understanding.

## 2025-06-16 NOTE — TELEPHONE ENCOUNTER
Copied from CRM #[id number]. Topic: General Inquiry - Patient Advice  >> Jun 13, 2025  8:23 AM Gwendolyn wrote:     Consult/Advisory     Name Of Caller:Susan Courtney         Contact Preference:[phone] (home)      Nature of call:Patient is calling to get her referral for oncology sent to [place].Requesting a call

## 2025-06-16 NOTE — TELEPHONE ENCOUNTER
Copied from CRM #[id number]. Topic: General Inquiry - Patient Advice  >> Jun 13, 2025  8:23 AM Gwendolyn wrote:     Consult/Advisory     Name Of Caller:Susan Courtney         Contact Preference:[phone] (home)      Nature of call:Patient is calling to get her referral for oncology sent to [place].Requesting a call   n/a

## 2025-07-21 NOTE — PROGRESS NOTES
Endocrinology New Visit   07/22/2025      Subjective:      Chief Complaint:  Hypertension and Medication Refill      History of Present Illness  Susan Courtney is a 65 y.o. female with known medical history of HTN, non insulin dependent T2DM, HLD, and migraine referred by Dr. Letty Kumar for evaluation of HTN, abnormal aldosterone to renin ratio.      Regarding Hyperaldosteronism:    Initial evaluation in the setting of low potasium and difficult to control HTN led to testing of aldosterone and renin levels by PCP.  Aldosterone undetectably low with renin 0.1. Serum K was not checked at the same time as david and renin on 3/18/2025, but hypokalemia 1wk earlier with K 2.8 on 3/11/2025.  Previous labs with hypokalemia, appears more prevalent since 2023 it appears, some metabolic alkalosis in the past with bicarbonate levels above reference range.    Was previously managed with valsartan 300 mg and amlodipine 5 mg for hypertension. Patient was started on aldactone 50 mg on 4/2025 after lab results were obtained, valsartan 300 mg was discontinued at that time. Patient reports good control of blood pressure at home with ranges of systolic between 120-140 mmHg.   Endorses elevations in blood pressure when stressed or in pain.    Patient reports symptoms of fatigue and hair loss. Has been told she has hypokalemia on labs in the past. Does not supplement with potassium.  Reports history of migraines, takes amerge for it and denies frequent migraines now.    Family history:  Mom high blood pressure, breast cancer, and migraines  Daughter has hypertension, 36 years old, reports passing out in the past      Reports falling due to being off balance, tried PT.  Reports falling face first last year. After leaning from .     Lab Results   Component Value Date    ALDOSTERONE <3.0 03/18/2025    RENIN 0.1 03/18/2025        - Adrenal CT scan: No dedicated adrenal study, all imaging reporting adrenal gland morphology  "and characteristics indicate no abnormalities    - Current symptoms:  Hypertension, Headaches, hypokalemia    No   Yes  [x]    []  Resistant Hypertension (despite adherence to an appropriate, three-drug regimen including a diuretic)  []    [x]  Hypokalemia (10-35%)  [x]    []  Metabolic alkalosis  [x]    []  Family history of hyperaldosteronism or significant hypertension  []    [x]  Taking spironolactone or eplerenone   [x]    []  Taking ACEI/ARB      Denies: muscle weakness/cramps, paresthesias.    Blood pressure in clinic today 134/78    In [place] to Type 2 DM    Appears to be diagnosed at least 5 years ago as first abnormal A1C was in 2020 at 8.5%  Recently A1C 6.3%  Managed with trulicity 3mg, takes on Thursday evening, also takes Jardiance 10 mg.   Reports diabetes runs in family.  Does not check blood glucose.  Was previously on metformin, stopped after sisters told them she is on dialysis from the metformin.    Takes prednisone prn for asthma. Reports taking infrequently.   Unable to verify last time she took steroids.      ROS:   As above    Objective:     /78 (BP Location: Left arm, Patient Position: Sitting)   Pulse 80   Resp 18   Ht 5' 5" (1.651 m)   Wt 82.8 kg (182 lb 8.7 oz)   SpO2 98%   BMI 30.38 kg/m²   BP Readings from Last 3 Encounters:   07/22/25 134/78   04/17/25 137/74   03/17/25 (!) 142/70     Wt Readings from Last 1 Encounters:   07/22/25 0913 82.8 kg (182 lb 8.7 oz)     Body mass index is 30.38 kg/m².      Physical Exam  Vitals reviewed.   Constitutional:       General: not in acute distress.     Appearance: not ill-appearing.   HENT:      Head: Normocephalic.      Mouth/Throat:      Mouth: Mucous membranes are moist.   Neck:      Thyroid:  No cervical LAD.  Eyes:      Conjunctiva/sclera: Conjunctivae normal.   Cardiovascular:      Rate and Rhythm: Normal rate.   Pulmonary:      Effort: Pulmonary effort is normal.   Neurological:      Mental Status: alert and oriented to person, " place, and time.   Psychiatric:         Mood and Affect: Mood normal.         Behavior: Behavior normal.       Lab Review:   Lab Results   Component Value Date    HGBA1C 6.3 (H) 01/15/2025     Lab Results   Component Value Date    CHOL 132 01/15/2025    HDL 47 01/15/2025    LDLCALC 61.4 (L) 01/15/2025    TRIG 118 01/15/2025    CHOLHDL 35.6 01/15/2025     Lab Results   Component Value Date     04/21/2025    K 3.5 04/21/2025     04/21/2025    CO2 26 04/21/2025     (H) 04/21/2025    BUN 11 04/21/2025    CREATININE 0.8 04/21/2025    CALCIUM 9.5 04/21/2025    PROT 7.2 03/18/2025    ALBUMIN 3.4 (L) 03/18/2025    BILITOT 0.2 03/18/2025    ALKPHOS 85 03/18/2025    AST 13 03/18/2025    ALT 12 03/18/2025    ANIONGAP 8 04/21/2025    ESTGFRAFRICA >60 11/16/2021    EGFRNONAA >60 11/16/2021    TSH 0.823 01/15/2025     Vit D, 25-Hydroxy   Date Value Ref Range Status   07/16/2024 42 30 - 96 ng/mL Final     Comment:     Vitamin D deficiency.........<10 ng/mL                              Vitamin D insufficiency......10-29 ng/mL       Vitamin D sufficiency........> or equal to 30 ng/mL  Vitamin D toxicity............>100 ng/mL           All relevant labs and imaging reviewed.    Assessment and Plan     1. Intractable migraine with aura without status migrainosus    Assessment & Plan:  Reports history of chronic migraine  Currently managed with amerge which she states has greatly helped her symptoms  Does not check blood pressure during migraine episodes      2. Primary hypertension  Assessment & Plan:  Managed with Amlodipine 5 mg and Aldactone 50 mg at home, reports blood pressure ranges in the 140s systolic, can go up higher if in pain.  Endorses headaches    -Will have patient stop aldactone at this time for appropriate evaluation of labs. Please see plan under abnormal aldosterone to renin ratio  -Will increase amlodipine dose to 10 mg in the setting of stopping aldactone      3. 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 Patient reports she was not taking aldactone during this lab, only valsartan.  Labs not consistent with hyper aldosteronism. In order for diagnosis:  Random, paired PRA or PRC is suppressed (less than 1 ng/mL/hour or less than the lower limit of reference range, respectively) and the paired PAC is inappropriately high (typically greater than 15 ng/dL, but the cutoff is 10 ng/dL), then confirmatory testing of inappropriate aldosterone secretion should take place    2. If patient has spontaneous hypokalemia, undetectable PRA or PRC, and a PAC greater than or equal to 20 ng/dL, then it is definitive primary hyperaldosteronism and does not require confirmatory testing    Important to note patient reportedly takes steroids prn for asthma, this can interfere with labs and lead to low renin and aldosterone levels  Previous imaging, although none dedicated to adrenal do not report abnormalities to the adrenal gland.    -Will assess aldosterone and renin levels appropriately by having patient stop aldactone at this time and stop exogenous steroids for a few days prior to lab.  -repeat aldosterone and renin level along with matched bmp for electrolyte evaluation in 4 weeks. Patient is to stop aldactone completely during this time.      4. Type 2 diabetes mellitus with hyperglycemia, without long-term current use of insulin  Assessment & Plan:  Managed with Trulicity 3 mg and Jardiance 10 mg  A1C 6.3% indicating good control    -Will defer management to PCP        Visit today included increased complexity associated with the care of the problems addressed and managing the longitudinal care of the patient due to the serious and/or complex managed problems    Crescencio Brewer MD  [facility]

## 2025-07-22 ENCOUNTER — PATIENT MESSAGE (OUTPATIENT)
Dept: ENDOCRINOLOGY | Facility: CLINIC | Age: 65
End: 2025-07-22

## 2025-07-22 ENCOUNTER — OFFICE VISIT (OUTPATIENT)
Dept: ENDOCRINOLOGY | Facility: CLINIC | Age: 65
End: 2025-07-22
Payer: MEDICARE

## 2025-07-22 VITALS
SYSTOLIC BLOOD PRESSURE: 134 MMHG | RESPIRATION RATE: 18 BRPM | HEART RATE: 80 BPM | OXYGEN SATURATION: 98 % | HEIGHT: 65 IN | DIASTOLIC BLOOD PRESSURE: 78 MMHG | BODY MASS INDEX: 30.42 KG/M2 | WEIGHT: 182.56 LBS

## 2025-07-22 DIAGNOSIS — E11.65 TYPE 2 DIABETES MELLITUS WITH HYPERGLYCEMIA, WITHOUT LONG-TERM CURRENT USE OF INSULIN: ICD-10-CM

## 2025-07-22 DIAGNOSIS — R79.89 ABNORMAL ALDOSTERONE TO RENIN RATIO: ICD-10-CM

## 2025-07-22 DIAGNOSIS — G43.119 INTRACTABLE MIGRAINE WITH AURA WITHOUT STATUS MIGRAINOSUS: Primary | ICD-10-CM

## 2025-07-22 DIAGNOSIS — R10.9 STOMACH PAIN: Primary | ICD-10-CM

## 2025-07-22 DIAGNOSIS — I10 PRIMARY HYPERTENSION: ICD-10-CM

## 2025-07-22 PROCEDURE — G2211 COMPLEX E/M VISIT ADD ON: HCPCS | Mod: S$GLB,,, | Performed by: STUDENT IN AN ORGANIZED HEALTH CARE EDUCATION/TRAINING PROGRAM

## 2025-07-22 PROCEDURE — 3078F DIAST BP <80 MM HG: CPT | Mod: CPTII,S$GLB,, | Performed by: STUDENT IN AN ORGANIZED HEALTH CARE EDUCATION/TRAINING PROGRAM

## 2025-07-22 PROCEDURE — 3075F SYST BP GE 130 - 139MM HG: CPT | Mod: CPTII,S$GLB,, | Performed by: STUDENT IN AN ORGANIZED HEALTH CARE EDUCATION/TRAINING PROGRAM

## 2025-07-22 PROCEDURE — 99204 OFFICE O/P NEW MOD 45 MIN: CPT | Mod: S$GLB,,, | Performed by: STUDENT IN AN ORGANIZED HEALTH CARE EDUCATION/TRAINING PROGRAM

## 2025-07-22 PROCEDURE — 3066F NEPHROPATHY DOC TX: CPT | Mod: CPTII,S$GLB,, | Performed by: STUDENT IN AN ORGANIZED HEALTH CARE EDUCATION/TRAINING PROGRAM

## 2025-07-22 PROCEDURE — 4010F ACE/ARB THERAPY RXD/TAKEN: CPT | Mod: CPTII,S$GLB,, | Performed by: STUDENT IN AN ORGANIZED HEALTH CARE EDUCATION/TRAINING PROGRAM

## 2025-07-22 PROCEDURE — 3044F HG A1C LEVEL LT 7.0%: CPT | Mod: CPTII,S$GLB,, | Performed by: STUDENT IN AN ORGANIZED HEALTH CARE EDUCATION/TRAINING PROGRAM

## 2025-07-22 PROCEDURE — 3008F BODY MASS INDEX DOCD: CPT | Mod: CPTII,S$GLB,, | Performed by: STUDENT IN AN ORGANIZED HEALTH CARE EDUCATION/TRAINING PROGRAM

## 2025-07-22 PROCEDURE — 99999 PR PBB SHADOW E&M-EST. PATIENT-LVL V: CPT | Mod: PBBFAC,,, | Performed by: STUDENT IN AN ORGANIZED HEALTH CARE EDUCATION/TRAINING PROGRAM

## 2025-07-22 PROCEDURE — 3061F NEG MICROALBUMINURIA REV: CPT | Mod: CPTII,S$GLB,, | Performed by: STUDENT IN AN ORGANIZED HEALTH CARE EDUCATION/TRAINING PROGRAM

## 2025-07-22 RX ORDER — FAMOTIDINE 40 MG/1
TABLET, FILM COATED ORAL
COMMUNITY

## 2025-07-22 RX ORDER — AMLODIPINE BESYLATE 10 MG/1
10 TABLET ORAL DAILY
Qty: 90 TABLET | Refills: 3 | Status: SHIPPED | OUTPATIENT
Start: 2025-07-22

## 2025-07-22 RX ORDER — METFORMIN HYDROCHLORIDE 1000 MG/1
1000 TABLET ORAL 2 TIMES DAILY WITH MEALS
COMMUNITY

## 2025-07-22 RX ORDER — DULAGLUTIDE 3 MG/.5ML
3 INJECTION, SOLUTION SUBCUTANEOUS
Qty: 12 PEN | Refills: 1 | Status: SHIPPED | OUTPATIENT
Start: 2025-07-22

## 2025-07-22 RX ORDER — HYDROCHLOROTHIAZIDE 25 MG/1
25 TABLET ORAL
COMMUNITY

## 2025-07-22 RX ORDER — CELECOXIB 200 MG/1
CAPSULE ORAL
COMMUNITY

## 2025-07-22 RX ORDER — RABEPRAZOLE SODIUM 20 MG/1
60 TABLET, DELAYED RELEASE ORAL
COMMUNITY
Start: 2024-09-20

## 2025-07-22 RX ORDER — SUCRALFATE 1 G/1
1 TABLET ORAL 4 TIMES DAILY
COMMUNITY
Start: 2025-03-11

## 2025-07-22 RX ORDER — OMEPRAZOLE 40 MG/1
CAPSULE, DELAYED RELEASE ORAL
COMMUNITY

## 2025-07-22 RX ORDER — METHOCARBAMOL 500 MG/1
TABLET, FILM COATED ORAL
COMMUNITY

## 2025-07-22 RX ORDER — TIZANIDINE 4 MG/1
4 TABLET ORAL DAILY PRN
COMMUNITY

## 2025-07-22 RX ORDER — VALSARTAN 320 MG/1
TABLET ORAL
COMMUNITY

## 2025-07-22 RX ORDER — VONOPRAZAN FUMARATE 13.36 MG/1
30 TABLET ORAL
COMMUNITY
Start: 2024-09-09

## 2025-07-22 NOTE — TELEPHONE ENCOUNTER
No care due was identified.  [facility] Embedded Care Due Messages. Reference number: [id number].   7/22/2025 11:13:40 AM CDT

## 2025-07-22 NOTE — TELEPHONE ENCOUNTER
Medication Pended  Allergies Review  Lov 04/17/2025    Refill Encounter    PCP Visits: Recent Visits  Date Type Provider Dept   04/17/25 Office Visit Flores Desai, TERESITA [facility]   03/17/25 Office Visit Letty Kumar MD [facility]   01/30/25 Office Visit Bigg Clancy NP [facility]   01/23/25 Office Visit Suleman Johnson PA-C [facility]   11/21/24 Office Visit Letty Kumar MD [facility]   Showing recent visits within past 360 days and meeting all other requirements  Future Appointments  No visits were found meeting these conditions.  Showing future appointments within next 720 days and meeting all other requirements      Last 3 Blood Pressure:   BP Readings from Last 3 Encounters:   07/22/25 134/78   04/17/25 137/74   03/17/25 (!) 142/70     Preferred Pharmacy:   [facility]/pharmacy #[id number] - [place], [place] - [address]  [address] [address]  Phone: [phone] Fax: [phone]    Requested RX:  Requested Prescriptions     Pending Prescriptions Disp Refills    dulaglutide (TRULICITY) 3 mg/0.5 mL pen injector 12 pen  1     Sig: Inject 3 mg into the skin every 7 days.      RX Route: Normal

## 2025-07-22 NOTE — ASSESSMENT & PLAN NOTE
Reports history of chronic migraine  Currently managed with amerge which she states has greatly helped her symptoms  Does not check blood pressure during migraine episodes

## 2025-07-22 NOTE — PATIENT INSTRUCTIONS
It was a pleasure meeting you today in endocrinology clinic, here is what we discussed:  We are evaluating an abnormal lab (aldosterone and renin level) in the setting of high blood pressure and low potassium    Please Stop the aldactone medication now  We will increase your amlodipine dose to 10 mg from 5 mg to control blood pressure. I have sent prescription to [facility] on [address]  We will repeat your aldosterone and renin levels in 4 weeks (this will be time you will not be taking aldactone). Please do this lab in the morning.  Please stop steroids at least 7 days before labs    Let us know if you have any questions or concerns

## 2025-07-22 NOTE — TELEPHONE ENCOUNTER
Care Due:                  Date            Visit Type   Department     Provider  --------------------------------------------------------------------------------                                EP -                              PRIMARY      [facility] INTERNAL  Last Visit: 03-      CARE ([facility])   MEDICINE       Letty Kumar  Next Visit: None Scheduled  None         None Found                                                            Last  Test          Frequency    Reason                     Performed    Due Date  --------------------------------------------------------------------------------    HBA1C.......  6 months...  dulaglutide..............  01-   07-    Vitamin D...  12 months..  ergocalciferol...........  07- 07-    [employer] Embedded Care Due Messages. Reference number: [id number].   7/22/2025 11:12:31 AM CDT

## 2025-07-22 NOTE — ASSESSMENT & PLAN NOTE
Managed with Trulicity 3 mg and Jardiance 10 mg  A1C 6.3% indicating good control    -Will defer management to PCP

## 2025-07-22 NOTE — PROGRESS NOTES
I have reviewed and concur with Dr. Brewer's history, physical, assessment, and plan.  I have personally interviewed and examined the patient.    3/18/2025 labs -- Aldosterone undetectably low with renin 0.1. Serum K was not checked at the same time as david and renin on 3/18/2025, but hypokalemia 1wk earlier with K 2.8 on 3/11/2025.    These labs are somewhat unusual and not typical for hyperaldosteronism. Despite this - Aldactone 50 mg was started mid April 2025. She does not have resistant hypertension per definition. Only other BP med currently is amlodipine 5 mg. Valsartan reportedly stopped due to hair loss (?). HCTZ on list but Rx never filled. She reports well controlled BP on current regimen.    Can see low david and renin 2/2  Exogenous mineralocorticoid or high-dose glucocorticoid treatment: Medication history is critical, as these agents can suppress the renin-aldosterone axis. She was prescribed high dose pred around the same time and has been on frequent courses of high dose prednisone for astthma. She states takes it prn but based on fill hx she is filling the Rx frequently. High dose steroids can cause this pattern of low aldosterone and low renin.     Before investigating other, rare causes of low aldosterone and low renin, need to recheck labs off aldactone for at least 4wks and with no exogenous glucocorticoids. Explained to the pt that if she needs prn steroids around time of lab appt in 4wks, she needs to notify us so lab appt can be moved. No steroids for at least a few days before lab appt. Since BP is controlled on amlodipine 5 mg and aldactone 50 mg and we are discontinuing aldactone, we will increase amlodipine to 10 mg. She should f/u with PCP for HTN management.    T2DM management per PCP. I would suggest switching Trulicity to Mounjaro for more weight loss potential as well as better BG control which may allow med regimen to be simplified.    Alesia Robertson MD

## 2025-07-22 NOTE — ASSESSMENT & PLAN NOTE
Managed with Amlodipine 5 mg and Aldactone 50 mg at home, reports blood pressure ranges in the 140s systolic, can go up higher if in pain.  Endorses headaches    -Will have patient stop aldactone at this time for appropriate evaluation of labs. Please see plan under abnormal aldosterone to renin ratio  -Will increase amlodipine dose to 10 mg in the setting of stopping aldactone

## 2025-07-22 NOTE — Clinical Note
1. David/renin should always be checked with a serum K at the same time. However -- unsure reason it was checked initially however this pattern is not typical for primary hyperaldosteronism, usually would see high david with low renin, not both low. Need to stop aldactone and re-assess renin, david, and BMP after at least 4 weeks off the MRA med. Also, steroids can cause low david with low renin. She is filling Rx for prednisone constantly and I suspect this contributed.  Second - would switch her trulicity to mounjaro. Also -- do not need to use a GLP1 with a DPP4 - they work on the same pathway. If on a GLP1 -- do not need Januvia. Defer T2DM management to you but just our suggestions.

## 2025-07-22 NOTE — ASSESSMENT & PLAN NOTE
Lab on 3/2025 of aldosterone and renin level obtained in the setting of unexplained hypokalemia and hypertension  Aldosterone <3, renin 0.1. Patient reports she was not taking aldactone during this lab, only valsartan.  Labs not consistent with hyper aldosteronism. In order for diagnosis:  Random, paired PRA or PRC is suppressed (less than 1 ng/mL/hour or less than the lower limit of reference range, respectively) and the paired PAC is inappropriately high (typically greater than 15 ng/dL, but the cutoff is 10 ng/dL), then confirmatory testing of inappropriate aldosterone secretion should take place    2. If patient has spontaneous hypokalemia, undetectable PRA or PRC, and a PAC greater than or equal to 20 ng/dL, then it is definitive primary hyperaldosteronism and does not require confirmatory testing    Important to note patient reportedly takes steroids prn for asthma, this can interfere with labs and lead to low renin and aldosterone levels    -Will assess aldosterone and renin levels appropriately by having patient stop aldactone at this time and stop exogenous steroids for a few days prior to lab.  -repeat aldosterone and renin level along with matched bmp for electrolyte evaluation in 4 weeks. Patient is to stop aldactone completely during this time.

## 2025-07-23 ENCOUNTER — TELEPHONE (OUTPATIENT)
Dept: ENDOCRINOLOGY | Facility: CLINIC | Age: 65
End: 2025-07-23
Payer: MEDICARE

## 2025-07-23 RX ORDER — DICYCLOMINE HYDROCHLORIDE 20 MG/1
20 TABLET ORAL 4 TIMES DAILY PRN
Qty: 90 TABLET | Refills: 1 | Status: SHIPPED | OUTPATIENT
Start: 2025-07-23

## 2025-07-23 NOTE — TELEPHONE ENCOUNTER
Copied from CRM #[id number]. Topic: Medications - Medication Refill  >> Jul 22, 2025 12:25 PM Med Assistant Lacey wrote:  Type: RX Refill Request    Who Called:  patient    Have you contacted your pharmacy: no    Refill or New Rx: refill    RX Name and Strength:  tiZANidine (ZANAFLEX) 4 MG tablet     How is the patient currently taking it? (ex. 1XDay): Take 4 mg by mouth daily as needed.    Is this a 30 day or 90 day RX: 30    Preferred Pharmacy with phone number: [facility]/pharmacy #[id number] - [place], [place] - [address]     Local or Mail Order: local    Ordering Provider: : Alesia Robertson MD    Would the patient rather a call back or a response via [facility] [employer]?  call    Best Call Back Number:   [phone] (M)      Additional Information:  none  >> Jul 22, 2025  2:54 PM Alesia Robertson MD wrote:  I am not managing the Zanaflex for this pt. Looks like this is a request for a non-endocrine medication.  ----- Message -----  From: Tamar Mackenzie MA  Sent: 7/22/2025   2:45 PM CDT  To: Alesia Robertson MD    >> Jul 22, 2025  2:45 PM Med Assistant Boyle wrote:  >> Jul 22, 2025  2:44 PM Med Assistant Boyle wrote:  Dr. Robertson, please advise

## 2025-07-29 ENCOUNTER — TELEPHONE (OUTPATIENT)
Dept: ENDOCRINOLOGY | Facility: CLINIC | Age: 65
End: 2025-07-29
Payer: MEDICARE

## 2025-08-13 ENCOUNTER — OFFICE VISIT (OUTPATIENT)
Dept: PSYCHIATRY | Facility: CLINIC | Age: 65
End: 2025-08-13
Payer: MEDICARE

## 2025-08-13 DIAGNOSIS — G47.00 INSOMNIA, UNSPECIFIED TYPE: ICD-10-CM

## 2025-08-13 DIAGNOSIS — F43.22 ADJUSTMENT DISORDER WITH ANXIOUS MOOD: Primary | ICD-10-CM

## 2025-08-13 PROCEDURE — 3066F NEPHROPATHY DOC TX: CPT | Mod: CPTII,95,,

## 2025-08-13 PROCEDURE — 98006 SYNCH AUDIO-VIDEO EST MOD 30: CPT | Mod: 95,,,

## 2025-08-13 PROCEDURE — G2211 COMPLEX E/M VISIT ADD ON: HCPCS | Mod: 95,,,

## 2025-08-13 PROCEDURE — 3044F HG A1C LEVEL LT 7.0%: CPT | Mod: CPTII,95,,

## 2025-08-13 PROCEDURE — 4010F ACE/ARB THERAPY RXD/TAKEN: CPT | Mod: CPTII,95,,

## 2025-08-13 PROCEDURE — 3061F NEG MICROALBUMINURIA REV: CPT | Mod: CPTII,95,,

## 2025-08-13 RX ORDER — DULOXETIN HYDROCHLORIDE 20 MG/1
20 CAPSULE, DELAYED RELEASE ORAL DAILY
Qty: 30 CAPSULE | Refills: 11 | Status: SHIPPED | OUTPATIENT
Start: 2025-08-13 | End: 2026-08-13

## 2025-08-13 RX ORDER — DIAZEPAM 10 MG/1
10 TABLET ORAL 2 TIMES DAILY PRN
Qty: 60 TABLET | Refills: 0 | Status: SHIPPED | OUTPATIENT
Start: 2025-08-13 | End: 2025-08-19

## 2025-08-13 RX ORDER — ZOLPIDEM TARTRATE 10 MG/1
20 TABLET ORAL NIGHTLY PRN
Qty: 60 TABLET | Refills: 3 | Status: SHIPPED | OUTPATIENT
Start: 2025-08-13 | End: 2025-12-11

## 2025-08-19 ENCOUNTER — TELEPHONE (OUTPATIENT)
Dept: PSYCHIATRY | Facility: CLINIC | Age: 65
End: 2025-08-19
Payer: MEDICARE

## 2025-08-19 DIAGNOSIS — F43.22 ADJUSTMENT DISORDER WITH ANXIOUS MOOD: Primary | ICD-10-CM

## 2025-08-19 RX ORDER — ALPRAZOLAM 1 MG/1
1 TABLET ORAL 2 TIMES DAILY
Qty: 60 TABLET | Refills: 2 | Status: SHIPPED | OUTPATIENT
Start: 2025-08-19 | End: 2025-11-17

## 2025-08-25 ENCOUNTER — TELEPHONE (OUTPATIENT)
Dept: INTERNAL MEDICINE | Facility: CLINIC | Age: 65
End: 2025-08-25
Payer: MEDICARE

## 2025-08-25 ENCOUNTER — LAB VISIT (OUTPATIENT)
Dept: LAB | Facility: OTHER | Age: 65
End: 2025-08-25
Attending: STUDENT IN AN ORGANIZED HEALTH CARE EDUCATION/TRAINING PROGRAM
Payer: MEDICARE

## 2025-08-25 DIAGNOSIS — R79.89 ABNORMAL ALDOSTERONE TO RENIN RATIO: ICD-10-CM

## 2025-08-25 LAB
ANION GAP (OHS): 10 MMOL/L (ref 8–16)
BUN SERPL-MCNC: 18 MG/DL (ref 8–23)
CALCIUM SERPL-MCNC: 9.5 MG/DL (ref 8.7–10.5)
CHLORIDE SERPL-SCNC: 106 MMOL/L (ref 95–110)
CO2 SERPL-SCNC: 29 MMOL/L (ref 23–29)
CREAT SERPL-MCNC: 0.7 MG/DL (ref 0.5–1.4)
GFR SERPLBLD CREATININE-BSD FMLA CKD-EPI: >60 ML/MIN/1.73/M2
GLUCOSE SERPL-MCNC: 109 MG/DL (ref 70–110)
POTASSIUM SERPL-SCNC: 3.7 MMOL/L (ref 3.5–5.1)
SODIUM SERPL-SCNC: 145 MMOL/L (ref 136–145)

## 2025-08-25 PROCEDURE — 82310 ASSAY OF CALCIUM: CPT

## 2025-08-25 PROCEDURE — 84244 ASSAY OF RENIN: CPT

## 2025-08-25 PROCEDURE — 82088 ASSAY OF ALDOSTERONE: CPT

## 2025-08-25 PROCEDURE — 36415 COLL VENOUS BLD VENIPUNCTURE: CPT

## 2025-08-28 LAB — W ALDOSTERONE: 7.2 NG/DL

## 2025-08-29 LAB — RENIN PLAS-CCNC: 1.2 NG/ML/H

## (undated) DEVICE — BLADE SURG STAINLESS STEEL #15

## (undated) DEVICE — GLOVE BIOGEL SKINSENSE PI 6.5

## (undated) DEVICE — BANDAGE ACE NON LATEX 2IN

## (undated) DEVICE — SUT 4/0 18IN ETHILON BL P3

## (undated) DEVICE — DRESSING ANTIMICROBIAL 1 INCH

## (undated) DEVICE — SEE MEDLINE ITEM 153151

## (undated) DEVICE — SOL PVP-I SCRUB 7.5% 4OZ

## (undated) DEVICE — BANDAGE MATRIX HK LOOP 2IN 5YD

## (undated) DEVICE — SNARE TONSIL

## (undated) DEVICE — CONTAINER SPECIMEN OR STER 4OZ

## (undated) DEVICE — GAUZE DERMACEA LOW PLY 3X4YRDS

## (undated) DEVICE — GLOVE BIOGEL PI MICRO INDIC 7

## (undated) DEVICE — SPONGE LAP 18X18 PREWASHED

## (undated) DEVICE — ELECTRODE REM PLYHSV RETURN 9

## (undated) DEVICE — ADHESIVE DERMABOND ADVANCED

## (undated) DEVICE — Device

## (undated) DEVICE — BLADE SURG STAINLESS STEEL #10

## (undated) DEVICE — NDL SAFETY 22G X 1.5 ECLIPSE

## (undated) DEVICE — SUT ETHIBOND EXCEL 1 CTX 18

## (undated) DEVICE — GLOVE BIOGEL SKINSENSE PI 6.0

## (undated) DEVICE — GAUZE SPONGE 4X4 12PLY

## (undated) DEVICE — APPLICATOR CHLORAPREP ORN 26ML

## (undated) DEVICE — DRAIN WND 15FRX3/16X4.7MM TRCR

## (undated) DEVICE — SYS CLSR DERMABOND PRINEO 22CM

## (undated) DEVICE — SPONGE GAUZE 16PLY 4X4

## (undated) DEVICE — NDL 18GA X1 1/2 REG BEVEL

## (undated) DEVICE — TUBE SUMP NASOGASTRIC 16FR

## (undated) DEVICE — EVACUATOR WOUND BULB 100CC

## (undated) DEVICE — SKIN MARKER DEVON 160

## (undated) DEVICE — CATH RED RUBBER 8FR

## (undated) DEVICE — GOWN SMART IMP BREATHABLE XXLG

## (undated) DEVICE — SOL 9P NACL IRR PIC IL

## (undated) DEVICE — GLOVE BIOGEL ECLIPSE SZ 7

## (undated) DEVICE — TOURNIQUET SB QC DP 18X4IN

## (undated) DEVICE — SEE MEDLINE ITEM 152622

## (undated) DEVICE — SUT ETHIBOND 0 CR/CT-2 8-18

## (undated) DEVICE — SEE L#120831

## (undated) DEVICE — PACK UPPER EXTREMITY BAPTIST

## (undated) DEVICE — SEE MEDLINE ITEM 157148

## (undated) DEVICE — SPONGE COTTON TRAY 4X4IN

## (undated) DEVICE — SYR 10CC LUER LOCK

## (undated) DEVICE — POSITIONER IV ARMBOARD FOAM

## (undated) DEVICE — STAPLER SKIN PROXIMATE WIDE

## (undated) DEVICE — GLOVE BIOGEL SKINSENSE PI 8.0

## (undated) DEVICE — DRAPE STERI-DRAPE 1000 17X11IN

## (undated) DEVICE — SUT VICRYL PLUS 3-0 SH 18IN

## (undated) DEVICE — SUT VICRYL+ 1 CTX 18IN VIOL

## (undated) DEVICE — POSITIONER HEAD DONUT 9IN FOAM

## (undated) DEVICE — GLOVE BIOGEL SKINSENSE PI 7.5

## (undated) DEVICE — CORD BIPOLAR 12 FOOT

## (undated) DEVICE — ELECTRODE BLD EXT INSUL 1

## (undated) DEVICE — TAPE SURG DURAPORE 2 X10YD

## (undated) DEVICE — FORCEP STRAIGHT DISP

## (undated) DEVICE — SEE MEDLINE ITEM 156930

## (undated) DEVICE — GLOVE BIOGEL SKINSENSE PI 7.0

## (undated) DEVICE — SEE MEDLINE ITEM 157110

## (undated) DEVICE — SUT MCRYL PLUS 4-0 PS2 27IN

## (undated) DEVICE — BANDAGE KERLIX P/P 2.25IN STER

## (undated) DEVICE — SYR B-D DISP CONTROL 10CC100/C

## (undated) DEVICE — DRESSING N ADH OIL EMUL 3X3

## (undated) DEVICE — PAD UNDERPAD 30X30

## (undated) DEVICE — KIT SURGIFLO HEMOSTATIC MATRIX

## (undated) DEVICE — SPONGE TONSIL DBL STRG MED STR